# Patient Record
Sex: MALE | Race: WHITE | Employment: OTHER | URBAN - METROPOLITAN AREA
[De-identification: names, ages, dates, MRNs, and addresses within clinical notes are randomized per-mention and may not be internally consistent; named-entity substitution may affect disease eponyms.]

---

## 2017-03-30 ENCOUNTER — OP HISTORICAL/CONVERTED ENCOUNTER (OUTPATIENT)
Dept: OTHER | Age: 82
End: 2017-03-30

## 2018-03-12 ENCOUNTER — IP HISTORICAL/CONVERTED ENCOUNTER (OUTPATIENT)
Dept: OTHER | Age: 83
End: 2018-03-12

## 2018-03-21 ENCOUNTER — DOCUMENTATION ONLY (OUTPATIENT)
Dept: CARDIOLOGY CLINIC | Age: 83
End: 2018-03-21

## 2018-03-21 NOTE — PROGRESS NOTES
Dual chamber pacemaker implantation performed at [facility] 3/16/18     Wound check 1 week   Clinic visit 4 weeks           Cardiac Electrophysiology Report       PATIENT INFORMATION     Patient Name: Sheryl Persaud MRN: [id number]                 Study Date: 3/16/2018     YOB: 1935 Age: 81 years         Gender: Male       Procedure: Lizzeth Townsend     Referring Physician:  Dr. Jan Stephens and Dr. Rodriguez Client     Duty   Name   Electrophysiologist   Dee Dee Zarate MD               PATIENT HISTORY     81year old male with hypertension, dyslipidemia, colon cancer admitted with newly diagnosed atrial fibrillation and noted to have bradycardia with pauses that precluded escalation of rate-controlling drug therapy. As a result, he is referred for implantation of a dual chamber pacemaker. PROCEDURE     The patient was brought to the Cardiac Electrophysiology laboratory in a post-absorptive, fasting state.  Informed consent was obtained. A peripheral IV was in place.  Continuous electrocardiographic, blood pressure, O2 saturation and  CO2 monitoring was initiated.  Intravenous antibiotics were administered pre-operatively. Self-adhesive cardioversion patches were positioned on the chest.  Conscious sedation was effectuated according to protocol. The patient was then prepped and draped in the usual sterile fashion.  A left subclavian venogram was performed and demonstrated patency of the vein. A 50/50 mixture of lidocaine (1%) with epinephrine and bupivicaine (0.5%) was utilized for local anesthesia. An incision was performed medial to the left delto-pectoral groove. Sharp and blunt dissection was carried down to the level of the pre-pectoralis fascia. Hemostasis was maintained with electrocautery. Extra-thoracic, subclavian venous access was obtained twice and sheaths were placed using the modified Seldinger technique.  Permanent pace/sense leads were advanced under fluoroscopic guidance and positioned in the right atrium and ventricle where stable pacing and sensing characteristics were encountered. The sheaths were peeled away and the leads were anchored to the pre-pectoralis fascia using O-ethibond non-absorbable suture material. A device pocket was then fashioned and flushed copiously with antibiotic solution. A pacemaker generator was connected to the leads and the generator was placed into the pocket. The device was secured to the pocket using O-ethibond, non-absorbable suture material. The pocket was then closed in three layers using 2-O vicryl, 3-O monocryl, 4-O monocryl absorbable suture material and Dermabond. The skin was closed using a sub-cuticular technique. A bio-occlusive dressing were applied to the skin. The patient remained hemodynamically stable, tolerated the procedure well and was transferred in stable condition. There were no immediate complications encountered during the procedure. There was minimal blood loss and no specimen were removed.         LEAD & GENERATOR DATA          Model #   Serial #   Generator   Perry e-SENS   [id number]          [id number]     Atrial Lead   [employer]   [id number]   [id number]   Ventricular Lead   Perry e-SENS   [id number]   [id number]       PACE/SENSE DATA       Sensed Wave (mV)   Threshold (V)   Impedance (Ohms)   Atrium                     7.4   1.3   797   Ventricle   14.7   0.4   935       FINAL PROGRAMMING     Mode   Lower Rate (ppm)   Upper Rate (ppm)   DDD   60   130       MEDICATION SUMMARY     Medication   Route   Unit   Total   Gentamycin   IV   mg   80   Ancef   IV   grams   2   Fentanyl   IV   micrograms   125   Versed   IV   grams   3       RADIOLOGY SUMMARY       Total   Fluoro Time (minutes)         10.9     Dose Area Product (mGy)   195       CONCLUSIONS     1. Successful implantation of a dual-chamber pacemaker. 2. IV antibiotics x 24 hours for 3 doses followed by Keflex 500 mg po tid x 5 days.    3. Monitor overnight on telemetry. 4. CXR (PA & lateral) and device interrogation in AM.   5. Possible discharge in AM.   6. Wound check in EP clinic in 7 days. 7. Follow up in EP clinic in 1 month or earlier if necessary. 8. Follow up with Dr. Ricky Luo and Dr. Radha Rasmussen as scheduled.       Sherry anderson, Dr. Ricky Luo and Dr. Radha Rasmussen for involving me in the care of this extraordinarily pleasant male.        Lynnette Goss MD   Cardiac Electrophysiology     Signature Line   Electronically Signed on 03/20/2018 09:55 EDT   ________________________________________________   Tristan Braun MD               Modified by: Tristan Braun MD on 03/16/2018 09:07 EDT     Modified by: Tristan Braun MD on 03/20/2018 09:55 EDT     Result type: Operative Report   Result date: March 16, 2018 09:05 EDT   Result status: Auth (Verified)   Performed by: Tristan Braun MD on March 16, 2018 09:07 EDT   Verified by: Tristan Braun MD on March 20, 2018 09:55 EDT   Encounter info: [id number], [address], Inpatient, 3/12/2018 - 3/19/2018

## 2018-03-28 ENCOUNTER — OFFICE VISIT (OUTPATIENT)
Dept: CARDIOLOGY CLINIC | Age: 83
End: 2018-03-28

## 2018-03-28 VITALS
BODY MASS INDEX: 30.61 KG/M2 | RESPIRATION RATE: 18 BRPM | HEART RATE: 60 BPM | SYSTOLIC BLOOD PRESSURE: 122 MMHG | WEIGHT: 195 LBS | HEIGHT: 67 IN | DIASTOLIC BLOOD PRESSURE: 72 MMHG | OXYGEN SATURATION: 97 %

## 2018-03-28 DIAGNOSIS — Z51.89 VISIT FOR WOUND CHECK: ICD-10-CM

## 2018-03-28 DIAGNOSIS — Z95.0 PACEMAKER: ICD-10-CM

## 2018-03-28 DIAGNOSIS — I49.5 SSS (SICK SINUS SYNDROME) (HCC): Primary | ICD-10-CM

## 2018-03-28 RX ORDER — AMIODARONE HYDROCHLORIDE 200 MG/1
100 TABLET ORAL DAILY
COMMUNITY
Start: 2018-03-19

## 2018-03-28 RX ORDER — CEPHALEXIN 500 MG/1
500 CAPSULE ORAL 3 TIMES DAILY
Qty: 15 CAP | Refills: 0 | Status: SHIPPED | OUTPATIENT
Start: 2018-03-28 | End: 2018-04-02

## 2018-03-28 RX ORDER — METOPROLOL SUCCINATE 25 MG/1
25 TABLET, EXTENDED RELEASE ORAL DAILY
COMMUNITY
Start: 2018-03-19

## 2018-03-28 NOTE — MR AVS SNAPSHOT
[address] [facility] 600 1007 [facility] 
[phone] Patient: William Chau MRN: [id number] :1935 Visit Information Date & Time Provider Department Dept. Phone Encounter #  
 3/28/2018  2:40 PM Sky Shipley NP [facility] Melissa Ornelas [phone] [id number] Your Appointments 2018  1:20 PM  
ESTABLISHED PATIENT with Uyen Heck MD  
[facility] ([address]) Appt Note: 3 week follow up per Ailyn Vegas [address] [facility] 600 1007 [facility]  
[address] [facility] [address] [address] Upcoming Health Maintenance Date Due DTaP/Tdap/Td series (1 - Tdap) 3/9/1957 ZOSTER VACCINE AGE 60> 1996 GLAUCOMA SCREENING Q2Y 3/9/2001 Pneumococcal 65+ Low/Medium Risk (1 of 2 - PCV13) 3/9/2001 Influenza Age 5 to Adult 2017 MEDICARE YEARLY EXAM 3/20/2018 Allergies as of 3/28/2018  Review Complete On: 3/28/2018 By: Sky Shipley NP Severity Noted Reaction Type Reactions Monosodium Glutamate High 2016    Anaphylaxis, Hives Adhesive Tape-silicones      Other (comments) BLISTERS Current Immunizations  Never Reviewed No immunizations on file. Not reviewed this visit You Were Diagnosed With   
  
 Codes Comments SSS (sick sinus syndrome) (HCC)    -  Primary ICD-10-CM: I49.5 ICD-9-CM: 427.81 Pacemaker     ICD-10-CM: Z95.0 ICD-9-CM: V45.01 Visit for wound check     ICD-10-CM: Z51.89 ICD-9-CM: V58.89 Vitals BP Pulse Resp Height(growth percentile) Weight(growth percentile) SpO2  
 122/72 (BP 1 Location: Right arm, BP Patient Position: Sitting) 60 18 5' 7\" (1.702 m) 195 lb (88.5 kg) 97% BMI Smoking Status 30.54 kg/m2 Former Smoker Vitals History BMI and BSA Data  Body Mass Index Body Surface Area 30.54 kg/m 2 2.05 m 2 Preferred Pharmacy Pharmacy Name Phone [address] [facility], [facility] [address] ([address] [phone] Your Updated Medication List  
  
   
This list is accurate as of 3/28/18  2:50 PM.  Always use your most recent med list.  
  
  
  
  
 allopurinol 100 mg tablet Commonly known as:  Jeana Ros Take 200 mg by mouth daily. ALPRAZolam 1 mg tablet Commonly known as:  Marlen Grandchild Take 1 mg by mouth nightly as needed. amiodarone 200 mg tablet Commonly known as:  CORDARONE Take 100 mg by mouth daily. busPIRone 15 mg tablet Commonly known as:  BUSPAR Take 15 mg by mouth two (2) times a day. cephALEXin 500 mg capsule Commonly known as:  Allison Grief Take 1 Cap by mouth three (3) times daily for 5 days. glimepiride 1 mg tablet Commonly known as:  AMARYL Take 1 mg by mouth Daily (before breakfast). lisinopril 2.5 mg tablet Commonly known as:  Beatris Isabela Take 2.5 mg by mouth daily. metFORMIN 1,000 mg tablet Commonly known as:  GLUCOPHAGE Take 1,000 mg by mouth nightly. metoprolol succinate 25 mg XL tablet Commonly known as:  TOPROL-XL Take 25 mg by mouth daily. multivitamin tablet Commonly known as:  ONE A DAY Take 1 Tab by mouth daily. 50+ PARoxetine 10 mg tablet Commonly known as:  PAXIL Take 10 mg by mouth daily. simvastatin 40 mg tablet Commonly known as:  ZOCOR Take 40 mg by mouth nightly. Prescriptions Sent to Pharmacy Refills  
 cephALEXin (KEFLEX) 500 mg capsule 0 Sig: Take 1 Cap by mouth three (3) times daily for 5 days. Class: Normal  
 Pharmacy: [facility] [address] [place] [address] #: [phone] Route: Oral  
  
Introducing \[facility]\"" & HEALTH SERVICES!    
 Rebecca Truong introduces [facility] patient portal. Now you can access parts of your medical record, email your doctor's office, and request medication refills online. 1. In your internet browser, go to [email or web address] 2. Click on the First Time User? Click Here link in the Sign In box. You will see the New Member Sign Up page. 3. Enter your [facility] Access Code exactly as it appears below. You will not need to use this code after youve completed the sign-up process. If you do not sign up before the expiration date, you must request a new code. · [facility] Access Code: [email or web address] Expires: 6/26/2018  2:50 PM 
 
4. Enter the last four digits of your Social Security Number (xxxx) and Date of Birth (mm/dd/yyyy) as indicated and click Submit. You will be taken to the next sign-up page. 5. Create a [facility] ID. This will be your [facility] login ID and cannot be changed, so think of one that is secure and easy to remember. 6. Create a [facility] password. You can change your password at any time. 7. Enter your Password Reset Question and Answer. This can be used at a later time if you forget your password. 8. Enter your e-mail address. You will receive e-mail notification when new information is available in [address] 9. Click Sign Up. You can now view and download portions of your medical record. 10. Click the Download Summary menu link to download a portable copy of your medical information. If you have questions, please visit the Frequently Asked Questions section of the [employer] [facility] website. Remember, [facility] [facility] is NOT to be used for urgent needs. For medical emergencies, dial 911. Now available from your iPhone and Android! Please provide this summary of care documentation to your next provider. Your primary care clinician is listed as Timothy Later. If you have any questions after today's visit, please call [phone].

## 2018-03-28 NOTE — PROGRESS NOTES
Patient presents for wound check post-device implantation. The dressing was removed and the site was inspected. The site appeared to be well-healing without ecchymosis/tenderness/erythema. Denies pain, fevers, discharge. Plan:    Continue follow up in device clinic as planned. He did not receive antibiotic prophylaxis when he was discharged from [facility] post procedure. Will provide Keflex 500mg TID for days.     Evangelista Found, NP

## 2018-03-31 ENCOUNTER — OP HISTORICAL/CONVERTED ENCOUNTER (OUTPATIENT)
Dept: OTHER | Age: 83
End: 2018-03-31

## 2018-04-18 ENCOUNTER — OFFICE VISIT (OUTPATIENT)
Dept: CARDIOLOGY CLINIC | Age: 83
End: 2018-04-18

## 2018-04-18 ENCOUNTER — CLINICAL SUPPORT (OUTPATIENT)
Dept: CARDIOLOGY CLINIC | Age: 83
End: 2018-04-18

## 2018-04-18 VITALS
HEART RATE: 64 BPM | DIASTOLIC BLOOD PRESSURE: 64 MMHG | HEIGHT: 67 IN | BODY MASS INDEX: 30.76 KG/M2 | OXYGEN SATURATION: 96 % | SYSTOLIC BLOOD PRESSURE: 102 MMHG | WEIGHT: 196 LBS | RESPIRATION RATE: 18 BRPM

## 2018-04-18 DIAGNOSIS — Z95.0 CARDIAC PACEMAKER IN SITU: Primary | ICD-10-CM

## 2018-04-18 DIAGNOSIS — Z95.0 PACEMAKER: Primary | ICD-10-CM

## 2018-04-18 NOTE — PROGRESS NOTES
HISTORY OF PRESENTING ILLNESS      Amada Camara is a 81 y.o. male who underwent pacemaker implantation and now presents for follow-up. Incision site has healed well. Device interrogation reveals normal device functioning. 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  EAR           PAST SURGICAL HISTORY     Past Surgical History:   Procedure Laterality Date    HX CATARACT REMOVAL Bilateral     HX COLECTOMY  1908'S    1 FOOT REMOVED    HX GI      COLONOSCOPY    HX HERNIA REPAIR      UMBILICAL    HX RETINAL DETACHMENT REPAIR Right     HX TONSILLECTOMY            ALLERGIES     Allergies   Allergen Reactions    Monosodium Glutamate Anaphylaxis and Hives    Adhesive Tape-Silicones Other (comments)     BLISTERS          FAMILY HISTORY     Family History   Problem Relation Age of Onset    Liver Disease Mother 27     JAUNDICE    Heart Disease Father     Heart Attack Father     No Known Problems Sister     Alcohol abuse Brother     Heart Disease Brother     Muscular dystrophy Brother      FROM THEIR MOTHER, PT STEPMOM    Muscular dystrophy Brother      FROM THEIR MOTHER, PT STEPMOM    Muscular dystrophy Brother      FROM THEIR MOTHER, PT STEPMOM    No Known Problems Sister     Breast Cancer Daughter     Anesth Problems Neg Hx     negative for cardiac disease       SOCIAL HISTORY     Social History     Social History    Marital status:      Spouse name: N/A    Number of children: N/A    Years of education: N/A     Social History Main Topics    Smoking status: Former Smoker     Packs/day: 1.00     Years: 10.00     Quit date: 7/29/1965    Smokeless tobacco: Never Used    Alcohol use No      Comment: NOT SINCE 2004    Drug use: No    Sexual activity: Not on file     Other Topics Concern    Not on file     Social History Narrative         MEDICATIONS     Current Outpatient Prescriptions   Medication Sig    amiodarone (CORDARONE) 200 mg tablet Take 100 mg by mouth daily.  metoprolol succinate (TOPROL-XL) 25 mg XL tablet Take 25 mg by mouth daily.  simvastatin (ZOCOR) 40 mg tablet Take 40 mg by mouth nightly.  lisinopril (PRINIVIL, ZESTRIL) 2.5 mg tablet Take 2.5 mg by mouth daily.  allopurinol (ZYLOPRIM) 100 mg tablet Take 200 mg by mouth daily.  metFORMIN (GLUCOPHAGE) 1,000 mg tablet Take 1,000 mg by mouth nightly.  busPIRone (BUSPAR) 15 mg tablet Take 15 mg by mouth two (2) times a day.  glimepiride (AMARYL) 1 mg tablet Take 1 mg by mouth Daily (before breakfast).  PARoxetine (PAXIL) 10 mg tablet Take 10 mg by mouth daily.  multivitamin (ONE A DAY) tablet Take 1 Tab by mouth daily. 50+    ALPRAZolam (XANAX) 1 mg tablet Take 1 mg by mouth nightly as needed. No current facility-administered medications for this visit. I have reviewed the nurses notes, vitals, problem list, allergy list, medical history, family, social history and medications. REVIEW OF SYMPTOMS      General: Pt denies excessive weight gain or loss. Pt is able to conduct ADL's  HEENT: Denies blurred vision, headaches, hearing loss, epistaxis and difficulty swallowing. Respiratory: Denies cough, congestion, shortness of breath, SALEEM, wheezing or stridor.   Cardiovascular: Denies precordial pain, palpitations, edema or PND  Gastrointestinal: Denies poor appetite, indigestion, abdominal pain or blood in stool  Genitourinary: Denies hematuria, dysuria, increased urinary frequency  Musculoskeletal: Denies joint pain or swelling from muscles or joints  Neurologic: Denies tremor, paresthesias, headache, or sensory motor disturbance  Psychiatric: Denies confusion, insomnia, depression  Integumentray: Denies rash, itching or ulcers. Hematologic: Denies easy bruising, bleeding       PHYSICAL EXAMINATION      There were no vitals filed for this visit. General: Well developed, in no acute distress. HEENT: No jaundice, oral mucosa moist, no oral ulcers  Neck: Supple, no stiffness, no lymphadenopathy, supple  Heart:  Normal S1/S2 negative S3 or S4. Regular, no murmur, gallop or rub, no jugular venous distention  Respiratory: Clear bilaterally x 4, no wheezing or rales  Abdomen:   Soft, non-tender, bowel sounds are active.   Extremities:  No edema, normal cap refill, no cyanosis. Musculoskeletal: No clubbing, no deformities  Neuro: A&Ox3, speech clear, gait stable, cooperative, no focal neurologic deficits  Skin: Skin color is normal. No rashes or lesions. Non diaphoretic, moist.  Vascular: 2+ pulses symmetric in all extremities       DIAGNOSTIC DATA      EKG:        LABORATORY DATA      Lab Results   Component Value Date/Time    WBC 6.8 07/29/2016 03:14 PM    HGB 15.6 07/29/2016 03:14 PM    HCT 45.8 07/29/2016 03:14 PM    PLATELET 590 71/67/6767 03:14 PM    MCV 92.7 07/29/2016 03:14 PM      Lab Results   Component Value Date/Time    Sodium 143 07/29/2016 03:14 PM    Potassium 4.8 07/29/2016 03:14 PM    Chloride 106 07/29/2016 03:14 PM    CO2 29 07/29/2016 03:14 PM    Anion gap 8 07/29/2016 03:14 PM    Glucose 117 (H) 07/29/2016 03:14 PM    BUN 18 07/29/2016 03:14 PM    Creatinine 1.05 07/29/2016 03:14 PM    BUN/Creatinine ratio 17 07/29/2016 03:14 PM    GFR est AA >60 07/29/2016 03:14 PM    GFR est non-AA >60 07/29/2016 03:14 PM    Calcium 8.8 07/29/2016 03:14 PM           ASSESSMENT      1. Pacemaker   [address]  2. Bradycardia         PLAN     Continue following in device clinic. Continue current drug regimen. FOLLOW-UP       Thank you, Ray Jung MD and Dr. Meche Kaufman for allowing me to participate in the care of this extraordinarily pleasant male.  Please do not hesitate to contact me for further questions/concerns.          Sarah Seth MD  Cardiac Electrophysiology / Cardiology    [facility] [address]  [address], [address] [facility], [address] [place] [address], [facility].    [place], [address] [place] [address]  [phone] / [phone] Fax   [phone] / [phone] Fax

## 2018-04-18 NOTE — PROGRESS NOTES
Visit Vitals    /64 (BP 1 Location: Left arm, BP Patient Position: Sitting)    Pulse 64    Resp 18    Ht 5' 7\" (1.702 m)    Wt 196 lb (88.9 kg)    SpO2 96%    BMI 30.7 kg/m2

## 2018-07-15 ENCOUNTER — ED HISTORICAL/CONVERTED ENCOUNTER (OUTPATIENT)
Dept: OTHER | Age: 83
End: 2018-07-15

## 2018-07-18 ENCOUNTER — OFFICE VISIT (OUTPATIENT)
Dept: CARDIOLOGY CLINIC | Age: 83
End: 2018-07-18

## 2018-07-18 DIAGNOSIS — Z95.0 PACEMAKER: Primary | ICD-10-CM

## 2019-12-03 ENCOUNTER — ED HISTORICAL/CONVERTED ENCOUNTER (OUTPATIENT)
Dept: OTHER | Age: 84
End: 2019-12-03

## 2020-06-20 ENCOUNTER — ED HISTORICAL/CONVERTED ENCOUNTER (OUTPATIENT)
Dept: OTHER | Age: 85
End: 2020-06-20

## 2021-07-28 ENCOUNTER — APPOINTMENT (OUTPATIENT)
Dept: CT IMAGING | Age: 86
End: 2021-07-28
Attending: EMERGENCY MEDICINE
Payer: MEDICARE

## 2021-07-28 ENCOUNTER — HOSPITAL ENCOUNTER (OUTPATIENT)
Age: 86
Setting detail: OBSERVATION
Discharge: HOME | End: 2021-07-29
Attending: EMERGENCY MEDICINE | Admitting: INTERNAL MEDICINE
Payer: MEDICARE

## 2021-07-28 ENCOUNTER — APPOINTMENT (OUTPATIENT)
Dept: GENERAL RADIOLOGY | Age: 86
End: 2021-07-28
Attending: EMERGENCY MEDICINE
Payer: MEDICARE

## 2021-07-28 DIAGNOSIS — H55.00 NYSTAGMUS: ICD-10-CM

## 2021-07-28 DIAGNOSIS — H53.2 DIPLOPIA: Primary | ICD-10-CM

## 2021-07-28 PROBLEM — R42 DIZZINESS: Status: ACTIVE | Noted: 2021-07-28

## 2021-07-28 LAB
ALBUMIN SERPL-MCNC: 3.9 G/DL (ref 3.5–5)
ALBUMIN/GLOB SERPL: 1.3 {RATIO} (ref 1.1–2.2)
ALP SERPL-CCNC: 65 U/L (ref 45–117)
ALT SERPL-CCNC: 37 U/L (ref 12–78)
ANION GAP SERPL CALC-SCNC: 2 MMOL/L (ref 5–15)
AST SERPL W P-5'-P-CCNC: 14 U/L (ref 15–37)
BASOPHILS # BLD: 0 K/UL (ref 0–0.1)
BASOPHILS NFR BLD: 0 % (ref 0–1)
BILIRUB SERPL-MCNC: 1.2 MG/DL (ref 0.2–1)
BUN SERPL-MCNC: 24 MG/DL (ref 6–20)
BUN/CREAT SERPL: 19 (ref 12–20)
CA-I BLD-MCNC: 9.5 MG/DL (ref 8.5–10.1)
CHLORIDE SERPL-SCNC: 108 MMOL/L (ref 97–108)
CO2 SERPL-SCNC: 32 MMOL/L (ref 21–32)
CREAT SERPL-MCNC: 1.29 MG/DL (ref 0.7–1.3)
DIFFERENTIAL METHOD BLD: ABNORMAL
EOSINOPHIL # BLD: 0.1 K/UL (ref 0–0.4)
EOSINOPHIL NFR BLD: 1 % (ref 0–7)
ERYTHROCYTE [DISTWIDTH] IN BLOOD BY AUTOMATED COUNT: 14.1 % (ref 11.5–14.5)
GLOBULIN SER CALC-MCNC: 2.9 G/DL (ref 2–4)
GLUCOSE BLD STRIP.AUTO-MCNC: 139 MG/DL (ref 65–117)
GLUCOSE SERPL-MCNC: 180 MG/DL (ref 65–100)
HCT VFR BLD AUTO: 45.2 % (ref 36.6–50.3)
HGB BLD-MCNC: 15.1 G/DL (ref 12.1–17)
IMM GRANULOCYTES # BLD AUTO: 0.1 K/UL (ref 0–0.04)
IMM GRANULOCYTES NFR BLD AUTO: 1 % (ref 0–0.5)
LYMPHOCYTES # BLD: 5.7 K/UL (ref 0.8–3.5)
LYMPHOCYTES NFR BLD: 46 % (ref 12–49)
MCH RBC QN AUTO: 32.2 PG (ref 26–34)
MCHC RBC AUTO-ENTMCNC: 33.4 G/DL (ref 30–36.5)
MCV RBC AUTO: 96.4 FL (ref 80–99)
MONOCYTES # BLD: 1.1 K/UL (ref 0–1)
MONOCYTES NFR BLD: 8 % (ref 5–13)
NEUTS SEG # BLD: 5.5 K/UL (ref 1.8–8)
NEUTS SEG NFR BLD: 44 % (ref 32–75)
NRBC # BLD: 0 K/UL (ref 0–0.01)
NRBC BLD-RTO: 0 PER 100 WBC
PERFORMED BY, TECHID: ABNORMAL
PLATELET # BLD AUTO: 182 K/UL (ref 150–400)
PMV BLD AUTO: 10.6 FL (ref 8.9–12.9)
POTASSIUM SERPL-SCNC: 4.8 MMOL/L (ref 3.5–5.1)
PROT SERPL-MCNC: 6.8 G/DL (ref 6.4–8.2)
RBC # BLD AUTO: 4.69 M/UL (ref 4.1–5.7)
SODIUM SERPL-SCNC: 142 MMOL/L (ref 136–145)
TROPONIN I SERPL-MCNC: <0.05 NG/ML
WBC # BLD AUTO: 12.5 K/UL (ref 4.1–11.1)

## 2021-07-28 PROCEDURE — 82962 GLUCOSE BLOOD TEST: CPT

## 2021-07-28 PROCEDURE — 85025 COMPLETE CBC W/AUTO DIFF WBC: CPT

## 2021-07-28 PROCEDURE — 74011000636 HC RX REV CODE- 636: Performed by: EMERGENCY MEDICINE

## 2021-07-28 PROCEDURE — 80053 COMPREHEN METABOLIC PANEL: CPT

## 2021-07-28 PROCEDURE — 99218 HC RM OBSERVATION: CPT

## 2021-07-28 PROCEDURE — 71045 X-RAY EXAM CHEST 1 VIEW: CPT

## 2021-07-28 PROCEDURE — 36415 COLL VENOUS BLD VENIPUNCTURE: CPT

## 2021-07-28 PROCEDURE — 74011250637 HC RX REV CODE- 250/637: Performed by: EMERGENCY MEDICINE

## 2021-07-28 PROCEDURE — 99285 EMERGENCY DEPT VISIT HI MDM: CPT

## 2021-07-28 PROCEDURE — 84484 ASSAY OF TROPONIN QUANT: CPT

## 2021-07-28 PROCEDURE — 74011250637 HC RX REV CODE- 250/637: Performed by: INTERNAL MEDICINE

## 2021-07-28 PROCEDURE — 70450 CT HEAD/BRAIN W/O DYE: CPT

## 2021-07-28 PROCEDURE — 93005 ELECTROCARDIOGRAM TRACING: CPT

## 2021-07-28 PROCEDURE — 70498 CT ANGIOGRAPHY NECK: CPT

## 2021-07-28 RX ORDER — ATORVASTATIN CALCIUM 20 MG/1
20 TABLET, FILM COATED ORAL
Status: DISCONTINUED | OUTPATIENT
Start: 2021-07-28 | End: 2021-07-29

## 2021-07-28 RX ORDER — ENOXAPARIN SODIUM 100 MG/ML
40 INJECTION SUBCUTANEOUS DAILY
Status: DISCONTINUED | OUTPATIENT
Start: 2021-07-28 | End: 2021-07-29 | Stop reason: HOSPADM

## 2021-07-28 RX ORDER — ACETAMINOPHEN 650 MG/1
650 SUPPOSITORY RECTAL
Status: DISCONTINUED | OUTPATIENT
Start: 2021-07-28 | End: 2021-07-29 | Stop reason: HOSPADM

## 2021-07-28 RX ORDER — ACETAMINOPHEN 325 MG/1
650 TABLET ORAL
Status: DISCONTINUED | OUTPATIENT
Start: 2021-07-28 | End: 2021-07-29 | Stop reason: HOSPADM

## 2021-07-28 RX ORDER — METOPROLOL SUCCINATE 25 MG/1
25 TABLET, EXTENDED RELEASE ORAL DAILY
Status: DISCONTINUED | OUTPATIENT
Start: 2021-07-28 | End: 2021-07-29 | Stop reason: HOSPADM

## 2021-07-28 RX ORDER — GUAIFENESIN 100 MG/5ML
81 LIQUID (ML) ORAL DAILY
Status: DISCONTINUED | OUTPATIENT
Start: 2021-07-29 | End: 2021-07-29 | Stop reason: HOSPADM

## 2021-07-28 RX ORDER — POLYETHYLENE GLYCOL 3350 17 G/17G
17 POWDER, FOR SOLUTION ORAL DAILY PRN
Status: DISCONTINUED | OUTPATIENT
Start: 2021-07-28 | End: 2021-07-29 | Stop reason: HOSPADM

## 2021-07-28 RX ORDER — SODIUM CHLORIDE 0.9 % (FLUSH) 0.9 %
5-40 SYRINGE (ML) INJECTION AS NEEDED
Status: DISCONTINUED | OUTPATIENT
Start: 2021-07-28 | End: 2021-07-29 | Stop reason: HOSPADM

## 2021-07-28 RX ORDER — ONDANSETRON 2 MG/ML
4 INJECTION INTRAMUSCULAR; INTRAVENOUS
Status: DISCONTINUED | OUTPATIENT
Start: 2021-07-28 | End: 2021-07-29 | Stop reason: HOSPADM

## 2021-07-28 RX ORDER — LISINOPRIL 5 MG/1
2.5 TABLET ORAL DAILY
Status: DISCONTINUED | OUTPATIENT
Start: 2021-07-28 | End: 2021-07-29 | Stop reason: HOSPADM

## 2021-07-28 RX ORDER — INSULIN LISPRO 100 [IU]/ML
INJECTION, SOLUTION INTRAVENOUS; SUBCUTANEOUS
Status: DISCONTINUED | OUTPATIENT
Start: 2021-07-28 | End: 2021-07-29 | Stop reason: HOSPADM

## 2021-07-28 RX ORDER — ALPRAZOLAM 0.25 MG/1
1 TABLET ORAL
Status: DISCONTINUED | OUTPATIENT
Start: 2021-07-28 | End: 2021-07-29

## 2021-07-28 RX ORDER — SODIUM CHLORIDE 0.9 % (FLUSH) 0.9 %
5-40 SYRINGE (ML) INJECTION EVERY 8 HOURS
Status: DISCONTINUED | OUTPATIENT
Start: 2021-07-28 | End: 2021-07-29 | Stop reason: HOSPADM

## 2021-07-28 RX ORDER — ALLOPURINOL 100 MG/1
200 TABLET ORAL DAILY
Status: DISCONTINUED | OUTPATIENT
Start: 2021-07-29 | End: 2021-07-29 | Stop reason: HOSPADM

## 2021-07-28 RX ORDER — PAROXETINE 10 MG/1
10 TABLET, FILM COATED ORAL DAILY
Status: DISCONTINUED | OUTPATIENT
Start: 2021-07-28 | End: 2021-07-29 | Stop reason: HOSPADM

## 2021-07-28 RX ORDER — ASPIRIN 325 MG
325 TABLET ORAL
Status: COMPLETED | OUTPATIENT
Start: 2021-07-28 | End: 2021-07-28

## 2021-07-28 RX ORDER — BISMUTH SUBSALICYLATE 262 MG
1 TABLET,CHEWABLE ORAL DAILY
Status: DISCONTINUED | OUTPATIENT
Start: 2021-07-29 | End: 2021-07-29 | Stop reason: HOSPADM

## 2021-07-28 RX ORDER — AMIODARONE HYDROCHLORIDE 200 MG/1
100 TABLET ORAL DAILY
Status: DISCONTINUED | OUTPATIENT
Start: 2021-07-29 | End: 2021-07-29 | Stop reason: HOSPADM

## 2021-07-28 RX ORDER — ONDANSETRON 4 MG/1
4 TABLET, ORALLY DISINTEGRATING ORAL
Status: DISCONTINUED | OUTPATIENT
Start: 2021-07-28 | End: 2021-07-29 | Stop reason: HOSPADM

## 2021-07-28 RX ADMIN — ALPRAZOLAM 1 MG: 0.25 TABLET ORAL at 23:13

## 2021-07-28 RX ADMIN — ATORVASTATIN CALCIUM 20 MG: 20 TABLET, FILM COATED ORAL at 21:05

## 2021-07-28 RX ADMIN — ASPIRIN 325 MG ORAL TABLET 325 MG: 325 PILL ORAL at 15:31

## 2021-07-28 RX ADMIN — IOPAMIDOL 100 ML: 755 INJECTION, SOLUTION INTRAVENOUS at 13:15

## 2021-07-28 RX ADMIN — Medication 10 ML: at 21:04

## 2021-07-28 NOTE — H&P
History & Physical    Primary Care Provider: Horacio Carr DO  Source of Information: Patient     History of Presenting Illness:   Lars Dawn is a 81 y.o. male with history of sick sinus syndrome status post permanent pacemaker placement, type 2 diabetes and essential hypertension who presents to the ED with reports of dizziness and double vision. Symptoms started this morning at about 8 AM.  Presented to PCPs office and was advised to come to the ED. He came to the ED as a stroke alert and was evaluated by neurology on call. CT scan of the brain as well as CTA negative for acute process. Denies chest pain or shortness of breath. No chest palpitations. . Chest x-ray negative for acute process. By the time I examined patient, symptoms had completely resolved. will be monitored overnight       Review of Systems:  A comprehensive review of systems was negative except for that written in the History of Present Illness. Past Medical History:   Diagnosis Date    Anxiety     Bradycardia     Cancer ([facility] [address]     COLON    Diabetes ([facility] [address]     TYPE II    Gout     Hypertension     Pacemaker     Psoriasis     SCALP AND LT. EAR      Past Surgical History:   Procedure Laterality Date    HX CATARACT REMOVAL Bilateral     HX GI      COLONOSCOPY    HX HERNIA REPAIR      UMBILICAL    HX PACEMAKER      HX RETINAL DETACHMENT REPAIR Right     HX TONSILLECTOMY      HX TOTAL COLECTOMY  1908'S    1 FOOT REMOVED     Prior to Admission medications    Medication Sig Start Date End Date Taking? Authorizing Provider   amiodarone (CORDARONE) 200 mg tablet Take 100 mg by mouth daily. 3/19/18   Provider, Historical   metoprolol succinate (TOPROL-XL) 25 mg XL tablet Take 25 mg by mouth daily. 3/19/18   Provider, Historical   simvastatin (ZOCOR) 40 mg tablet Take 40 mg by mouth nightly. Provider, Historical   lisinopril (PRINIVIL, ZESTRIL) 2.5 mg tablet Take 2.5 mg by mouth daily. Provider, Historical   allopurinol (ZYLOPRIM) 100 mg tablet Take 200 mg by mouth daily. Provider, Historical   metFORMIN (GLUCOPHAGE) 1,000 mg tablet Take 1,000 mg by mouth nightly. Provider, Historical   busPIRone (BUSPAR) 15 mg tablet Take 15 mg by mouth two (2) times a day. Provider, Historical   glimepiride (AMARYL) 1 mg tablet Take 1 mg by mouth Daily (before breakfast). Provider, Historical   PARoxetine (PAXIL) 10 mg tablet Take 10 mg by mouth daily. Provider, Historical   multivitamin (ONE A DAY) tablet Take 1 Tab by mouth daily. 50+    Provider, Historical   ALPRAZolam (XANAX) 1 mg tablet Take 1 mg by mouth nightly as needed. Provider, Historical     Allergies   Allergen Reactions    Monosodium Glutamate Anaphylaxis and Hives    Adhesive Tape-Silicones Other (comments)     BLISTERS      Family History   Problem Relation Age of Onset    Liver Disease Mother 27        JAUNDICE    Heart Disease Father     Heart Attack Father     No Known Problems Sister     Alcohol abuse Brother     Heart Disease Brother     Muscular dystrophy Brother         FROM THEIR MOTHER, PT STEPMOM    Muscular dystrophy Brother         FROM THEIR MOTHER, PT STEPMOM    Muscular dystrophy Brother         FROM THEIR MOTHER, PT STEPMOM    No Known Problems Sister     Breast Cancer Daughter     Anesth Problems Neg Hx         SOCIAL HISTORY:  Patient resides:  Independently x   Assisted Living    SNF    With family care       Smoking history:   None x   Former    Chronic      Alcohol history:   None x   Social    Chronic      Ambulates:   Independently x   w/cane    w/walker    w/wc    CODE STATUS:  DNR x   Full    Other      Objective:     Physical Exam:     Visit Vitals  /82   Pulse 60   Temp 98.2 °F (36.8 °C)   Resp 13   Ht 5' 8\" (1.727 m)   Wt 90.7 kg (200 lb)   SpO2 95%   BMI 30.41 kg/m²      O2 Device: None (Room air)    General:  Alert, cooperative, no distress, appears stated age.    Head: Normocephalic, without obvious abnormality, atraumatic. Eyes:  Conjunctivae/corneas clear. PERRL, EOMs intact. Nose: Nares normal. Septum midline. Mucosa normal. No drainage or sinus tenderness. Throat: Lips, mucosa, and tongue normal. Teeth and gums normal.   Neck: Supple, symmetrical, trachea midline, no adenopathy, thyroid: no enlargement/tenderness/nodules, no carotid bruit and no JVD. Back:   Symmetric, no curvature. ROM normal. No CVA tenderness. Lungs:   Clear to auscultation bilaterally. Chest wall:  No tenderness or deformity. Heart:  Regular rate and rhythm, S1, S2 normal, no murmur, click, rub or gallop. Abdomen:   Soft, non-tender. Bowel sounds normal. No masses,  No organomegaly. Extremities: Extremities normal, atraumatic, no cyanosis or edema. Pulses: 2+ and symmetric all extremities. Skin: Skin color, texture, turgor normal. No rashes or lesions   Neurologic: CNII-XII intact. No motor or sensory deficits. EKG:  nonspecific ST and T waves changes. Data Review:     Recent Days:  Recent Labs     07/28/21  1127   WBC 12.5*   HGB 15.1   HCT 45.2        Recent Labs     07/28/21  1127      K 4.8      CO2 32   *   BUN 24*   CREA 1.29   CA 9.5   ALB 3.9   TBILI 1.2*   ALT 37     No results for input(s): PH, PCO2, PO2, HCO3, FIO2 in the last 72 hours.     24 Hour Results:  Recent Results (from the past 24 hour(s))   CBC WITH AUTOMATED DIFF    Collection Time: 07/28/21 11:27 AM   Result Value Ref Range    WBC 12.5 (H) 4.1 - 11.1 K/uL    RBC 4.69 4.10 - 5.70 M/uL    HGB 15.1 12.1 - 17.0 g/dL    HCT 45.2 36.6 - 50.3 %    MCV 96.4 80.0 - 99.0 FL    MCH 32.2 26.0 - 34.0 PG    MCHC 33.4 30.0 - 36.5 g/dL    RDW 14.1 11.5 - 14.5 %    PLATELET 742 996 - 316 K/uL    MPV 10.6 8.9 - 12.9 FL    NRBC 0.0 0.0  WBC    ABSOLUTE NRBC 0.00 0.00 - 0.01 K/uL    NEUTROPHILS 44 32 - 75 %    LYMPHOCYTES 46 12 - 49 %    MONOCYTES 8 5 - 13 %    EOSINOPHILS 1 0 - 7 % BASOPHILS 0 0 - 1 %    IMMATURE GRANULOCYTES 1 (H) 0 - 0.5 %    ABS. NEUTROPHILS 5.5 1.8 - 8.0 K/UL    ABS. LYMPHOCYTES 5.7 (H) 0.8 - 3.5 K/UL    ABS. MONOCYTES 1.1 (H) 0.0 - 1.0 K/UL    ABS. EOSINOPHILS 0.1 0.0 - 0.4 K/UL    ABS. BASOPHILS 0.0 0.0 - 0.1 K/UL    ABS. IMM. GRANS. 0.1 (H) 0.00 - 0.04 K/UL    DF AUTOMATED     METABOLIC PANEL, COMPREHENSIVE    Collection Time: 07/28/21 11:27 AM   Result Value Ref Range    Sodium 142 136 - 145 mmol/L    Potassium 4.8 3.5 - 5.1 mmol/L    Chloride 108 97 - 108 mmol/L    CO2 32 21 - 32 mmol/L    Anion gap 2 (L) 5 - 15 mmol/L    Glucose 180 (H) 65 - 100 mg/dL    BUN 24 (H) 6 - 20 mg/dL    Creatinine 1.29 0.70 - 1.30 mg/dL    BUN/Creatinine ratio 19 12 - 20      GFR est AA >60 >60 ml/min/1.73m2    GFR est non-AA 53 (L) >60 ml/min/1.73m2    Calcium 9.5 8.5 - 10.1 mg/dL    Bilirubin, total 1.2 (H) 0.2 - 1.0 mg/dL    AST (SGOT) 14 (L) 15 - 37 U/L    ALT (SGPT) 37 12 - 78 U/L    Alk. phosphatase 65 45 - 117 U/L    Protein, total 6.8 6.4 - 8.2 g/dL    Albumin 3.9 3.5 - 5.0 g/dL    Globulin 2.9 2.0 - 4.0 g/dL    A-G Ratio 1.3 1.1 - 2.2     TROPONIN I    Collection Time: 07/28/21 11:27 AM   Result Value Ref Range    Troponin-I, Qt. <0.05 <0.05 ng/mL         Imaging:   CTA CODE NEURO HEAD AND NECK W CONT   Final Result   No occlusion or high-grade stenosis of the major cervical or intracranial   arterial vasculature. Please note that degrees of carotid stenosis are measured in accordance with   NASCET criteria. Findings were discussed with Dr. Lois Pan by Dr. Tristan eMtz at 7/28/2021 on 2:01 PM   via phone conversation. XR CHEST PORT   Final Result   No acute cardiopulmonary abnormality. CT HEAD WO CONT   Final Result   1. No acute intracranial abnormality. 2.  Periventricular white matter hypodensities may reflect chronic small vessel   ischemic disease in the appropriate clinical setting. 3.  Prior left mastoidectomy.             Assessment:     Dizziness rule out acute CVA  Benign essential hypertension. Fairly stable  Sick sinus syndrome status post permanent pacemaker placement  Type 2 diabetes  Hard of hearing    Plan:     Admit to medical telemetry floor observation  Neuro check every 4 hours x24 hours  Resume home medications except Metformin and glipizide  Begin low-salt cardiac diet  Obtain neuro evaluation.    Blood sugar AC at bedtime/sliding scale insulin  DVT GI prophylaxis  Anticipate overnight hospital stay for further observation  He is a DNR      Signed By: Laroy Cushing, MD     July 28, 2021

## 2021-07-28 NOTE — ROUTINE PROCESS
TRANSFER - OUT REPORT:    Verbal report given to Esther Aguirre RN(name) on Chacorta Silva  being transferred to [facility](unit) for routine progression of care       Report consisted of patients Situation, Background, Assessment and   Recommendations(SBAR). Information from the following report(s) SBAR, ED Summary, [facility] and Recent Results was reviewed with the receiving nurse. Lines:   Peripheral IV 07/28/21 Left Antecubital (Active)        Opportunity for questions and clarification was provided.       Patient transported with:   "[employer]

## 2021-07-28 NOTE — ED PROVIDER NOTES
EMERGENCY DEPARTMENT HISTORY AND PHYSICAL EXAM      Date: 7/28/2021  Patient Name: Ed Crmaer    History of Presenting Illness     Chief Complaint   Patient presents with    Double Vision       History Provided By: Patient    HPI: Ed Cramer, 81 y.o. male presents to the ED with cc of   Chief Complaint   Patient presents with    Double Vision     To pcp office this am for dizziness and lightheadedness onset this am, sent here for eval of \"double vision\"   Patient denies any headache weakness fever shortness of breath or chest pain, symptoms started today when he woke up around 7 AM    There are no other complaints, changes, or physical findings at this time. PCP: Nav Gutierrez, DO    No current facility-administered medications on file prior to encounter. Current Outpatient Medications on File Prior to Encounter   Medication Sig Dispense Refill    amiodarone (CORDARONE) 200 mg tablet Take 100 mg by mouth daily.  metoprolol succinate (TOPROL-XL) 25 mg XL tablet Take 25 mg by mouth daily.  simvastatin (ZOCOR) 40 mg tablet Take 40 mg by mouth nightly.  lisinopril (PRINIVIL, ZESTRIL) 2.5 mg tablet Take 2.5 mg by mouth daily.  allopurinol (ZYLOPRIM) 100 mg tablet Take 200 mg by mouth daily.  metFORMIN (GLUCOPHAGE) 1,000 mg tablet Take 1,000 mg by mouth nightly.  busPIRone (BUSPAR) 15 mg tablet Take 15 mg by mouth two (2) times a day.  glimepiride (AMARYL) 1 mg tablet Take 1 mg by mouth Daily (before breakfast).  PARoxetine (PAXIL) 10 mg tablet Take 10 mg by mouth daily.  multivitamin (ONE A DAY) tablet Take 1 Tab by mouth daily. 50+      ALPRAZolam (XANAX) 1 mg tablet Take 1 mg by mouth nightly as needed.          Past History     Past Medical History:  Past Medical History:   Diagnosis Date    Anxiety     Bradycardia     Cancer ([address]     COLON    Diabetes ([facility] [address]     TYPE II    Gout     Hypertension     Pacemaker     Psoriasis     SCALP AND LT. EAR       Past Surgical History:  Past Surgical History:   Procedure Laterality Date    HX CATARACT REMOVAL Bilateral     HX GI      COLONOSCOPY    HX HERNIA REPAIR      UMBILICAL    HX PACEMAKER      HX RETINAL DETACHMENT REPAIR Right     HX TONSILLECTOMY      HX TOTAL COLECTOMY  190'S    1 FOOT REMOVED       Family History:  Family History   Problem Relation Age of Onset    Liver Disease Mother 27        JAUNDICE    Heart Disease Father     Heart Attack Father     No Known Problems Sister     Alcohol abuse Brother     Heart Disease Brother     Muscular dystrophy Brother         FROM THEIR MOTHER, PT STEPMOM    Muscular dystrophy Brother         FROM THEIR MOTHER, PT STEPMOM    Muscular dystrophy Brother         FROM THEIR MOTHER, PT STEPMOM    No Known Problems Sister     Breast Cancer Daughter     Anesth Problems Neg Hx        Social History:  Social History     Tobacco Use    Smoking status: Former Smoker     Packs/day: 1.00     Years: 10.00     Pack years: 10.00     Quit date: 1965     Years since quittin.0    Smokeless tobacco: Never Used   Substance Use Topics    Alcohol use: No     Comment: NOT SINCE     Drug use: No       Allergies: Allergies   Allergen Reactions    Monosodium Glutamate Anaphylaxis and Hives    Adhesive Tape-Silicones Other (comments)     BLISTERS         Review of Systems   Review of Systems   Constitutional: Negative. HENT: Negative for congestion, facial swelling, rhinorrhea and sore throat. Eyes: Positive for visual disturbance. Negative for photophobia and pain. Respiratory: Negative for cough, shortness of breath and wheezing. Cardiovascular: Negative. Negative for chest pain. Gastrointestinal: Negative for abdominal distention and abdominal pain. Genitourinary: Negative. Musculoskeletal: Negative. Allergic/Immunologic: Negative for immunocompromised state.    Neurological: Positive for dizziness and light-headedness. Negative for syncope and weakness. Hematological: Negative. Psychiatric/Behavioral: Negative. Physical Exam   Physical Exam  Vitals and nursing note reviewed. Constitutional:       Appearance: Normal appearance. He is normal weight. HENT:      Head: Normocephalic and atraumatic. Nose: No congestion or rhinorrhea. Eyes:      Extraocular Movements: Extraocular movements intact. Pupils: Pupils are equal, round, and reactive to light. Cardiovascular:      Rate and Rhythm: Normal rate and regular rhythm. Pulmonary:      Effort: Pulmonary effort is normal.      Breath sounds: Normal breath sounds. Abdominal:      General: Abdomen is flat. Bowel sounds are normal. There is no distension. Tenderness: There is no abdominal tenderness. There is no guarding. Musculoskeletal:         General: Normal range of motion. Cervical back: Normal range of motion and neck supple. Comments: Lateral upper extremity fine intentional tremor   Skin:     General: Skin is warm and dry. Neurological:      General: No focal deficit present. Mental Status: He is alert and oriented to person, place, and time. Motor: No weakness.       Comments: Positive nystagmus in the right eye is slightly deviated to the right but able to follow directions EOMI intact   Psychiatric:         Mood and Affect: Mood normal.         Diagnostic Study Results     Labs -     Recent Results (from the past 12 hour(s))   CBC WITH AUTOMATED DIFF    Collection Time: 07/28/21 11:27 AM   Result Value Ref Range    WBC 12.5 (H) 4.1 - 11.1 K/uL    RBC 4.69 4.10 - 5.70 M/uL    HGB 15.1 12.1 - 17.0 g/dL    HCT 45.2 36.6 - 50.3 %    MCV 96.4 80.0 - 99.0 FL    MCH 32.2 26.0 - 34.0 PG    MCHC 33.4 30.0 - 36.5 g/dL    RDW 14.1 11.5 - 14.5 %    PLATELET 765 864 - 512 K/uL    MPV 10.6 8.9 - 12.9 FL    NRBC 0.0 0.0  WBC    ABSOLUTE NRBC 0.00 0.00 - 0.01 K/uL    NEUTROPHILS 44 32 - 75 %    LYMPHOCYTES 46 12 - 49 %    MONOCYTES 8 5 - 13 %    EOSINOPHILS 1 0 - 7 %    BASOPHILS 0 0 - 1 %    IMMATURE GRANULOCYTES 1 (H) 0 - 0.5 %    ABS. NEUTROPHILS 5.5 1.8 - 8.0 K/UL    ABS. LYMPHOCYTES 5.7 (H) 0.8 - 3.5 K/UL    ABS. MONOCYTES 1.1 (H) 0.0 - 1.0 K/UL    ABS. EOSINOPHILS 0.1 0.0 - 0.4 K/UL    ABS. BASOPHILS 0.0 0.0 - 0.1 K/UL    ABS. IMM. GRANS. 0.1 (H) 0.00 - 0.04 K/UL    DF AUTOMATED     METABOLIC PANEL, COMPREHENSIVE    Collection Time: 07/28/21 11:27 AM   Result Value Ref Range    Sodium 142 136 - 145 mmol/L    Potassium 4.8 3.5 - 5.1 mmol/L    Chloride 108 97 - 108 mmol/L    CO2 32 21 - 32 mmol/L    Anion gap 2 (L) 5 - 15 mmol/L    Glucose 180 (H) 65 - 100 mg/dL    BUN 24 (H) 6 - 20 mg/dL    Creatinine 1.29 0.70 - 1.30 mg/dL    BUN/Creatinine ratio 19 12 - 20      GFR est AA >60 >60 ml/min/1.73m2    GFR est non-AA 53 (L) >60 ml/min/1.73m2    Calcium 9.5 8.5 - 10.1 mg/dL    Bilirubin, total 1.2 (H) 0.2 - 1.0 mg/dL    AST (SGOT) 14 (L) 15 - 37 U/L    ALT (SGPT) 37 12 - 78 U/L    Alk. phosphatase 65 45 - 117 U/L    Protein, total 6.8 6.4 - 8.2 g/dL    Albumin 3.9 3.5 - 5.0 g/dL    Globulin 2.9 2.0 - 4.0 g/dL    A-G Ratio 1.3 1.1 - 2.2     TROPONIN I    Collection Time: 07/28/21 11:27 AM   Result Value Ref Range    Troponin-I, Qt. <0.05 <0.05 ng/mL       Labs reviewed by me    Radiologic Studies -   CTA CODE NEURO HEAD AND NECK W CONT   Final Result   No occlusion or high-grade stenosis of the major cervical or intracranial   arterial vasculature. Please note that degrees of carotid stenosis are measured in accordance with   NASCET criteria. Findings were discussed with Dr. Deepa Brand by Dr. Junior Awan at 7/28/2021 on 2:01 PM   via phone conversation. XR CHEST PORT   Final Result   No acute cardiopulmonary abnormality. CT HEAD WO CONT   Final Result   1. No acute intracranial abnormality.    2.  Periventricular white matter hypodensities may reflect chronic small vessel   ischemic disease in the appropriate clinical setting. 3.  Prior left mastoidectomy. CT Results  (Last 48 hours)               07/28/21 1314  CTA CODE NEURO HEAD AND NECK W CONT Final result    Impression:  No occlusion or high-grade stenosis of the major cervical or intracranial   arterial vasculature. Please note that degrees of carotid stenosis are measured in accordance with   NASCET criteria. Findings were discussed with Dr. Rosario John by Dr. Morena Lange at 7/28/2021 on 2:01 PM   via phone conversation. Narrative:  Study: Head and Neck CTA       Clinical Indication: Double vision starting this morning. Comparison: Head CT performed earlier the same day. Technique: Contiguous thin section axial acquisition of the head and neck was   performed in the arterial phase from the thoracic inlet to the skull vertex   following the intravenous administration of 100 mL Isovue-370. Coronal,   sagittal, and MIP reconstructions were generated and reviewed. Dose reduction:   All CT scans at this facility are performed using dose reduction optimization   techniques as appropriate to a performed exam including the following-automated   exposure control, adjustments of mA and/or Kv according to patient size, or use   of iterative reconstructive technique. Findings:       Neck CTA:   The aortic arch and great vessel origins are unremarkable. The common carotid arteries are patent without high-grade stenosis. The carotid   bifurcations are unremarkable. The cervical internal carotid arteries are patent   without high-grade stenosis based on NASCET criteria. The external carotid   arteries are unremarkable. The cervical vertebral arteries are patent without high-grade stenosis. The paraspinal soft tissues are unremarkable. The lung apices are clear. Partially visualized left subclavian approach pacemaker. Multilevel cervical   spondylosis.        Head CTA:   The intracranial internal carotid arteries are patent without high-grade   stenosis. The anterior cerebral arteries are patent without high-grade stenosis. The middle cerebral arteries are patent without high-grade stenosis. The intracranial vertebral arteries are patent without high-grade stenosis. The   basilar artery is patent without high-grade stenosis. The posterior cerebral   arteries are patent without high-grade stenosis. No aneurysm or high flow vascular malformation. 07/28/21 1131  CT HEAD WO CONT Final result    Impression:  1. No acute intracranial abnormality. 2.  Periventricular white matter hypodensities may reflect chronic small vessel   ischemic disease in the appropriate clinical setting. 3.  Prior left mastoidectomy. Narrative:      History: Dizziness       Comparison: None. Technique: Continuous helical CT images of the head were obtained without IV   contrast and reconstructed in 3 mm axial intervals. Coronal and sagittal   reconstructions were also performed. Dose reduction: Department policy specifies   that all CT scans at this facility are performed using dose reduction   optimization techniques as appropriate to a performed exam including the   following: automated exposure control, adjustments of the mA and/or kV according   to patient size, or use of iterative reconstruction technique. Findings: There is moderate generalized volume loss. Ventricles remain midline. . The   cortical sulci and subarachnoid cisterns are unremarkable. Mild periventricular   white matter hypodensities may indicate chronic small vessel ischemic disease. Mild intracranial vascular calcifications. The gray-white differentiation is   preserved. There is no evidence of intracranial hemorrhage or territorial   ischemia. The extracalvarial soft tissues are unremarkable. Prior left mastoidectomy. The   paranasal sinuses are clear. Lateral ocular lens replacements.  A hyperdense   scleral band is noted along the right globes. CXR Results  (Last 48 hours)               07/28/21 1140  XR CHEST PORT Final result    Impression:  No acute cardiopulmonary abnormality. Narrative:  Clinical history: Dizziness       Comparison: None. Findings:    Single view chest. Left chest dual-lead cardiac pacer device in place. The lungs   are adequately expanded and clear. No pleural effusion or pneumothorax. The   cardiac silhouette is normal in size. Minimal aortic atherosclerosis. No   pulmonary edema. The osseous structures are intact. Medical Decision Making     I am the first provider for this patient. I reviewed the vital signs, available nursing notes, past medical history, past surgical history, family history and social history. RADIOLOGY report and LABS reviewed by me    Vital Signs-Reviewed the patient's vital signs. Patient Vitals for the past 12 hrs:   Temp Pulse Resp BP SpO2   07/28/21 1200 98.2 °F (36.8 °C) 60 13 135/82 95 %   07/28/21 1105 99.4 °F (37.4 °C) 60 18 109/73 95 %       EKG interpretation: (Preliminary)  EKG done at 1110 shows atrial paced rhythm with prolonged AV conduction call, ventricular rate of 60, left axis deviation, inferior Q waves with no ectopy          Records Reviewed: Nurse's note. Provider Notes (Medical Decision Making):    Patient presents with DIFF DX : diplopia, CVA,        ED Course:   Initial assessment performed. The patients presenting problems have been discussed, and they are in agreement with the care plan formulated and outlined with them. I have encouraged them to ask questions as they arise throughout their visit.      Consulted telemetry neurologist advised to admit the patient for further work-up including MRI patient has diplopia which could be due to a central cause            TREATMENT RESPONSE -Hernando Church MD      Disposition:  Admitted   Diagnostic tests were reviewed and questions answered. Diagnosis, care plan and treatment options were discussed. The patient understand instructions and will follow up as directed. Condition stable    Admitting Provider:  Opal Morales MD     Consulting Provider:  No ref. provider found       DISCHARGE PLAN:  1. Current Discharge Medication List        2. Follow-up Information    None       3. Return to ED if worse     Diagnosis     Clinical Impression:     ICD-10-CM ICD-9-CM    1. Diplopia  H53.2 368.2    2. Nystagmus  H55.00 379.50    3   RO CVA     Attestations:    Marlyn Patterson MD    Please note that this dictation was completed with [employer], the computer voice recognition software. Quite often unanticipated grammatical, syntax, homophones, and other interpretive errors are inadvertently transcribed by the computer software. Please disregard these errors. Please excuse any errors that have escaped final proofreading. Thank you.

## 2021-07-28 NOTE — ED TRIAGE NOTES
To pcp office this am for dizziness and lightheadedness onset this am, sent here for eval of \"double vision\"

## 2021-07-28 NOTE — PROGRESS NOTES
Primary Nurse Kath Dickens LPN and Tommi Lennox, RN performed a dual skin assessment on Mr. Silke Pederson. Patient has spot of Psoriasis on his right knee and spots of psoriasis on both right and left buttocks cheeks. Both feet are dry and flaky but no other wounds or abrasions on the patient skin.

## 2021-07-28 NOTE — PROGRESS NOTES
7/28/21. Pt & son  informed of MOON/OBS notice, verbalized understanding, & pt signed. Copy to pt , copy in chart, & original to HIM for scanning into EMR. Pt HOG & wears hearing aids, pt lives alone, & drives. Family lives close & will transport pt home upon discharge.

## 2021-07-29 ENCOUNTER — APPOINTMENT (OUTPATIENT)
Dept: CT IMAGING | Age: 86
End: 2021-07-29
Attending: HOSPITALIST
Payer: MEDICARE

## 2021-07-29 ENCOUNTER — APPOINTMENT (OUTPATIENT)
Dept: NON INVASIVE DIAGNOSTICS | Age: 86
End: 2021-07-29
Attending: HOSPITALIST
Payer: MEDICARE

## 2021-07-29 VITALS
WEIGHT: 190 LBS | SYSTOLIC BLOOD PRESSURE: 118 MMHG | RESPIRATION RATE: 18 BRPM | TEMPERATURE: 97.7 F | OXYGEN SATURATION: 93 % | HEIGHT: 68 IN | HEART RATE: 60 BPM | BODY MASS INDEX: 28.79 KG/M2 | DIASTOLIC BLOOD PRESSURE: 69 MMHG

## 2021-07-29 LAB
ANION GAP SERPL CALC-SCNC: 1 MMOL/L (ref 5–15)
ATRIAL RATE: 52 BPM
BUN SERPL-MCNC: 22 MG/DL (ref 6–20)
BUN/CREAT SERPL: 19 (ref 12–20)
CA-I BLD-MCNC: 8.8 MG/DL (ref 8.5–10.1)
CALCULATED R AXIS, ECG10: -19 DEGREES
CALCULATED T AXIS, ECG11: -18 DEGREES
CHLORIDE SERPL-SCNC: 108 MMOL/L (ref 97–108)
CHOLEST SERPL-MCNC: 110 MG/DL
CO2 SERPL-SCNC: 33 MMOL/L (ref 21–32)
CREAT SERPL-MCNC: 1.16 MG/DL (ref 0.7–1.3)
DIAGNOSIS, 93000: NORMAL
EST. AVERAGE GLUCOSE BLD GHB EST-MCNC: 146 MG/DL
GLUCOSE BLD STRIP.AUTO-MCNC: 128 MG/DL (ref 65–117)
GLUCOSE BLD STRIP.AUTO-MCNC: 143 MG/DL (ref 65–117)
GLUCOSE BLD STRIP.AUTO-MCNC: 153 MG/DL (ref 65–117)
GLUCOSE SERPL-MCNC: 119 MG/DL (ref 65–100)
HBA1C MFR BLD: 6.7 % (ref 4–5.6)
HDLC SERPL-MCNC: 31 MG/DL
HDLC SERPL: 3.5 {RATIO} (ref 0–5)
LDLC SERPL CALC-MCNC: 25.2 MG/DL (ref 0–100)
LIPID PROFILE,FLP: ABNORMAL
P-R INTERVAL, ECG05: 316 MS
PERFORMED BY, TECHID: ABNORMAL
POTASSIUM SERPL-SCNC: 4 MMOL/L (ref 3.5–5.1)
Q-T INTERVAL, ECG07: 422 MS
QRS DURATION, ECG06: 82 MS
QTC CALCULATION (BEZET), ECG08: 422 MS
SODIUM SERPL-SCNC: 142 MMOL/L (ref 136–145)
TRIGL SERPL-MCNC: 269 MG/DL (ref ?–150)
VENTRICULAR RATE, ECG03: 60 BPM
VLDLC SERPL CALC-MCNC: 53.8 MG/DL

## 2021-07-29 PROCEDURE — 83036 HEMOGLOBIN GLYCOSYLATED A1C: CPT

## 2021-07-29 PROCEDURE — 82962 GLUCOSE BLOOD TEST: CPT

## 2021-07-29 PROCEDURE — 74011250636 HC RX REV CODE- 250/636: Performed by: INTERNAL MEDICINE

## 2021-07-29 PROCEDURE — 93306 TTE W/DOPPLER COMPLETE: CPT

## 2021-07-29 PROCEDURE — 97161 PT EVAL LOW COMPLEX 20 MIN: CPT

## 2021-07-29 PROCEDURE — 70450 CT HEAD/BRAIN W/O DYE: CPT

## 2021-07-29 PROCEDURE — 80048 BASIC METABOLIC PNL TOTAL CA: CPT

## 2021-07-29 PROCEDURE — 74011250637 HC RX REV CODE- 250/637: Performed by: INTERNAL MEDICINE

## 2021-07-29 PROCEDURE — 96372 THER/PROPH/DIAG INJ SC/IM: CPT

## 2021-07-29 PROCEDURE — 74011636637 HC RX REV CODE- 636/637: Performed by: INTERNAL MEDICINE

## 2021-07-29 PROCEDURE — 99218 HC RM OBSERVATION: CPT

## 2021-07-29 PROCEDURE — 80061 LIPID PANEL: CPT

## 2021-07-29 PROCEDURE — 97530 THERAPEUTIC ACTIVITIES: CPT

## 2021-07-29 PROCEDURE — 36415 COLL VENOUS BLD VENIPUNCTURE: CPT

## 2021-07-29 RX ORDER — ATORVASTATIN CALCIUM 40 MG/1
80 TABLET, FILM COATED ORAL
Status: DISCONTINUED | OUTPATIENT
Start: 2021-07-29 | End: 2021-07-29 | Stop reason: HOSPADM

## 2021-07-29 RX ORDER — GUAIFENESIN 100 MG/5ML
81 LIQUID (ML) ORAL DAILY
Qty: 30 TABLET | Refills: 0 | Status: SHIPPED | OUTPATIENT
Start: 2021-07-30 | End: 2022-02-01

## 2021-07-29 RX ORDER — ATORVASTATIN CALCIUM 80 MG/1
80 TABLET, FILM COATED ORAL
Qty: 30 TABLET | Refills: 0 | Status: SHIPPED | OUTPATIENT
Start: 2021-07-29

## 2021-07-29 RX ADMIN — PAROXETINE HYDROCHLORIDE 10 MG: 10 TABLET, FILM COATED ORAL at 08:56

## 2021-07-29 RX ADMIN — INSULIN LISPRO 2 UNITS: 100 INJECTION, SOLUTION INTRAVENOUS; SUBCUTANEOUS at 08:56

## 2021-07-29 RX ADMIN — ASPIRIN 81 MG: 81 TABLET, CHEWABLE ORAL at 08:56

## 2021-07-29 RX ADMIN — Medication 10 ML: at 05:41

## 2021-07-29 RX ADMIN — METOPROLOL SUCCINATE 25 MG: 25 TABLET, EXTENDED RELEASE ORAL at 08:56

## 2021-07-29 RX ADMIN — LISINOPRIL 2.5 MG: 5 TABLET ORAL at 08:56

## 2021-07-29 RX ADMIN — ALLOPURINOL 200 MG: 100 TABLET ORAL at 09:03

## 2021-07-29 RX ADMIN — INSULIN LISPRO 2 UNITS: 100 INJECTION, SOLUTION INTRAVENOUS; SUBCUTANEOUS at 12:42

## 2021-07-29 RX ADMIN — MULTIVITAMIN TABLET 1 TABLET: TABLET at 08:56

## 2021-07-29 RX ADMIN — ENOXAPARIN SODIUM 40 MG: 40 INJECTION SUBCUTANEOUS at 08:56

## 2021-07-29 RX ADMIN — AMIODARONE HYDROCHLORIDE 100 MG: 200 TABLET ORAL at 08:56

## 2021-07-29 NOTE — CONSULTS
Consult Date: 2021    IP CONSULT TO NEUROLOGY  Consult performed by: Tacey Duverney, MD  Consult ordered by: Killian Morris MD       Mr. Felicitas Bowles is a 81year old man with sick sinus syndrome s/p PPM , HTN, DM who developed vertigo and horizontal diplopia for the entire day yesterday. He cannot tell me if the diplopia resolved with one eye closed or if it was worse on far or near vision. He says when he came out of the Ct scan his vision was normal. Ct head showed no acute stroke and CTa head and neck showed no LVO. He is back to baseline now. Subjective     Past Medical History:   Diagnosis Date    Anxiety     Bradycardia     Cancer ([address]     COLON    Diabetes [address] [address]     TYPE II    Gout     Hypertension     Pacemaker     Psoriasis     SCALP AND LT.  EAR      Past Surgical History:   Procedure Laterality Date    HX CATARACT REMOVAL Bilateral     HX GI      COLONOSCOPY    HX HERNIA REPAIR      UMBILICAL    HX PACEMAKER      HX RETINAL DETACHMENT REPAIR Right     HX TONSILLECTOMY      HX TOTAL COLECTOMY  12'S    1 FOOT REMOVED     Family History   Problem Relation Age of Onset    Liver Disease Mother 27        JAUNDICE    Heart Disease Father     Heart Attack Father     No Known Problems Sister     Alcohol abuse Brother     Heart Disease Brother     Muscular dystrophy Brother         FROM THEIR MOTHER, PT STEPMOM    Muscular dystrophy Brother         FROM THEIR MOTHER, PT STEPMOM    Muscular dystrophy Brother         FROM THEIR MOTHER, PT STEPMOM    No Known Problems Sister     Breast Cancer Daughter     Anesth Problems Neg Hx       Social History     Tobacco Use    Smoking status: Former Smoker     Packs/day: 1.00     Years: 10.00     Pack years: 10.00     Quit date: 1965     Years since quittin.0    Smokeless tobacco: Never Used   Substance Use Topics    Alcohol use: No     Comment: NOT SINCE        Current Facility-Administered Medications Medication Dose Route Frequency Provider Last Rate Last Admin    atorvastatin (LIPITOR) tablet 80 mg  80 mg Oral QHS Jaylen Lord MD        sodium chloride (NS) flush 5-40 mL  5-40 mL IntraVENous Q8H Becky Dunn MD   10 mL at 07/29/21 0541    sodium chloride (NS) flush 5-40 mL  5-40 mL IntraVENous PRN Becky Dunn MD        acetaminophen (TYLENOL) tablet 650 mg  650 mg Oral Q6H PRN Becky Dunn MD        Or   Kelechi Arroyo acetaminophen (TYLENOL) suppository 650 mg  650 mg Rectal Q6H PRN Becky Dunn MD        polyethylene glycol (MIRALAX) packet 17 g  17 g Oral DAILY PRN Becky Dunn MD        ondansetron (ZOFRAN ODT) tablet 4 mg  4 mg Oral Q8H PRN Becky Dunn MD        Or    ondansetron [facility]) injection 4 mg  4 mg IntraVENous Q6H PRN Becky Dunn MD        enoxaparin (LOVENOX) injection 40 mg  40 mg SubCUTAneous DAILY Becky Dunn MD   40 mg at 07/29/21 0856    allopurinoL (ZYLOPRIM) tablet 200 mg  200 mg Oral DAILY Becky Dunn MD   200 mg at 07/29/21 7694    amiodarone (CORDARONE) tablet 100 mg  100 mg Oral DAILY Becky Dunn MD   100 mg at 07/29/21 0856    lisinopriL (PRINIVIL, ZESTRIL) tablet 2.5 mg  2.5 mg Oral DAILY Becky Dunn MD   2.5 mg at 07/29/21 6024    metoprolol succinate (TOPROL-XL) XL tablet 25 mg  25 mg Oral DAILY Becky Dunn MD   25 mg at 07/29/21 0856    multivitamin (ONE A DAY) tablet 1 Tablet  1 Tablet Oral DAILY Becky Dunn MD   1 Tablet at 07/29/21 0856    PARoxetine (PAXIL) tablet 10 mg  10 mg Oral DAILY Becky Dunn MD   10 mg at 07/29/21 0856    insulin lispro (HUMALOG) injection   SubCUTAneous AC&HS Becky Dunn MD   2 Units at 07/29/21 0856    aspirin chewable tablet 81 mg  81 mg Oral DAILY Becky Dunn MD   81 mg at 07/29/21 0596        Review of Systems   All other systems reviewed and are negative.       Objective     Vital signs for last 24 hours:  Visit Vitals  /75 (BP 1 Location: Right upper arm, BP Patient Position: At rest;Supine)   Pulse 60   Temp 98 °F (36.7 °C)   Resp 20   Ht 5' 8\" (1.727 m)   Wt 86.2 kg (190 lb)   SpO2 96%   BMI 28.89 kg/m²       Intake/Output this shift:  Current Shift: No intake/output data recorded. Last 3 Shifts: 07/27 1901 - 07/29 0700  In: -   Out: 450 [Urine:450]    Data Review:   Recent Results (from the past 24 hour(s))   CBC WITH AUTOMATED DIFF    Collection Time: 07/28/21 11:27 AM   Result Value Ref Range    WBC 12.5 (H) 4.1 - 11.1 K/uL    RBC 4.69 4.10 - 5.70 M/uL    HGB 15.1 12.1 - 17.0 g/dL    HCT 45.2 36.6 - 50.3 %    MCV 96.4 80.0 - 99.0 FL    MCH 32.2 26.0 - 34.0 PG    MCHC 33.4 30.0 - 36.5 g/dL    RDW 14.1 11.5 - 14.5 %    PLATELET 984 384 - 393 K/uL    MPV 10.6 8.9 - 12.9 FL    NRBC 0.0 0.0  WBC    ABSOLUTE NRBC 0.00 0.00 - 0.01 K/uL    NEUTROPHILS 44 32 - 75 %    LYMPHOCYTES 46 12 - 49 %    MONOCYTES 8 5 - 13 %    EOSINOPHILS 1 0 - 7 %    BASOPHILS 0 0 - 1 %    IMMATURE GRANULOCYTES 1 (H) 0 - 0.5 %    ABS. NEUTROPHILS 5.5 1.8 - 8.0 K/UL    ABS. LYMPHOCYTES 5.7 (H) 0.8 - 3.5 K/UL    ABS. MONOCYTES 1.1 (H) 0.0 - 1.0 K/UL    ABS. EOSINOPHILS 0.1 0.0 - 0.4 K/UL    ABS. BASOPHILS 0.0 0.0 - 0.1 K/UL    ABS. IMM. GRANS. 0.1 (H) 0.00 - 0.04 K/UL    DF AUTOMATED     METABOLIC PANEL, COMPREHENSIVE    Collection Time: 07/28/21 11:27 AM   Result Value Ref Range    Sodium 142 136 - 145 mmol/L    Potassium 4.8 3.5 - 5.1 mmol/L    Chloride 108 97 - 108 mmol/L    CO2 32 21 - 32 mmol/L    Anion gap 2 (L) 5 - 15 mmol/L    Glucose 180 (H) 65 - 100 mg/dL    BUN 24 (H) 6 - 20 mg/dL    Creatinine 1.29 0.70 - 1.30 mg/dL    BUN/Creatinine ratio 19 12 - 20      GFR est AA >60 >60 ml/min/1.73m2    GFR est non-AA 53 (L) >60 ml/min/1.73m2    Calcium 9.5 8.5 - 10.1 mg/dL    Bilirubin, total 1.2 (H) 0.2 - 1.0 mg/dL    AST (SGOT) 14 (L) 15 - 37 U/L    ALT (SGPT) 37 12 - 78 U/L    Alk.  phosphatase 65 45 - 117 U/L    Protein, total 6.8 6.4 - 8.2 g/dL    Albumin 3.9 3.5 - 5.0 g/dL    Globulin 2.9 2.0 - 4.0 g/dL    A-G Ratio 1.3 1.1 - 2.2     TROPONIN I    Collection Time: 07/28/21 11:27 AM   Result Value Ref Range    Troponin-I, Qt. <0.05 <0.05 ng/mL   GLUCOSE, POC    Collection Time: 07/28/21  9:40 PM   Result Value Ref Range    Glucose (POC) 139 (H) 65 - 117 mg/dL    Performed by Laxmi Briones    METABOLIC PANEL, BASIC    Collection Time: 07/29/21  6:03 AM   Result Value Ref Range    Sodium 142 136 - 145 mmol/L    Potassium 4.0 3.5 - 5.1 mmol/L    Chloride 108 97 - 108 mmol/L    CO2 33 (H) 21 - 32 mmol/L    Anion gap 1 (L) 5 - 15 mmol/L    Glucose 119 (H) 65 - 100 mg/dL    BUN 22 (H) 6 - 20 mg/dL    Creatinine 1.16 0.70 - 1.30 mg/dL    BUN/Creatinine ratio 19 12 - 20      GFR est AA >60 >60 ml/min/1.73m2    GFR est non-AA 60 (L) >60 ml/min/1.73m2    Calcium 8.8 8.5 - 10.1 mg/dL   GLUCOSE, POC    Collection Time: 07/29/21  7:39 AM   Result Value Ref Range    Glucose (POC) 143 (H) 65 - 117 mg/dL    Performed by Sonia Mills        Physical Exam    Neuro Physical Exam      General: Well developed, well nourished. Patient in no apparent distress. Neurological Exam:  Mental Status: AAOx4, normal speech   Cranial Nerves:   Intact visual fields. PERRL, EOM's full, no nystagmus, no ptosis. Facial sensation is normal. Facial movement is symmetric. Palate is midline. Normal sternocleidomastoid strength. Tongue is midline. Hearing is intact bilaterally. Motor:  5/5 strength in upper and lower proximal and distal muscles. Normal bulk and tone. Reflexes:   Deep tendon reflexes 2+/4 and symmetrical.   Sensory:   Normal to light touch in all 4 ext    Gait:  Not tested    Tremor:   No tremor noted. Cerebellar:  No cerebellar signs present. Assessment and Plan: Mr. Rehana Gil is a 81year old man with vertigo and diplopia for a day yesterday which has now resolved. Likely this was a small pontine stroke.      - cont asa 81 mg, 80 mg atorvastatin  - cannot have mri brain due to PPM. Repeat Ct head w/o contrast  -TTE  -CTa head and neck showed no LVO  - Hga1c and lipid panel  - BP goal < 150/90  If above test unrevealing he can be discharged and fu with me in clinin in 3-4 weeks.

## 2021-07-29 NOTE — PROGRESS NOTES
PHYSICAL THERAPY EVALUATION/DISCHARGE  Patient: Jacob Estrada (38 y.o. male)  Date: 7/29/2021  Primary Diagnosis: Dizziness [R42]       Precautions: standard       ASSESSMENT  Pt is a 80 yo male admitted for observation on 7/28/2021 with reports of dizziness and double vision. Head CT showed Cerebral atrophy. Mild ischemic microvascular disease white matter. No acute intracranial abnormality. 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 PMH: SSS s/p pacemaker placement, DM, HTN, anxiety, colon cancer, psoriasis. Pt A&O x 4. Per pt report pt resides alone in a 1 story mobile home with 4 KRYSTA, bilateral handrails, pt was I for ADLS/IADLS, no AD with mobility prior to admission. DME: SPC. Pt reports 1 fall in past 3 months. Based on the objective data described below, the patient presents at functional baseline for mobility and amb. Pt semi-supine in bed upon PT arrival, agreeable to evaluation. Pt required mod I to SBA for bed mobility, supine <> sit and sit <> stand transfers. Pt amb 50 feet in room with gt belt, no Ad, and mod I; demonstrates slow, steady, step through gt pattern with no LOB or knee buckling noted. Pt did well with session today, no c/o pain throughout session, normal sensation, strength, and coordination noted on bilateral LE and UE. Pt has no skilled acute PT needs at this time noted by PT or reported by pt, will DC skilled PT following evaluation; pt verbalized understanding and agreement.      Current Level of Function Impacting Discharge (mobility/balance): mod I to SBA    Other factors to consider for discharge: at baseline     PLAN :  Recommendations and Planned Interventions: DC from skilled PT services following evaluation    Frequency/Duration: Patient will be followed by physical therapy: DC from services following evaluation due to pt being at functional baseline; no skilled therapy required during admission, please reorder if needed     Recommendation for discharge: (in order for the patient to meet his/her long term goals)  HHPT    This discharge recommendation:  Has been made in collaboration with the attending provider and/or case management    IF patient discharges home will need the following DME: none       SUBJECTIVE:   Patient stated I feel much better.     OBJECTIVE DATA SUMMARY:   HISTORY:    Past Medical History:   Diagnosis Date    Anxiety     Bradycardia     Cancer ([facility] [address]     COLON    Diabetes ([facility] [address]     TYPE II    Gout     Hypertension     Pacemaker     Psoriasis     SCALP AND LT. EAR     Past Surgical History:   Procedure Laterality Date    HX CATARACT REMOVAL Bilateral     HX GI      COLONOSCOPY    HX HERNIA REPAIR      UMBILICAL    HX PACEMAKER      HX RETINAL DETACHMENT REPAIR Right     HX TONSILLECTOMY      HX TOTAL COLECTOMY  1908'S    1 FOOT REMOVED       Home Situation  Home Environment: Trailer/mobile home  # Steps to Enter: 4  Rails to Enter: Yes  Hand Rails : Bilateral  One/Two Story Residence: One story  Living Alone: Yes  Support Systems: Child(zayra) (son and daughter both live nearby)  Patient Expects to be Discharged to[de-identified] Trailer/mobile home  Current DME Used/Available at Home: Cane, straight    EXAMINATION/PRESENTATION/DECISION MAKING:   Critical Behavior:  Neurologic State: Alert  Orientation Level: Oriented X4  Cognition: Appropriate decision making, Appropriate for age attention/concentration, Appropriate safety awareness     Hearing: Auditory  Auditory Impairment: Hard of hearing, bilateral, Hearing aid(s)  Skin:  Intact where visible  Edema: none noted   Range Of Motion:  AROM: Within functional limits                       Strength:    Strength:  Within functional limits                    Tone & Sensation:                                  Coordination:  Coordination: Within functional limits  Vision:      Functional Mobility:  Bed Mobility:  Rolling: Modified independent  Supine to Sit: Stand-by assistance  Sit to Supine: Stand-by assistance  Scooting: Stand-by assistance  Transfers:  Sit to Stand: Modified independent  Stand to Sit: Modified independent                       Balance:   Sitting: Intact; Without support  Standing: Intact; Without support  Ambulation/Gait Training:  Distance (ft): 50 Feet (ft)  Assistive Device: Gait belt  Ambulation - Level of Assistance: Modified independent     Gait Description (WDL): Exceptions to WDL            Therapeutic Exercises:   Not completed this session    Functional Measure:  [facility] Mobility Inpatient Short Form  How much difficulty does the patient currently have. .. Unable A Lot A Little None   1. Turning over in bed (including adjusting bedclothes, sheets and blankets)? [] 1   [] 2   [] 3   [x] 4   2. Sitting down on and standing up from a chair with arms ( e.g., wheelchair, bedside commode, etc.)   [] 1   [] 2   [] 3   [x] 4   3. Moving from lying on back to sitting on the side of the bed? [] 1   [] 2   [x] 3   [] 4          How much help from another person does the patient currently need. .. Total A Lot A Little None   4. Moving to and from a bed to a chair (including a wheelchair)? [] 1   [] 2   [] 3   [x] 4   5. Need to walk in hospital room? [] 1   [] 2   [] 3   [x] 4   6. Climbing 3-5 steps with a railing? [] 1   [] 2   [x] 3   [] 4   © 2007, Trustees of [address] [place] [address], under license to [employer]. All rights reserved     Score:  Initial: 22/24 Most Recent: X (Date: 7/29/21)   Interpretation of Tool:  Represents activities that are increasingly more difficult (i.e. Bed mobility, Transfers, Gait).   Score 24 23 22-20 19-15 14-10 9-7 6   Modifier CH CI CJ CK CL CM CN          Physical Therapy Evaluation Charge Determination   History Examination Presentation Decision-Making   HIGH Complexity :3+ comorbidities / personal factors will impact the outcome/ POC  HIGH Complexity : 4+ Standardized tests and measures addressing body structure, function, activity limitation and / or participation in recreation  LOW Complexity : Stable, uncomplicated  Other outcome measures [facility] 6  low      Based on the above components, the patient evaluation is determined to be of the following complexity level: LOW     Pain Ratin/10    Activity Tolerance: WNL    After treatment patient left in no apparent distress:   Supine in bed, Call bell within reach and Side rails x 3 and nsg updated      COMMUNICATION/EDUCATION:   The patients plan of care was discussed with: Registered nurse and Case management. Fall prevention education was provided and the patient/caregiver indicated understanding., Patient/family have participated as able in goal setting and plan of care. and Patient/family agree to work toward stated goals and plan of care.     Thank you for this referral.  Ruma Roblero, PT, DPT   Time Calculation: 18 mins

## 2021-07-29 NOTE — DISCHARGE INSTRUCTIONS
Patient Education        Dizziness: Care Instructions  Your Care Instructions  Dizziness is the feeling of unsteadiness or fuzziness in your head. It is different than having vertigo, which is a feeling that the room is spinning or that you are moving or falling. It is also different from lightheadedness, which is the feeling that you are about to faint. It can be hard to know what causes dizziness. Some people feel dizzy when they have migraine headaches. Sometimes bouts of flu can make you feel dizzy. Some medical conditions, such as heart problems or high blood pressure, can make you feel dizzy. Many medicines can cause dizziness, including medicines for high blood pressure, pain, or anxiety. If a medicine causes your symptoms, your doctor may recommend that you stop or change the medicine. If it is a problem with your heart, you may need medicine to help your heart work better. If there is no clear reason for your symptoms, your doctor may suggest watching and waiting for a while to see if the dizziness goes away on its own. Follow-up care is a key part of your treatment and safety. Be sure to make and go to all appointments, and call your doctor if you are having problems. It's also a good idea to know your test results and keep a list of the medicines you take. How can you care for yourself at home? · If your doctor recommends or prescribes medicine, take it exactly as directed. Call your doctor if you think you are having a problem with your medicine. · Do not drive while you feel dizzy. · Try to prevent falls. Steps you can take include:  ? Using nonskid mats, adding grab bars near the tub, and using night-lights. ? Clearing your home so that walkways are free of anything you might trip on.  ? Letting family and friends know that you have been feeling dizzy. This will help them know how to help you. When should you call for help? Call 911 anytime you think you may need emergency care.  For example, call if:    · You passed out (lost consciousness).     · You have dizziness along with symptoms of a heart attack. These may include:  ? Chest pain or pressure, or a strange feeling in the chest.  ? Sweating. ? Shortness of breath. ? Nausea or vomiting. ? Pain, pressure, or a strange feeling in the back, neck, jaw, or upper belly or in one or both shoulders or arms. ? Lightheadedness or sudden weakness. ? A fast or irregular heartbeat.     · You have symptoms of a stroke. These may include:  ? Sudden numbness, tingling, weakness, or loss of movement in your face, arm, or leg, especially on only one side of your body. ? Sudden vision changes. ? Sudden trouble speaking. ? Sudden confusion or trouble understanding simple statements. ? Sudden problems with walking or balance. ? A sudden, severe headache that is different from past headaches. Call your doctor now or seek immediate medical care if:    · You feel dizzy and have a fever, headache, or ringing in your ears.     · You have new or increased nausea and vomiting.     · Your dizziness does not go away or comes back. Watch closely for changes in your health, and be sure to contact your doctor if:    · You do not get better as expected. Where can you learn more? Go to http://www.gray.com/  Enter Q823 in the search box to learn more about \"Dizziness: Care Instructions. \"  Current as of: February 26, 2020               Content Version: 12.8  © 1900-3277 [employer]. Care instructions adapted under license by [employer] (which disclaims liability or warranty for this information). If you have questions about a medical condition or this instruction, always ask your healthcare professional. [place] [address] any warranty or liability for your use of this information.        INSTRUCTIONS ON DISCHARGE     (1) please note that new medications added are ASPIRIN and ATORVASTATIN- please continue to take this medication until you see Dr. Deepa Brand. (since you are on ATORVASTATIN) you dont have to take SIMVASTATIN    (2) please try to wean alprazolam for sleep, it has more risks than benefits    (3) F/u with NEUROLOGY in 1 week

## 2021-07-30 LAB
ECHO AO ASC DIAM: 3.3 CM
ECHO AO ROOT DIAM: 3.3 CM
ECHO AR MAX VEL PISA: 327 CM/S
ECHO AV AREA PEAK VELOCITY: 4.05 CM2
ECHO AV AREA VTI: 3.12 CM2
ECHO AV AREA/BSA PEAK VELOCITY: 2 CM2/M2
ECHO AV AREA/BSA VTI: 1.6 CM2/M2
ECHO AV MEAN GRADIENT: 2 MMHG
ECHO AV MEAN VELOCITY: 65 CM/S
ECHO AV PEAK GRADIENT: 4 MMHG
ECHO AV PEAK VELOCITY: 104 CM/S
ECHO AV REGURGITANT PHT: 555 MS
ECHO AV VTI: 20.3 CM
ECHO EST RA PRESSURE: 5 MMHG
ECHO LA AREA 2C: 10 CM2
ECHO LA AREA 4C: 11 CM2
ECHO LA MAJOR AXIS: 3.35 CM
ECHO LA MINOR AXIS: 1.68 CM
ECHO LV E' SEPTAL VELOCITY: 7.8 CM/S
ECHO LV EDV A2C: 31 CM3
ECHO LV EDV A2C: 72.5 CM3
ECHO LV EDV A4C: 55 CM3
ECHO LV ESV A2C: 16.8 CM3
ECHO LV ESV A4C: 27 CM3
ECHO LV INTERNAL DIMENSION DIASTOLIC: 4.17 CM (ref 4.2–5.9)
ECHO LV INTERNAL DIMENSION SYSTOLIC: 2.56 CM
ECHO LV IVSD: 1 CM (ref 0.6–1)
ECHO LV MASS 2D: 150.3 G (ref 88–224)
ECHO LV MASS INDEX 2D: 75.1 G/M2 (ref 49–115)
ECHO LV POSTERIOR WALL DIASTOLIC: 1.15 CM (ref 0.6–1)
ECHO LVOT DIAM: 2.5 CM
ECHO LVOT PEAK GRADIENT: 3 MMHG
ECHO LVOT PEAK VELOCITY: 85.8 CM/S
ECHO LVOT SV: 63 CM3
ECHO LVOT VTI: 12.9 CM
ECHO LVOT VTI: 12.9 CM
ECHO MV A VELOCITY: 87 CM/S
ECHO MV AREA PHT: 3.73 CM2
ECHO MV E VELOCITY: 41.4 CM/S
ECHO MV E/A RATIO: 0.48
ECHO MV E/E' SEPTAL: 5.31
ECHO MV PRESSURE HALF TIME (PHT): 59 MS
ECHO PV MAX VELOCITY: 71.9 CM/S
ECHO PV MEAN GRADIENT: 1 MMHG
ECHO PV PEAK INSTANTANEOUS GRADIENT SYSTOLIC: 2 MMHG
ECHO PV VTI: 11.4 CM
ECHO RA AREA 4C: 21.24 CM2
ECHO RIGHT VENTRICULAR SYSTOLIC PRESSURE (RVSP): 25 MMHG
ECHO TV MAX VELOCITY: 226 CM/S
ECHO TV REGURGITANT PEAK GRADIENT: 20 MMHG
LA VOL DISK BP: 29 CM3 (ref 18–58)
LVOT MG: 1 MMHG
MV DEC SLOPE: 2070 MM/S2
MV DEC SLOPE: 2070 MM/S2

## 2021-10-28 ENCOUNTER — HOSPITAL ENCOUNTER (EMERGENCY)
Age: 86
Discharge: HOME | End: 2021-10-28
Attending: EMERGENCY MEDICINE
Payer: MEDICARE

## 2021-10-28 VITALS
BODY MASS INDEX: 27.28 KG/M2 | HEIGHT: 68 IN | HEART RATE: 60 BPM | DIASTOLIC BLOOD PRESSURE: 74 MMHG | TEMPERATURE: 98.4 F | OXYGEN SATURATION: 100 % | RESPIRATION RATE: 18 BRPM | WEIGHT: 180 LBS | SYSTOLIC BLOOD PRESSURE: 132 MMHG

## 2021-10-28 DIAGNOSIS — F41.8 ANXIETY ASSOCIATED WITH DEPRESSION: Primary | ICD-10-CM

## 2021-10-28 LAB
ALBUMIN SERPL-MCNC: 3.5 G/DL (ref 3.5–5)
ALBUMIN/GLOB SERPL: 1.3 {RATIO} (ref 1.1–2.2)
ALP SERPL-CCNC: 60 U/L (ref 45–117)
ALT SERPL-CCNC: 29 U/L (ref 12–78)
AMPHET UR QL SCN: NEGATIVE
ANION GAP SERPL CALC-SCNC: 4 MMOL/L (ref 5–15)
APPEARANCE UR: CLEAR
AST SERPL W P-5'-P-CCNC: 18 U/L (ref 15–37)
BACTERIA URNS QL MICRO: NEGATIVE /HPF
BARBITURATES UR QL SCN: NEGATIVE
BASOPHILS # BLD: 0 K/UL (ref 0–0.1)
BASOPHILS NFR BLD: 0 % (ref 0–1)
BENZODIAZ UR QL: NEGATIVE
BILIRUB SERPL-MCNC: 1.1 MG/DL (ref 0.2–1)
BILIRUB UR QL: NEGATIVE
BUN SERPL-MCNC: 21 MG/DL (ref 6–20)
BUN/CREAT SERPL: 18 (ref 12–20)
CA-I BLD-MCNC: 8.8 MG/DL (ref 8.5–10.1)
CANNABINOIDS UR QL SCN: NEGATIVE
CHLORIDE SERPL-SCNC: 105 MMOL/L (ref 97–108)
CO2 SERPL-SCNC: 31 MMOL/L (ref 21–32)
COCAINE UR QL SCN: NEGATIVE
COLOR UR: ABNORMAL
CREAT SERPL-MCNC: 1.17 MG/DL (ref 0.7–1.3)
DIFFERENTIAL METHOD BLD: ABNORMAL
DRUG SCRN COMMENT,DRGCM: NORMAL
EOSINOPHIL # BLD: 0.1 K/UL (ref 0–0.4)
EOSINOPHIL NFR BLD: 1 % (ref 0–7)
ERYTHROCYTE [DISTWIDTH] IN BLOOD BY AUTOMATED COUNT: 13.6 % (ref 11.5–14.5)
ETHANOL SERPL-MCNC: <4 MG/DL
GLOBULIN SER CALC-MCNC: 2.7 G/DL (ref 2–4)
GLUCOSE SERPL-MCNC: 184 MG/DL (ref 65–100)
GLUCOSE UR STRIP.AUTO-MCNC: NEGATIVE MG/DL
HCT VFR BLD AUTO: 44.2 % (ref 36.6–50.3)
HGB BLD-MCNC: 14.7 G/DL (ref 12.1–17)
HGB UR QL STRIP: NEGATIVE
IMM GRANULOCYTES # BLD AUTO: 0.1 K/UL (ref 0–0.04)
IMM GRANULOCYTES NFR BLD AUTO: 0 % (ref 0–0.5)
KETONES UR QL STRIP.AUTO: NEGATIVE MG/DL
LEUKOCYTE ESTERASE UR QL STRIP.AUTO: NEGATIVE
LYMPHOCYTES # BLD: 6 K/UL (ref 0.8–3.5)
LYMPHOCYTES NFR BLD: 51 % (ref 12–49)
MCH RBC QN AUTO: 32.2 PG (ref 26–34)
MCHC RBC AUTO-ENTMCNC: 33.3 G/DL (ref 30–36.5)
MCV RBC AUTO: 96.9 FL (ref 80–99)
METHADONE UR QL: NEGATIVE
MONOCYTES # BLD: 0.7 K/UL (ref 0–1)
MONOCYTES NFR BLD: 6 % (ref 5–13)
MUCOUS THREADS URNS QL MICRO: ABNORMAL /LPF
NEUTS SEG # BLD: 4.9 K/UL (ref 1.8–8)
NEUTS SEG NFR BLD: 42 % (ref 32–75)
NITRITE UR QL STRIP.AUTO: NEGATIVE
NRBC # BLD: 0 K/UL (ref 0–0.01)
NRBC BLD-RTO: 0 PER 100 WBC
OPIATES UR QL: NEGATIVE
PCP UR QL: NEGATIVE
PH UR STRIP: 6 [PH] (ref 5–8)
PLATELET # BLD AUTO: 183 K/UL (ref 150–400)
PMV BLD AUTO: 9.8 FL (ref 8.9–12.9)
POTASSIUM SERPL-SCNC: 4.3 MMOL/L (ref 3.5–5.1)
PROT SERPL-MCNC: 6.2 G/DL (ref 6.4–8.2)
PROT UR STRIP-MCNC: NEGATIVE MG/DL
RBC # BLD AUTO: 4.56 M/UL (ref 4.1–5.7)
RBC #/AREA URNS HPF: ABNORMAL /HPF (ref 0–5)
SODIUM SERPL-SCNC: 140 MMOL/L (ref 136–145)
SP GR UR REFRACTOMETRY: 1.02 (ref 1–1.03)
UROBILINOGEN UR QL STRIP.AUTO: 4 EU/DL (ref 0.1–1)
WBC # BLD AUTO: 11.7 K/UL (ref 4.1–11.1)
WBC URNS QL MICRO: ABNORMAL /HPF (ref 0–4)

## 2021-10-28 PROCEDURE — 82077 ASSAY SPEC XCP UR&BREATH IA: CPT

## 2021-10-28 PROCEDURE — 36415 COLL VENOUS BLD VENIPUNCTURE: CPT

## 2021-10-28 PROCEDURE — 99283 EMERGENCY DEPT VISIT LOW MDM: CPT

## 2021-10-28 PROCEDURE — 81001 URINALYSIS AUTO W/SCOPE: CPT

## 2021-10-28 PROCEDURE — 75810000275 HC EMERGENCY DEPT VISIT NO LEVEL OF CARE

## 2021-10-28 PROCEDURE — 85025 COMPLETE CBC W/AUTO DIFF WBC: CPT

## 2021-10-28 PROCEDURE — 80307 DRUG TEST PRSMV CHEM ANLYZR: CPT

## 2021-10-28 PROCEDURE — 80053 COMPREHEN METABOLIC PANEL: CPT

## 2021-10-28 RX ORDER — SIMVASTATIN 20 MG/1
20 TABLET, FILM COATED ORAL
COMMUNITY
End: 2022-06-14

## 2021-10-28 RX ORDER — ALBUTEROL SULFATE 2.5 MG/.5ML
2.5 SOLUTION RESPIRATORY (INHALATION)
COMMUNITY
End: 2022-06-14

## 2021-10-28 RX ORDER — HYDROXYZINE PAMOATE 25 MG/1
25 CAPSULE ORAL
Qty: 20 CAPSULE | Refills: 0 | Status: SHIPPED | OUTPATIENT
Start: 2021-10-28 | End: 2022-06-14

## 2021-10-28 NOTE — ED TRIAGE NOTES
Arrives to er with daughter, who reports pt is feeling anxious, fearful and \" being on the edge\" for a few weeks

## 2021-10-28 NOTE — ED PROVIDER NOTES
EMERGENCY DEPARTMENT HISTORY AND PHYSICAL EXAM      Date: 10/28/2021  Patient Name: Toya De Leon    History of Presenting Illness     Chief Complaint   Patient presents with    Anxiety       History Provided By: Patient and Caregiver    HPI: Toya De Leon, 81 y.o. male with a past medical history significant For major depressive disorder presents to the ED with cc of increasing anxiety and depression as of recent. Patient has concerns over finances as well as staying at home. He denies any suicidal homicidal ideation. There are no other exacerbating or relieving factors and has not treated with anything. Used to be on medication for this in the past but has not been on it recently. He would like to follow-up with someone in psychiatry. There are no other complaints, changes, or physical findings at this time. PCP: Fernando Gibbons, DO    No current facility-administered medications on file prior to encounter. Current Outpatient Medications on File Prior to Encounter   Medication Sig Dispense Refill    simvastatin (Zocor) 20 mg tablet Take 20 mg by mouth nightly.  albuterol sulfate (PROVENTIL;VENTOLIN) 2.5 mg/0.5 mL nebu nebulizer solution 2.5 mg by Nebulization route every eight (8) hours as needed for Wheezing.  aspirin 81 mg chewable tablet Take 1 Tablet by mouth daily. 30 Tablet 0    amiodarone (CORDARONE) 200 mg tablet Take 100 mg by mouth daily.  metoprolol succinate (TOPROL-XL) 25 mg XL tablet Take 25 mg by mouth daily.  lisinopril (PRINIVIL, ZESTRIL) 2.5 mg tablet Take 2.5 mg by mouth daily.  allopurinol (ZYLOPRIM) 100 mg tablet Take 200 mg by mouth daily.  metFORMIN (GLUCOPHAGE) 1,000 mg tablet Take 1,000 mg by mouth nightly.  busPIRone (BUSPAR) 15 mg tablet Take 15 mg by mouth two (2) times a day.  PARoxetine (PAXIL) 10 mg tablet Take 10 mg by mouth daily.  multivitamin (ONE A DAY) tablet Take 1 Tab by mouth daily.  50+      atorvastatin (LIPITOR) 80 mg tablet Take 1 Tablet by mouth nightly. (Patient not taking: Reported on 10/28/2021) 30 Tablet 0    glimepiride (AMARYL) 1 mg tablet Take 1 mg by mouth Daily (before breakfast). (Patient not taking: Reported on 10/28/2021)         Past History     Past Medical History:  Past Medical History:   Diagnosis Date    Anxiety     Bradycardia     Cancer ([address]     COLON    Diabetes ([address]     TYPE II    Gout     Hypertension     Pacemaker     Psoriasis     SCALP AND LT. EAR       Past Surgical History:  Past Surgical History:   Procedure Laterality Date    HX CATARACT REMOVAL Bilateral     HX GI      COLONOSCOPY    HX HERNIA REPAIR      UMBILICAL    HX PACEMAKER      HX RETINAL DETACHMENT REPAIR Right     HX TONSILLECTOMY      HX TOTAL COLECTOMY  'S    1 FOOT REMOVED       Family History:  Family History   Problem Relation Age of Onset    Liver Disease Mother 27        JAUNDICE    Heart Disease Father     Heart Attack Father     No Known Problems Sister     Alcohol abuse Brother     Heart Disease Brother     Muscular dystrophy Brother         FROM THEIR MOTHER, PT STEPMOM    Muscular dystrophy Brother         FROM THEIR MOTHER, PT STEPMOM    Muscular dystrophy Brother         FROM THEIR MOTHER, PT STEPMOM    No Known Problems Sister     Breast Cancer Daughter     Anesth Problems Neg Hx        Social History:  Social History     Tobacco Use    Smoking status: Former Smoker     Packs/day: 1.00     Years: 10.00     Pack years: 10.00     Quit date: 1965     Years since quittin.2    Smokeless tobacco: Never Used   Vaping Use    Vaping Use: Never used   Substance Use Topics    Alcohol use: No     Comment: NOT SINCE     Drug use: No       Allergies:   Allergies   Allergen Reactions    Monosodium Glutamate Anaphylaxis and Hives    Adhesive Tape-Silicones Other (comments)     BLISTERS         Review of Systems     Review of Systems   Constitutional: Negative. Negative for appetite change, chills, fatigue and fever. HENT: Negative. Negative for congestion and sinus pain. Eyes: Negative. Negative for pain and visual disturbance. Respiratory: Negative. Negative for chest tightness and shortness of breath. Cardiovascular: Negative. Negative for chest pain. Gastrointestinal: Negative. Negative for abdominal pain, diarrhea, nausea and vomiting. Genitourinary: Negative. Negative for difficulty urinating. No discharge   Musculoskeletal: Negative. Negative for arthralgias. Skin: Negative. Negative for rash. Neurological: Negative. Negative for weakness and headaches. Hematological: Negative. Psychiatric/Behavioral: Positive for dysphoric mood. Negative for agitation. The patient is not nervous/anxious. All other systems reviewed and are negative. Physical Exam     Physical Exam  Vitals and nursing note reviewed. Constitutional:       General: He is not in acute distress. Appearance: He is well-developed. HENT:      Head: Normocephalic and atraumatic. Nose: Nose normal.      Mouth/Throat:      Mouth: Mucous membranes are moist.      Pharynx: Oropharynx is clear. No oropharyngeal exudate. Eyes:      General:         Right eye: No discharge. Left eye: No discharge. Conjunctiva/sclera: Conjunctivae normal.      Pupils: Pupils are equal, round, and reactive to light. Cardiovascular:      Rate and Rhythm: Normal rate and regular rhythm. Chest Wall: PMI is not displaced. No thrill. Heart sounds: Normal heart sounds. No murmur heard. No friction rub. No gallop. Pulmonary:      Effort: Pulmonary effort is normal. No respiratory distress. Breath sounds: Normal breath sounds. No wheezing or rales. Chest:      Chest wall: No tenderness. Abdominal:      General: Bowel sounds are normal. There is no distension. Palpations: Abdomen is soft. There is no mass.       Tenderness: There is no abdominal tenderness. There is no guarding or rebound. Musculoskeletal:         General: Normal range of motion. Cervical back: Normal range of motion and neck supple. Lymphadenopathy:      Cervical: No cervical adenopathy. Skin:     General: Skin is warm and dry. Capillary Refill: Capillary refill takes less than 2 seconds. Findings: No erythema or rash. Neurological:      Mental Status: He is alert and oriented to person, place, and time. Cranial Nerves: No cranial nerve deficit. Coordination: Coordination normal.   Psychiatric:         Mood and Affect: Mood normal.         Behavior: Behavior normal.         Lab and Diagnostic Study Results     Labs -   No results found for this or any previous visit (from the past 12 hour(s)). Radiologic Studies -   @lastxrresult@  CT Results  (Last 48 hours)    None        CXR Results  (Last 48 hours)    None            Medical Decision Making   - I am the first provider for this patient. - I reviewed the vital signs, available nursing notes, past medical history, past surgical history, family history and social history. - Initial assessment performed. The patients presenting problems have been discussed, and they are in agreement with the care plan formulated and outlined with them. I have encouraged them to ask questions as they arise throughout their visit. Vital Signs-Reviewed the patient's vital signs. No data found. Records Reviewed: Nursing Notes    The patient presents with depression and anxiety with a differential diagnosis of electrolyte abnormality, dehydration, UTI. Will provide behavioral health evaluation also. ED Course:          Provider Notes (Medical Decision Making): MDM       Procedures   Medical Decision Makingedical Decision Making  Performed by: Jose Sweet MD  PROCEDURES:  Procedures       Disposition   Disposition: Condition stable    Discharged    DISCHARGE PLAN:  1. Current Discharge Medication List      CONTINUE these medications which have NOT CHANGED    Details   simvastatin (Zocor) 20 mg tablet Take 20 mg by mouth nightly. albuterol sulfate (PROVENTIL;VENTOLIN) 2.5 mg/0.5 mL nebu nebulizer solution 2.5 mg by Nebulization route every eight (8) hours as needed for Wheezing. aspirin 81 mg chewable tablet Take 1 Tablet by mouth daily. Qty: 30 Tablet, Refills: 0      amiodarone (CORDARONE) 200 mg tablet Take 100 mg by mouth daily. metoprolol succinate (TOPROL-XL) 25 mg XL tablet Take 25 mg by mouth daily. lisinopril (PRINIVIL, ZESTRIL) 2.5 mg tablet Take 2.5 mg by mouth daily. allopurinol (ZYLOPRIM) 100 mg tablet Take 200 mg by mouth daily. metFORMIN (GLUCOPHAGE) 1,000 mg tablet Take 1,000 mg by mouth nightly. busPIRone (BUSPAR) 15 mg tablet Take 15 mg by mouth two (2) times a day. PARoxetine (PAXIL) 10 mg tablet Take 10 mg by mouth daily. multivitamin (ONE A DAY) tablet Take 1 Tab by mouth daily. 50+      atorvastatin (LIPITOR) 80 mg tablet Take 1 Tablet by mouth nightly. Qty: 30 Tablet, Refills: 0      glimepiride (AMARYL) 1 mg tablet Take 1 mg by mouth Daily (before breakfast). 2.   Follow-up Information     Follow up With Specialties Details Why [id number], [place] [facility] Call in 2 days As needed, If symptoms worsen [address] [place] [id number]  [phone]          3. Return to ED if worse   4. Discharge Medication List as of 10/28/2021 12:56 PM      START taking these medications    Details   hydrOXYzine pamoate (VistariL) 25 mg capsule Take 1 Capsule by mouth two (2) times daily as needed for Itching.  As needed for anxiety, Normal, Disp-20 Capsule, R-0         CONTINUE these medications which have NOT CHANGED    Details   simvastatin (Zocor) 20 mg tablet Take 20 mg by mouth nightly., Historical Med      albuterol sulfate (PROVENTIL;VENTOLIN) 2.5 mg/0.5 mL nebu nebulizer solution 2.5 mg by Nebulization route every eight (8) hours as needed for Wheezing., Historical Med      aspirin 81 mg chewable tablet Take 1 Tablet by mouth daily. , Normal, Disp-30 Tablet, R-0      amiodarone (CORDARONE) 200 mg tablet Take 100 mg by mouth daily. , Historical Med      metoprolol succinate (TOPROL-XL) 25 mg XL tablet Take 25 mg by mouth daily. , Historical Med      lisinopril (PRINIVIL, ZESTRIL) 2.5 mg tablet Take 2.5 mg by mouth daily. , Historical Med      allopurinol (ZYLOPRIM) 100 mg tablet Take 200 mg by mouth daily. , Historical Med      metFORMIN (GLUCOPHAGE) 1,000 mg tablet Take 1,000 mg by mouth nightly., Historical Med      busPIRone (BUSPAR) 15 mg tablet Take 15 mg by mouth two (2) times a day., Historical Med      PARoxetine (PAXIL) 10 mg tablet Take 10 mg by mouth daily. , Historical Med      multivitamin (ONE A DAY) tablet Take 1 Tab by mouth daily. 50+, Historical Med      atorvastatin (LIPITOR) 80 mg tablet Take 1 Tablet by mouth nightly., Normal, Disp-30 Tablet, R-0      glimepiride (AMARYL) 1 mg tablet Take 1 mg by mouth Daily (before breakfast). , Historical Med               Diagnosis     Clinical Impression:   1. Anxiety associated with depression        Attestations:    Tobias Remy MD    Please note that this dictation was completed with [employer], the [employer] voice recognition software. Quite often unanticipated grammatical, syntax, homophones, and other interpretive errors are inadvertently transcribed by the computer software. Please disregard these errors. Please excuse any errors that have escaped final proofreading. Thank you.

## 2021-10-28 NOTE — BSMART NOTE
Pt arrived at ED via private vehicle (family) and assessed in ED 18    Pt presented with anxiety, Denies SI and Denies HI     Pt presented with well-groomed appearance. Pt thought process circumstantial    Pt cognition appropriate safety awareness    Pt reports has never been hospitalized     Most Recent Hospitalizations if any: NA    Pt reports none    Pt does not have a hx of legal issues. Pt does not have hx of violence/aggression     Pt reports no substance use    Pt UDS positive for: No result    Hx. Of Substance Treatment: NO  When: Not Applicable  Where: Not Applicable    Highest Level of Education: HS    Employment: NO    Source of Income: social security    Housing: [place]    Access to Weapons: NO    If weapons, Have they been removed: N/A    Trauma Hx:   Sexual: NO  When:  Not Applicable By Whom:Not Applicable    Physical: NO  When: Not Applicable By Whom:Not Applicable    Verbal: NO  When: Not Applicable By Whom:Not Applicable      Family Support: YES    Who: daughter and son      Dr. Katharine Jimenez contacted and reports pt does not meet inpatient level of care and will follow up with resources outpatient as needed. This writer notified assigned RN Keira and assigned physician Dr. John Powell. Safety Plan Completed: N/A      PATIENT NARRATIVE SUMMARY:  Pt assessed face to face in ED 18. Pt laying on the str calm polite and daughter is in with pt during assessment at pt request.  Pt denies SI HI Hallucinations. States he is edgy and having Anxiety. States just wants something to take the edge off. Pt d/c with Rx for Vistaril 25mg BID PRN and to f/u with family dr. Mari Mcqueen will follow up as needed.

## 2021-10-28 NOTE — ED NOTES
Pt ambulated A&Ox4 ,[facility] 15 to ED lobby, accompanied by daughter. In possession of personal belongings and discharge instructions.

## 2022-01-19 ENCOUNTER — APPOINTMENT (OUTPATIENT)
Dept: CT IMAGING | Age: 87
End: 2022-01-19
Attending: EMERGENCY MEDICINE
Payer: MEDICARE

## 2022-01-19 ENCOUNTER — HOSPITAL ENCOUNTER (EMERGENCY)
Age: 87
Discharge: ANOTHER HOSPITAL | End: 2022-01-20
Attending: EMERGENCY MEDICINE
Payer: MEDICARE

## 2022-01-19 DIAGNOSIS — S06.5XAA SUBDURAL HEMATOMA: Primary | ICD-10-CM

## 2022-01-19 LAB
ALBUMIN SERPL-MCNC: 3.4 G/DL (ref 3.5–5)
ALBUMIN/GLOB SERPL: 1.1 {RATIO} (ref 1.1–2.2)
ALP SERPL-CCNC: 90 U/L (ref 45–117)
ALT SERPL-CCNC: 23 U/L (ref 12–78)
ANION GAP SERPL CALC-SCNC: 6 MMOL/L (ref 5–15)
APTT PPP: 25.7 SEC (ref 22.1–31)
AST SERPL W P-5'-P-CCNC: 18 U/L (ref 15–37)
BASOPHILS # BLD: 0 K/UL (ref 0–0.1)
BASOPHILS NFR BLD: 0 % (ref 0–1)
BILIRUB DIRECT SERPL-MCNC: 0.1 MG/DL (ref 0–0.2)
BILIRUB SERPL-MCNC: 0.7 MG/DL (ref 0.2–1)
BUN SERPL-MCNC: 31 MG/DL (ref 6–20)
BUN/CREAT SERPL: 21 (ref 12–20)
CA-I BLD-MCNC: 8.8 MG/DL (ref 8.5–10.1)
CHLORIDE SERPL-SCNC: 109 MMOL/L (ref 97–108)
CO2 SERPL-SCNC: 31 MMOL/L (ref 21–32)
CREAT SERPL-MCNC: 1.49 MG/DL (ref 0.7–1.3)
DIFFERENTIAL METHOD BLD: ABNORMAL
EOSINOPHIL # BLD: 0.2 K/UL (ref 0–0.4)
EOSINOPHIL NFR BLD: 1 % (ref 0–7)
ERYTHROCYTE [DISTWIDTH] IN BLOOD BY AUTOMATED COUNT: 13.6 % (ref 11.5–14.5)
GLOBULIN SER CALC-MCNC: 3 G/DL (ref 2–4)
GLUCOSE SERPL-MCNC: 228 MG/DL (ref 65–100)
HCT VFR BLD AUTO: 41.3 % (ref 36.6–50.3)
HGB BLD-MCNC: 13.7 G/DL (ref 12.1–17)
INR PPP: 1.1 (ref 0.9–1.1)
LYMPHOCYTES NFR BLD: 51 % (ref 12–49)
MCH RBC QN AUTO: 32.5 PG (ref 26–34)
MCHC RBC AUTO-ENTMCNC: 33.2 G/DL (ref 30–36.5)
MCV RBC AUTO: 98.1 FL (ref 80–99)
MONOCYTES NFR BLD: 7 % (ref 5–13)
NEUTS SEG NFR BLD: 41 % (ref 32–75)
PLATELET # BLD AUTO: 183 K/UL (ref 150–400)
PMV BLD AUTO: 9.7 FL (ref 8.9–12.9)
POTASSIUM SERPL-SCNC: 4.3 MMOL/L (ref 3.5–5.1)
PROT SERPL-MCNC: 6.4 G/DL (ref 6.4–8.2)
PROTHROMBIN TIME: 10.5 SEC (ref 9–11.1)
RBC # BLD AUTO: 4.21 M/UL (ref 4.1–5.7)
SODIUM SERPL-SCNC: 146 MMOL/L (ref 136–145)
THERAPEUTIC RANGE,PTTT: NORMAL SEC (ref 82–109)
WBC # BLD AUTO: 12.3 K/UL (ref 4.1–11.1)

## 2022-01-19 PROCEDURE — 80048 BASIC METABOLIC PNL TOTAL CA: CPT

## 2022-01-19 PROCEDURE — 99283 EMERGENCY DEPT VISIT LOW MDM: CPT

## 2022-01-19 PROCEDURE — 70450 CT HEAD/BRAIN W/O DYE: CPT

## 2022-01-19 PROCEDURE — 85610 PROTHROMBIN TIME: CPT

## 2022-01-19 PROCEDURE — 85025 COMPLETE CBC W/AUTO DIFF WBC: CPT

## 2022-01-19 PROCEDURE — 80076 HEPATIC FUNCTION PANEL: CPT

## 2022-01-19 PROCEDURE — 36415 COLL VENOUS BLD VENIPUNCTURE: CPT

## 2022-01-19 PROCEDURE — 85730 THROMBOPLASTIN TIME PARTIAL: CPT

## 2022-01-20 ENCOUNTER — APPOINTMENT (OUTPATIENT)
Dept: CT IMAGING | Age: 87
DRG: 025 | End: 2022-01-20
Attending: STUDENT IN AN ORGANIZED HEALTH CARE EDUCATION/TRAINING PROGRAM
Payer: MEDICARE

## 2022-01-20 ENCOUNTER — HOSPITAL ENCOUNTER (INPATIENT)
Age: 87
LOS: 12 days | Discharge: NURSING, REHAB OR LONG-TERM CARE | DRG: 025 | End: 2022-02-01
Attending: STUDENT IN AN ORGANIZED HEALTH CARE EDUCATION/TRAINING PROGRAM | Admitting: STUDENT IN AN ORGANIZED HEALTH CARE EDUCATION/TRAINING PROGRAM
Payer: MEDICARE

## 2022-01-20 ENCOUNTER — ANESTHESIA EVENT (OUTPATIENT)
Dept: SURGERY | Age: 87
DRG: 025 | End: 2022-01-20
Payer: MEDICARE

## 2022-01-20 ENCOUNTER — ANESTHESIA (OUTPATIENT)
Dept: SURGERY | Age: 87
DRG: 025 | End: 2022-01-20
Payer: MEDICARE

## 2022-01-20 VITALS
SYSTOLIC BLOOD PRESSURE: 136 MMHG | TEMPERATURE: 98 F | HEART RATE: 60 BPM | DIASTOLIC BLOOD PRESSURE: 97 MMHG | WEIGHT: 180 LBS | BODY MASS INDEX: 28.93 KG/M2 | OXYGEN SATURATION: 96 % | HEIGHT: 66 IN | RESPIRATION RATE: 16 BRPM

## 2022-01-20 PROBLEM — I60.9 SUBARACHNOID BLEED (HCC): Status: ACTIVE | Noted: 2022-01-20

## 2022-01-20 PROBLEM — S06.5XAA SUBDURAL HEMATOMA: Status: ACTIVE | Noted: 2022-01-20

## 2022-01-20 LAB
ANION GAP SERPL CALC-SCNC: 3 MMOL/L (ref 5–15)
BUN SERPL-MCNC: 30 MG/DL (ref 6–20)
BUN/CREAT SERPL: 23 (ref 12–20)
CALCIUM SERPL-MCNC: 8.8 MG/DL (ref 8.5–10.1)
CHLORIDE SERPL-SCNC: 113 MMOL/L (ref 97–108)
CO2 SERPL-SCNC: 28 MMOL/L (ref 21–32)
COVID-19 RAPID TEST, COVR: NOT DETECTED
CREAT SERPL-MCNC: 1.31 MG/DL (ref 0.7–1.3)
ERYTHROCYTE [DISTWIDTH] IN BLOOD BY AUTOMATED COUNT: 13.7 % (ref 11.5–14.5)
GLUCOSE BLD STRIP.AUTO-MCNC: 137 MG/DL (ref 65–117)
GLUCOSE BLD STRIP.AUTO-MCNC: 174 MG/DL (ref 65–117)
GLUCOSE SERPL-MCNC: 187 MG/DL (ref 65–100)
HCT VFR BLD AUTO: 37.7 % (ref 36.6–50.3)
HGB BLD-MCNC: 12.3 G/DL (ref 12.1–17)
MAGNESIUM SERPL-MCNC: 2.1 MG/DL (ref 1.6–2.4)
MCH RBC QN AUTO: 31.7 PG (ref 26–34)
MCHC RBC AUTO-ENTMCNC: 32.6 G/DL (ref 30–36.5)
MCV RBC AUTO: 97.2 FL (ref 80–99)
NRBC # BLD: 0 K/UL (ref 0–0.01)
NRBC BLD-RTO: 0 PER 100 WBC
PHOSPHATE SERPL-MCNC: 2.2 MG/DL (ref 2.6–4.7)
PLATELET # BLD AUTO: 172 K/UL (ref 150–400)
PMV BLD AUTO: 10.1 FL (ref 8.9–12.9)
POTASSIUM SERPL-SCNC: 4.2 MMOL/L (ref 3.5–5.1)
RBC # BLD AUTO: 3.88 M/UL (ref 4.1–5.7)
SERVICE CMNT-IMP: ABNORMAL
SERVICE CMNT-IMP: ABNORMAL
SODIUM SERPL-SCNC: 144 MMOL/L (ref 136–145)
SOURCE, COVRS: NORMAL
WBC # BLD AUTO: 11.7 K/UL (ref 4.1–11.1)

## 2022-01-20 PROCEDURE — 74011250636 HC RX REV CODE- 250/636: Performed by: NEUROLOGICAL SURGERY

## 2022-01-20 PROCEDURE — 77030008684 HC TU ET CUF COVD -B: Performed by: ANESTHESIOLOGY

## 2022-01-20 PROCEDURE — 83735 ASSAY OF MAGNESIUM: CPT

## 2022-01-20 PROCEDURE — 74011250636 HC RX REV CODE- 250/636: Performed by: NURSE PRACTITIONER

## 2022-01-20 PROCEDURE — 76010000171 HC OR TIME 2 TO 2.5 HR INTENSV-TIER 1: Performed by: NEUROLOGICAL SURGERY

## 2022-01-20 PROCEDURE — 74011250636 HC RX REV CODE- 250/636: Performed by: NURSE ANESTHETIST, CERTIFIED REGISTERED

## 2022-01-20 PROCEDURE — 74011000250 HC RX REV CODE- 250: Performed by: NURSE ANESTHETIST, CERTIFIED REGISTERED

## 2022-01-20 PROCEDURE — 80048 BASIC METABOLIC PNL TOTAL CA: CPT

## 2022-01-20 PROCEDURE — 75810000275 HC EMERGENCY DEPT VISIT NO LEVEL OF CARE

## 2022-01-20 PROCEDURE — 74011000250 HC RX REV CODE- 250: Performed by: NURSE PRACTITIONER

## 2022-01-20 PROCEDURE — 77030040361 HC SLV COMPR DVT MDII -B: Performed by: NEUROLOGICAL SURGERY

## 2022-01-20 PROCEDURE — 77030026438 HC STYL ET INTUB CARD -A: Performed by: ANESTHESIOLOGY

## 2022-01-20 PROCEDURE — 85027 COMPLETE CBC AUTOMATED: CPT

## 2022-01-20 PROCEDURE — 65610000006 HC RM INTENSIVE CARE

## 2022-01-20 PROCEDURE — 77030004472 HC BUR TAPR MEDT -B: Performed by: NEUROLOGICAL SURGERY

## 2022-01-20 PROCEDURE — 77030003029 HC SUT VCRL J&J -B: Performed by: NEUROLOGICAL SURGERY

## 2022-01-20 PROCEDURE — 77030003152 HC GRFT COLGN DURAL INLC -H: Performed by: NEUROLOGICAL SURGERY

## 2022-01-20 PROCEDURE — 77030002916 HC SUT ETHLN J&J -A: Performed by: NEUROLOGICAL SURGERY

## 2022-01-20 PROCEDURE — 74011000258 HC RX REV CODE- 258: Performed by: STUDENT IN AN ORGANIZED HEALTH CARE EDUCATION/TRAINING PROGRAM

## 2022-01-20 PROCEDURE — 77030014650 HC SEAL MTRX FLOSEL BAXT -C: Performed by: NEUROLOGICAL SURGERY

## 2022-01-20 PROCEDURE — 77030014355 HC CVR BUR H TI BIOM -C: Performed by: NEUROLOGICAL SURGERY

## 2022-01-20 PROCEDURE — 77030040506 HC DRN WND MDII -A: Performed by: NEUROLOGICAL SURGERY

## 2022-01-20 PROCEDURE — 2709999900 HC NON-CHARGEABLE SUPPLY: Performed by: NEUROLOGICAL SURGERY

## 2022-01-20 PROCEDURE — 4A00X4Z MEASUREMENT OF CENTRAL NERVOUS ELECTRICAL ACTIVITY, EXTERNAL APPROACH: ICD-10-PCS | Performed by: NEUROLOGICAL SURGERY

## 2022-01-20 PROCEDURE — 77030004391 HC BUR FLUT MEDT -C: Performed by: NEUROLOGICAL SURGERY

## 2022-01-20 PROCEDURE — 84100 ASSAY OF PHOSPHORUS: CPT

## 2022-01-20 PROCEDURE — 74011000250 HC RX REV CODE- 250: Performed by: NEUROLOGICAL SURGERY

## 2022-01-20 PROCEDURE — C1713 ANCHOR/SCREW BN/BN,TIS/BN: HCPCS | Performed by: NEUROLOGICAL SURGERY

## 2022-01-20 PROCEDURE — 74011250636 HC RX REV CODE- 250/636: Performed by: ANESTHESIOLOGY

## 2022-01-20 PROCEDURE — 77030002946 HC SUT NRLN J&J -B: Performed by: NEUROLOGICAL SURGERY

## 2022-01-20 PROCEDURE — 82962 GLUCOSE BLOOD TEST: CPT

## 2022-01-20 PROCEDURE — 76060000036 HC ANESTHESIA 2.5 TO 3 HR: Performed by: NEUROLOGICAL SURGERY

## 2022-01-20 PROCEDURE — 77030031139 HC SUT VCRL2 J&J -A: Performed by: NEUROLOGICAL SURGERY

## 2022-01-20 PROCEDURE — 74011000258 HC RX REV CODE- 258: Performed by: NURSE ANESTHETIST, CERTIFIED REGISTERED

## 2022-01-20 PROCEDURE — 74011000250 HC RX REV CODE- 250: Performed by: STUDENT IN AN ORGANIZED HEALTH CARE EDUCATION/TRAINING PROGRAM

## 2022-01-20 PROCEDURE — 77030011233 HC DRN FACE WND AXIO -B: Performed by: NEUROLOGICAL SURGERY

## 2022-01-20 PROCEDURE — 009400Z DRAINAGE OF INTRACRANIAL SUBDURAL SPACE WITH DRAINAGE DEVICE, OPEN APPROACH: ICD-10-PCS | Performed by: NEUROLOGICAL SURGERY

## 2022-01-20 PROCEDURE — 36415 COLL VENOUS BLD VENIPUNCTURE: CPT

## 2022-01-20 PROCEDURE — 87635 SARS-COV-2 COVID-19 AMP PRB: CPT

## 2022-01-20 PROCEDURE — 74011250636 HC RX REV CODE- 250/636: Performed by: STUDENT IN AN ORGANIZED HEALTH CARE EDUCATION/TRAINING PROGRAM

## 2022-01-20 PROCEDURE — 76210000003 HC OR PH I REC 3.5 TO 4 HR: Performed by: NEUROLOGICAL SURGERY

## 2022-01-20 PROCEDURE — 77030014008 HC SPNG HEMSTAT J&J -C: Performed by: NEUROLOGICAL SURGERY

## 2022-01-20 PROCEDURE — 77030014006 HC SPNG HEMSTAT J&J -A: Performed by: NEUROLOGICAL SURGERY

## 2022-01-20 DEVICE — PLATE BONE LNG L16MM THK0.6MM 2 H TI STR FOR 1.5MM SCR: Type: IMPLANTABLE DEVICE | Site: CRANIAL | Status: FUNCTIONAL

## 2022-01-20 DEVICE — DURAGEN® PLUS DURAL REGENERATION MATRIX, 2 IN X 2 IN (5 CM X 5 CM)
Type: IMPLANTABLE DEVICE | Site: CRANIAL | Status: FUNCTIONAL
Brand: DURAGEN® PLUS

## 2022-01-20 DEVICE — COVER BUR H L DIA18.5MM THK0.5MM 5 H NEURO TI W/O TAB: Type: IMPLANTABLE DEVICE | Site: CRANIAL | Status: FUNCTIONAL

## 2022-01-20 DEVICE — SCREW BNE L4MM DIA1.5MM CORT MAXILLOMANDIBULAR GRN TI SELF: Type: IMPLANTABLE DEVICE | Site: CRANIAL | Status: FUNCTIONAL

## 2022-01-20 RX ORDER — MIDAZOLAM HYDROCHLORIDE 1 MG/ML
1 INJECTION, SOLUTION INTRAMUSCULAR; INTRAVENOUS ONCE
Status: COMPLETED | OUTPATIENT
Start: 2022-01-20 | End: 2022-01-20

## 2022-01-20 RX ORDER — DOCUSATE SODIUM 50 MG/5ML
100 LIQUID ORAL
Status: DISCONTINUED | OUTPATIENT
Start: 2022-01-20 | End: 2022-02-01 | Stop reason: HOSPADM

## 2022-01-20 RX ORDER — ONDANSETRON 2 MG/ML
4 INJECTION INTRAMUSCULAR; INTRAVENOUS
Status: CANCELLED | OUTPATIENT
Start: 2022-01-20

## 2022-01-20 RX ORDER — MORPHINE SULFATE 2 MG/ML
2 INJECTION, SOLUTION INTRAMUSCULAR; INTRAVENOUS
Status: CANCELLED | OUTPATIENT
Start: 2022-01-20

## 2022-01-20 RX ORDER — HYDROCODONE BITARTRATE AND ACETAMINOPHEN 5; 325 MG/1; MG/1
1 TABLET ORAL
Status: CANCELLED | OUTPATIENT
Start: 2022-01-20

## 2022-01-20 RX ORDER — AMIODARONE HYDROCHLORIDE 200 MG/1
100 TABLET ORAL DAILY
Status: DISCONTINUED | OUTPATIENT
Start: 2022-01-21 | End: 2022-02-01 | Stop reason: HOSPADM

## 2022-01-20 RX ORDER — PAROXETINE HYDROCHLORIDE 20 MG/1
10 TABLET, FILM COATED ORAL DAILY
Status: DISCONTINUED | OUTPATIENT
Start: 2022-01-21 | End: 2022-02-01 | Stop reason: HOSPADM

## 2022-01-20 RX ORDER — SODIUM CHLORIDE 9 MG/ML
125 INJECTION, SOLUTION INTRAVENOUS CONTINUOUS
Status: DISCONTINUED | OUTPATIENT
Start: 2022-01-20 | End: 2022-01-20

## 2022-01-20 RX ORDER — SODIUM CHLORIDE 0.9 % (FLUSH) 0.9 %
5-40 SYRINGE (ML) INJECTION EVERY 8 HOURS
Status: DISCONTINUED | OUTPATIENT
Start: 2022-01-20 | End: 2022-01-20 | Stop reason: HOSPADM

## 2022-01-20 RX ORDER — METOPROLOL SUCCINATE 25 MG/1
25 TABLET, EXTENDED RELEASE ORAL DAILY
Status: DISCONTINUED | OUTPATIENT
Start: 2022-01-21 | End: 2022-01-24

## 2022-01-20 RX ORDER — ACETAMINOPHEN 650 MG/1
650 SUPPOSITORY RECTAL
Status: DISCONTINUED | OUTPATIENT
Start: 2022-01-20 | End: 2022-02-01 | Stop reason: HOSPADM

## 2022-01-20 RX ORDER — SUCCINYLCHOLINE CHLORIDE 20 MG/ML
INJECTION INTRAMUSCULAR; INTRAVENOUS AS NEEDED
Status: DISCONTINUED | OUTPATIENT
Start: 2022-01-20 | End: 2022-01-20 | Stop reason: HOSPADM

## 2022-01-20 RX ORDER — BACITRACIN 500 UNIT/G
PACKET (EA) TOPICAL AS NEEDED
Status: DISCONTINUED | OUTPATIENT
Start: 2022-01-20 | End: 2022-01-20 | Stop reason: HOSPADM

## 2022-01-20 RX ORDER — LISINOPRIL 5 MG/1
2.5 TABLET ORAL DAILY
Status: DISCONTINUED | OUTPATIENT
Start: 2022-01-21 | End: 2022-02-01 | Stop reason: HOSPADM

## 2022-01-20 RX ORDER — ALLOPURINOL 100 MG/1
200 TABLET ORAL DAILY
Status: DISCONTINUED | OUTPATIENT
Start: 2022-01-21 | End: 2022-01-21

## 2022-01-20 RX ORDER — SODIUM CHLORIDE 0.9 % (FLUSH) 0.9 %
5-40 SYRINGE (ML) INJECTION EVERY 8 HOURS
Status: CANCELLED | OUTPATIENT
Start: 2022-01-20

## 2022-01-20 RX ORDER — DEXTROSE 50 % IN WATER (D50W) INTRAVENOUS SYRINGE
25-50 AS NEEDED
Status: DISCONTINUED | OUTPATIENT
Start: 2022-01-20 | End: 2022-01-27

## 2022-01-20 RX ORDER — ONDANSETRON 2 MG/ML
INJECTION INTRAMUSCULAR; INTRAVENOUS AS NEEDED
Status: DISCONTINUED | OUTPATIENT
Start: 2022-01-20 | End: 2022-01-20 | Stop reason: HOSPADM

## 2022-01-20 RX ORDER — MAGNESIUM SULFATE 100 %
4 CRYSTALS MISCELLANEOUS AS NEEDED
Status: DISCONTINUED | OUTPATIENT
Start: 2022-01-20 | End: 2022-01-27

## 2022-01-20 RX ORDER — ACETAMINOPHEN 325 MG/1
650 TABLET ORAL
Status: DISCONTINUED | OUTPATIENT
Start: 2022-01-20 | End: 2022-02-01 | Stop reason: HOSPADM

## 2022-01-20 RX ORDER — PHENYLEPHRINE HCL IN 0.9% NACL 0.4MG/10ML
SYRINGE (ML) INTRAVENOUS AS NEEDED
Status: DISCONTINUED | OUTPATIENT
Start: 2022-01-20 | End: 2022-01-20 | Stop reason: HOSPADM

## 2022-01-20 RX ORDER — LIDOCAINE HYDROCHLORIDE AND EPINEPHRINE 10; 10 MG/ML; UG/ML
INJECTION, SOLUTION INFILTRATION; PERINEURAL AS NEEDED
Status: DISCONTINUED | OUTPATIENT
Start: 2022-01-20 | End: 2022-01-20 | Stop reason: HOSPADM

## 2022-01-20 RX ORDER — ROCURONIUM BROMIDE 10 MG/ML
INJECTION, SOLUTION INTRAVENOUS AS NEEDED
Status: DISCONTINUED | OUTPATIENT
Start: 2022-01-20 | End: 2022-01-20 | Stop reason: HOSPADM

## 2022-01-20 RX ORDER — SODIUM CHLORIDE, SODIUM LACTATE, POTASSIUM CHLORIDE, CALCIUM CHLORIDE 600; 310; 30; 20 MG/100ML; MG/100ML; MG/100ML; MG/100ML
50 INJECTION, SOLUTION INTRAVENOUS CONTINUOUS
Status: DISCONTINUED | OUTPATIENT
Start: 2022-01-20 | End: 2022-01-20 | Stop reason: HOSPADM

## 2022-01-20 RX ORDER — LIDOCAINE HYDROCHLORIDE 20 MG/ML
INJECTION, SOLUTION EPIDURAL; INFILTRATION; INTRACAUDAL; PERINEURAL AS NEEDED
Status: DISCONTINUED | OUTPATIENT
Start: 2022-01-20 | End: 2022-01-20 | Stop reason: HOSPADM

## 2022-01-20 RX ORDER — HYDROMORPHONE HYDROCHLORIDE 1 MG/ML
0.2 INJECTION, SOLUTION INTRAMUSCULAR; INTRAVENOUS; SUBCUTANEOUS
Status: DISCONTINUED | OUTPATIENT
Start: 2022-01-20 | End: 2022-01-20 | Stop reason: HOSPADM

## 2022-01-20 RX ORDER — SODIUM CHLORIDE 0.9 % (FLUSH) 0.9 %
5-40 SYRINGE (ML) INJECTION AS NEEDED
Status: CANCELLED | OUTPATIENT
Start: 2022-01-20

## 2022-01-20 RX ORDER — FACIAL-BODY WIPES
10 EACH TOPICAL DAILY PRN
Status: DISCONTINUED | OUTPATIENT
Start: 2022-01-20 | End: 2022-02-01 | Stop reason: HOSPADM

## 2022-01-20 RX ORDER — NALOXONE HYDROCHLORIDE 0.4 MG/ML
0.4 INJECTION, SOLUTION INTRAMUSCULAR; INTRAVENOUS; SUBCUTANEOUS AS NEEDED
Status: DISCONTINUED | OUTPATIENT
Start: 2022-01-20 | End: 2022-02-01 | Stop reason: HOSPADM

## 2022-01-20 RX ORDER — HALOPERIDOL 5 MG/ML
2 INJECTION INTRAMUSCULAR ONCE
Status: DISCONTINUED | OUTPATIENT
Start: 2022-01-20 | End: 2022-01-20

## 2022-01-20 RX ORDER — MORPHINE SULFATE 2 MG/ML
2 INJECTION, SOLUTION INTRAMUSCULAR; INTRAVENOUS
Status: DISCONTINUED | OUTPATIENT
Start: 2022-01-20 | End: 2022-01-20 | Stop reason: HOSPADM

## 2022-01-20 RX ORDER — POTASSIUM CHLORIDE AND SODIUM CHLORIDE 450; 150 MG/100ML; MG/100ML
INJECTION, SOLUTION INTRAVENOUS CONTINUOUS
Status: DISCONTINUED | OUTPATIENT
Start: 2022-01-20 | End: 2022-01-23

## 2022-01-20 RX ORDER — INSULIN LISPRO 100 [IU]/ML
INJECTION, SOLUTION INTRAVENOUS; SUBCUTANEOUS EVERY 6 HOURS
Status: DISCONTINUED | OUTPATIENT
Start: 2022-01-21 | End: 2022-01-25

## 2022-01-20 RX ORDER — LIDOCAINE HYDROCHLORIDE 10 MG/ML
0.1 INJECTION, SOLUTION EPIDURAL; INFILTRATION; INTRACAUDAL; PERINEURAL AS NEEDED
Status: DISCONTINUED | OUTPATIENT
Start: 2022-01-20 | End: 2022-01-20 | Stop reason: HOSPADM

## 2022-01-20 RX ORDER — SODIUM CHLORIDE 0.9 % (FLUSH) 0.9 %
5-40 SYRINGE (ML) INJECTION AS NEEDED
Status: DISCONTINUED | OUTPATIENT
Start: 2022-01-20 | End: 2022-01-20 | Stop reason: HOSPADM

## 2022-01-20 RX ORDER — FENTANYL CITRATE 50 UG/ML
INJECTION, SOLUTION INTRAMUSCULAR; INTRAVENOUS AS NEEDED
Status: DISCONTINUED | OUTPATIENT
Start: 2022-01-20 | End: 2022-01-20 | Stop reason: HOSPADM

## 2022-01-20 RX ORDER — ONDANSETRON 2 MG/ML
4 INJECTION INTRAMUSCULAR; INTRAVENOUS
Status: DISCONTINUED | OUTPATIENT
Start: 2022-01-20 | End: 2022-02-01 | Stop reason: HOSPADM

## 2022-01-20 RX ORDER — BUSPIRONE HYDROCHLORIDE 5 MG/1
15 TABLET ORAL 2 TIMES DAILY
Status: DISCONTINUED | OUTPATIENT
Start: 2022-01-20 | End: 2022-02-01 | Stop reason: HOSPADM

## 2022-01-20 RX ORDER — ONDANSETRON 2 MG/ML
4 INJECTION INTRAMUSCULAR; INTRAVENOUS AS NEEDED
Status: DISCONTINUED | OUTPATIENT
Start: 2022-01-20 | End: 2022-01-20 | Stop reason: HOSPADM

## 2022-01-20 RX ORDER — DEXAMETHASONE SODIUM PHOSPHATE 4 MG/ML
INJECTION, SOLUTION INTRA-ARTICULAR; INTRALESIONAL; INTRAMUSCULAR; INTRAVENOUS; SOFT TISSUE AS NEEDED
Status: DISCONTINUED | OUTPATIENT
Start: 2022-01-20 | End: 2022-01-20 | Stop reason: HOSPADM

## 2022-01-20 RX ORDER — PROPOFOL 10 MG/ML
INJECTION, EMULSION INTRAVENOUS AS NEEDED
Status: DISCONTINUED | OUTPATIENT
Start: 2022-01-20 | End: 2022-01-20 | Stop reason: HOSPADM

## 2022-01-20 RX ORDER — SODIUM CHLORIDE, SODIUM LACTATE, POTASSIUM CHLORIDE, CALCIUM CHLORIDE 600; 310; 30; 20 MG/100ML; MG/100ML; MG/100ML; MG/100ML
INJECTION, SOLUTION INTRAVENOUS
Status: DISCONTINUED | OUTPATIENT
Start: 2022-01-20 | End: 2022-01-20 | Stop reason: HOSPADM

## 2022-01-20 RX ORDER — DOCUSATE SODIUM 100 MG/1
100 CAPSULE, LIQUID FILLED ORAL 2 TIMES DAILY
Status: CANCELLED | OUTPATIENT
Start: 2022-01-20

## 2022-01-20 RX ORDER — ATORVASTATIN CALCIUM 40 MG/1
80 TABLET, FILM COATED ORAL
Status: DISCONTINUED | OUTPATIENT
Start: 2022-01-20 | End: 2022-01-21

## 2022-01-20 RX ORDER — HYDRALAZINE HYDROCHLORIDE 20 MG/ML
10 INJECTION INTRAMUSCULAR; INTRAVENOUS
Status: DISCONTINUED | OUTPATIENT
Start: 2022-01-20 | End: 2022-01-23

## 2022-01-20 RX ORDER — METFORMIN HYDROCHLORIDE 500 MG/1
1000 TABLET ORAL
Status: DISCONTINUED | OUTPATIENT
Start: 2022-01-20 | End: 2022-01-20

## 2022-01-20 RX ORDER — FENTANYL CITRATE 50 UG/ML
25 INJECTION, SOLUTION INTRAMUSCULAR; INTRAVENOUS
Status: DISCONTINUED | OUTPATIENT
Start: 2022-01-20 | End: 2022-01-20 | Stop reason: HOSPADM

## 2022-01-20 RX ADMIN — SODIUM CHLORIDE 5 MG/HR: 9 INJECTION, SOLUTION INTRAVENOUS at 22:24

## 2022-01-20 RX ADMIN — WATER 2 G: 1 INJECTION INTRAMUSCULAR; INTRAVENOUS; SUBCUTANEOUS at 14:44

## 2022-01-20 RX ADMIN — LEVETIRACETAM 1 G: 100 INJECTION, SOLUTION INTRAVENOUS at 14:48

## 2022-01-20 RX ADMIN — Medication 80 MCG: at 14:35

## 2022-01-20 RX ADMIN — SUGAMMADEX 300 MG: 100 INJECTION, SOLUTION INTRAVENOUS at 16:31

## 2022-01-20 RX ADMIN — POTASSIUM CHLORIDE AND SODIUM CHLORIDE: 450; 150 INJECTION, SOLUTION INTRAVENOUS at 17:34

## 2022-01-20 RX ADMIN — FENTANYL CITRATE 50 MCG: 50 INJECTION, SOLUTION INTRAMUSCULAR; INTRAVENOUS at 14:34

## 2022-01-20 RX ADMIN — LIDOCAINE HYDROCHLORIDE 80 MG: 20 INJECTION, SOLUTION EPIDURAL; INFILTRATION; INTRACAUDAL; PERINEURAL at 14:34

## 2022-01-20 RX ADMIN — Medication 80 MCG: at 15:44

## 2022-01-20 RX ADMIN — FENTANYL CITRATE 100 MCG: 50 INJECTION, SOLUTION INTRAMUSCULAR; INTRAVENOUS at 14:54

## 2022-01-20 RX ADMIN — PROPOFOL 50 MG: 10 INJECTION, EMULSION INTRAVENOUS at 14:54

## 2022-01-20 RX ADMIN — ROCURONIUM BROMIDE 5 MG: 10 SOLUTION INTRAVENOUS at 14:34

## 2022-01-20 RX ADMIN — PROPOFOL 120 MG: 10 INJECTION, EMULSION INTRAVENOUS at 14:35

## 2022-01-20 RX ADMIN — LEVETIRACETAM 1000 MG: 100 INJECTION INTRAVENOUS at 05:15

## 2022-01-20 RX ADMIN — SUCCINYLCHOLINE CHLORIDE 160 MG: 20 INJECTION, SOLUTION INTRAMUSCULAR; INTRAVENOUS at 14:35

## 2022-01-20 RX ADMIN — ONDANSETRON HYDROCHLORIDE 4 MG: 2 INJECTION, SOLUTION INTRAMUSCULAR; INTRAVENOUS at 16:04

## 2022-01-20 RX ADMIN — SODIUM CHLORIDE 7.5 MG/HR: 9 INJECTION, SOLUTION INTRAVENOUS at 13:14

## 2022-01-20 RX ADMIN — WATER 2 G: 1 INJECTION INTRAMUSCULAR; INTRAVENOUS; SUBCUTANEOUS at 22:13

## 2022-01-20 RX ADMIN — DEXAMETHASONE SODIUM PHOSPHATE 4 MG: 4 INJECTION, SOLUTION INTRAMUSCULAR; INTRAVENOUS at 14:45

## 2022-01-20 RX ADMIN — SODIUM CHLORIDE 125 ML/HR: 900 INJECTION, SOLUTION INTRAVENOUS at 04:13

## 2022-01-20 RX ADMIN — Medication 120 MCG: at 14:41

## 2022-01-20 RX ADMIN — MIDAZOLAM 1 MG: 1 INJECTION INTRAMUSCULAR; INTRAVENOUS at 13:36

## 2022-01-20 RX ADMIN — Medication 120 MCG: at 16:50

## 2022-01-20 RX ADMIN — ROCURONIUM BROMIDE 45 MG: 10 SOLUTION INTRAVENOUS at 14:48

## 2022-01-20 RX ADMIN — SODIUM CHLORIDE, POTASSIUM CHLORIDE, SODIUM LACTATE AND CALCIUM CHLORIDE: 600; 310; 30; 20 INJECTION, SOLUTION INTRAVENOUS at 14:26

## 2022-01-20 RX ADMIN — FENTANYL CITRATE 50 MCG: 50 INJECTION, SOLUTION INTRAMUSCULAR; INTRAVENOUS at 15:12

## 2022-01-20 RX ADMIN — PHENYLEPHRINE HYDROCHLORIDE 20 MCG/MIN: 10 INJECTION INTRAVENOUS at 14:43

## 2022-01-20 RX ADMIN — NICARDIPINE HYDROCHLORIDE 5 MG/HR: 25 INJECTION, SOLUTION INTRAVENOUS at 09:11

## 2022-01-20 NOTE — ED NOTES
TRANSFER - OUT REPORT:    Verbal report given to Sabrina Moctezuma RN (name) on Chato Kaur  being transferred to OR (unit) for routine progression of care       Report consisted of patients Situation, Background, Assessment and   Recommendations(SBAR). Information from the following report(s) SBAR, Kardex and ED Summary was reviewed with the receiving nurse. Lines:   Peripheral IV 01/20/22 Distal;Left Basilic (Active)        Opportunity for questions and clarification was provided.       Patient transported with:   Monitor  Registered Nurse

## 2022-01-20 NOTE — BRIEF OP NOTE
Brief Postoperative Note    Patient: Xiomara Ceron  YOB: 1935  MRN: [id number]    Date of Procedure: 1/20/2022     Pre-Op Diagnosis: LEFT SUBDURAL HEMATOMA    Post-Op Diagnosis: SAME    Procedure(s):  CRANIOTOMY FOR LEFT FRONTAL SUBDURAL HEMATOMA    Surgeon(s):  Maritza Maria MD    Surgical Assistant: Surg Asst-1: Lauren Leger      Surg Asst 2 - Mini    Anesthesia: General     Estimated Blood Loss (mL): 40NQ    Complications: none    Specimens: * No specimens in log *     Implants:   Implant Name Type Inv.  Item Serial No.  Lot No. LRB No. Used Action   SCREW BNE L4MM DIA1.5MM MODESTO MAXILLOMANDIBULAR GRN TI SELF - SNA  SCREW BNE L4MM DIA1.5MM MODESTO MAXILLOMANDIBULAR GRN TI SELF NA ROSALVAPixiflyET MICROFIXATION_WD NA Left 13 Implanted   COVER BUR H L DIA18.5MM THK0.5MM 5 H NEURO TI W/O TAB - SNA  COVER BUR H L DIA18.5MM THK0.5MM 5 H NEURO TI W/O TAB NA ROSALVA BIOMET MICROFIXATION_WD NA Left 1 Implanted   PLATE BONE LNG [id number] THK0.6MM 2 H TI STR FOR 1.5MM SCR - SNA  PLATE BONE LNG [id number] THK0.6MM 2 H TI STR FOR 1.5MM SCR NA ROSALVA BIOMET MICROFIXATION_WD NA Left 3 Implanted   GRAFT DURA [id number] ULTRAPURE DURAGN+ 5PK/EA - SNA  GRAFT DURA [id number] ULTRAPURE DURAGN+ 5PK/EA NA [employer]WD [id number] Left 1 Implanted       Drains:   Umer-Flores Drain 01/20/22 Anterior;Left;Upper Head (Active)       Umer-Flores Drain 01/20/22 Anterior;Left;Upper Head (Active)       Findings: chronic dark brown fluid under pressure; hematoma, subacute; no active bleeding    Electronically Signed by Nicki Mei MD on 1/20/2022 at 4:49 PM

## 2022-01-20 NOTE — PROGRESS NOTES
Physical Therapy - defer  Order received, chart reviewed. Patient admitted with left frontal SDH after a fall. Patient is currently REDDY to the OR. Will follow for evaluation as appropriate.

## 2022-01-20 NOTE — ANESTHESIA PROCEDURE NOTES
Arterial Line Placement    Start time: 1/20/2022 12:20 PM  End time: 1/20/2022 12:30 PM  Performed by: Rodríguez Romano MD  Authorized by: Rodríguez Romano MD     Pre-Procedure  Indications:  Arterial pressure monitoring  Preanesthetic Checklist: patient identified, risks and benefits discussed, anesthesia consent, site marked, patient being monitored, timeout performed and patient being monitored    Timeout Time: 12:20 EST        Procedure:   Prep:  Chlorhexidine  Orientation:  Right  Location:  Radial artery  Catheter size:  20 G  Number of attempts:  1    Assessment:   Post-procedure:  Line secured and sterile dressing applied  Patient Tolerance:  Patient tolerated the procedure well with no immediate complications

## 2022-01-20 NOTE — CONSULTS
[address]    Name:  Brian Clark  MR#:  [id number]  :  1935  ACCOUNT #:  [de-identified]  DATE OF SERVICE:  2022    NEUROSURGERY CONSULTATION    REASON FOR CONSULTATION:  Left frontal subdural hematoma. HISTORY OF PRESENT ILLNESS:  This is an 59-year-old gentleman, who has been having multiple falls since November. His daughter says that he was recently diagnosed with Parkinson's disease, but that his unsteady gait has been present at least since November. Prior to November, he was walking on his own when he started to have decline in his level of function, his daughter moved him in with her. He has been using a walker for ambulation, but more recently she said that he has fallen, he has refused to use a walker, that his personality has changed and his balance is very difficult. He had a fall in the bathroom and he broke his hearing aid and she brought him to the emergency room for further evaluation. Workup at outside emergency room revealed a large left frontal subdural hematoma and he was transferred to our facility for further care. PAST MEDICAL HISTORY:  1. Colon cancer. 2.  Diabetes. 3.  Gout. 4.  Hypertension. 5.  Pacemaker. 6.  Psoriasis. PAST SURGICAL HISTORY:  1. Cataract surgery. 2.  Colonoscopy. 3.  Pacemaker placement. 4.  Tonsillectomy. 5.  Colectomy, . MEDICATIONS PRIOR TO ADMISSION:  1. Zocor 20 mg q. day. 2.  Inhaler q.8 hours p.r.n.  3.  Vistaril 25 mg b.i.d. p.r.n. itching. 4.  Aspirin 81 mg q. day. 5.  Lipitor 80 mg prescribed, but the patient not taking. 6.  Amiodarone 100 mg q. day. 7.  Toprol-XL 25 mg q. day. 8.  Prinivil 2.5 mg q. day. 9.  Allopurinol 200 mg q. day. 10.  Metformin 1000 mg at bedtime. 11. BuSpar 15 mg b.i.d.  12.  Amaryl 1 mg before breakfast, but the patient is not taking this. 13.  Paxil 10 mg q. day. 14.  Multivitamin q. day.     FAMILY HISTORY:  Positive for cardiac disease, muscular dystrophy, breast cancer. SOCIAL HISTORY:  He previously smoked one pack of cigarettes a day, he quit in 1965. No alcohol use. ALLERGIES:  MSG AND ADHESIVE TAPE. REVIEW OF SYSTEMS:  This is obtained from the patient's daughter. She denies any recent fever or chills. No complaints of headaches or vision changes. Positive for significant hearing difficulty. No chest pain, shortness of breath, abdominal pain, urinary dysfunction, numbness, muscle spasm, skin eruption. Positive for difficulty with balance. PHYSICAL EXAMINATION:  GENERAL:  This is a well-developed, well-nourished gentleman, who was examined lying on the hospital bed. VITAL SIGNS:  Height 5 feet 7 inches, weight 179 pounds, BMI 28. Temperature 98.4, blood pressure 125/46, pulse 60. NEUROLOGIC:  He is alert. He is oriented to self, he is disoriented to place, disoriented to date. He will answer questions, but confabulates and he has difficulty paying attention to the questions asked. He is moving all extremities. He has right pronator drift and weakness in his right lower extremities compared to the left. Sensory grossly intact. IMAGING STUDIES:  He had a CT scan performed yesterday evening that shows a large left subdural hematoma acute on chronic. It measures 3.2 cm with 8 mm midline shift. There does appear to be membranes within the subdural collection. ASSESSMENT AND PLAN:  This is an 81-year-old gentleman, who has a large left frontal subdural.  It appears that there is acute on chronic component. He does take aspirin at home. It may be related to his aspirin. I discussed risks and benefits of surgery with his daughter who is his decision maker and she would like to proceed with surgery. He will be scheduled for a  craniotomy resection of subdural collection.       Paula Sanchez MD      KM/ANA [email or web address]  D:  01/20/2022 14:07  T:  01/20/2022 18:09  JOB #:  [id number]

## 2022-01-20 NOTE — ED TRIAGE NOTES
Pt arrived via EMS. Pt deaf r/t hearing aid broken. Per report pt transferred for subderal s/p fall. Pt seen by randy MCMAHON after offloaded by EMS.

## 2022-01-20 NOTE — PROGRESS NOTES
Neurosurgery Progress Note  Joshua Yañez, [facility]          Admit Date: 2022   LOS: 0 days        Daily Progress Note: 2022      Subjective: The patient was recently diagnosed with Parkinson's disease. He is very hard of hearing and wears a hearing aid in his right ear, which he broke when he fell. He is normally oriented at baseline per family. The patient has refused to use a walker for ambulation despite needing one. He is on a baby aspirin at home. Patient is Luba Hurst witness. Unable to obtain ROS due to patient condition. Objective:     Vital signs  Temp (24hrs), Av.2 °F (36.8 °C), Min:97.8 °F (36.6 °C), Max:98.5 °F (36.9 °C)   No intake/output data recorded.  1901 -  0700  In: -   Out: 400 [Urine:400]    Visit Vitals  BP (!) 160/80   Pulse 60   Temp 98.4 °F (36.9 °C)   Resp 25   Ht 5' 7\" (1.702 m)   Wt 81.6 kg (180 lb)   SpO2 94%   BMI 28.19 kg/m²      O2 Device: None (Room air)     Pain control       PT/OT  Gait                 Physical Exam:  Gen:NAD. Neuro: Awake. Likes to keep eyes closed. Difficult to communicate with because of his hearing. When I ask if he can tell me his name, he states no. Does not follow commands. Squeezes eyes shut tight when I try to assess them. Curled on his left side. Moves all extremities spontaneously. Skin: Dried blood in right ear canal.    CT head without contrast on 22 shows large left frontal subdural mixed density collection with significant mass effect, probably acute on chronic subdural hematoma.     24 hour results:    Recent Results (from the past 24 hour(s))   CBC WITH AUTOMATED DIFF    Collection Time: 22 11:30 PM   Result Value Ref Range    WBC 12.3 (H) 4.1 - 11.1 K/uL    RBC 4.21 4.10 - 5.70 M/uL    HGB 13.7 12.1 - 17.0 g/dL    HCT 41.3 36.6 - 50.3 %    MCV 98.1 80.0 - 99.0 FL    MCH 32.5 26.0 - 34.0 PG    MCHC 33.2 30.0 - 36.5 g/dL    RDW 13.6 11.5 - 14.5 %    PLATELET 552 421 - 106 K/uL    MPV 9.7 8.9 - 12.9 FL    NEUTROPHILS 41 32 - 75 %    LYMPHOCYTES 51 (H) 12 - 49 %    MONOCYTES 7 5 - 13 %    EOSINOPHILS 1 0 - 7 %    BASOPHILS 0 0 - 1 %    ABS. EOSINOPHILS 0.2 0.0 - 0.4 K/UL    ABS. BASOPHILS 0.0 0.0 - 0.1 K/UL    DF AUTOMATED     METABOLIC PANEL, BASIC    Collection Time: 01/19/22 11:30 PM   Result Value Ref Range    Sodium 146 (H) 136 - 145 mmol/L    Potassium 4.3 3.5 - 5.1 mmol/L    Chloride 109 (H) 97 - 108 mmol/L    CO2 31 21 - 32 mmol/L    Anion gap 6 5 - 15 mmol/L    Glucose 228 (H) 65 - 100 mg/dL    BUN 31 (H) 6 - 20 mg/dL    Creatinine 1.49 (H) 0.70 - 1.30 mg/dL    BUN/Creatinine ratio 21 (H) 12 - 20      GFR est AA 54 (L) >60 ml/min/1.73m2    GFR est non-AA 45 (L) >60 ml/min/1.73m2    Calcium 8.8 8.5 - 10.1 mg/dL   PROTHROMBIN TIME + INR    Collection Time: 01/19/22 11:30 PM   Result Value Ref Range    Prothrombin time 10.5 9.0 - 11.1 sec    INR 1.1 0.9 - 1.1     PTT    Collection Time: 01/19/22 11:30 PM   Result Value Ref Range    aPTT 25.7 22.1 - 31.0 sec    aPTT, therapeutic range   82 - 109 sec   HEPATIC FUNCTION PANEL    Collection Time: 01/19/22 11:30 PM   Result Value Ref Range    Protein, total 6.4 6.4 - 8.2 g/dL    Albumin 3.4 (L) 3.5 - 5.0 g/dL    Globulin 3.0 2.0 - 4.0 g/dL    A-G Ratio 1.1 1.1 - 2.2      Bilirubin, total 0.7 0.2 - 1.0 mg/dL    Bilirubin, direct 0.1 0.0 - 0.2 mg/dL    Alk.  phosphatase 90 45 - 117 U/L    AST (SGOT) 18 15 - 37 U/L    ALT (SGPT) 23 12 - 78 U/L   CBC W/O DIFF    Collection Time: 01/20/22  5:54 AM   Result Value Ref Range    WBC 11.7 (H) 4.1 - 11.1 K/uL    RBC 3.88 (L) 4.10 - 5.70 M/uL    HGB 12.3 12.1 - 17.0 g/dL    HCT 37.7 36.6 - 50.3 %    MCV 97.2 80.0 - 99.0 FL    MCH 31.7 26.0 - 34.0 PG    MCHC 32.6 30.0 - 36.5 g/dL    RDW 13.7 11.5 - 14.5 %    PLATELET 608 516 - 782 K/uL    MPV 10.1 8.9 - 12.9 FL    NRBC 0.0 0  WBC    ABSOLUTE NRBC 0.00 0.00 - 9.22 K/uL   METABOLIC PANEL, BASIC    Collection Time: 01/20/22  5:54 AM   Result Value Ref Range Sodium 144 136 - 145 mmol/L    Potassium 4.2 3.5 - 5.1 mmol/L    Chloride 113 (H) 97 - 108 mmol/L    CO2 28 21 - 32 mmol/L    Anion gap 3 (L) 5 - 15 mmol/L    Glucose 187 (H) 65 - 100 mg/dL    BUN 30 (H) 6 - 20 MG/DL    Creatinine 1.31 (H) 0.70 - 1.30 MG/DL    BUN/Creatinine ratio 23 (H) 12 - 20      GFR est AA >60 >60 ml/min/1.73m2    GFR est non-AA 52 (L) >60 ml/min/1.73m2    Calcium 8.8 8.5 - 10.1 MG/DL   MAGNESIUM    Collection Time: 01/20/22  5:54 AM   Result Value Ref Range    Magnesium 2.1 1.6 - 2.4 mg/dL   PHOSPHORUS    Collection Time: 01/20/22  5:54 AM   Result Value Ref Range    Phosphorus 2.2 (L) 2.6 - 4.7 MG/DL          Assessment:     Active Problems:    Subdural hematoma ([address] (1/20/2022)        Plan:   1. Traumatic subdural hematoma   - family would like to proceed with surgery   - Pt to go to the OR today for aury holes/possible craniotomy   - will need to be admitted to ICU post-op   - on keppra   - neuro checks q2h currently   - Rapid COVID test for OR  2. Brain compression   - due to #1   - plans as above  3. Parkinson's disease   - recently diagnosed. No meds listed in the PTA med list  4. HTN   - SBP<140   - Cardene PRN   - hydralazine PRN   - hospitalist following  5. Diabetes mellitus, type 2   - hold PO diabetic meds  6. CAD   - hold ASA   - pacemaker      Activity: up with assist  DVT ppx: SCDs  Dispo: tbd    Plan d/w Dr. Cindy Dennison, nurse. Will call daughter to discuss so we can obtain consent. Salima Brown NP     01/20/22 1000    Called daughter and discussed surgery and risks. Will have Dr. Cindy Dennison call daughter and then have nurse obtain consent over telephone from the daughter.

## 2022-01-20 NOTE — ED PROVIDER NOTES
EMERGENCY DEPARTMENT HISTORY AND PHYSICAL EXAM      Date: 1/19/2022  Patient Name: Jessica Nielsen    History of Presenting Illness       History Provided By: Patient and daughter    HPI: Jessica Nielsen, 81 y.o. male presents to the ED with cc of ground-level fall. Patient was brought to the emergency room by his daughter for multiple episodes of ground-level fall in the last several days. Daughter states the patient was recently diagnosed with Parkinson and has had unsteady gait for last several weeks. Daughter states that patient has refused to use a walker for ambulation. No LOC. No altered mental status from his baseline. Patient is not on anticoagulation other than taking baby aspirin a day. Patient has been recently evaluated for Parkinson but no diagnosis of dementia as per daughter. Patient had a hearing aid on his right ear which broke after a fall just prior to arrival.  Patient has difficulty hearing without hearing aid. Past History     Past Medical History:  Past Medical History:   Diagnosis Date    Anxiety     Bradycardia     Cancer ([address]     COLON    Diabetes ([address]     TYPE II    Gout     Hypertension     Pacemaker     Psoriasis     SCALP AND LT.  EAR       Past Surgical History:  Past Surgical History:   Procedure Laterality Date    HX CATARACT REMOVAL Bilateral     HX GI      COLONOSCOPY    HX HERNIA REPAIR      UMBILICAL    HX PACEMAKER      HX RETINAL DETACHMENT REPAIR Right     HX TONSILLECTOMY      HX TOTAL COLECTOMY  12'S    1 FOOT REMOVED       Family History:  Family History   Problem Relation Age of Onset    Liver Disease Mother 27        JAUNDICE    Heart Disease Father     Heart Attack Father     No Known Problems Sister     Alcohol abuse Brother     Heart Disease Brother     Muscular dystrophy Brother         FROM THEIR MOTHER, PT STEPMOM    Muscular dystrophy Brother         FROM THEIR MOTHER, PT STEPMOM    Muscular dystrophy Brother FROM THEIR MOTHER, PT STEPMOM    No Known Problems Sister     Breast Cancer Daughter     Anesth Problems Neg Hx        Social History:  Social History     Tobacco Use    Smoking status: Former Smoker     Packs/day: 1.00     Years: 10.00     Pack years: 10.00     Quit date: 1965     Years since quittin.5    Smokeless tobacco: Never Used   Vaping Use    Vaping Use: Never used   Substance Use Topics    Alcohol use: No     Comment: NOT SINCE     Drug use: No       Allergies: Allergies   Allergen Reactions    Monosodium Glutamate Anaphylaxis and Hives    Adhesive Tape-Silicones Other (comments)     BLISTERS         Review of Systems   Review of Systems   Unable to perform ROS: Other (Review of system as per daughter. Difficulty with obtaining review of systems due to hearing loss)   Constitutional: Negative for fever. HENT: Negative for nosebleeds. Respiratory: Negative for shortness of breath. Cardiovascular: Negative for chest pain. Gastrointestinal: Negative for abdominal pain and vomiting. Genitourinary: Negative for flank pain. Musculoskeletal: Negative for back pain and neck pain. Skin: Negative for wound. Psychiatric/Behavioral: Negative for confusion. Physical Exam   Physical Exam  Vitals and nursing note reviewed. Constitutional:       General: He is not in acute distress. Appearance: Normal appearance. He is not ill-appearing, toxic-appearing or diaphoretic. HENT:      Head: Normocephalic and atraumatic. Left Ear: External ear normal.      Ears:      Comments: Right earlobe with dried blood with superficial abrasion. Canal and tympanic membrane is normal.     Nose: Nose normal.      Mouth/Throat:      Mouth: Mucous membranes are moist.   Eyes:      Extraocular Movements: Extraocular movements intact. Conjunctiva/sclera: Conjunctivae normal.      Pupils: Pupils are equal, round, and reactive to light.    Cardiovascular:      Rate and Rhythm: Normal rate and regular rhythm. Heart sounds: Normal heart sounds. Pulmonary:      Effort: Pulmonary effort is normal.      Breath sounds: Normal breath sounds. Chest:      Chest wall: No deformity or tenderness. Abdominal:      General: Abdomen is flat. Bowel sounds are normal.      Palpations: Abdomen is soft. Tenderness: There is no abdominal tenderness. There is no guarding or rebound. Musculoskeletal:         General: No tenderness, deformity or signs of injury. Cervical back: Normal range of motion and neck supple. No tenderness. No muscular tenderness. Skin:     General: Skin is warm and dry. Findings: No bruising. Neurological:      General: No focal deficit present. Mental Status: He is alert and oriented to person, place, and time. Cranial Nerves: No cranial nerve deficit. Comments: Patient ambulated in the room with ataxic gait   Psychiatric:         Behavior: Behavior normal.         Thought Content: Thought content normal.         Diagnostic Study Results     Labs -     Recent Results (from the past 12 hour(s))   CBC WITH AUTOMATED DIFF    Collection Time: 01/19/22 11:30 PM   Result Value Ref Range    WBC 12.3 (H) 4.1 - 11.1 K/uL    RBC 4.21 4.10 - 5.70 M/uL    HGB 13.7 12.1 - 17.0 g/dL    HCT 41.3 36.6 - 50.3 %    MCV 98.1 80.0 - 99.0 FL    MCH 32.5 26.0 - 34.0 PG    MCHC 33.2 30.0 - 36.5 g/dL    RDW 13.6 11.5 - 14.5 %    PLATELET 102 709 - 438 K/uL    MPV 9.7 8.9 - 12.9 FL    NEUTROPHILS 41 32 - 75 %    LYMPHOCYTES 51 (H) 12 - 49 %    MONOCYTES 7 5 - 13 %    EOSINOPHILS 1 0 - 7 %    BASOPHILS 0 0 - 1 %    ABS. EOSINOPHILS 0.2 0.0 - 0.4 K/UL    ABS.  BASOPHILS 0.0 0.0 - 0.1 K/UL    DF AUTOMATED     METABOLIC PANEL, BASIC    Collection Time: 01/19/22 11:30 PM   Result Value Ref Range    Sodium 146 (H) 136 - 145 mmol/L    Potassium 4.3 3.5 - 5.1 mmol/L    Chloride 109 (H) 97 - 108 mmol/L    CO2 31 21 - 32 mmol/L    Anion gap 6 5 - 15 mmol/L    Glucose 228 (H) 65 - 100 mg/dL    BUN 31 (H) 6 - 20 mg/dL    Creatinine 1.49 (H) 0.70 - 1.30 mg/dL    BUN/Creatinine ratio 21 (H) 12 - 20      GFR est AA 54 (L) >60 ml/min/1.73m2    GFR est non-AA 45 (L) >60 ml/min/1.73m2    Calcium 8.8 8.5 - 10.1 mg/dL   PROTHROMBIN TIME + INR    Collection Time: 01/19/22 11:30 PM   Result Value Ref Range    Prothrombin time 10.5 9.0 - 11.1 sec    INR 1.1 0.9 - 1.1     PTT    Collection Time: 01/19/22 11:30 PM   Result Value Ref Range    aPTT 25.7 22.1 - 31.0 sec    aPTT, therapeutic range   82 - 109 sec   HEPATIC FUNCTION PANEL    Collection Time: 01/19/22 11:30 PM   Result Value Ref Range    Protein, total 6.4 6.4 - 8.2 g/dL    Albumin 3.4 (L) 3.5 - 5.0 g/dL    Globulin 3.0 2.0 - 4.0 g/dL    A-G Ratio 1.1 1.1 - 2.2      Bilirubin, total 0.7 0.2 - 1.0 mg/dL    Bilirubin, direct 0.1 0.0 - 0.2 mg/dL    Alk. phosphatase 90 45 - 117 U/L    AST (SGOT) 18 15 - 37 U/L    ALT (SGPT) 23 12 - 78 U/L       Radiologic Studies -   [unfilled]  CT Results  (Last 48 hours)               01/19/22 2225  CT HEAD WO CONT Final result    Impression:  Large left frontal subdural mixed density collection with   significant mass effect, probably acute on chronic subdural hematoma. I called the report to Dr. Nataliia Alexander 10:45 PM.               Narrative: Indication: Fall. Right ear bleeding. Dose reduction: All CT scans done at this facility are performed using dose   reduction optimization techniques as appropriate to a performed exam including   the following: Automated exposure control, adjustments of the mA and/or kV   according to patient size, or use of iterative reconstruction technique. CT head unenhanced 19 January 2022. Comparison 29 July 2021. Interval large left frontal mixed primarily low density extra-axial likely   subdural fluid collection with significant mass effect, effacing the adjacent   sulci and left lateral ventricle, with midline shift to the right of 8 mm.  There   are several areas of higher density in the periphery of the lesion suggesting   acute on chronic hemorrhage. Collection measures 3.2 cm transverse. No transtentorial herniation. Cerebral atrophy. Left mastoidectomy changes again noted. Normal right mastoid, right middle ear. Right optical globe banding again noted. Bilateral absent native lens. CXR Results  (Last 48 hours)    None          Medical Decision Making and ED Course   I am the first provider for this patient. I reviewed the vital signs, available nursing notes, past medical history, past surgical history, family history and social history. Vital Signs-Reviewed the patient's vital signs. Records Reviewed: Nursing note reviewed      ED Course:   Initial assessment performed. The patients presenting problems have been discussed, and they are in agreement with the care plan formulated and outlined with them. I have encouraged them to ask questions as they arise throughout their visit. Patient remained alert. Hemodynamically stable. Procedures     CRITICAL CARE NOTE :  12:01 AM  Amount of Critical Care Time: __30  __(minutes)__    IMPENDING DETERIORATION -CNS  ASSOCIATED RISK FACTORS - CNS Decompensation  MANAGEMENT- Bedside Assessment, Supervision of Care and Transfer  INTERPRETATION -  CT Scan  INTERVENTIONS - Neurologic interventions  and multiple exam for neuro status  CASE REVIEW - Hospitalist/Intensivist, Medical Sub-Specialist and Family  TREATMENT RESPONSE -Stable  PERFORMED BY - Self    NOTES   :  I have spent critical care time involved in lab review, consultations with specialist, family decision- making, bedside attention and documentation. This time excludes time spent in any separate billed procedures. During this entire length of time I was immediately available to the patient . Bettye Rubio MD                Disposition     Transferred to Another Facility      DISCHARGE PLAN:  1.    Current Discharge Medication List      CONTINUE these medications which have NOT CHANGED    Details   simvastatin (Zocor) 20 mg tablet Take 20 mg by mouth nightly. albuterol sulfate (PROVENTIL;VENTOLIN) 2.5 mg/0.5 mL nebu nebulizer solution 2.5 mg by Nebulization route every eight (8) hours as needed for Wheezing. hydrOXYzine pamoate (VistariL) 25 mg capsule Take 1 Capsule by mouth two (2) times daily as needed for Itching. As needed for anxiety  Qty: 20 Capsule, Refills: 0      aspirin 81 mg chewable tablet Take 1 Tablet by mouth daily. Qty: 30 Tablet, Refills: 0      atorvastatin (LIPITOR) 80 mg tablet Take 1 Tablet by mouth nightly. Qty: 30 Tablet, Refills: 0      amiodarone (CORDARONE) 200 mg tablet Take 100 mg by mouth daily. metoprolol succinate (TOPROL-XL) 25 mg XL tablet Take 25 mg by mouth daily. lisinopril (PRINIVIL, ZESTRIL) 2.5 mg tablet Take 2.5 mg by mouth daily. allopurinol (ZYLOPRIM) 100 mg tablet Take 200 mg by mouth daily. metFORMIN (GLUCOPHAGE) 1,000 mg tablet Take 1,000 mg by mouth nightly. busPIRone (BUSPAR) 15 mg tablet Take 15 mg by mouth two (2) times a day. glimepiride (AMARYL) 1 mg tablet Take 1 mg by mouth Daily (before breakfast). PARoxetine (PAXIL) 10 mg tablet Take 10 mg by mouth daily. multivitamin (ONE A DAY) tablet Take 1 Tab by mouth daily. 50+           2. Follow-up Information    None       3. Return to ED if worse     Diagnosis     Clinical Impression:   1. Subdural hematoma (HCC)        Attestations:    Whit Lira MD    Please note that this dictation was completed with Restlet, the computer voice recognition software. Quite often unanticipated grammatical, syntax, homophones, and other interpretive errors are inadvertently transcribed by the computer software. Please disregard these errors. Please excuse any errors that have escaped final proofreading. Thank you.

## 2022-01-20 NOTE — PROGRESS NOTES
SLP Contact Note    Update: Neurosurgery occurring today. Will hold until tomorrow. Noted Parkinson's disease and large hemorrhage and will certainly be an aspiration risk. Noted pt is to remain NPO for now. Will hold SLP and complete evaluation when indicated.       Thank you,  CAMI LinderEd, [id number] [facility]  Speech-Language Pathologist

## 2022-01-20 NOTE — ED NOTES
Verbal shift change report given to [facility]-MARY VALDOIVNOS (oncoming nurse) by Lance Klein RN  (offgoing nurse). Report included the following information SBAR, Kardex and ED Summary.

## 2022-01-20 NOTE — PROGRESS NOTES
Occupational Therapy  01/20/22    853: Order acknowledged, chart reviewed. Patient admitted with GLF resulting in L frontal SDH, awaiting neurosurgery consult at this time. Per MD, goal for SBP <140 however patient has been agitated, slightly above parameters at rest, therefore will defer and follow up with OT evaluation once plan of care is established and vitals are stable for functional activity. 1327: Patient REDDY in [address] [place] [address] for neurosurgery, will follow up POD #1 as able & medically appropriate.     Thank you,   ODALIS Wilson, OTR/L

## 2022-01-20 NOTE — ED NOTES
Pt remains confused. Per daughter at bedside it is not normal for him to not know basic information. Pt only alert to self. Pt unaware of the year, where he lives or situational questioning. Pt difficult to follow directions r/t not being able to hear at all. PT continues to attempt to climb over the side rail of stretcher. Pt placed in hallway on [place] for safety and continued observation.

## 2022-01-20 NOTE — PERIOP NOTES
Used during the procedure    surgifoam ref: 1971, lot: [id number]; exp 07/02/2025    Surgicel ref: 1950, lot: [id number], exp: 12/31/2025    floseal ref: [id number], lot: [id number], exp: 10/14/2023

## 2022-01-20 NOTE — H&P
History & Physical    Primary Care Provider: Sekou Ortega DO  Source of Information: Patient and chart review    History of Presenting Illness:   Jagruti Kaur is a 81 y.o. male with past medical history of noninsulin-dependent type 2 diabetes, hypertension, gout, CVA hard of hearing, dyslipidemia who presented today outside ER for evaluation after a fall. History is unobtainable from patient due to severe hard of hearing and lack of hearing aids. Family is not at bedside for further history. Per chart review, patient suffered a fall at home and landed on his right temple. He damaged his hearing aid and mild bleeding was noted from his right ear. EMS was activated and patient was taken to hospital for further evaluation and treatment. Remarkable vitals on ER Presentations: /66  Labs Remarkable for: Creatinine 1.49, glucose 228  ER Images: Large left frontal subdural mixed density collection with  significant mass effect, probably acute on chronic subdural hematoma. Review of Systems:  A comprehensive review of systems was negative except for that written in the History of Present Illness. Past Medical History:   Diagnosis Date    Anxiety     Bradycardia     Cancer ([address]     COLON    Diabetes ([address]     TYPE II    Gout     Hypertension     Pacemaker     Psoriasis     SCALP AND LT. EAR      Past Surgical History:   Procedure Laterality Date    HX CATARACT REMOVAL Bilateral     HX GI      COLONOSCOPY    HX HERNIA REPAIR      UMBILICAL    HX PACEMAKER      HX RETINAL DETACHMENT REPAIR Right     HX TONSILLECTOMY      HX TOTAL COLECTOMY  1908'S    1 FOOT REMOVED     Prior to Admission medications    Medication Sig Start Date End Date Taking? Authorizing Provider   simvastatin (Zocor) 20 mg tablet Take 20 mg by mouth nightly.     Other, MD Pravin   albuterol sulfate (PROVENTIL;VENTOLIN) 2.5 mg/0.5 mL nebu nebulizer solution 2.5 mg by Nebulization route every eight (8) hours as needed for Wheezing. Other, MD Pravin   hydrOXYzine pamoate (VistariL) 25 mg capsule Take 1 Capsule by mouth two (2) times daily as needed for Itching. As needed for anxiety 10/28/21   Sunny Allen MD   aspirin 81 mg chewable tablet Take 1 Tablet by mouth daily. 7/30/21   Alexei North MD   atorvastatin (LIPITOR) 80 mg tablet Take 1 Tablet by mouth nightly. Patient not taking: Reported on 10/28/2021 7/29/21   Alexei North MD   amiodarone (CORDARONE) 200 mg tablet Take 100 mg by mouth daily. 3/19/18   Provider, Historical   metoprolol succinate (TOPROL-XL) 25 mg XL tablet Take 25 mg by mouth daily. 3/19/18   Provider, Historical   lisinopril (PRINIVIL, ZESTRIL) 2.5 mg tablet Take 2.5 mg by mouth daily. Provider, Historical   allopurinol (ZYLOPRIM) 100 mg tablet Take 200 mg by mouth daily. Provider, Historical   metFORMIN (GLUCOPHAGE) 1,000 mg tablet Take 1,000 mg by mouth nightly. Provider, Historical   busPIRone (BUSPAR) 15 mg tablet Take 15 mg by mouth two (2) times a day. Provider, Historical   glimepiride (AMARYL) 1 mg tablet Take 1 mg by mouth Daily (before breakfast). Patient not taking: Reported on 10/28/2021    Provider, Historical   PARoxetine (PAXIL) 10 mg tablet Take 10 mg by mouth daily. Provider, Historical   multivitamin (ONE A DAY) tablet Take 1 Tab by mouth daily.  50+    Provider, Historical     Allergies   Allergen Reactions    Monosodium Glutamate Anaphylaxis and Hives    Adhesive Tape-Silicones Other (comments)     BLISTERS      Family History   Problem Relation Age of Onset    Liver Disease Mother 27        JAUNDICE    Heart Disease Father     Heart Attack Father     No Known Problems Sister     Alcohol abuse Brother     Heart Disease Brother     Muscular dystrophy Brother         FROM THEIR MOTHER, PT STEPMOM    Muscular dystrophy Brother         FROM THEIR MOTHER, PT STEPMOM    Muscular dystrophy Brother FROM THEIR MOTHER, PT STEPMOM    No Known Problems Sister     Breast Cancer Daughter     Anesth Problems Neg Hx         SOCIAL HISTORY:  Patient resides:  Independently    Assisted Living    SNF    With family care x      Smoking history:   None x   Former    Chronic      Alcohol history:   None x   Social    Chronic      Ambulates:   Independently x   w/cane    w/walker    w/wc    CODE STATUS:  DNR x   Full    Other      Objective:     Physical Exam:     Visit Vitals  /68   Pulse 60   Temp 98.5 °F (36.9 °C)   Resp 16   Ht 5' 7\" (1.702 m)   Wt 81.6 kg (180 lb)   SpO2 98%   BMI 28.19 kg/m²      O2 Device: None (Room air)    General:  Alert, cooperative, no distress, appears stated age. Severely hard of hearing and unable to participate in any meaningful conversation. Head:  Normocephalic, without obvious abnormality, atraumatic. Eyes:  Conjunctivae/corneas clear. PERRL, EOMs intact. Nose: Nares normal. Septum midline. Mucosa normal.        Neck: Supple, symmetrical, trachea midline. Lungs:   Clear to auscultation bilaterally. Chest wall:  No tenderness or deformity. Heart:  Regular rate and rhythm, S1, S2 normal   Abdomen:   Soft, non-tender. Bowel sounds normal. No masses,  No organomegaly. Extremities: Extremities normal, atraumatic, no cyanosis or edema. Pulses: 2+ and symmetric all extremities. Skin: Skin color, texture, turgor normal. No rashes or lesions   Neurologic: CNII-XII intact. Data Review:     Recent Days:  Recent Labs     01/20/22  0554 01/19/22  2330   WBC 11.7* 12.3*   HGB 12.3 13.7   HCT 37.7 41.3    183     Recent Labs     01/20/22  0554 01/19/22  2330    146*   K 4.2 4.3   * 109*   CO2 28 31   * 228*   BUN 30* 31*   CREA 1.31* 1.49*   CA 8.8 8.8   MG 2.1  --    PHOS 2.2*  --    ALB  --  3.4*   ALT  --  23   INR  --  1.1     No results for input(s): PH, PCO2, PO2, HCO3, FIO2 in the last 72 hours.     24 Hour Results:  Recent Results (from the past 24 hour(s))   CBC WITH AUTOMATED DIFF    Collection Time: 01/19/22 11:30 PM   Result Value Ref Range    WBC 12.3 (H) 4.1 - 11.1 K/uL    RBC 4.21 4.10 - 5.70 M/uL    HGB 13.7 12.1 - 17.0 g/dL    HCT 41.3 36.6 - 50.3 %    MCV 98.1 80.0 - 99.0 FL    MCH 32.5 26.0 - 34.0 PG    MCHC 33.2 30.0 - 36.5 g/dL    RDW 13.6 11.5 - 14.5 %    PLATELET 240 684 - 680 K/uL    MPV 9.7 8.9 - 12.9 FL    NEUTROPHILS 41 32 - 75 %    LYMPHOCYTES 51 (H) 12 - 49 %    MONOCYTES 7 5 - 13 %    EOSINOPHILS 1 0 - 7 %    BASOPHILS 0 0 - 1 %    ABS. EOSINOPHILS 0.2 0.0 - 0.4 K/UL    ABS. BASOPHILS 0.0 0.0 - 0.1 K/UL    DF AUTOMATED     METABOLIC PANEL, BASIC    Collection Time: 01/19/22 11:30 PM   Result Value Ref Range    Sodium 146 (H) 136 - 145 mmol/L    Potassium 4.3 3.5 - 5.1 mmol/L    Chloride 109 (H) 97 - 108 mmol/L    CO2 31 21 - 32 mmol/L    Anion gap 6 5 - 15 mmol/L    Glucose 228 (H) 65 - 100 mg/dL    BUN 31 (H) 6 - 20 mg/dL    Creatinine 1.49 (H) 0.70 - 1.30 mg/dL    BUN/Creatinine ratio 21 (H) 12 - 20      GFR est AA 54 (L) >60 ml/min/1.73m2    GFR est non-AA 45 (L) >60 ml/min/1.73m2    Calcium 8.8 8.5 - 10.1 mg/dL   PROTHROMBIN TIME + INR    Collection Time: 01/19/22 11:30 PM   Result Value Ref Range    Prothrombin time 10.5 9.0 - 11.1 sec    INR 1.1 0.9 - 1.1     PTT    Collection Time: 01/19/22 11:30 PM   Result Value Ref Range    aPTT 25.7 22.1 - 31.0 sec    aPTT, therapeutic range   82 - 109 sec   HEPATIC FUNCTION PANEL    Collection Time: 01/19/22 11:30 PM   Result Value Ref Range    Protein, total 6.4 6.4 - 8.2 g/dL    Albumin 3.4 (L) 3.5 - 5.0 g/dL    Globulin 3.0 2.0 - 4.0 g/dL    A-G Ratio 1.1 1.1 - 2.2      Bilirubin, total 0.7 0.2 - 1.0 mg/dL    Bilirubin, direct 0.1 0.0 - 0.2 mg/dL    Alk.  phosphatase 90 45 - 117 U/L    AST (SGOT) 18 15 - 37 U/L    ALT (SGPT) 23 12 - 78 U/L   CBC W/O DIFF    Collection Time: 01/20/22  5:54 AM   Result Value Ref Range    WBC 11.7 (H) 4.1 - 11.1 K/uL    RBC 3.88 (L) 4.10 - 5.70 M/uL    HGB 12.3 12.1 - 17.0 g/dL    HCT 37.7 36.6 - 50.3 %    MCV 97.2 80.0 - 99.0 FL    MCH 31.7 26.0 - 34.0 PG    MCHC 32.6 30.0 - 36.5 g/dL    RDW 13.7 11.5 - 14.5 %    PLATELET 469 681 - 336 K/uL    MPV 10.1 8.9 - 12.9 FL    NRBC 0.0 0  WBC    ABSOLUTE NRBC 0.00 0.00 - 8.49 K/uL   METABOLIC PANEL, BASIC    Collection Time: 01/20/22  5:54 AM   Result Value Ref Range    Sodium 144 136 - 145 mmol/L    Potassium 4.2 3.5 - 5.1 mmol/L    Chloride 113 (H) 97 - 108 mmol/L    CO2 28 21 - 32 mmol/L    Anion gap 3 (L) 5 - 15 mmol/L    Glucose 187 (H) 65 - 100 mg/dL    BUN 30 (H) 6 - 20 MG/DL    Creatinine 1.31 (H) 0.70 - 1.30 MG/DL    BUN/Creatinine ratio 23 (H) 12 - 20      GFR est AA >60 >60 ml/min/1.73m2    GFR est non-AA 52 (L) >60 ml/min/1.73m2    Calcium 8.8 8.5 - 10.1 MG/DL   MAGNESIUM    Collection Time: 01/20/22  5:54 AM   Result Value Ref Range    Magnesium 2.1 1.6 - 2.4 mg/dL   PHOSPHORUS    Collection Time: 01/20/22  5:54 AM   Result Value Ref Range    Phosphorus 2.2 (L) 2.6 - 4.7 MG/DL         Imaging:     Assessment:     Jose Lane is a 81 y.o. male with past medical history of noninsulin-dependent type 2 diabetes, hypertension, gout, severe hard of hearing, dyslipidemia who is admitted for traumatic subarachnoid hemorrhage. Plan:       Traumatic subarachnoid hemorrhage  -Plan for admission to neuro IMCU per neurosurgery recs (Dr. Rell Mckinnon)  -Neurologically intact currently  -Keep n.p.o.  -Cardene drip with goal systolic BPs 1 43-2 60  -MIVF  -Keppra load for seizure prophylaxis  -Every 2 hours neurochecks  -Repeat head CT in a.m.  -Neurosurgery on consult    Hypertension  -On Cardene gtt. Non-insulin-dependent type 2 diabetes  -SSI plus hypoglycemic protocols  -Every 6 hours Accu-Cheks    Gout  -Resume allopurinol once tolerating p.o.     Generalized anxiety disorder  -Home Renée FELIPE/ROBER -  [email or web address] ml/hr  Activity - as tolerated  DVT prophylaxis - SCDs  GI prophylaxis -  NI  Disposition - TBD    CODE STATUS:  Full code       Signed By: Lucila Anderson MD     January 20, 2022

## 2022-01-20 NOTE — ED TRIAGE NOTES
Pt fell at home and broke the hearing aid in his right ear  Right ear was bleeding and  Is now controlled  Pt is very hard of hearing  He is supposed to use  A walker but will not  Pt has hd several falls over the lat few months

## 2022-01-20 NOTE — ED NOTES
Pt increased aggitation, unable to stay still, pulling at lines and attempts tp still get out of bed. Difficult to reasses. Pt blake report he is \"okay\" and will laugh when you gesture to him.

## 2022-01-20 NOTE — ANESTHESIA PREPROCEDURE EVALUATION
Anesthetic History   No history of anesthetic complications            Review of Systems / Medical History  Patient summary reviewed, nursing notes reviewed and pertinent labs reviewed    Pulmonary                Comments: Former smoker   Neuro/Psych         Psychiatric history    Comments: [employer] [facility]    Hypertension        Dysrhythmias   Pacemaker    Exercise tolerance: >4 METS     GI/Hepatic/Renal  Within defined limits              Endo/Other    Diabetes    Cancer     Other Findings   Comments: [facility]  Jehovah witness  Hb 12         Physical Exam    Airway  Mallampati: II  TM Distance: > 6 cm  Neck ROM: decreased range of motion   Mouth opening: Normal     Cardiovascular    Rhythm: regular  Rate: abnormal        Comments: bradycardia Dental    Dentition: Caps/crowns  Comments: Few missing   Pulmonary  Breath sounds clear to auscultation               Abdominal  GI exam deferred       Other Findings            Anesthetic Plan    ASA: 3  Anesthesia type: general    Monitoring Plan: Arterial line      Induction: Intravenous  Anesthetic plan and risks discussed with: Son / Daughter      No blood products, no chest compressions, no defib.

## 2022-01-20 NOTE — PROGRESS NOTES
Pt seen and examed  Very difficulty hearing and hard to communicate, confused, neuro status similar compared to last night per nurse. Discussed with daughter Parish. She agrees surgery  Plan discussed with NS Dr. Oliver Born, NPO now. Screen for rapid covid 19 test. Plan to OR today per NS.    Plan discussed with Nurse

## 2022-01-20 NOTE — CONSULTS
Full consult dictated   Large left frontal subdural collection  To OR for drainage  Risks and benefits discussed with patients daughter who agreed to proceed.

## 2022-01-20 NOTE — PROGRESS NOTES
[facility]    ICU TEAM Progress Note    Name: Fco Vega   : 1935   MRN: [id number]   Date: 2022      Subjective:   Progress Note: 2022      Mr Fco Vega is an 80 yo male with a PMH of NIDDM2, HTN, gout, prior CVA, HLD, Parkinson disease and who is hard of hearing with a R sided hearing aid. He presented with a traumatic subdural hematoma. It was reported that at baseline, he is oriented. He does have difficulties with ambulation and a walker has been recommended in the past, but he does not utilize this. He experienced a GLF at home, hitting his head. This prompted his presentation to the ED the evening of . He exhibited AMS. CT head showed large left frontal subdural mixed density collection with significant mass effect, probably acute on chronic subdural hematoma. NSGY was consulted. On , he was taken to the OR for craniotomy for evacuation of the SDH. Following the OR, he was taken to the ICU. Of note, he is a Bahai. POD:Day of Surgery    Active Problem List:     Problem List  Date Reviewed: 2019          Codes Class    Subdural hematoma ([facility] [address] ICD-10-CM: B06.4V1N  ICD-9-CM: 432.1         Dizziness ICD-10-CM: R42  ICD-9-CM: 780.4         Pacemaker ICD-10-CM: Z95.0  ICD-9-CM: V45.01         Chronic left mastoiditis ICD-10-CM: H70.12  ICD-9-CM: 383.1               Past Medical History:      has a past medical history of Anxiety, Bradycardia, Cancer ([facility] [address] Diabetes ([facility] [address] Gout, Hypertension, Pacemaker, and Psoriasis. Past Surgical History:      has a past surgical history that includes hx retinal detachment repair (Right); hx cataract removal (Bilateral); hx tonsillectomy; hx gi; hx total colectomy (8'S); hx hernia repair; and hx pacemaker. Home Medications:     Prior to Admission medications    Medication Sig Start Date End Date Taking?  Authorizing Provider   simvastatin (Zocor) 20 mg tablet Take 20 mg by mouth nightly. Pravin Muñoz MD   albuterol sulfate (PROVENTIL;VENTOLIN) 2.5 mg/0.5 mL nebu nebulizer solution 2.5 mg by Nebulization route every eight (8) hours as needed for Wheezing. Pravin Muñoz MD   hydrOXYzine pamoate (VistariL) 25 mg capsule Take 1 Capsule by mouth two (2) times daily as needed for Itching. As needed for anxiety 10/28/21   Ezekiel Soto MD   aspirin 81 mg chewable tablet Take 1 Tablet by mouth daily. 21   Alejandro Eason MD   atorvastatin (LIPITOR) 80 mg tablet Take 1 Tablet by mouth nightly. Patient not taking: Reported on 10/28/2021 7/29/21   Alejandro Eason MD   amiodarone (CORDARONE) 200 mg tablet Take 100 mg by mouth daily. 3/19/18   Provider, Historical   metoprolol succinate (TOPROL-XL) 25 mg XL tablet Take 25 mg by mouth daily. 3/19/18   Provider, Historical   lisinopril (PRINIVIL, ZESTRIL) 2.5 mg tablet Take 2.5 mg by mouth daily. Provider, Historical   allopurinol (ZYLOPRIM) 100 mg tablet Take 200 mg by mouth daily. Provider, Historical   metFORMIN (GLUCOPHAGE) 1,000 mg tablet Take 1,000 mg by mouth nightly. Provider, Historical   busPIRone (BUSPAR) 15 mg tablet Take 15 mg by mouth two (2) times a day. Provider, Historical   glimepiride (AMARYL) 1 mg tablet Take 1 mg by mouth Daily (before breakfast). Patient not taking: Reported on 10/28/2021    Provider, Historical   PARoxetine (PAXIL) 10 mg tablet Take 10 mg by mouth daily. Provider, Historical   multivitamin (ONE A DAY) tablet Take 1 Tab by mouth daily. 50+    Provider, Historical       Allergies/Social/Family History:      Allergies   Allergen Reactions    Monosodium Glutamate Anaphylaxis and Hives    Adhesive Tape-Silicones Other (comments)     BLISTERS      Social History     Tobacco Use    Smoking status: Former Smoker     Packs/day: 1.00     Years: 10.00     Pack years: 10.00     Quit date: 1965     Years since quittin.5    Smokeless tobacco: Never Used   Substance Use Topics  Alcohol use: No     Comment: NOT SINCE       Family History   Problem Relation Age of Onset    Liver Disease Mother 27        JAUNDICE    Heart Disease Father     Heart Attack Father     No Known Problems Sister     Alcohol abuse Brother     Heart Disease Brother     Muscular dystrophy Brother         FROM THEIR MOTHER, PT STEPMOM    Muscular dystrophy Brother         FROM THEIR MOTHER, PT STEPMOM    Muscular dystrophy Brother         FROM THEIR MOTHER, PT STEPMOM    No Known Problems Sister     Breast Cancer Daughter     Anesth Problems Neg Hx        Review of Systems:     Unable to obtain due to AMS    Objective:   Vital Signs:  Visit Vitals  /63 (BP 1 Location: Right upper arm)   Pulse 60   Temp 98.4 °F (36.9 °C)   Resp 28   Ht 5' 7\" (1.702 m)   Wt 81.6 kg (179 lb 14.3 oz)   SpO2 97%   BMI 28.18 kg/m²    O2 Flow Rate (L/min): 2 l/min O2 Device: Nasal cannula Temp (24hrs), Av.3 °F (36.8 °C), Min:97.8 °F (36.6 °C), Max:98.5 °F (36.9 °C)           Intake/Output:     Intake/Output Summary (Last 24 hours) at 2022 1247  Last data filed at 2022 0951  Gross per 24 hour   Intake 33.75 ml   Output 400 ml   Net -366.25 ml       Physical Exam:  General:  no distress, appears stated age, drowsy  Eye:  conjunctivae/corneas clear. Pupils are reg round and reactive bilaterally. Neurologic: Limited, post op, BURTON on painful stimuli, does not follow commands at present. No seizure like activity or twitching noted. Opens eyes to vocal stimuli. Neck:  normal and no erythema or exudates noted. Lungs: Clear bilateral sounds on ausculation, non labored, some grunting noted. Heart: paced rhythm, regular rate, S1, S2   Abdomen:  soft, non-tender.  Bowel sounds normal. No masses,  no organomegaly  Cardiovascular:  Regular rate and rhythm, S1S2 present, pedal pulses normal and no edema  Skin:  Normal. and no rash or abnormalities    LABS AND  DATA: Personally reviewed  Recent Labs 01/20/22  0554 01/19/22  2330   WBC 11.7* 12.3*   HGB 12.3 13.7   HCT 37.7 41.3    183     Recent Labs     01/20/22  0554 01/19/22 2330    146*   K 4.2 4.3   * 109*   CO2 28 31   BUN 30* 31*   CREA 1.31* 1.49*   * 228*   CA 8.8 8.8   MG 2.1  --    PHOS 2.2*  --      Recent Labs     01/19/22 2330   AP 90   TP 6.4   ALB 3.4*   GLOB 3.0     Recent Labs     01/19/22 2330   INR 1.1   PTP 10.5   APTT 25.7      No results for input(s): PHI, PCO2I, PO2I, FIO2I in the last 72 hours. No results for input(s): CPK, CKMB, TROIQ, BNPP in the last 72 hours. MEDS: Reviewed    Chest X-Ray: None to review  CT Results  (Last 48 hours)               01/19/22 2225  CT HEAD WO CONT Final result    Impression:  Large left frontal subdural mixed density collection with   significant mass effect, probably acute on chronic subdural hematoma. I called the report to Dr. Lisa Cohn 10:45 PM.               Narrative: Indication: Fall. Right ear bleeding. Dose reduction: All CT scans done at this facility are performed using dose   reduction optimization techniques as appropriate to a performed exam including   the following: Automated exposure control, adjustments of the mA and/or kV   according to patient size, or use of iterative reconstruction technique. CT head unenhanced 19 January 2022. Comparison 29 July 2021. Interval large left frontal mixed primarily low density extra-axial likely   subdural fluid collection with significant mass effect, effacing the adjacent   sulci and left lateral ventricle, with midline shift to the right of 8 mm. There   are several areas of higher density in the periphery of the lesion suggesting   acute on chronic hemorrhage. Collection measures 3.2 cm transverse. No transtentorial herniation. Cerebral atrophy. Left mastoidectomy changes again noted. Normal right mastoid, right middle ear. Right optical globe banding again noted.  Bilateral absent native lens. Assessment and Plan:     Traumatic subdural hematoma c/b brain compression:  - NSGY consulted and following  - Continue keppra 1g IV q12hrs  - Ancef for proph while head drain is in place  - Q1h neurochecks    HTN:  - Goal SBP <140  - Cardene gtt PRN for above goal  - Hydralazine PRN for above goal    DM2:   - SSI PRN  - Hold metformin PO    Parkinson disease:   - Elected not to take medications as outpatient. CAD: Hold aspirin per NSGY    Deconditioning: Acute on Chronic    1. SBP Goal of: > 90 mmHg and < 140 mmHg  2. MAP Goal of: > 65 mmHg  3. Phenylephrine and Nicardipine (Cardene) - For above SBP/MAP goals  4. IVFs:  0.45% with 20 KCL meq @ 125ml/hr  5. Transfusion Trigger (Hgb): <7 g/dL  6. Respiratory Goals:  a. Aggressive bronchopulmonary hygiene  7. Pulmonary toilet: NA   8. SpO2 Goal: > 92% (on  3 L)  9. Keep K>4; Mg>2   10. PT/OT: PT consulted and on board, OT consulted and on board and Speech therapy consulted and on board   11. Discussed Plan of Care/Code Status: Do Not Resuscitate  12. Appreciate Consultants Input: Neurosurgery  13. Discussed Care Plan with Bedside RN  14. Documentation of Current Medications  15.  Rest of Plan Below:    F - Feeding:  No NPO  A - Analgesia: Acetaminophen  S - Sedation: None  T - DVT Prophylaxis: SCD's or Sequential Compression Device   H - Head of Bed: > 30 Degrees  U - Ulcer Prophylaxis: Not at this time   G - Glycemic Control: Insulin SSI  S - Spontaneous Breathing Trial: N/A  B - Bowel Regimen: Docusate (Colace) and Bisacodyl (Dulcolax)  I - Indwelling Catheter:   Tubes: None  Lines: Peripheral IV and Arterial Line  Drains: Acuna Catheter and Umer-Flores Drain (BAELINO)  D - De-escalation of Antibiotics: Ancef    Multidisciplinary Rounds Completed:  No    ABCDEF Bundle/Checklist Completed:  Yes    SPECIAL EQUIPMENT  None    DISPOSITION  Stay in ICU      CRITICAL CARE CONSULTANT NOTE  I had a face to face encounter with the patient, reviewed and interpreted patient data including clinical events, labs, images, vital signs, I/O's, and examined patient. I have discussed the case and the plan and management of the patient's care with the consulting services, the bedside nurses and the respiratory therapist.      NOTE OF PERSONAL INVOLVEMENT IN CARE   This patient has a high probability of imminent, clinically significant deterioration, which requires the highest level of preparedness to intervene urgently. I participated in the decision-making and personally managed or directed the management of the following life and organ supporting interventions that required my frequent assessment to treat or prevent imminent deterioration. I personally spent 60 minutes of critical care time. This is time spent at this critically ill patient's bedside actively involved in patient care as well as the coordination of care and discussions with the patient's family. This does not include any procedural time which has been billed separately.     ESTEFANÍA Gr[facility]  1/20/2022       Addendum by:  [facility]     Critical Care Medicine  [facility]

## 2022-01-21 ENCOUNTER — APPOINTMENT (OUTPATIENT)
Dept: CT IMAGING | Age: 87
DRG: 025 | End: 2022-01-21
Attending: NEUROLOGICAL SURGERY
Payer: MEDICARE

## 2022-01-21 LAB
ANION GAP SERPL CALC-SCNC: 7 MMOL/L (ref 5–15)
BASOPHILS # BLD: 0 K/UL (ref 0–0.1)
BASOPHILS NFR BLD: 0 % (ref 0–1)
BUN SERPL-MCNC: 29 MG/DL (ref 6–20)
BUN/CREAT SERPL: 20 (ref 12–20)
CALCIUM SERPL-MCNC: 7.9 MG/DL (ref 8.5–10.1)
CHLORIDE SERPL-SCNC: 114 MMOL/L (ref 97–108)
CO2 SERPL-SCNC: 23 MMOL/L (ref 21–32)
CREAT SERPL-MCNC: 1.45 MG/DL (ref 0.7–1.3)
DIFFERENTIAL METHOD BLD: ABNORMAL
EOSINOPHIL # BLD: 0 K/UL (ref 0–0.4)
EOSINOPHIL NFR BLD: 0 % (ref 0–7)
ERYTHROCYTE [DISTWIDTH] IN BLOOD BY AUTOMATED COUNT: 13.7 % (ref 11.5–14.5)
GLUCOSE BLD STRIP.AUTO-MCNC: 116 MG/DL (ref 65–117)
GLUCOSE BLD STRIP.AUTO-MCNC: 166 MG/DL (ref 65–117)
GLUCOSE BLD STRIP.AUTO-MCNC: 174 MG/DL (ref 65–117)
GLUCOSE BLD STRIP.AUTO-MCNC: 186 MG/DL (ref 65–117)
GLUCOSE SERPL-MCNC: 152 MG/DL (ref 65–100)
HCT VFR BLD AUTO: 34.2 % (ref 36.6–50.3)
HGB BLD-MCNC: 11 G/DL (ref 12.1–17)
IMM GRANULOCYTES # BLD AUTO: 0.1 K/UL (ref 0–0.04)
IMM GRANULOCYTES NFR BLD AUTO: 1 % (ref 0–0.5)
LYMPHOCYTES # BLD: 3.7 K/UL (ref 0.8–3.5)
LYMPHOCYTES NFR BLD: 27 % (ref 12–49)
MCH RBC QN AUTO: 31.9 PG (ref 26–34)
MCHC RBC AUTO-ENTMCNC: 32.2 G/DL (ref 30–36.5)
MCV RBC AUTO: 99.1 FL (ref 80–99)
MONOCYTES # BLD: 0.9 K/UL (ref 0–1)
MONOCYTES NFR BLD: 6 % (ref 5–13)
NEUTS SEG # BLD: 9.2 K/UL (ref 1.8–8)
NEUTS SEG NFR BLD: 66 % (ref 32–75)
NRBC # BLD: 0 K/UL (ref 0–0.01)
NRBC BLD-RTO: 0 PER 100 WBC
PLATELET # BLD AUTO: 168 K/UL (ref 150–400)
PMV BLD AUTO: 10.1 FL (ref 8.9–12.9)
POTASSIUM SERPL-SCNC: 4.8 MMOL/L (ref 3.5–5.1)
RBC # BLD AUTO: 3.45 M/UL (ref 4.1–5.7)
SERVICE CMNT-IMP: ABNORMAL
SERVICE CMNT-IMP: NORMAL
SODIUM SERPL-SCNC: 144 MMOL/L (ref 136–145)
WBC # BLD AUTO: 13.8 K/UL (ref 4.1–11.1)

## 2022-01-21 PROCEDURE — 36415 COLL VENOUS BLD VENIPUNCTURE: CPT

## 2022-01-21 PROCEDURE — 85025 COMPLETE CBC W/AUTO DIFF WBC: CPT

## 2022-01-21 PROCEDURE — 82962 GLUCOSE BLOOD TEST: CPT

## 2022-01-21 PROCEDURE — 92610 EVALUATE SWALLOWING FUNCTION: CPT | Performed by: SPEECH-LANGUAGE PATHOLOGIST

## 2022-01-21 PROCEDURE — 74011250636 HC RX REV CODE- 250/636: Performed by: NEUROLOGICAL SURGERY

## 2022-01-21 PROCEDURE — 74011000250 HC RX REV CODE- 250: Performed by: NURSE PRACTITIONER

## 2022-01-21 PROCEDURE — 80048 BASIC METABOLIC PNL TOTAL CA: CPT

## 2022-01-21 PROCEDURE — 97163 PT EVAL HIGH COMPLEX 45 MIN: CPT

## 2022-01-21 PROCEDURE — 94760 N-INVAS EAR/PLS OXIMETRY 1: CPT

## 2022-01-21 PROCEDURE — 97165 OT EVAL LOW COMPLEX 30 MIN: CPT

## 2022-01-21 PROCEDURE — 97530 THERAPEUTIC ACTIVITIES: CPT

## 2022-01-21 PROCEDURE — 74011000250 HC RX REV CODE- 250: Performed by: NEUROLOGICAL SURGERY

## 2022-01-21 PROCEDURE — 77010033678 HC OXYGEN DAILY

## 2022-01-21 PROCEDURE — 74011636637 HC RX REV CODE- 636/637: Performed by: NURSE PRACTITIONER

## 2022-01-21 PROCEDURE — 65610000006 HC RM INTENSIVE CARE

## 2022-01-21 PROCEDURE — 2709999900 HC NON-CHARGEABLE SUPPLY

## 2022-01-21 PROCEDURE — 74011250637 HC RX REV CODE- 250/637: Performed by: NEUROLOGICAL SURGERY

## 2022-01-21 PROCEDURE — 70450 CT HEAD/BRAIN W/O DYE: CPT

## 2022-01-21 PROCEDURE — 74011000258 HC RX REV CODE- 258: Performed by: NEUROLOGICAL SURGERY

## 2022-01-21 PROCEDURE — 77030040831 HC BAG URINE DRNG MDII -A

## 2022-01-21 RX ORDER — DEXMEDETOMIDINE HYDROCHLORIDE 4 UG/ML
.1-.3 INJECTION, SOLUTION INTRAVENOUS
Status: DISCONTINUED | OUTPATIENT
Start: 2022-01-21 | End: 2022-01-24

## 2022-01-21 RX ADMIN — SODIUM CHLORIDE 2.5 MG/HR: 9 INJECTION, SOLUTION INTRAVENOUS at 03:26

## 2022-01-21 RX ADMIN — BUSPIRONE HYDROCHLORIDE 15 MG: 10 TABLET ORAL at 18:59

## 2022-01-21 RX ADMIN — HYDRALAZINE HYDROCHLORIDE 10 MG: 20 INJECTION INTRAMUSCULAR; INTRAVENOUS at 09:54

## 2022-01-21 RX ADMIN — LEVETIRACETAM 1000 MG: 100 INJECTION INTRAVENOUS at 03:24

## 2022-01-21 RX ADMIN — DEXMEDETOMIDINE HYDROCHLORIDE 0.5 MCG/KG/HR: 400 INJECTION INTRAVENOUS at 06:43

## 2022-01-21 RX ADMIN — POTASSIUM CHLORIDE AND SODIUM CHLORIDE: 450; 150 INJECTION, SOLUTION INTRAVENOUS at 01:16

## 2022-01-21 RX ADMIN — POTASSIUM CHLORIDE AND SODIUM CHLORIDE: 450; 150 INJECTION, SOLUTION INTRAVENOUS at 10:36

## 2022-01-21 RX ADMIN — Medication 2 UNITS: at 06:18

## 2022-01-21 RX ADMIN — POTASSIUM CHLORIDE AND SODIUM CHLORIDE: 450; 150 INJECTION, SOLUTION INTRAVENOUS at 18:59

## 2022-01-21 RX ADMIN — Medication 2 UNITS: at 00:51

## 2022-01-21 RX ADMIN — WATER 2 G: 1 INJECTION INTRAMUSCULAR; INTRAVENOUS; SUBCUTANEOUS at 06:44

## 2022-01-21 RX ADMIN — LEVETIRACETAM 1000 MG: 100 INJECTION INTRAVENOUS at 15:16

## 2022-01-21 RX ADMIN — WATER 2 G: 1 INJECTION INTRAMUSCULAR; INTRAVENOUS; SUBCUTANEOUS at 15:17

## 2022-01-21 RX ADMIN — DEXMEDETOMIDINE HYDROCHLORIDE 0.4 MCG/KG/HR: 400 INJECTION INTRAVENOUS at 01:06

## 2022-01-21 RX ADMIN — WATER 2 G: 1 INJECTION INTRAMUSCULAR; INTRAVENOUS; SUBCUTANEOUS at 22:55

## 2022-01-21 RX ADMIN — Medication 2 UNITS: at 13:13

## 2022-01-21 NOTE — PROGRESS NOTES
FRANKIE PLAN:  RUR-15%  Disposition-TBD pending progress  Transportation-BLS vs family  F/U with PCP/Specialist    Awaiting PT/OT evals pending progress    Hx of multiple falls    A new diagnosis of Parkinson's disease    Contact: Keturah Mclaughlin ([phone]). She is a nurse    Medicare pt has received, reviewed, and signed 2nd IM letter informing them of their right to appeal the discharge. Signed copy has been placed on pt bedside chart. Reason for Admission: Left subdural hematoma, s/p Craniotomy, evacuation of left frontal subdural hematoma                     RUR Score:   15%               PCP: First and Last name:   Karina Monzon DO     Name of Practice:    Are you a current patient: Yes/No: Yes   Approximate date of last visit: Nov 2021   Can you participate in a virtual visit if needed: No    Do you (patient/family) have any concerns for transition/discharge? No              Plan for utilizing home health:   TBD    Current Advanced Directive/Advance Care Plan:  DNR      Healthcare Decision Maker: Keturah Chau  Click here to complete [place] including selection of the Healthcare Decision Maker Relationship (ie \"Primary\")              Transition of Care Plan:   CM called daughter Genevieve Seymour, [facility] to discuss discharge planning. Daughter said patient now lives with her, her  and son since late last year when they realized he was no longer safe to live alone. However, they have decided to move and live with the patient at his house as the floor plan is safer for him than their house. She said they will hopefully move out of their home before patient is discharged as it is her father's wish not be placed in a skilled facility. Patient needs assistance with his ADLs and IADLs since diagnosed with Parkinson's disease. Although he has a walker and a cane, he has refused to use them as he likes to be independent. Hence his frequent falls.    The daughter verified the demographic information and did not voice any discharge barriers. Patient has never had rehab or [facility] in the past.   CM will continue to follow for any discharge needs that may arise. Courtney Porras MSA, RN, CRM  Care Management Interventions  PCP Verified by CM: Yes  Palliative Care Criteria Met (RRAT>21 & CHF Dx)?: No  Mode of Transport at Discharge:  Other (see comment) (Private car)  Transition of Care Consult (CM Consult): Discharge Planning  MyChart Signup: No  Discharge Durable Medical Equipment: No  Health Maintenance Reviewed: Yes  Physical Therapy Consult: Yes  Occupational Therapy Consult: Yes  Speech Therapy Consult: Yes  Support Systems: Other Family Member(s)  Confirm Follow Up Transport: Family  Discharge Location  Patient Expects to be Discharged to[de-identified] Unable to determine at this time

## 2022-01-21 NOTE — PROGRESS NOTES
Problem: Mobility Impaired (Adult and Pediatric)  Goal: *Acute Goals and Plan of Care (Insert Text)  Description: FUNCTIONAL STATUS PRIOR TO ADMISSION: Patient is extremely [place], unable to provide PLOF (hearing aid broken with GLF prior to admission). Per chart review, patient with hx of Parkinson's dx, requires assist for ADLs and IADLs, has a RW and [facility] but refuses to use them, significant fall hx. HOME SUPPORT: The patient lived with his daughter and her spouse & child, per chart review, plans for all of them to move back to his one story house. Physical Therapy Goals  Initiated 1/21/2022  1. Patient will move from supine to sit and sit to supine , scoot up and down, and roll side to side in bed with moderate assistance within 7 day(s). 2.  Patient will transfer from bed to chair and chair to bed with minimal assistance of 2 people using the least restrictive device within 7 day(s). 3.  Patient will perform sit to stand with minimal assistance of 2 people within 7 day(s). 4.  Patient will ambulate with moderate assistance of 2 people for 20 feet with the least restrictive device within 7 day(s). 5.  Patient will tolerate sitting EOB for 5min with CGA within 7 day(s). Outcome: Not Met   PHYSICAL THERAPY EVALUATION  Patient: Mandy Rodriguez (79 y.o. male)  Date: 1/21/2022  Primary Diagnosis: Subarachnoid bleed (HCC) [I60.9]  Procedure(s) (LRB):  DARSHANA HOLES FOR LEFT FRONTAL SUBDURAL, with CRANIOTOMY/ REQ TF/ ER 23 (Left) 1 Day Post-Op   Precautions: Fall       ASSESSMENT  Based on the objective data described below, the patient presents with [facility] (hearing aid broken), impaired cognition, decreased balance, generalized weakness (R>L),increased  tone (R side), decreased activity tolerance and distractibility s/p L frontal craniotomy on 1/20/2022. Per chart review at baseline pt lived with family and requires assistance for ADLs and has a  and [facility] but refused use them for mobility.  Pt has a hx of falls and Parkinson's disease. D/t pt's extreme [place] unable to formally assess MMT and sensation. Pt required modA of 2 people for supine<> sit and demonstrated ability to maintain seated balance with occasional Kyle to maintain midline. He was a min-mod of 2 people for sit<>stand and required modA of 2 people to perform side steps to head of bed. Pt manually cued for R knee extension in standing. Recommending IPR upon discharge. Current Level of Function Impacting Discharge (mobility/balance): modA x2 for side stepping    Functional Outcome Measure: The patient scored 1/56 on the Arce outcome measure which is indicative of high fall risk. Other factors to consider for discharge: [place], lives with family, fall risk, Parkinson's disease     Patient will benefit from skilled therapy intervention to address the above noted impairments. PLAN :  Recommendations and Planned Interventions: bed mobility training, transfer training, gait training, therapeutic exercises, neuromuscular re-education, patient and family training/education and therapeutic activities      Frequency/Duration: Patient will be followed by physical therapy:  5 times a week to address goals. Recommendation for discharge: (in order for the patient to meet his/her long term goals)  Therapy 3 hours per day 5-7 days per week    This discharge recommendation:  Has not yet been discussed the attending provider and/or case management    IF patient discharges home will need the following DME: to be determined (TBD)         SUBJECTIVE:   Patient stated I'm coming back.     OBJECTIVE DATA SUMMARY:   HISTORY:    Past Medical History:   Diagnosis Date    Anxiety     Bradycardia     Cancer ([facility]     COLON    Diabetes ([facility]     TYPE II    Gout     Hypertension     Pacemaker     Psoriasis     SCALP AND LT.  EAR     Past Surgical History:   Procedure Laterality Date    HX CATARACT REMOVAL Bilateral     HX GI      COLONOSCOPY    HX HERNIA REPAIR      UMBILICAL    HX PACEMAKER      HX RETINAL DETACHMENT REPAIR Right     HX TONSILLECTOMY      HX TOTAL COLECTOMY  12'S    1 FOOT REMOVED         Home Situation  Support Systems: Other Family Member(s)  Patient Expects to be Discharged to[de-identified] Unable to determine at this time    EXAMINATION/PRESENTATION/DECISION MAKING:   Critical Behavior:  Neurologic State: Alert  Orientation Level:  (unable to assess d/t significant [place])  Cognition: Decreased attention/concentration,Decreased command following,Impaired decision making,Impulsive,Poor safety awareness  Safety/Judgement: Decreased awareness of environment,Decreased awareness of need for assistance,Decreased awareness of need for safety    Range Of Motion:  AROM: Generally decreased, functional (R side moreso decreased)           PROM: Generally decreased, functional (decreased at akiko shoulders)           Strength:    Strength: Generally decreased, functional (R side moreso decreased)                    Tone & Sensation:   Tone: Abnormal (slight tone in RLE and RUE)              Sensation:  (unable to assess d/t significant [place])               Coordination:  Coordination: Generally decreased, functional  Vision:   Tracking: Requires cues, head turns, or add eye shifts to track  Functional Mobility:  Bed Mobility:     Supine to Sit: Moderate assistance;Assist x2  Sit to Supine: Moderate assistance;Assist x2  Scooting: Maximum assistance;Assist x2  Transfers:  Sit to Stand: Minimum assistance; Moderate assistance;Assist x2  Stand to Sit: Minimum assistance; Moderate assistance;Assist x2                       Balance:   Sitting: Impaired  Sitting - Static: Good (unsupported)  Sitting - Dynamic: Fair (occasional)  Standing: Impaired; With support (akiko HHA)  Standing - Static: Fair;Constant support  Standing - Dynamic : Poor;Constant support        Functional Measure:  Arce Balance Test:    Sitting to Standin  Standing Unsupported: 0  Sitting with Back Unsupported: 1  Standing to Sittin  Transfers: 0  Standing Unsupported with Eyes Closed: 0  Standing Unsupported with Feet Together: 0  Reach Forward with Outstretched Arm: 0   Object: 0  Turn to Look Over Shoulders: 0  Turn 360 Degrees: 0  Alternate Foot on Step/Stool: 0  Standing Unsupported One Foot in Front: 0  Stand on One Le  Total:          56=Maximum possible score;   0-20=High fall risk  21-40=Moderate fall risk   41-56=Low fall risk          Physical Therapy Evaluation Charge Determination   History Examination Presentation Decision-Making   HIGH Complexity :3+ comorbidities / personal factors will impact the outcome/ POC  HIGH Complexity : 4+ Standardized tests and measures addressing body structure, function, activity limitation and / or participation in recreation  HIGH Complexity : Unstable and unpredictable characteristics  Other outcome measures Arce  HIGH       Based on the above components, the patient evaluation is determined to be of the following complexity level: HIGH       Activity Tolerance:   Poor    After treatment patient left in no apparent distress:   Supine in bed, Call bell within reach and Restraints    COMMUNICATION/EDUCATION:   The patients plan of care was discussed with: Occupational therapist and Registered nurse. Fall prevention education was provided and the patient/caregiver indicated understanding., Patient/family have participated as able in goal setting and plan of care. and Patient/family agree to work toward stated goals and plan of care. Thank you for this referral.  Michael Velasquez, SPT   Time Calculation: 30 mins      Regarding student involvement in patient care:  A student participated in this treatment session. Per CMS Medicare statements and APTA guidelines I certify that the following was true:  1. I was present and directly observed the entire session. 2. I made all skilled judgments and clinical decisions regarding care.   3. I am the practitioner responsible for assessment, treatment, and documentation.

## 2022-01-21 NOTE — PROGRESS NOTES
Occupational Therapy  01/21/22    Orders received, chart reviewed and patient evaluated by occupational therapy. Pending progression with skilled acute occupational therapy, recommend:  Therapy 3 hours per day 5-7 days per week     Recommend with nursing ADLs with assist, OOB to chair vs bed in chair position as appropriate 3x/day and toileting via bedside commode vs rolling in supine with 2 assist. Thank you for completing as able in order to maintain patient strength, endurance and independence. Full evaluation to follow.      Thank you,   ODALIS Chris, OTR/L

## 2022-01-21 NOTE — PROGRESS NOTES
POD 1  Tmax 100  cardene gtt  ABELINO subdural - 85cc  ABELINO scalp - 55cc  WBC 13.8  Alert  Disoriented  Slight weakness on right  BURTON  Dressing C/D/I  S/P: left crani for SDH  CT - improved  Continue drains - d/c when output decreased

## 2022-01-21 NOTE — PROGRESS NOTES
Speech Pathology:  Chart reviewed and discussed with RN who reports patient agitated overnight and now not alert for PO trials/dyspahgia evaluation. SLP to continue to follow. Thank you.     Iggy Montaño, SLP

## 2022-01-21 NOTE — PROGRESS NOTES
Neurosurgery Progress Note  Maykel Guzmán [facility]          Admit Date: 2022   LOS: 1 day        Daily Progress Note: 2022    HPI: The patient was recently diagnosed with Parkinson's disease. He is very hard of hearing and wears a hearing aid in his right ear, which he broke when he fell. He is normally oriented at baseline per family. The patient has refused to use a walker for ambulation despite needing one. He is on a baby aspirin at home. Patient is Steven Arnel witness. POD1 s/p left craniotomy for evacuation of SDH    Subjective: The patient went to the OR yesterday for craniotomy to evacuate his SDH. He required precedex overnight due to agitation and need to stay flat in bed. Pt is also very hard of hearing making it difficult to communicate. Pt can hear you if you speak slowly into his right ear. He was pretty lethargic on 0.3 mcg/kg/hr of precedex, but was then climbing out of bed off of it. Unable to obtain ROS due to patient condition. Objective:     Vital signs  Temp (24hrs), Av °F (37.2 °C), Min:98 °F (36.7 °C), Max:100 °F (37.8 °C)   No intake/output data recorded.  1901 -  0700  In: 2969.7 [I.V.:2969.7]  Out: 0583 [Urine:1215; Drains:140]    Visit Vitals  BP (!) 168/74   Pulse 60   Temp 99.7 °F (37.6 °C)   Resp 23   Ht 5' 7\" (1.702 m)   Wt 86 kg (189 lb 9.5 oz)   SpO2 95%   BMI 29.69 kg/m²    O2 Flow Rate (L/min): 6 l/min O2 Device: Nasal cannula     Pain control  Pain Assessment  Pain Scale 1: Numeric (0 - 10)  Pain Intensity 1: 0    PT/OT  Gait                 Physical Exam:  Gen:NAD. Neuro: Awake. Likes to keep eyes closed. Difficult to communicate with because of his hearing. Able to tell me his name, that he goes by Angelina. Shook head appropriately yes/no to questions about place. Thinks it is 2020. Follows commands to give me thumbs up on both sides and wiggle toes BL. BURTON spontaneously.   Skin: Dried blood in right ear canal.  Left incision C/D/I    ABELINO drain #1 85 cc since surgery  ABELINO drain #2 55 cc since surgery    CT head without contrast on 01/19/22 shows large left frontal subdural mixed density collection with significant mass effect, probably acute on chronic subdural hematoma. CT head without contrast on 01/21/22 shows improved subdural hematoma status post drain placement. 24 hour results:    Recent Results (from the past 24 hour(s))   GLUCOSE, POC    Collection Time: 01/20/22 12:30 PM   Result Value Ref Range    Glucose (POC) 137 (H) 65 - 117 mg/dL    Performed by Kip Lopes, POC    Collection Time: 01/20/22  6:31 PM   Result Value Ref Range    Glucose (POC) 174 (H) 65 - 117 mg/dL    Performed by Yenni Marin, POC    Collection Time: 01/21/22 12:45 AM   Result Value Ref Range    Glucose (POC) 186 (H) 65 - 117 mg/dL    Performed by [facility]    CBC WITH AUTOMATED DIFF    Collection Time: 01/21/22  4:17 AM   Result Value Ref Range    WBC 13.8 (H) 4.1 - 11.1 K/uL    RBC 3.45 (L) 4.10 - 5.70 M/uL    HGB 11.0 (L) 12.1 - 17.0 g/dL    HCT 34.2 (L) 36.6 - 50.3 %    MCV 99.1 (H) 80.0 - 99.0 FL    MCH 31.9 26.0 - 34.0 PG    MCHC 32.2 30.0 - 36.5 g/dL    RDW 13.7 11.5 - 14.5 %    PLATELET 562 555 - 971 K/uL    MPV 10.1 8.9 - 12.9 FL    NRBC 0.0 0  WBC    ABSOLUTE NRBC 0.00 0.00 - 0.01 K/uL    NEUTROPHILS 66 32 - 75 %    LYMPHOCYTES 27 12 - 49 %    MONOCYTES 6 5 - 13 %    EOSINOPHILS 0 0 - 7 %    BASOPHILS 0 0 - 1 %    IMMATURE GRANULOCYTES 1 (H) 0.0 - 0.5 %    ABS. NEUTROPHILS 9.2 (H) 1.8 - 8.0 K/UL    ABS. LYMPHOCYTES 3.7 (H) 0.8 - 3.5 K/UL    ABS. MONOCYTES 0.9 0.0 - 1.0 K/UL    ABS. EOSINOPHILS 0.0 0.0 - 0.4 K/UL    ABS. BASOPHILS 0.0 0.0 - 0.1 K/UL    ABS. IMM.  GRANS. 0.1 (H) 0.00 - 0.04 K/UL    DF AUTOMATED     METABOLIC PANEL, BASIC    Collection Time: 01/21/22  4:17 AM   Result Value Ref Range    Sodium 144 136 - 145 mmol/L    Potassium 4.8 3.5 - 5.1 mmol/L    Chloride 114 (H) 97 - 108 mmol/L    CO2 23 21 - 32 mmol/L    Anion gap 7 5 - 15 mmol/L    Glucose 152 (H) 65 - 100 mg/dL    BUN 29 (H) 6 - 20 MG/DL    Creatinine 1.45 (H) 0.70 - 1.30 MG/DL    BUN/Creatinine ratio 20 12 - 20      GFR est AA 56 (L) >60 ml/min/1.73m2    GFR est non-AA 46 (L) >60 ml/min/1.73m2    Calcium 7.9 (L) 8.5 - 10.1 MG/DL   GLUCOSE, POC    Collection Time: 01/21/22  6:07 AM   Result Value Ref Range    Glucose (POC) 166 (H) 65 - 117 mg/dL    Performed by [facility]           Assessment:     Active Problems:    Subdural hematoma ([address] (1/20/2022)        Plan:   1. Traumatic subdural hematoma   - s/p crani 01/20   - cont ABELINO drains today   - on keppra   - neuro checks q2h   - ok to get up today   - cont IVF today   - Keep in ICU today   - PT/OT/Speech   - Ancef while ABELINO drains are in place   - Area around drains cleaned with CHG and dressing reapplied this morning as his dressing was off of his head when I walked in this morning  2. Brain compression   - due to #1   - plans as above  3. Parkinson's disease   - recently diagnosed. Started taking meds for a few days but did not think they helped, so he stopped them on his own. 4. HTN   - SBP<140   - Cardene PRN   - hydralazine PRN   - cont amiodarone, lisinopril, metoprolol from home   - intensivist following  5. Diabetes mellitus, type 2   - hold PO diabetic meds   - SSI and accu-checks    - intensivist following  6. CAD   - hold ASA   - pacemaker  7. Acute metabolic encephalopathy   - Multi-factorial   - supportive care   - precedex PRN   - restraints PRN    Activity: up with assist  DVT ppx: SCDs  Dispo: tbd    Plan d/w Dr. Rahul Rock, nurse, intensivist.      Alayna Munoz NP     01/21/22 2858    The patient is more awake. Will place him on a pureed diet, no liquids as he was not as awake when speech came and he seemed to do ok with applesauce. Needs to be fed and will defer feeding of pureed diet at the time to nursing staff based on patient's mentation over the weekend.  The patient's scalp drain was removed and pressure held. Spoke with patient's daughter by telephone to give updates. Daughter stated she was planning to visit on Sunday.  I requested her to bring a back-up hearing aid if she had one that worked at home to help improve communications with nursing and therapy team.    [facility]

## 2022-01-21 NOTE — ANESTHESIA POSTPROCEDURE EVALUATION
Post-Anesthesia Evaluation and Assessment    Patient: Jessica Nielsen MRN: [id number]  SSN: [id number]    YOB: 1935  Age: 81 y.o. Sex: male      I have evaluated the patient and they are stable and ready for discharge from the PACU. Cardiovascular Function/Vital Signs  Vitals Value Taken Time   /62 01/20/22 2015   Temp 37.3 °C (99.1 °F) 01/20/22 1900   Pulse 60 01/20/22 2022   Resp 21 01/20/22 2022   SpO2 89 % 01/20/22 2022       Patient is status post General anesthesia for Procedure(s):  DARSHANA HOLES FOR LEFT FRONTAL SUBDURAL, with CRANIOTOMY/ REQ TF/ ER 23. Nausea/Vomiting: None    Postoperative hydration reviewed and adequate. Pain:  Managed    Neurological Status: At baseline    Mental Status, Level of Consciousness: No acute distress, does not follow commands    Pulmonary Status:   O2 Device: Nasal cannula (01/21/22 0400)   Adequate oxygenation and airway patent    Complications related to anesthesia: None    Post-anesthesia assessment completed.  No concerns    Signed By: Lambert Barrett DO     January 20, 2022

## 2022-01-21 NOTE — PROGRESS NOTES
[facility]    ICU TEAM Progress Note    Name: Zachariah Shirley   : 1935   MRN: [id number]   Date: 2022      I  Subjective:   Progress Note: 2022      Reason for ICU Admission: Post subdural evacuation    Interval history: From critical care consult on   85 yo male with a PMH of NIDDM2, HTN, gout, prior CVA, HLD, Parkinson disease and who is hard of hearing with a R sided hearing aid. He presented with a traumatic subdural hematoma.       It was reported that at baseline, he is oriented. He does have difficulties with ambulation and a walker has been recommended in the past, but he does not utilize this. He experienced a GLF at home, hitting his head. This prompted his presentation to the ED the evening of . He exhibited AMS. CT head showed large left frontal subdural mixed density collection with significant mass effect, probably acute on chronic subdural hematoma. NSGY was consulted. On , he was taken to the OR for craniotomy for evacuation of the SDH. Following the OR, he was taken to the ICU. Overnight Events:   : Postoperatively agitated trying to sit up and get out of bed, low-dose Precedex had to be started to prevent him from falling and pulling IV lines and drains and to keep him as flat as tolerated. Active Problem List:     Problem List  Date Reviewed: 2019          Codes Class    Subdural hematoma ([facility] [address] ICD-10-CM: T26.0T9K  ICD-9-CM: 432.1         Dizziness ICD-10-CM: R42  ICD-9-CM: 780.4         Pacemaker ICD-10-CM: Z95.0  ICD-9-CM: V45.01         Chronic left mastoiditis ICD-10-CM: H70.12  ICD-9-CM: 383.1               Past Medical History:      has a past medical history of Anxiety, Bradycardia, Cancer ([address] Diabetes ([address] Gout, Hypertension, Pacemaker, and Psoriasis.     Past Surgical History:      has a past surgical history that includes hx retinal detachment repair (Right); hx cataract removal (Bilateral); hx tonsillectomy; hx gi; hx total colectomy (1908'S); hx hernia repair; and hx pacemaker. Home Medications:     Prior to Admission medications    Medication Sig Start Date End Date Taking? Authorizing Provider   simvastatin (Zocor) 20 mg tablet Take 20 mg by mouth nightly. Pravin Muñoz MD   albuterol sulfate (PROVENTIL;VENTOLIN) 2.5 mg/0.5 mL nebu nebulizer solution 2.5 mg by Nebulization route every eight (8) hours as needed for Wheezing. Other, MD Pravin   hydrOXYzine pamoate (VistariL) 25 mg capsule Take 1 Capsule by mouth two (2) times daily as needed for Itching. As needed for anxiety 10/28/21   Chela Desir MD   aspirin 81 mg chewable tablet Take 1 Tablet by mouth daily. 7/30/21   Mayra Mixon MD   atorvastatin (LIPITOR) 80 mg tablet Take 1 Tablet by mouth nightly. Patient not taking: Reported on 10/28/2021 7/29/21   Mayra Mixon MD   amiodarone (CORDARONE) 200 mg tablet Take 100 mg by mouth daily. 3/19/18   Provider, Historical   metoprolol succinate (TOPROL-XL) 25 mg XL tablet Take 25 mg by mouth daily. 3/19/18   Provider, Historical   lisinopril (PRINIVIL, ZESTRIL) 2.5 mg tablet Take 2.5 mg by mouth daily. Provider, Historical   allopurinol (ZYLOPRIM) 100 mg tablet Take 200 mg by mouth daily. Provider, Historical   metFORMIN (GLUCOPHAGE) 1,000 mg tablet Take 1,000 mg by mouth nightly. Provider, Historical   busPIRone (BUSPAR) 15 mg tablet Take 15 mg by mouth two (2) times a day. Provider, Historical   glimepiride (AMARYL) 1 mg tablet Take 1 mg by mouth Daily (before breakfast). Patient not taking: Reported on 10/28/2021    Provider, Historical   PARoxetine (PAXIL) 10 mg tablet Take 10 mg by mouth daily. Provider, Historical   multivitamin (ONE A DAY) tablet Take 1 Tab by mouth daily. 50+    Provider, Historical       Allergies/Social/Family History:      Allergies   Allergen Reactions    Monosodium Glutamate Anaphylaxis and Hives    Adhesive Tape-Silicones Other (comments) BLISTERS      Social History     Tobacco Use    Smoking status: Former Smoker     Packs/day: 1.00     Years: 10.00     Pack years: 10.00     Quit date: 1965     Years since quittin.5    Smokeless tobacco: Never Used   Substance Use Topics    Alcohol use: No     Comment: NOT SINCE       Family History   Problem Relation Age of Onset    Liver Disease Mother 27        JAUNDICE    Heart Disease Father     Heart Attack Father     No Known Problems Sister     Alcohol abuse Brother     Heart Disease Brother     Muscular dystrophy Brother         FROM THEIR MOTHER, PT STEPMOM    Muscular dystrophy Brother         FROM THEIR MOTHER, PT STEPMOM    Muscular dystrophy Brother         FROM THEIR MOTHER, PT STEPMOM    No Known Problems Sister     Breast Cancer Daughter     Anesth Problems Neg Hx        Review of Systems:     Not able to obtain due to patient medical condition    Objective:   Vital Signs:  Visit Vitals  /64   Pulse 60   Temp 99.7 °F (37.6 °C)   Resp 23   Ht 5' 7\" (1.702 m)   Wt 86 kg (189 lb 9.5 oz)   SpO2 95%   BMI 29.69 kg/m²    O2 Flow Rate (L/min): 6 l/min O2 Device: Nasal cannula Temp (24hrs), Av.9 °F (37.2 °C), Min:98 °F (36.7 °C), Max:100 °F (37.8 °C)           Intake/Output:     Intake/Output Summary (Last 24 hours) at 2022 3687  Last data filed at 2022 0700  Gross per 24 hour   Intake 2969.67 ml   Output 1005 ml   Net 1964.67 ml       Physical Exam:    General:  no distress, appears stated age, drowsy  Eye:  conjunctivae/corneas clear. Pupils are reg round and reactive bilaterally. Neurologic: Limited as he is on low-dose Precedex, move all limbs spontaneously, does not follow commands [very hard of hearing]. No seizure like activity or twitching noted. Opens eyes to vocal stimuli. Neck:  normal and no erythema or exudates noted. Lungs: Clear bilateral sounds on ausculation, non labored, some grunting noted.    Heart: paced rhythm, regular rate, S1, S2 Abdomen:  soft, non-tender. Bowel sounds normal. No masses,  no organomegaly  Cardiovascular:  Regular rate and rhythm, S1S2 present, pedal pulses normal and no edema  Skin:  Normal. and no rash or abnormalities    LABS AND  DATA: Personally reviewed  Recent Labs     01/21/22 0417 01/20/22  0554   WBC 13.8* 11.7*   HGB 11.0* 12.3   HCT 34.2* 37.7    172     Recent Labs     01/21/22  0417 01/20/22  0554    144   K 4.8 4.2   * 113*   CO2 23 28   BUN 29* 30*   CREA 1.45* 1.31*   * 187*   CA 7.9* 8.8   MG  --  2.1   PHOS  --  2.2*     Recent Labs     01/19/22  2330   AP 90   TP 6.4   ALB 3.4*   GLOB 3.0     Recent Labs     01/19/22  2330   INR 1.1   PTP 10.5   APTT 25.7      No results for input(s): PHI, PCO2I, PO2I, FIO2I in the last 72 hours. No results for input(s): CPK, CKMB, TROIQ, BNPP in the last 72 hours. Hemodynamics:   PAP:   CO:     Wedge:   CI:     CVP:    SVR:       PVR:       Ventilator Settings:  Mode Rate Tidal Volume Pressure FiO2 PEEP                    Peak airway pressure:      Minute ventilation:          MEDS: Reviewed    Chest X-Ray:  CXR Results  (Last 48 hours)    None          Assessment and Plan:   Traumatic subdural hematoma c/b brain compression status post surgical evaluation on January 20:  -Appreciate neurosurgery recommendation management  - Wean Precedex as tolerated, had to be started to prevent interruption of medical care.   Keep as flat as tolerated  - Continue keppra 1g IV q12hrs  - Ancef for proph while head drain is in place  - Q1h neurochecks till adjusted by neurosurgery     HTN:  - Goal SBP <140, currently off drips  - Cardene gtt PRN for above goal  - Hydralazine PRN for above goal     DM2:   - SSI PRN, goal blood sugar below 180  - Hold metformin PO     Parkinson disease:   - Elected not to take medications as outpatient.     CAD: Hold aspirin per NSGY    SCD for DVT prophylaxis    DISPOSITION  Stay in ICU    CRITICAL CARE CONSULTANT NOTE  I had a face to face encounter with the patient, reviewed and interpreted patient data including clinical events, labs, images, vital signs, I/O's, and examined patient. I have discussed the case and the plan and management of the patient's care with the consulting services, the bedside nurses and the respiratory therapist.      NOTE OF PERSONAL INVOLVEMENT IN CARE   This patient has a high probability of imminent, clinically significant deterioration, which requires the highest level of preparedness to intervene urgently. I participated in the decision-making and personally managed or directed the management of the following life and organ supporting interventions that required my frequent assessment to treat or prevent imminent deterioration. I personally spent 40 minutes of critical care time. This is time spent at this critically ill patient's bedside actively involved in patient care as well as the coordination of care and discussions with the patient's family. This does not include any procedural time which has been billed separately. Júnior Blakely M.D.   Staff Intensivist/Pulmonologist  [facility]  1/21/2022

## 2022-01-21 NOTE — OP NOTES
[address]   OPERATIVE REPORT    Name:  Celina Contreras  MR#:  [id number]  :  1935  ACCOUNT #:  [de-identified]  DATE OF SERVICE:  2022    PREOPERATIVE DIAGNOSIS:  Left subdural hematoma. POSTOPERATIVE DIAGNOSIS:  Left subdural hematoma. PROCEDURES PERFORMED:  Craniotomy, evacuation of left frontal subdural hematoma. SURGEON:  Jun Andersen MD    ASSISTANTS:  1. Geovanni Serrano SA  2. Mini. ANESTHESIA:  General.    COMPLICATIONS:  None. SPECIMENS REMOVED:  None. IMPLANTS:  Please see operative record. ESTIMATED BLOOD LOSS:  50 mL. DRAINS:  Subdural drain and scalp drain. FINDINGS:  Chronic dark brown fluid under pressure, hematoma, subacute. No active bleeding. INDICATIONS FOR SURGERY:  This is an 79-year-old gentleman who has had a change in his overall level of function since November. He has been living with his daughter who says his falls are increasing in frequency and he is becoming increasingly confused. Yesterday, he had a fall in the bathroom and that resulted in significant bleeding from his left ear. She brought him to the emergency room. Workup in the emergency room included a CAT scan that showed a large acute-on-chronic subdural hematoma on the left, 3 cm with 8 mm midline shift. Risks and benefits of surgical drainage were discussed with the patient's daughter. She understood these and agreed to proceed. PROCEDURE:  The patient was brought to the operating room. He was placed under general endotracheal anesthesia. His head was placed in [place] head ron. He received 2 g of Ancef within one hour prior to incision. He had SCDs on and functioning during the entire case. I shaved the left side of his head, and I marked a linear incision approximately 5 cm off the midline. He was sterilely prepped and draped in standard fashion. I infiltrated the proposed incision with lidocaine with epinephrine.   I used a 10 blade to incise the skin. Hemostasis was obtained with Bovie and bipolar cautery. Dissection was carried down to the skull. Self-retaining retractors were placed. I used first an acorn and then a matchstick drill to make 2 separate bur holes. He had very thin dura, so I was able to open part of it. I used a craniotome to turn a cranial flap. A part of the dura was ripped during turning the flap, but there was a thick membrane. I opened the membrane, and dark brown fluid came out under pressure. I continued with opening the remaining dura in a cruciate fashion. I irrigated copiously. I removed the membrane. There was some hematoma within the subdural space. I removed this as well. There was no active bleeding. I copiously irrigated. I then placed a subdural drain, tunneled it underneath the skin, reapproximated the dura with 4-0 Nurolon. I placed Duragen over the dural defect. I did use FloSeal along the epidural edges for hemostasis. After placing the Duragen, I placed the bone flap and secured it with cranial plates. I then placed a scalp drain or a subgaleal drain and tunneled this underneath the skin and closed the scalp using 2-0 interrupted Vicryls for the galea and Rapide for the skin. I secured the drain with nylon. He tolerated the surgery well and was taken to the postoperative care unit in stable condition.         MD LAMAR Randall/ANA [email or web address]  D:  01/20/2022 17:27  T:  01/21/2022 0:40  JOB #:  [id number]

## 2022-01-21 NOTE — PROGRESS NOTES
TRANSFER - IN REPORT:    Verbal report received from Amena HERNANDEZ(name) on Richard Ceron  being received from PACU(unit) for routine progression of care      Report consisted of patients Situation, Background, Assessment and   Recommendations(SBAR). Information from the following report(s) SBAR, Kardex, ED Summary, Intake/Output, MAR, Recent Results, Cardiac Rhythm SR and Alarm Parameters  was reviewed with the receiving nurse. Opportunity for questions and clarification was provided. Assessment completed upon patients arrival to unit and care assumed. 2030: Pt on unit. Very drowsy, no commands, BURTON spontaneously. 2330: Pt more awake, agitated, continuing to attempt to sit up in bed, pull at lines, and get out of bed. Finn NP notified, restraints ordered. 0100: Pt increasingly agitated, continuously sitting up in bed and yelling. RN has concern for pt pulling out ABELINO drains and difficulty to keep pt laying flat per neurosurgery orders. Finn JOSÉ notified. Orders for precedex received.

## 2022-01-21 NOTE — PERIOP NOTES
TRANSFER - OUT REPORT:    Verbal report given to Rica Damico (name) on Dawson Sis  being transferred to ICU 13(unit) for routine post - op       Report consisted of patients Situation, Background, Assessment and   Recommendations(SBAR). Time Pre op antibiotic given:1444  Anesthesia Stop time: 9729  Acuna Present on Transfer to floor:Y  Order for Acuna on Chart:Y  Discharge Prescriptions with Chart:N/A    Information from the following report(s) SBAR, OR Summary, Procedure Summary, Intake/Output and MAR was reviewed with the receiving nurse. Opportunity for questions and clarification was provided. Is the patient on 02? YES       L/Min 3       Other N/A    Is the patient on a monitor? YES    Is the nurse transporting with the patient? YES    Surgical Waiting Area notified of patient's transfer from PACU? NO, called daughter @ home      The following personal items collected during your admission accompanied patient upon transfer:   Dental Appliance: Dental Appliances: None  Vision:    Hearing Aid:    Jewelry: Jewelry: None  Clothing: Clothing: With patient to ICU 13  Other Valuables:  Other Valuables: None  Valuables sent to safe:

## 2022-01-21 NOTE — PROGRESS NOTES
Problem: Self Care Deficits Care Plan (Adult)  Goal: *Acute Goals and Plan of Care (Insert Text)  Description: FUNCTIONAL STATUS PRIOR TO ADMISSION: Patient is extremely [place], unable to provide PLOF (hearing aid broken with GLF prior to admission). Per chart review, patient with hx of Parkinson's dx, requires assist for ADLs and IADLs, has a RW and SPC but refuses to use them, significant fall hx. HOME SUPPORT: The patient lived with his daughter and her spouse & child, per chart review, plans for all of them to move back to his one story house. Occupational Therapy Goals  Initiated 1/21/2022   1. Patient will perform self-feeding with minimal assistance/contact guard assist within 7 day(s). 2.  Patient will perform grooming at the sink with moderate assistance within 7 day(s). 3.  Patient will perform bathing with moderate assistance within 7 day(s). 4.  Patient will perform lower body dressing with moderate assistance within 7 day(s). 5.  Patient will perform upper body dressing with moderate assistance within 7 day(s). 6.  Patient will perform toilet transfers with minimal assistance/contact guard assist within 7 day(s). 7.  Patient will perform all aspects of toileting with moderate assistance  within 7 day(s). 8.  Patient will participate in upper extremity therapeutic exercise/activities with minimal assistance/contact guard assist within 7 day(s). 9.  Patient will complete the [address] within 7 days.         Outcome: Not Met     OCCUPATIONAL THERAPY EVALUATION  Patient: Shaka Acosta (22 y.o. male)  Date: 1/21/2022  Primary Diagnosis: Subarachnoid bleed (HCC) [I60.9]  Procedure(s) (LRB):  DARSHANA HOLES FOR LEFT FRONTAL SUBDURAL, with CRANIOTOMY/ REQ TF/ ER 23 (Left) 1 Day Post-Op   Precautions: Fall, Skin, Seizure       ASSESSMENT  Based on the objective data described below, the patient presents with decreased balance, strength, activity tolerance, cognition (initiation, termination, sequencing, attention), coordination (globally, R>L), visual scanning, attention to the R, and lower body access s/p admission for GLF with L frontal SAH, now POD #1 s/p [place] with L crani. Patient unable to provide PLOF, per chart review required assist for functional activity, significant fall hx d/t baseline Parkinson's dx and refusal to use recommended DME, living with his daughter's family. He now presents far below his baseline, requiring Min-Mod A x2 for limited OOB mobility with akiko HHA, visual gestures, and facilitation for weight shifting to promote sidestepping. He requires significant assist with ADLs, Min-Total A with decreased RUE/RLE function, cognition, and inability to hear therapists despite numerous attempts at loud, deep vocalization. Given his deficits, recommend d/c to IPR to maximize safety & functional independence. Current Level of Function Impacting Discharge (ADLs/self-care): Min-Total A for ADLs, Min-Mod A x2 for bed mobility & sidestepping    Functional Outcome Measure: The patient scored 10/100 on the Barthel Index indicating he is totally dependent in basic self care. Other factors to consider for discharge: fall risk, assist of 2, PMH, PLOF, acute neuro deficits, [place] with inability to hear despite numerous attempts     Patient will benefit from skilled therapy intervention to address the above noted impairments. PLAN :  Recommendations and Planned Interventions: self care training, functional mobility training, therapeutic exercise, balance training, visual/perceptual training, therapeutic activities, cognitive retraining, endurance activities, neuromuscular re-education, patient education, home safety training, and family training/education    Frequency/Duration: Patient will be followed by occupational therapy 5 times a week to address goals.     Recommendation for discharge: (in order for the patient to meet his/her long term goals)  Therapy 3 hours per day 5-7 days per week    This discharge recommendation:  A follow-up discussion with the attending provider and/or case management is planned    IF patient discharges home will need the following DME: To be determined (TBD)         SUBJECTIVE:   Patient stated Water water water.  upon entering room    OBJECTIVE DATA SUMMARY:   HISTORY:   Past Medical History:   Diagnosis Date    Anxiety     Bradycardia     Cancer ([facility] [address]     COLON    Diabetes ([facility] [address]     TYPE II    Gout     Hypertension     Pacemaker     Psoriasis     SCALP AND LT. EAR     Past Surgical History:   Procedure Laterality Date    HX CATARACT REMOVAL Bilateral     HX GI      COLONOSCOPY    HX HERNIA REPAIR      UMBILICAL    HX PACEMAKER      HX RETINAL DETACHMENT REPAIR Right     HX TONSILLECTOMY      HX TOTAL COLECTOMY  1908'S    1 FOOT REMOVED     Expanded or extensive additional review of patient history:     Home Situation  Support Systems: Other Family Member(s)  Patient Expects to be Discharged to[de-identified] Unable to determine at this time    EXAMINATION OF PERFORMANCE DEFICITS:  Cognitive/Behavioral Status:  Neurologic State: Alert  Orientation Level:  (unable to assess d/t significant [place])  Cognition: Decreased attention/concentration;Decreased command following; Impaired decision making; Impulsive;Poor safety awareness  Perception: Cues to attend left visual field;Cues to attend right visual field (decreased awareness of R side)     Safety/Judgement: Decreased awareness of environment;Decreased awareness of need for assistance;Decreased awareness of need for safety    Skin: Appears intact despite cranial dressings and AEBLINO drains x2    Edema: None    Hearing:       Vision/Perceptual:    Tracking: Requires cues, head turns, or add eye shifts to track                                Range of Motion:  AROM: Generally decreased, functional (R side moreso decreased)  PROM: Generally decreased, functional (decreased at akiko shoulders)                      Strength:  Strength: Generally decreased, functional (R side moreso decreased)                Coordination:  Coordination: Generally decreased, functional  Fine Motor Skills-Upper: Left Impaired;Right Impaired    Gross Motor Skills-Upper: Left Impaired;Right Impaired    Tone & Sensation:  Tone: Abnormal (slight tone in RLE and RUE)  Sensation:  (unable to assess d/t significant [place])                      Balance:  Sitting: Impaired  Sitting - Static: Good (unsupported)  Sitting - Dynamic: Fair (occasional)  Standing: Impaired; With support (akiko HHA)  Standing - Static: Fair;Constant support  Standing - Dynamic : Poor;Constant support    Functional Mobility and Transfers for ADLs:  Bed Mobility:  Supine to Sit: Moderate assistance;Assist x2  Sit to Supine: Moderate assistance;Assist x2  Scooting: Maximum assistance;Assist x2    Transfers:  Sit to Stand: Minimum assistance; Moderate assistance;Assist x2  Stand to Sit: Minimum assistance; Moderate assistance;Assist x2  Toilet Transfer : Moderate assistance;Assist x2 (infer to [address] per mobility)  Assistive Device : Gait Belt    ADL Assessment:  Feeding: Moderate assistance    Oral Facial Hygiene/Grooming: Maximum assistance    Bathing: Total assistance    Upper Body Dressing: Total assistance    Lower Body Dressing: Total assistance    Toileting: Total assistance                ADL Intervention and task modifications:                           Lower Body Dressing Assistance  Dressing Assistance: Total assistance(dependent)  Socks: Total assistance (dependent)  Leg Crossed Method Used: No  Position Performed: Supine  Cues: Physical assistance; Tactile cues provided;Verbal cues provided;Visual cues provided    Toileting  Toileting Assistance:  Total assistance(dependent) ([place])    Cognitive Retraining  Safety/Judgement: Decreased awareness of environment;Decreased awareness of need for assistance;Decreased awareness of need for safety      Functional Measure:    Barthel Index:  Bathin  Bladder: 0  Bowels: 0  Groomin  Dressin  Feedin  Mobility: 0  Stairs: 0  Toilet Use: 0  Transfer (Bed to Chair and Back): 5  Total: 10/100      The Barthel ADL Index: Guidelines  1. The index should be used as a record of what a patient does, not as a record of what a patient could do. 2. The main aim is to establish degree of independence from any help, physical or verbal, however minor and for whatever reason. 3. The need for supervision renders the patient not independent. 4. A patient's performance should be established using the best available evidence. Asking the patient, friends/relatives and nurses are the usual sources, but direct observation and common sense are also important. However direct testing is not needed. 5. Usually the patient's performance over the preceding 24-48 hours is important, but occasionally longer periods will be relevant. 6. Middle categories imply that the patient supplies over 50 per cent of the effort. 7. Use of aids to be independent is allowed. Score Interpretation (from [address] [facility] [address])    Independent   60-79 Minimally independent   40-59 Partially dependent   20-39 Very dependent   <20 Totally dependent     -Abdirahman Russell., Barthel, DUmbertoW. (1965). Functional evaluation: the Barthel Index. [address] [facility] [employer] ([address] (). The Barthel activities of daily living index: self-reporting versus actual performance in the old (> or = 75 years). Journal of [address] [place] [address] 45(7), [facility], MELIA, Mike Kendall., [facility] . (). Measuring the change in disability after inpatient rehabilitation; comparison of the responsiveness of the Barthel Index and Functional [place] Measure. Journal of Neurology, Neurosurgery, and Psychiatry, 66(4), 483-837. Janneth Jackson, N.J.A, HILARIO Velazquez, & Osei Alberto, M.A. (2004) Assessment of post-stroke quality of life in cost-effectiveness studies:  The usefulness of the Barthel Index and the EuroQoL-5D. Quality of Life Research, 15, 446-13         Occupational Therapy Evaluation Charge Determination   History Examination Decision-Making   LOW Complexity : Brief history review  HIGH Complexity : 5 or more performance deficits relating to physical, cognitive , or psychosocial skils that result in activity limitations and / or participation restrictions HIGH Complexity : Patient presents with comorbidities that affect occupational performance. Signifigant modification of tasks or assistance (eg, physical or verbal) with assessment (s) is necessary to enable patient to complete evaluation       Based on the above components, the patient evaluation is determined to be of the following complexity level: LOW   Pain Rating:  None reported/verbalized, no grimacing    Activity Tolerance:   Fair    After treatment patient left in no apparent distress:    Supine in bed, Call bell within reach, Bed / chair alarm activated, Side rails x 3, and Restraints    COMMUNICATION/EDUCATION:   The patients plan of care was discussed with: Physical therapist and Registered nurse. Patient is unable to participate in goal setting and plan of care. This patients plan of care is appropriate for delegation to [facility].     Thank you for this referral.  ODALIS Russell, OTR/L  Time Calculation: 29 mins

## 2022-01-21 NOTE — PROGRESS NOTES
Problem: Dysphagia (Adult)  Goal: *Acute Goals and Plan of Care (Insert Text)  Description: Speech Therapy Goals  Initiated 1/21/2022  1. Patient will tolerate medication crushed in puree without overt s/s aspiration within 7 days. Outcome: Not Met     SPEECH LANGUAGE PATHOLOGY BEDSIDE SWALLOW EVALUATION  Patient: Janie hSerman (37 y.o. male)  Date: 1/21/2022  Primary Diagnosis: Subarachnoid bleed (HCC) [I60.9]  Procedure(s) (LRB):  DARSHANA HOLES FOR LEFT FRONTAL SUBDURAL, with CRANIOTOMY/ REQ TF/ ER 23 (Left) 1 Day Post-Op   Precautions: Aspiration       ASSESSMENT :  Based on the objective data described below, the patient presents with moderate - severe oral dysphagia characterized by absent mouthing opening to accept bolus for approximately half of PO trials and poor labial seal/retrieval of bolus requiring all PO trials to be dropped into oral cavity. Oral manipulation was slow. Moderate pharyngeal dysphagia is characterized by suspected pharyngeal swallow delay, reduced laryngeal elevation via palpation and multiple swallows per bolus which could indicated pharyngeal residue. Patient at high risk to aspirate given left craniotomy and recent Parkinson's disease diagnosis. Given bedside presentation this date feel patient is safe for medication crushed in puree when awake and alert. Patient will benefit from skilled intervention to address the above impairments. Patients rehabilitation potential is considered to be Good     PLAN :  Recommendations and Planned Interventions:  Oral care  Medication crushed in puree  Frequency/Duration: Patient will be followed by speech-language pathology 3 times a week to address goals. Discharge Recommendations: To Be Determined     SUBJECTIVE:   Patient mouthed the word \"good\" however no voicing achieved. RN at bedside. RN and OT report patient asking for water earlier this date.     OBJECTIVE:     Past Medical History:   Diagnosis Date    Anxiety Bradycardia     Cancer (HCC)     COLON    Diabetes ([facility] [address]     TYPE II    Gout     Hypertension     Pacemaker     Psoriasis     SCALP AND LT. EAR     Past Surgical History:   Procedure Laterality Date    HX CATARACT REMOVAL Bilateral     HX GI      COLONOSCOPY    HX HERNIA REPAIR      UMBILICAL    HX PACEMAKER      HX RETINAL DETACHMENT REPAIR Right     HX TONSILLECTOMY      HX TOTAL COLECTOMY  1908'S    1 FOOT REMOVED     Prior Level of Function/Home Situation:   Home Situation  Support Systems: Other Family Member(s)  Patient Expects to be Discharged to[de-identified] Unable to determine at this time  Diet prior to admission: Unknown  Current Diet:  NPO   Cognitive and Communication Status:  Neurologic State: Alert  Orientation Level:  (unable to assess d/t significant [place])  Cognition: Decreased attention/concentration,Decreased command following,Impaired decision making,Impulsive,Poor safety awareness  Perception: Cues to attend left visual field,Cues to attend right visual field (decreased awareness of R side)     Safety/Judgement: Decreased awareness of environment,Decreased awareness of need for assistance,Decreased awareness of need for safety  Oral Assessment:  Oral Assessment  Labial:  (Unable to assess given poor command following)  P.O. Trials:  Patient Position: Upright in bed  Vocal quality prior to P.O.: Aphonic  Consistency Presented: Ice chips;Puree; Thin liquid  How Presented: SLP-fed/presented;Cup/sip;Spoon     Bolus Acceptance: Impaired  Bolus Formation/Control: Impaired  Type of Impairment: Delayed; Incomplete;Poor  Propulsion: Delayed (# of seconds)     Initiation of Swallow: Delayed (# of seconds)  Laryngeal Elevation: Decreased  Aspiration Signs/Symptoms: None  Pharyngeal Phase Characteristics: Double swallow;Multiple swallows             Oral Phase Severity: Moderate-severe  Pharyngeal Phase Severity : Moderate-severe    NOMS:   The NOMS functional outcome measure was used to quantify this patient's level of swallowing impairment. Based on the NOMS, the patient was determined to be at level 2 for swallow function       NOMS Swallowing Levels:  Level 1 (CN): NPO  Level 2 (CM): NPO but takes consistency in therapy  Level 3 (CL): Takes less than 50% of nutrition p.o. and continues with nonoral feedings; and/or safe with mod cues; and/or max diet restriction  Level 4 (CK): Safe swallow but needs mod cues; and/or mod diet restriction; and/or still requires some nonoral feeding/supplements  Level 5 (CJ): Safe swallow with min diet restriction; and/or needs min cues  Level 6 (CI): Independent with p.o.; rare cues; usually self cues; may need to avoid some foods or needs extra time  Level 7 ([address] [place] [address]): Independent for all p.o.  AMAIRANI. (2003). National Outcomes Measurement System (NOMS): Adult Speech-Language Pathology User's Guide. After treatment:   Patient left in no apparent distress in bed, Call bell within reach, and Nursing notified    COMMUNICATION/EDUCATION:   Patient was educated regarding role of SLP and POC. Patient did not respond. The patient's plan of care including recommendations, planned interventions, and recommended diet changes were discussed with: Occupational therapist and Registered nurse. Patient is unable to participate in goal setting and plan of care.     Thank you for this referral.  CHERIE Sandoval  Time Calculation: 20 mins

## 2022-01-22 LAB
ANION GAP SERPL CALC-SCNC: 2 MMOL/L (ref 5–15)
BUN SERPL-MCNC: 22 MG/DL (ref 6–20)
BUN/CREAT SERPL: 21 (ref 12–20)
CALCIUM SERPL-MCNC: 8.1 MG/DL (ref 8.5–10.1)
CHLORIDE SERPL-SCNC: 113 MMOL/L (ref 97–108)
CO2 SERPL-SCNC: 27 MMOL/L (ref 21–32)
CREAT SERPL-MCNC: 1.06 MG/DL (ref 0.7–1.3)
ERYTHROCYTE [DISTWIDTH] IN BLOOD BY AUTOMATED COUNT: 13.5 % (ref 11.5–14.5)
GLUCOSE BLD STRIP.AUTO-MCNC: 110 MG/DL (ref 65–117)
GLUCOSE BLD STRIP.AUTO-MCNC: 135 MG/DL (ref 65–117)
GLUCOSE BLD STRIP.AUTO-MCNC: 153 MG/DL (ref 65–117)
GLUCOSE BLD STRIP.AUTO-MCNC: 80 MG/DL (ref 65–117)
GLUCOSE BLD STRIP.AUTO-MCNC: 80 MG/DL (ref 65–117)
GLUCOSE SERPL-MCNC: 137 MG/DL (ref 65–100)
HCT VFR BLD AUTO: 36.7 % (ref 36.6–50.3)
HGB BLD-MCNC: 11.8 G/DL (ref 12.1–17)
MAGNESIUM SERPL-MCNC: 2.1 MG/DL (ref 1.6–2.4)
MCH RBC QN AUTO: 31.6 PG (ref 26–34)
MCHC RBC AUTO-ENTMCNC: 32.2 G/DL (ref 30–36.5)
MCV RBC AUTO: 98.4 FL (ref 80–99)
NRBC # BLD: 0 K/UL (ref 0–0.01)
NRBC BLD-RTO: 0 PER 100 WBC
PHOSPHATE SERPL-MCNC: 2.9 MG/DL (ref 2.6–4.7)
PLATELET # BLD AUTO: 135 K/UL (ref 150–400)
PMV BLD AUTO: 10.2 FL (ref 8.9–12.9)
POTASSIUM SERPL-SCNC: 5 MMOL/L (ref 3.5–5.1)
RBC # BLD AUTO: 3.73 M/UL (ref 4.1–5.7)
SERVICE CMNT-IMP: ABNORMAL
SERVICE CMNT-IMP: ABNORMAL
SERVICE CMNT-IMP: NORMAL
SODIUM SERPL-SCNC: 142 MMOL/L (ref 136–145)
WBC # BLD AUTO: 15 K/UL (ref 4.1–11.1)

## 2022-01-22 PROCEDURE — 74011250636 HC RX REV CODE- 250/636: Performed by: NEUROLOGICAL SURGERY

## 2022-01-22 PROCEDURE — 74011000250 HC RX REV CODE- 250: Performed by: NURSE PRACTITIONER

## 2022-01-22 PROCEDURE — 65610000006 HC RM INTENSIVE CARE

## 2022-01-22 PROCEDURE — 84100 ASSAY OF PHOSPHORUS: CPT

## 2022-01-22 PROCEDURE — 85027 COMPLETE CBC AUTOMATED: CPT

## 2022-01-22 PROCEDURE — 74011000258 HC RX REV CODE- 258: Performed by: NEUROLOGICAL SURGERY

## 2022-01-22 PROCEDURE — 83735 ASSAY OF MAGNESIUM: CPT

## 2022-01-22 PROCEDURE — 74011000250 HC RX REV CODE- 250: Performed by: NEUROLOGICAL SURGERY

## 2022-01-22 PROCEDURE — 82962 GLUCOSE BLOOD TEST: CPT

## 2022-01-22 PROCEDURE — 80048 BASIC METABOLIC PNL TOTAL CA: CPT

## 2022-01-22 PROCEDURE — 74011636637 HC RX REV CODE- 636/637: Performed by: NURSE PRACTITIONER

## 2022-01-22 PROCEDURE — 74011250637 HC RX REV CODE- 250/637: Performed by: NEUROLOGICAL SURGERY

## 2022-01-22 PROCEDURE — 36415 COLL VENOUS BLD VENIPUNCTURE: CPT

## 2022-01-22 RX ADMIN — AMIODARONE HYDROCHLORIDE 100 MG: 200 TABLET ORAL at 09:12

## 2022-01-22 RX ADMIN — LEVETIRACETAM 1000 MG: 100 INJECTION INTRAVENOUS at 03:11

## 2022-01-22 RX ADMIN — WATER 2 G: 1 INJECTION INTRAMUSCULAR; INTRAVENOUS; SUBCUTANEOUS at 23:32

## 2022-01-22 RX ADMIN — DEXMEDETOMIDINE HYDROCHLORIDE 0.4 MCG/KG/HR: 400 INJECTION INTRAVENOUS at 03:10

## 2022-01-22 RX ADMIN — LISINOPRIL 2.5 MG: 5 TABLET ORAL at 09:11

## 2022-01-22 RX ADMIN — HYDRALAZINE HYDROCHLORIDE 10 MG: 20 INJECTION INTRAMUSCULAR; INTRAVENOUS at 07:10

## 2022-01-22 RX ADMIN — WATER 2 G: 1 INJECTION INTRAMUSCULAR; INTRAVENOUS; SUBCUTANEOUS at 07:00

## 2022-01-22 RX ADMIN — Medication 2 UNITS: at 12:22

## 2022-01-22 RX ADMIN — BUSPIRONE HYDROCHLORIDE 15 MG: 10 TABLET ORAL at 09:10

## 2022-01-22 RX ADMIN — METOPROLOL SUCCINATE 25 MG: 25 TABLET, EXTENDED RELEASE ORAL at 09:13

## 2022-01-22 RX ADMIN — POTASSIUM CHLORIDE AND SODIUM CHLORIDE: 450; 150 INJECTION, SOLUTION INTRAVENOUS at 18:58

## 2022-01-22 RX ADMIN — BUSPIRONE HYDROCHLORIDE 15 MG: 10 TABLET ORAL at 17:41

## 2022-01-22 RX ADMIN — POTASSIUM CHLORIDE AND SODIUM CHLORIDE: 450; 150 INJECTION, SOLUTION INTRAVENOUS at 12:22

## 2022-01-22 RX ADMIN — PAROXETINE HYDROCHLORIDE 10 MG: 20 TABLET, FILM COATED ORAL at 09:13

## 2022-01-22 RX ADMIN — LEVETIRACETAM 1000 MG: 100 INJECTION INTRAVENOUS at 15:20

## 2022-01-22 RX ADMIN — POTASSIUM CHLORIDE AND SODIUM CHLORIDE: 450; 150 INJECTION, SOLUTION INTRAVENOUS at 03:11

## 2022-01-22 RX ADMIN — WATER 2 G: 1 INJECTION INTRAMUSCULAR; INTRAVENOUS; SUBCUTANEOUS at 15:18

## 2022-01-22 RX ADMIN — DEXMEDETOMIDINE HYDROCHLORIDE 0.4 MCG/KG/HR: 400 INJECTION INTRAVENOUS at 15:17

## 2022-01-22 NOTE — PROGRESS NOTES
Awake, eyes open. Restrained overnight. Grimaces and localizes. Not fc. No speech. May need ngt for enteral feeding.

## 2022-01-22 NOTE — PROGRESS NOTES
[facility]    ICU TEAM Progress Note    Name: Jose Lane   : 1935   MRN: [id number]   Date: 2022      Subjective:   Progress Note: 2022      Per prior clinician: Mr Jose Lane is an 82 yo male with a PMH of NIDDM2, HTN, gout, prior CVA, HLD, Parkinson disease and who is hard of hearing with a R sided hearing aid. He presented with a traumatic subdural hematoma. It was reported that at baseline, he is oriented. He does have difficulties with ambulation and a walker has been recommended in the past, but he does not utilize this. He experienced a GLF at home, hitting his head. This prompted his presentation to the ED the evening of . He exhibited AMS. CT head showed large left frontal subdural mixed density collection with significant mass effect, probably acute on chronic subdural hematoma. NSGY was consulted. On , he was taken to the OR for craniotomy for evacuation of the SDH. Following the OR, he was taken to the ICU. Of note, he is a [employer]. POD:2    Active Problem List:     Problem List  Date Reviewed: 2019          Codes Class    Subdural hematoma ([facility] [address] ICD-10-CM: W50.7C0I  ICD-9-CM: 432.1         Dizziness ICD-10-CM: R42  ICD-9-CM: 780.4         Pacemaker ICD-10-CM: Z95.0  ICD-9-CM: V45.01         Chronic left mastoiditis ICD-10-CM: H70.12  ICD-9-CM: 383.1               Past Medical History:      has a past medical history of Anxiety, Bradycardia, Cancer ([address] Diabetes ([facility] [address] Gout, Hypertension, Pacemaker, and Psoriasis. Past Surgical History:      has a past surgical history that includes hx retinal detachment repair (Right); hx cataract removal (Bilateral); hx tonsillectomy; hx gi; hx total colectomy (8'S); hx hernia repair; and hx pacemaker. Home Medications:     Prior to Admission medications    Medication Sig Start Date End Date Taking?  Authorizing Provider   simvastatin (Zocor) 20 mg tablet Take 20 mg by mouth nightly. Pravin Muñoz MD   albuterol sulfate (PROVENTIL;VENTOLIN) 2.5 mg/0.5 mL nebu nebulizer solution 2.5 mg by Nebulization route every eight (8) hours as needed for Wheezing. Pravin Muñoz MD   hydrOXYzine pamoate (VistariL) 25 mg capsule Take 1 Capsule by mouth two (2) times daily as needed for Itching. As needed for anxiety 10/28/21   Tiffany Ram MD   aspirin 81 mg chewable tablet Take 1 Tablet by mouth daily. 21   Edu Ariza MD   atorvastatin (LIPITOR) 80 mg tablet Take 1 Tablet by mouth nightly. Patient not taking: Reported on 10/28/2021 7/29/21   Edu Ariza MD   amiodarone (CORDARONE) 200 mg tablet Take 100 mg by mouth daily. 3/19/18   Provider, Historical   metoprolol succinate (TOPROL-XL) 25 mg XL tablet Take 25 mg by mouth daily. 3/19/18   Provider, Historical   lisinopril (PRINIVIL, ZESTRIL) 2.5 mg tablet Take 2.5 mg by mouth daily. Provider, Historical   allopurinol (ZYLOPRIM) 100 mg tablet Take 200 mg by mouth daily. Provider, Historical   metFORMIN (GLUCOPHAGE) 1,000 mg tablet Take 1,000 mg by mouth nightly. Provider, Historical   busPIRone (BUSPAR) 15 mg tablet Take 15 mg by mouth two (2) times a day. Provider, Historical   glimepiride (AMARYL) 1 mg tablet Take 1 mg by mouth Daily (before breakfast). Patient not taking: Reported on 10/28/2021    Provider, Historical   PARoxetine (PAXIL) 10 mg tablet Take 10 mg by mouth daily. Provider, Historical   multivitamin (ONE A DAY) tablet Take 1 Tab by mouth daily. 50+    Provider, Historical       Allergies/Social/Family History:      Allergies   Allergen Reactions    Monosodium Glutamate Anaphylaxis and Hives    Adhesive Tape-Silicones Other (comments)     BLISTERS      Social History     Tobacco Use    Smoking status: Former Smoker     Packs/day: 1.00     Years: 10.00     Pack years: 10.00     Quit date: 1965     Years since quittin.8    Smokeless tobacco: Never Used   Substance Use Topics    Alcohol use: No     Comment: NOT SINCE       Family History   Problem Relation Age of Onset    Liver Disease Mother 27        JAUNDICE    Heart Disease Father     Heart Attack Father     No Known Problems Sister     Alcohol abuse Brother     Heart Disease Brother     Muscular dystrophy Brother         FROM THEIR MOTHER, PT STEPMOM    Muscular dystrophy Brother         FROM THEIR MOTHER, PT STEPMOM    Muscular dystrophy Brother         FROM THEIR MOTHER, PT STEPMOM    No Known Problems Sister     Breast Cancer Daughter     Anesth Problems Neg Hx        Objective:   Vital Signs:  Visit Vitals  BP (!) 150/73   Pulse 60   Temp 97.3 °F (36.3 °C)   Resp 21   Ht 5' 7\" (1.702 m)   Wt 87.9 kg (193 lb 12.6 oz)   SpO2 98%   BMI 30.35 kg/m²    O2 Flow Rate (L/min): 6 l/min O2 Device: Nasal cannula Temp (24hrs), Av.3 °F (36.8 °C), Min:97.3 °F (36.3 °C), Max:99.4 °F (37.4 °C)           Intake/Output:     Intake/Output Summary (Last 24 hours) at 2022 0814  Last data filed at 2022 0700  Gross per 24 hour   Intake 3383.35 ml   Output 2053 ml   Net 1330.35 ml       Physical Exam:   General:  no distress, appears stated age, drowsy on precedex  Eye:  conjunctivae/corneas clear. PERRL  Neurologic: very [place], best in R ear (though missing hearing aid), does follow commands if able to hear  Lungs: resps even and unlabored, symmetric chest rise  Heart: paced rhythm, regular  Abdomen:  soft, non-tender.    Skin:  Dry, no visible rashes    LABS AND  DATA: Personally reviewed  Recent Labs     22  0435 22  0417   WBC 15.0* 13.8*   HGB 11.8* 11.0*   HCT 36.7 34.2*   * 168     Recent Labs     22  0435 22  0417 22  0554 22  0554    144   < > 144   K 5.0 4.8   < > 4.2   * 114*   < > 113*   CO2 27 23   < > 28   BUN 22* 29*   < > 30*   CREA 1.06 1.45*   < > 1.31*   * 152*   < > 187*   CA 8.1* 7.9*   < > 8.8   MG 2.1  --   --  2.1   PHOS 2.9  --   -- 2.2*    < > = values in this interval not displayed. Recent Labs     01/19/22  2330   AP 90   TP 6.4   ALB 3.4*   GLOB 3.0     Recent Labs     01/19/22  2330   INR 1.1   PTP 10.5   APTT 25.7      No results for input(s): PHI, PCO2I, PO2I, FIO2I in the last 72 hours. No results for input(s): CPK, CKMB, TROIQ, BNPP in the last 72 hours. MEDS: Reviewed    Chest X-Ray: None to review  CT Results  (Last 48 hours)               01/21/22 0307  CT HEAD WO CONT Final result    Impression:  Improved subdural hematoma status post drain placement. Narrative:  EXAM: CT HEAD WO CONT       INDICATION: monitor progression of SAH       COMPARISON: 1/20/2022. CONTRAST: None. TECHNIQUE: Unenhanced CT of the head was performed using 5 mm images. Brain and   bone windows were generated. Coronal and sagittal reformats. CT dose reduction   was achieved through use of a standardized protocol tailored for this   examination and automatic exposure control for dose modulation. FINDINGS:   There is improved shift to the right, which measures 3.9 mm, previously 7.7 mm. Periventricular hypoattenuation compatible with chronic small vessel ischemic   changes. The subdural hematoma is smaller status post drain placement and   measures 2.0 cm, previously 3.1 cm there is a small amount of acute hemorrhage   in the subdural space. The basilar cisterns are open. No CT evidence of acute   infarct. There is scalp soft tissue swelling. The bone windows demonstrate left frontal craniectomy. The visualized portions   of the paranasal sinuses and mastoid air cells are clear. Assessment and Plan:     Traumatic subdural hematoma   S/p crani 1/20  - NSGY consulted and following  - Continue keppra   - Ancef while ABELINO drains in place    HTN:  - Goal SBP <140  - Cardene gtt, hydral IV prn    DM2:   - SSI prn    Parkinson disease:   - Elected not to take medications as outpatient.     CAD:   - Hold aspirin per [address]  Stay in ICU while drains in place      CRITICAL CARE CONSULTANT NOTE  I had a face to face encounter with the patient, reviewed and interpreted patient data including clinical events, labs, images, vital signs, I/O's, and examined patient. I have discussed the case and the plan and management of the patient's care with the consulting services, the bedside nurses and the respiratory therapist.      NOTE OF PERSONAL INVOLVEMENT IN CARE   This patient has a high probability of imminent, clinically significant deterioration, which requires the highest level of preparedness to intervene urgently. I participated in the decision-making and personally managed or directed the management of the following life and organ supporting interventions that required my frequent assessment to treat or prevent imminent deterioration. I personally spent 30 minutes of critical care time. This is time spent at this critically ill patient's bedside actively involved in patient care as well as the coordination of care and discussions with the patient's family. This does not include any procedural time which has been billed separately.     Marianne Perez MD  Intensivist  1/22/2022

## 2022-01-22 NOTE — PROGRESS NOTES
Bedside and Verbal shift change report given to Kamala (oncoming nurse) by Angel Naylor (offgoing nurse). Report included the following information SBAR, Kardex, Intake/Output, MAR, Accordion and Recent Results. Dual neuro performed with Angel Naylor RN at shift change. At best, pt can say his name, he engages in conversation. No facial droop. He follows commands intermittently. Pupils equal and reactive. He is extremely hard of hearing. [place] and a-line removed. On and off precedex today. One ABELINO drain removed by NS. Bedside and Verbal shift change report given to Tami (oncoming nurse) by Marilynn Chand (offgoing nurse). Report included the following information SBAR, Kardex, Intake/Output, MAR, Accordion, Recent Results and Dual Neuro Assessment.

## 2022-01-23 LAB
ANION GAP SERPL CALC-SCNC: 4 MMOL/L (ref 5–15)
BASOPHILS # BLD: 0 K/UL (ref 0–0.1)
BASOPHILS NFR BLD: 0 % (ref 0–1)
BUN SERPL-MCNC: 17 MG/DL (ref 6–20)
BUN/CREAT SERPL: 17 (ref 12–20)
CALCIUM SERPL-MCNC: 7.9 MG/DL (ref 8.5–10.1)
CHLORIDE SERPL-SCNC: 111 MMOL/L (ref 97–108)
CO2 SERPL-SCNC: 25 MMOL/L (ref 21–32)
CREAT SERPL-MCNC: 0.98 MG/DL (ref 0.7–1.3)
DIFFERENTIAL METHOD BLD: ABNORMAL
EOSINOPHIL # BLD: 0.2 K/UL (ref 0–0.4)
EOSINOPHIL NFR BLD: 2 % (ref 0–7)
ERYTHROCYTE [DISTWIDTH] IN BLOOD BY AUTOMATED COUNT: 13.1 % (ref 11.5–14.5)
GLUCOSE BLD STRIP.AUTO-MCNC: 121 MG/DL (ref 65–117)
GLUCOSE BLD STRIP.AUTO-MCNC: 135 MG/DL (ref 65–117)
GLUCOSE BLD STRIP.AUTO-MCNC: 97 MG/DL (ref 65–117)
GLUCOSE SERPL-MCNC: 107 MG/DL (ref 65–100)
HCT VFR BLD AUTO: 35.2 % (ref 36.6–50.3)
HGB BLD-MCNC: 11.4 G/DL (ref 12.1–17)
IMM GRANULOCYTES # BLD AUTO: 0 K/UL (ref 0–0.04)
IMM GRANULOCYTES NFR BLD AUTO: 0 % (ref 0–0.5)
LYMPHOCYTES # BLD: 5.8 K/UL (ref 0.8–3.5)
LYMPHOCYTES NFR BLD: 50 % (ref 12–49)
MCH RBC QN AUTO: 31.8 PG (ref 26–34)
MCHC RBC AUTO-ENTMCNC: 32.4 G/DL (ref 30–36.5)
MCV RBC AUTO: 98.1 FL (ref 80–99)
MONOCYTES # BLD: 0.7 K/UL (ref 0–1)
MONOCYTES NFR BLD: 6 % (ref 5–13)
NEUTS SEG # BLD: 4.9 K/UL (ref 1.8–8)
NEUTS SEG NFR BLD: 42 % (ref 32–75)
NRBC # BLD: 0 K/UL (ref 0–0.01)
NRBC BLD-RTO: 0 PER 100 WBC
PLATELET # BLD AUTO: 156 K/UL (ref 150–400)
PMV BLD AUTO: 10.1 FL (ref 8.9–12.9)
POTASSIUM SERPL-SCNC: 5.3 MMOL/L (ref 3.5–5.1)
RBC # BLD AUTO: 3.59 M/UL (ref 4.1–5.7)
SERVICE CMNT-IMP: ABNORMAL
SERVICE CMNT-IMP: ABNORMAL
SERVICE CMNT-IMP: NORMAL
SODIUM SERPL-SCNC: 140 MMOL/L (ref 136–145)
WBC # BLD AUTO: 11.7 K/UL (ref 4.1–11.1)

## 2022-01-23 PROCEDURE — 85025 COMPLETE CBC W/AUTO DIFF WBC: CPT

## 2022-01-23 PROCEDURE — 36415 COLL VENOUS BLD VENIPUNCTURE: CPT

## 2022-01-23 PROCEDURE — 74011250636 HC RX REV CODE- 250/636: Performed by: NEUROLOGICAL SURGERY

## 2022-01-23 PROCEDURE — 82962 GLUCOSE BLOOD TEST: CPT

## 2022-01-23 PROCEDURE — 74011000250 HC RX REV CODE- 250: Performed by: INTERNAL MEDICINE

## 2022-01-23 PROCEDURE — 74011000258 HC RX REV CODE- 258: Performed by: NEUROLOGICAL SURGERY

## 2022-01-23 PROCEDURE — 74011000250 HC RX REV CODE- 250: Performed by: NEUROLOGICAL SURGERY

## 2022-01-23 PROCEDURE — 65610000006 HC RM INTENSIVE CARE

## 2022-01-23 PROCEDURE — 80048 BASIC METABOLIC PNL TOTAL CA: CPT

## 2022-01-23 PROCEDURE — 74011250636 HC RX REV CODE- 250/636: Performed by: INTERNAL MEDICINE

## 2022-01-23 PROCEDURE — 74011250637 HC RX REV CODE- 250/637: Performed by: NEUROLOGICAL SURGERY

## 2022-01-23 PROCEDURE — 92526 ORAL FUNCTION THERAPY: CPT

## 2022-01-23 PROCEDURE — 74011250637 HC RX REV CODE- 250/637: Performed by: INTERNAL MEDICINE

## 2022-01-23 RX ORDER — FUROSEMIDE 10 MG/ML
10 INJECTION INTRAMUSCULAR; INTRAVENOUS DAILY
Status: DISCONTINUED | OUTPATIENT
Start: 2022-01-23 | End: 2022-01-23

## 2022-01-23 RX ORDER — MICONAZOLE NITRATE 2 %
POWDER (GRAM) TOPICAL 2 TIMES DAILY
Status: DISCONTINUED | OUTPATIENT
Start: 2022-01-23 | End: 2022-02-01 | Stop reason: HOSPADM

## 2022-01-23 RX ORDER — HYDRALAZINE HYDROCHLORIDE 20 MG/ML
5 INJECTION INTRAMUSCULAR; INTRAVENOUS
Status: DISCONTINUED | OUTPATIENT
Start: 2022-01-23 | End: 2022-01-27

## 2022-01-23 RX ADMIN — LEVETIRACETAM 1000 MG: 100 INJECTION INTRAVENOUS at 02:46

## 2022-01-23 RX ADMIN — DEXMEDETOMIDINE HYDROCHLORIDE 0.3 MCG/KG/HR: 400 INJECTION INTRAVENOUS at 06:40

## 2022-01-23 RX ADMIN — BUSPIRONE HYDROCHLORIDE 15 MG: 10 TABLET ORAL at 08:39

## 2022-01-23 RX ADMIN — METOPROLOL SUCCINATE 25 MG: 25 TABLET, EXTENDED RELEASE ORAL at 08:40

## 2022-01-23 RX ADMIN — LISINOPRIL 2.5 MG: 5 TABLET ORAL at 08:40

## 2022-01-23 RX ADMIN — PAROXETINE HYDROCHLORIDE 10 MG: 20 TABLET, FILM COATED ORAL at 08:41

## 2022-01-23 RX ADMIN — AMIODARONE HYDROCHLORIDE 100 MG: 200 TABLET ORAL at 08:41

## 2022-01-23 RX ADMIN — WATER 2 G: 1 INJECTION INTRAMUSCULAR; INTRAVENOUS; SUBCUTANEOUS at 16:32

## 2022-01-23 RX ADMIN — LEVETIRACETAM 1000 MG: 100 INJECTION INTRAVENOUS at 16:32

## 2022-01-23 RX ADMIN — BUSPIRONE HYDROCHLORIDE 15 MG: 10 TABLET ORAL at 18:30

## 2022-01-23 RX ADMIN — WATER 20 MG: 1 INJECTION INTRAMUSCULAR; INTRAVENOUS; SUBCUTANEOUS at 16:14

## 2022-01-23 RX ADMIN — MICONAZOLE NITRATE 2 % TOPICAL POWDER: at 21:33

## 2022-01-23 RX ADMIN — WATER 2 G: 1 INJECTION INTRAMUSCULAR; INTRAVENOUS; SUBCUTANEOUS at 06:41

## 2022-01-23 RX ADMIN — POTASSIUM CHLORIDE AND SODIUM CHLORIDE: 450; 150 INJECTION, SOLUTION INTRAVENOUS at 02:46

## 2022-01-23 NOTE — PROGRESS NOTES
1930: Bedside and Verbal shift change report given to Mauri Jung (oncoming nurse) by Theodore Meckel (offgoing nurse). Report included the following information SBAR, Kardex, ED Summary, Procedure Summary, MAR, Recent Results and Cardiac Rhythm AV Paced. 2000: Shift assessment completed. 0000: Reassessment completed. 0400: Reassessment completed. 0730: Bedside and Verbal shift change report given to Theodore Meckel (oncoming nurse) by Mauri Jung (offgoing nurse). Report included the following information SBAR, Kardex, ED Summary, Procedure Summary, MAR, Recent Results and Cardiac Rhythm AV paced.

## 2022-01-23 NOTE — PROGRESS NOTES
Problem: Dysphagia (Adult)  Goal: *Acute Goals and Plan of Care (Insert Text)  Description: Speech Therapy Goals  Initiated 1/21/2022  1. Patient will tolerate medication crushed in puree without overt s/s aspiration within 7 days. Outcome: Progressing Towards Goal     SPEECH LANGUAGE PATHOLOGY DYSPHAGIA TREATMENT  Patient: Karina Jeffries (28 y.o. male)  Date: 1/23/2022  Diagnosis: Subarachnoid bleed ([address] [I60.9] <principal problem not specified>  Procedure(s) (LRB):  DARSHANA HOLES FOR LEFT FRONTAL SUBDURAL, with CRANIOTOMY/ REQ TF/ ER 23 (Left) 3 Days Post-Op  Precautions:       ASSESSMENT:  Pt continues to be at high risk for prandial aspiration given Parkinson's and overt difficulty noted with thin liquids. Recommend pureed snacks and ice chips/teaspoons of water. Would recommend a goals of care conversation given his diagnosis of Parkinson's and given that dysphagia is typically an inevitable occurrence with Parkinson's. However, will plan to complete a Modified Barium Swallow Study to objectively assess safety of swallow for further information if in line with his goals of care. Will follow-up tomorrow to see if he is appropriate to participate. PLAN:  Recommendations and Planned Interventions:  --Pureed snacks  --Ice chips/teaspoons of water  --Meds crushed in applesauce  --Good oral care  --MBS early this week if in line with goals of care and pt able to participate    Patient continues to benefit from skilled intervention to address the above impairments. Continue treatment per established plan of care. Discharge Recommendations: To Be Determined     SUBJECTIVE:   Patient stated, Matt Males me that! .      OBJECTIVE:   Cognitive and Communication Status:  Neurologic State: Alert,Irritable  Orientation Level: Oriented to person,Disoriented to situation,Disoriented to time  Cognition: Decreased command following  Perception: Cues to attend left visual field,Cues to attend right visual field (decreased awareness of R side)     Safety/Judgement: Decreased awareness of environment,Decreased awareness of need for assistance,Decreased awareness of need for safety  Dysphagia Treatment:  Oral Assessment:  Oral Assessment  Labial: No impairment  Dentition: Natural  P.O. Trials:  Patient Position: upright in bed  Vocal quality prior to P.O.: Strain  Consistency Presented: Thin liquid;Puree  How Presented: Self-fed/presented;Cup/sip;Spoon;Straw     Bolus Acceptance: No impairment  Bolus Formation/Control: No impairment           Initiation of Swallow:  (subjectively discoordinated)     Aspiration Signs/Symptoms: Strong cough (with thin liquids)                Oral Phase Severity: No impairment  Pharyngeal Phase Severity :  (almost certainly at least moderate but needs imaging)  Pain:  Pain Scale 1: Numeric (0 - 10)  Pain Intensity 1: 0       After treatment:   Call bell within reach and Nursing notified    COMMUNICATION/EDUCATION:     The patient's plan of care including recommendations, planned interventions, and recommended diet changes were discussed with: Registered nurse.      Yvan Mcgill SLP  Time Calculation: 10 mins

## 2022-01-23 NOTE — PROGRESS NOTES
[facility]    ICU TEAM Progress Note    Name: Chato Kaur   : 1935   MRN: [id number]   Date: 2022      Subjective:   Progress Note: 2022      Per prior clinician: Mr Chato Kaur is an 82 yo male with a PMH of NIDDM2, HTN, gout, prior CVA, HLD, Parkinson disease and who is hard of hearing with a R sided hearing aid. He presented with a traumatic subdural hematoma. It was reported that at baseline, he is oriented. He does have difficulties with ambulation and a walker has been recommended in the past, but he does not utilize this. He experienced a GLF at home, hitting his head. This prompted his presentation to the ED the evening of . He exhibited AMS. CT head showed large left frontal subdural mixed density collection with significant mass effect, probably acute on chronic subdural hematoma. NSGY was consulted. On , he was taken to the OR for craniotomy for evacuation of the SDH. Following the OR, he was taken to the ICU. Of note, he is a [employer]. POD: 3    Overnight events  : occ agitation mostly related to inability to understand caregivers/staff (very [place]); tolerating low-dose prec well. Doing very well this morning -- oriented, without pain. Drains out. Active Problem List:     Problem List  Date Reviewed: 2019          Codes Class    Subdural hematoma ([facility] [address] ICD-10-CM: D21.8B2A  ICD-9-CM: 432.1         Dizziness ICD-10-CM: R42  ICD-9-CM: 780.4         Pacemaker ICD-10-CM: Z95.0  ICD-9-CM: V45.01         Chronic left mastoiditis ICD-10-CM: H70.12  ICD-9-CM: 383.1               Past Medical History:      has a past medical history of Anxiety, Bradycardia, Cancer ([address] Diabetes ([address] Gout, Hypertension, Pacemaker, and Psoriasis.     Past Surgical History:      has a past surgical history that includes hx retinal detachment repair (Right); hx cataract removal (Bilateral); hx tonsillectomy; hx gi; hx total colectomy (1908'S); hx hernia repair; and hx pacemaker. Home Medications:     Prior to Admission medications    Medication Sig Start Date End Date Taking? Authorizing Provider   simvastatin (Zocor) 20 mg tablet Take 20 mg by mouth nightly. Toni, MD Pravin   albuterol sulfate (PROVENTIL;VENTOLIN) 2.5 mg/0.5 mL nebu nebulizer solution 2.5 mg by Nebulization route every eight (8) hours as needed for Wheezing. Other, MD Pravin   hydrOXYzine pamoate (VistariL) 25 mg capsule Take 1 Capsule by mouth two (2) times daily as needed for Itching. As needed for anxiety 10/28/21   Caleb Casiano MD   aspirin 81 mg chewable tablet Take 1 Tablet by mouth daily. 7/30/21   Lazarus Chase MD   atorvastatin (LIPITOR) 80 mg tablet Take 1 Tablet by mouth nightly. Patient not taking: Reported on 10/28/2021 7/29/21   Lazarus Chase MD   amiodarone (CORDARONE) 200 mg tablet Take 100 mg by mouth daily. 3/19/18   Provider, Historical   metoprolol succinate (TOPROL-XL) 25 mg XL tablet Take 25 mg by mouth daily. 3/19/18   Provider, Historical   lisinopril (PRINIVIL, ZESTRIL) 2.5 mg tablet Take 2.5 mg by mouth daily. Provider, Historical   allopurinol (ZYLOPRIM) 100 mg tablet Take 200 mg by mouth daily. Provider, Historical   metFORMIN (GLUCOPHAGE) 1,000 mg tablet Take 1,000 mg by mouth nightly. Provider, Historical   busPIRone (BUSPAR) 15 mg tablet Take 15 mg by mouth two (2) times a day. Provider, Historical   glimepiride (AMARYL) 1 mg tablet Take 1 mg by mouth Daily (before breakfast). Patient not taking: Reported on 10/28/2021    Provider, Historical   PARoxetine (PAXIL) 10 mg tablet Take 10 mg by mouth daily. Provider, Historical   multivitamin (ONE A DAY) tablet Take 1 Tab by mouth daily. 50+    Provider, Historical       Allergies/Social/Family History:      Allergies   Allergen Reactions    Monosodium Glutamate Anaphylaxis and Hives    Adhesive Tape-Silicones Other (comments)     BLISTERS      Social History Tobacco Use    Smoking status: Former Smoker     Packs/day: 1.00     Years: 10.00     Pack years: 10.00     Quit date: 1965     Years since quittin.5    Smokeless tobacco: Never Used   Substance Use Topics    Alcohol use: No     Comment: NOT SINCE       Family History   Problem Relation Age of Onset    Liver Disease Mother 27        JAUNDICE    Heart Disease Father     Heart Attack Father     No Known Problems Sister     Alcohol abuse Brother     Heart Disease Brother     Muscular dystrophy Brother         FROM THEIR MOTHER, PT STEPMOM    Muscular dystrophy Brother         FROM THEIR MOTHER, PT STEPMOM    Muscular dystrophy Brother         FROM THEIR MOTHER, PT STEPMOM    No Known Problems Sister     Breast Cancer Daughter     Anesth Problems Neg Hx        Objective:   Vital Signs:  Visit Vitals  /61   Pulse 60   Temp 98.3 °F (36.8 °C)   Resp 25   Ht 5' 7\" (1.702 m)   Wt 84.8 kg (186 lb 15.2 oz)   SpO2 99%   BMI 29.28 kg/m²    O2 Flow Rate (L/min): 4 l/min O2 Device: Nasal cannula Temp (24hrs), Av.6 °F (36.4 °C), Min:96.8 °F (36 °C), Max:98.4 °F (36.9 °C)           Intake/Output:     Intake/Output Summary (Last 24 hours) at 2022 8520  Last data filed at 2022 0700  Gross per 24 hour   Intake 3441.87 ml   Output 1557 ml   Net 1884.87 ml       Physical Exam: grossly unchanged  General: no distress, awake, alert, oriented, ABELINO drains newly out  Neurologic: very [place], best in R ear (though missing hearing aid), does follow commands if able to hear  Lungs: resps even and unlabored, symmetric chest rise on low NC  Heart: paced rhythm, regular  Abdomen:  soft, non-tender.    Skin:  Dry, no visible rashes    LABS AND  DATA: Personally reviewed  Recent Labs     22  0431 22  043   WBC 11.7* 15.0*   HGB 11.4* 11.8*   HCT 35.2* 36.7    135*     Recent Labs     22  0431 22  0435    142   K 5.3* 5.0   * 113*   CO2 25 27   BUN 17 22* CREA 0.98 1.06   * 137*   CA 7.9* 8.1*   MG  --  2.1   PHOS  --  2.9     No results for input(s): AP, TBIL, TP, ALB, GLOB, AML, LPSE in the last 72 hours. No lab exists for component: SGOT, GPT, AMYP  No results for input(s): INR, PTP, APTT, INREXT, INREXT in the last 72 hours. No results for input(s): PHI, PCO2I, PO2I, FIO2I in the last 72 hours. No results for input(s): CPK, CKMB, TROIQ, BNPP in the last 72 hours. MEDS: Reviewed    Chest X-Ray: None to review  CT Results  (Last 48 hours)    None        Assessment and Plan:     Traumatic subdural hematoma   S/p crani 1/20  - NSGY consulted and following  - Continue keppra   - weaning precedex    HTN:  - Goal SBP <140    DM2:   - SSI prn    Parkinson disease:   - Elected not to take medications as outpatient. CAD:   - Hold aspirin per [address]  Stay in ICU 24h post-drain removal      CRITICAL CARE CONSULTANT NOTE  I had a face to face encounter with the patient, reviewed and interpreted patient data including clinical events, labs, images, vital signs, I/O's, and examined patient. I have discussed the case and the plan and management of the patient's care with the consulting services, the bedside nurses and the respiratory therapist.      NOTE OF PERSONAL INVOLVEMENT IN CARE   This patient has a high probability of imminent, clinically significant deterioration, which requires the highest level of preparedness to intervene urgently. I participated in the decision-making and personally managed or directed the management of the following life and organ supporting interventions that required my frequent assessment to treat or prevent imminent deterioration. I personally spent 30 minutes of critical care time. This is time spent at this critically ill patient's bedside actively involved in patient care as well as the coordination of care and discussions with the patient's family.   This does not include any procedural time which has been billed separately.     Breanna Dill MD  Intensivist  1/23/2022

## 2022-01-23 NOTE — PROGRESS NOTES
SLP Contact Note    SLP treatment complete. Recommend pureed snacks and ice chips/teaspoons of water. Would recommend a goals of care conversation given his diagnosis of Parkinson's. However, will plan to complete a Modified Barium Swallow Study to objectively assess safety of swallow for further information. Will follow-up tomorrow to see if he is appropriate to participate.        Thank you,  CAMI MartinezEd, [id number] [facility]  Speech-Language Pathologist

## 2022-01-24 LAB
ANION GAP SERPL CALC-SCNC: 5 MMOL/L (ref 5–15)
BASOPHILS # BLD: 0 K/UL (ref 0–0.1)
BASOPHILS NFR BLD: 0 % (ref 0–1)
BUN SERPL-MCNC: 18 MG/DL (ref 6–20)
BUN/CREAT SERPL: 19 (ref 12–20)
CALCIUM SERPL-MCNC: 8.4 MG/DL (ref 8.5–10.1)
CHLORIDE SERPL-SCNC: 110 MMOL/L (ref 97–108)
CO2 SERPL-SCNC: 28 MMOL/L (ref 21–32)
CREAT SERPL-MCNC: 0.93 MG/DL (ref 0.7–1.3)
DIFFERENTIAL METHOD BLD: ABNORMAL
EOSINOPHIL # BLD: 0.2 K/UL (ref 0–0.4)
EOSINOPHIL NFR BLD: 2 % (ref 0–7)
ERYTHROCYTE [DISTWIDTH] IN BLOOD BY AUTOMATED COUNT: 13.2 % (ref 11.5–14.5)
GLUCOSE BLD STRIP.AUTO-MCNC: 102 MG/DL (ref 65–117)
GLUCOSE BLD STRIP.AUTO-MCNC: 110 MG/DL (ref 65–117)
GLUCOSE BLD STRIP.AUTO-MCNC: 177 MG/DL (ref 65–117)
GLUCOSE BLD STRIP.AUTO-MCNC: 180 MG/DL (ref 65–117)
GLUCOSE SERPL-MCNC: 118 MG/DL (ref 65–100)
HCT VFR BLD AUTO: 37.1 % (ref 36.6–50.3)
HGB BLD-MCNC: 11.8 G/DL (ref 12.1–17)
IMM GRANULOCYTES # BLD AUTO: 0 K/UL (ref 0–0.04)
IMM GRANULOCYTES NFR BLD AUTO: 0 % (ref 0–0.5)
LYMPHOCYTES # BLD: 6.4 K/UL (ref 0.8–3.5)
LYMPHOCYTES NFR BLD: 51 % (ref 12–49)
MCH RBC QN AUTO: 31.4 PG (ref 26–34)
MCHC RBC AUTO-ENTMCNC: 31.8 G/DL (ref 30–36.5)
MCV RBC AUTO: 98.7 FL (ref 80–99)
MONOCYTES # BLD: 0.7 K/UL (ref 0–1)
MONOCYTES NFR BLD: 6 % (ref 5–13)
NEUTS SEG # BLD: 5 K/UL (ref 1.8–8)
NEUTS SEG NFR BLD: 41 % (ref 32–75)
NRBC # BLD: 0 K/UL (ref 0–0.01)
NRBC BLD-RTO: 0 PER 100 WBC
PLATELET # BLD AUTO: 174 K/UL (ref 150–400)
PMV BLD AUTO: 10.2 FL (ref 8.9–12.9)
POTASSIUM SERPL-SCNC: 4.4 MMOL/L (ref 3.5–5.1)
RBC # BLD AUTO: 3.76 M/UL (ref 4.1–5.7)
RBC MORPH BLD: ABNORMAL
SERVICE CMNT-IMP: ABNORMAL
SERVICE CMNT-IMP: ABNORMAL
SERVICE CMNT-IMP: NORMAL
SERVICE CMNT-IMP: NORMAL
SODIUM SERPL-SCNC: 143 MMOL/L (ref 136–145)
WBC # BLD AUTO: 12.3 K/UL (ref 4.1–11.1)
WBC MORPH BLD: ABNORMAL

## 2022-01-24 PROCEDURE — 74011250636 HC RX REV CODE- 250/636: Performed by: NEUROLOGICAL SURGERY

## 2022-01-24 PROCEDURE — 97112 NEUROMUSCULAR REEDUCATION: CPT

## 2022-01-24 PROCEDURE — 82962 GLUCOSE BLOOD TEST: CPT

## 2022-01-24 PROCEDURE — 97530 THERAPEUTIC ACTIVITIES: CPT

## 2022-01-24 PROCEDURE — 74011636637 HC RX REV CODE- 636/637: Performed by: NURSE PRACTITIONER

## 2022-01-24 PROCEDURE — 80048 BASIC METABOLIC PNL TOTAL CA: CPT

## 2022-01-24 PROCEDURE — 74011250637 HC RX REV CODE- 250/637: Performed by: NEUROLOGICAL SURGERY

## 2022-01-24 PROCEDURE — 92526 ORAL FUNCTION THERAPY: CPT

## 2022-01-24 PROCEDURE — 74011000250 HC RX REV CODE- 250: Performed by: INTERNAL MEDICINE

## 2022-01-24 PROCEDURE — 74011000258 HC RX REV CODE- 258: Performed by: NEUROLOGICAL SURGERY

## 2022-01-24 PROCEDURE — 65660000000 HC RM CCU STEPDOWN

## 2022-01-24 PROCEDURE — 74011000250 HC RX REV CODE- 250: Performed by: NEUROLOGICAL SURGERY

## 2022-01-24 PROCEDURE — 74011250637 HC RX REV CODE- 250/637: Performed by: INTERNAL MEDICINE

## 2022-01-24 PROCEDURE — 36415 COLL VENOUS BLD VENIPUNCTURE: CPT

## 2022-01-24 PROCEDURE — 92612 ENDOSCOPY SWALLOW (FEES) VID: CPT

## 2022-01-24 PROCEDURE — 97535 SELF CARE MNGMENT TRAINING: CPT

## 2022-01-24 PROCEDURE — 85025 COMPLETE CBC W/AUTO DIFF WBC: CPT

## 2022-01-24 RX ORDER — QUETIAPINE FUMARATE 25 MG/1
50 TABLET, FILM COATED ORAL EVERY EVENING
Status: DISCONTINUED | OUTPATIENT
Start: 2022-01-24 | End: 2022-01-26

## 2022-01-24 RX ORDER — QUETIAPINE FUMARATE 25 MG/1
25 TABLET, FILM COATED ORAL DAILY
Status: DISCONTINUED | OUTPATIENT
Start: 2022-01-25 | End: 2022-01-26

## 2022-01-24 RX ORDER — QUETIAPINE FUMARATE 25 MG/1
50 TABLET, FILM COATED ORAL 2 TIMES DAILY
Status: DISCONTINUED | OUTPATIENT
Start: 2022-01-24 | End: 2022-01-24

## 2022-01-24 RX ORDER — METOPROLOL TARTRATE 25 MG/1
12.5 TABLET, FILM COATED ORAL EVERY 12 HOURS
Status: DISCONTINUED | OUTPATIENT
Start: 2022-01-25 | End: 2022-02-01 | Stop reason: HOSPADM

## 2022-01-24 RX ADMIN — BUSPIRONE HYDROCHLORIDE 15 MG: 10 TABLET ORAL at 17:14

## 2022-01-24 RX ADMIN — QUETIAPINE FUMARATE 50 MG: 25 TABLET ORAL at 17:14

## 2022-01-24 RX ADMIN — LEVETIRACETAM 1000 MG: 100 INJECTION INTRAVENOUS at 03:26

## 2022-01-24 RX ADMIN — AMIODARONE HYDROCHLORIDE 100 MG: 200 TABLET ORAL at 09:19

## 2022-01-24 RX ADMIN — Medication 2 UNITS: at 00:04

## 2022-01-24 RX ADMIN — MICONAZOLE NITRATE 2 % TOPICAL POWDER: at 17:15

## 2022-01-24 RX ADMIN — Medication 2 UNITS: at 12:43

## 2022-01-24 RX ADMIN — LEVETIRACETAM 1000 MG: 100 INJECTION INTRAVENOUS at 14:40

## 2022-01-24 RX ADMIN — WATER 2 G: 1 INJECTION INTRAMUSCULAR; INTRAVENOUS; SUBCUTANEOUS at 00:04

## 2022-01-24 RX ADMIN — QUETIAPINE FUMARATE 50 MG: 25 TABLET ORAL at 09:16

## 2022-01-24 RX ADMIN — DEXMEDETOMIDINE HYDROCHLORIDE 0.2 MCG/KG/HR: 400 INJECTION INTRAVENOUS at 06:33

## 2022-01-24 RX ADMIN — PAROXETINE HYDROCHLORIDE 10 MG: 20 TABLET, FILM COATED ORAL at 09:17

## 2022-01-24 RX ADMIN — BUSPIRONE HYDROCHLORIDE 15 MG: 10 TABLET ORAL at 09:17

## 2022-01-24 RX ADMIN — MICONAZOLE NITRATE 2 % TOPICAL POWDER: at 09:19

## 2022-01-24 RX ADMIN — METOPROLOL SUCCINATE 25 MG: 25 TABLET, EXTENDED RELEASE ORAL at 09:17

## 2022-01-24 RX ADMIN — WATER 2 G: 1 INJECTION INTRAMUSCULAR; INTRAVENOUS; SUBCUTANEOUS at 06:34

## 2022-01-24 RX ADMIN — LISINOPRIL 2.5 MG: 5 TABLET ORAL at 09:18

## 2022-01-24 NOTE — PROGRESS NOTES
1930: Bedside and Verbal shift change report given to Corinne Stanton (oncoming nurse) by Vinay Correia (offgoing nurse). Report included the following information SBAR, Kardex, ED Summary, Procedure Summary, MAR, Recent Results and Cardiac Rhythm AV paced. 2000: Shift assessment completed. 0000: Reassessment completed. 0400: Reassessment completed. 0730: Bedside and Verbal shift change report given to [address] [place] [address] (oncoming nurse) by Corinne Stanton (offgoing nurse). Report included the following information SBAR, Kardex, ED Summary, Procedure Summary, MAR, Recent Results and Cardiac Rhythm AV Paced.

## 2022-01-24 NOTE — PROGRESS NOTES
Problem: Dysphagia (Adult)  Goal: *Acute Goals and Plan of Care (Insert Text)  Description: Speech Therapy Goals  Initiated 1/21/2022  1. Patient will tolerate medication crushed in puree without overt s/s aspiration within 7 days. 1/24/2022 1136 by Zafar Seals, SLP  Outcome: Progressing Towards Goal  1/24/2022 1135 by Zafar Seals, SLP  Outcome: Progressing Towards Goal     Speech Language Pathology  Flexible Endoscopic Evaluation of Swallowing-FEES  Patient: Jagruti Kaur (76 y.o. male)  Date: 1/24/2022  Primary Diagnosis: Subarachnoid bleed ([facility] [address] [I60.9]  Procedure(s) (LRB):  DARSHANA HOLES FOR LEFT FRONTAL SUBDURAL, with CRANIOTOMY/ REQ TF/ ER 23 (Left) 4 Days Post-Op   Precautions:        ASSESSMENT :  Based on the objective data described below, the patient presents with severe pharyngeal dysphagia characterized by reduced epiglottic inversion with suspect reduced hyolaryngeal excursion resulting in silent aspiration during and after the swallow with all liquid consistencies, and severe residue with liquid consistencies and the entirety of the solid consistency that was attempted to be swallowed. Pt also noted to be aspirating posteriorly from the piriform sinuses with all consistencies except for puree. Suspect current swallow dysfunction is likely not new and is the result of Parkinson's disease and exacerbated by likely presence of a cervical osteophyte around c3 impacting epiglottic retroflexion. Therefore, would recommend a goals of care conversation as this is likely not going to improve. 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 PEG tube likely not indicated given chronic nature of dysphagia with Parkinson's, however, will benefit from goals of care conversation.  Will continue pureed snacks until a decision is made    Patient will benefit from skilled intervention to address the above impairments. Patient's rehabilitation potential is considered to be Guarded     PLAN :  Recommendations and Planned Interventions:  --See above for options. --Goals of care conversation  --SLP available to help as needed. Frequency/Duration: Patient will be followed by speech-language pathology 3 times a week to address goals. Discharge Recommendations: To Be Determined     SUBJECTIVE:   Patient stated, Janette Sinclair. OBJECTIVE:     Past Medical History:   Diagnosis Date    Anxiety     Bradycardia     Cancer ([facility] [address]     COLON    Diabetes ([facility] [address]     TYPE II    Gout     Hypertension     Pacemaker     Psoriasis     SCALP AND LT.  EAR     Past Surgical History:   Procedure Laterality Date    HX CATARACT REMOVAL Bilateral     HX GI      COLONOSCOPY    HX HERNIA REPAIR      UMBILICAL    HX PACEMAKER      HX RETINAL DETACHMENT REPAIR Right     HX TONSILLECTOMY      HX TOTAL COLECTOMY  1908'S    1 FOOT REMOVED     Prior Level of Function/Home Situation:   Home Situation  Support Systems: Other Family Member(s)  Patient Expects to be Discharged to[de-identified] Unable to determine at this time  Diet prior to admission: regular diet/thin liquids  Current Diet:  Pureed diet no liquids     Cognitive and Communication Status:  Neurologic State: Confused,Drowsy,Eyes open to voice,Pharmacologically induced (comment)  Orientation Level: Oriented to person,Disoriented to time,Disoriented to situation,Disoriented to place  Cognition: Follows commands  Perception: Cues to attend left visual field,Cues to attend right visual field (decreased awareness of R side)     Safety/Judgement: Decreased awareness of environment,Decreased awareness of need for assistance,Decreased awareness of need for safety    History/indication for procedure: Parkinson's Disease; dubious bedside presentation  Lidocaine used: No  Nostril used: left; right was clogged with dried blood  Scope Used: Ambu disposable scope  Feeding Tube Present in Nare: No  Adverse Reaction: No  Respiratory status: Nasal cannula        Part I:  Anatomy:  Oral Assessment  Labial: No impairment  Dentition: Natural    General Comments:      Palate:   WFL   Base of tongue:   WFL   Valleculae:   WFL   Epiglottis:   WFL   Arytenoids:   WFL   False vocal folds:   WFL   Vocal folds:   Impaired: ?vocal nodule although unable to diagnose without stroboscopy Pyriform sinus:   WFL         Part II:  Laryngeal Function:    Adduction:   Full   Abduction:   Full   Arytenoid movement:   Symmetric Vocal quality:   Hoarse         Part III:  Secretions:    [place] Secretion Rating (0-7): 3     Location:  1 - Secretions in pyriform fossae (above 20%) Amount:   1 - Secretions in pyriform fossae, not yet full (20-80%) Response*:  1 - Ineffective attempts to clear or no response to secretions in pyriform fossae   Comments (e.g., quality/texture/color):      *Normal airway responses in the pharynx or laryngeal vestibule may include spontaneous coughing, throat clearing, and/or swallowing        Part IV:  Swallow Trials:    Subjective comments regarding oral phase of swallow: Prolonged mastication; tongue pumping    Comments regarding esophageal phase of swallow: Unable to assess given presence of residue    Consistency:  Thin liquid, Mildly thick liquid, and Moderately thick liquid  Position of Bolus Pre-Swallow: Presumed base of tongue  [place] Pharyngeal Residue Severity Rating Scale: Valleculae residue: 2 - trace; 1-5%, trace coating of the mucosa and Pyriform sinus residue: 3 - mild; 5-25%, up wall to quarter full  Spontaneously Cleared: No  Penetration: Yes  Aspiration: Yes  Response: No response  [place] Penetration-Aspiration Scale: 8 - Material enters the airway, passes below the vocal folds, and no effort is made to eject    Consistency: Puree  Position of Bolus Pre-Swallow: Valleculae  [place] Pharyngeal Residue Severity Rating Scale: Valleculae residue: 1 - none; 0%, no residue and Pyriform sinus residue: 2 - trace; 1-5%, trace coating of the mucosa  Spontaneously Cleared: Yes  Penetration: Some on the tip of the epiglottis  Aspiration: No  Response: n/a  [place] Penetration-Aspiration Scale: 1 - Material does not enter the airway    Consistency: Solid  Position of Bolus Pre-Swallow: Valleculae  [place] Pharyngeal Residue Severity Rating Scale: Valleculae residue: 5 - severe; >50%, filled to epiglottic rim and Pyriform sinus residue: 4 - moderate; 25-50%, up wall to half full  Spontaneously Cleared: No  Penetration: Yes  Aspiration: No  Response: No response  [place] Penetration-Aspiration Scale: 3 - Material enters the airway, remains above the vocal folds, and is not ejected from the airway    Dysphagia Severity Rating:   Oral phase: Mild  Pharyngeal phase: Severe      NOMS:   The NOMS functional outcome measure was used to quantify this patient's level of swallowing impairment. Based on the NOMS, the patient was determined to be at level 3 for swallow function         NOMS Swallowing Levels:  Level 1 (CN): NPO  Level 2 (CM): NPO but takes consistency in therapy  Level 3 (CL): Takes less than 50% of nutrition p.o. and continues with nonoral feedings; and/or safe with mod cues; and/or max diet restriction  Level 4 (CK): Safe swallow but needs mod cues; and/or mod diet restriction; and/or still requires some nonoral feeding/supplements  Level 5 (CJ): Safe swallow with min diet restriction; and/or needs min cues  Level 6 (CI): Independent with p.o.; rare cues; usually self cues; may need to avoid some foods or needs extra time  Level 7 ([address] [place] [address]): Independent for all p.o.  AMAIRANI. (2003). National Outcomes Measurement System (NOMS): Adult Speech-Language Pathology User's Guide.      Pain:  Pain Scale 1: Numeric (0 - 10)  Pain Intensity 1: 0       COMMUNICATION/EDUCATION:     The patient's plan of care including findings from FEES, recommendations, planned interventions, and recommended diet changes were discussed with: Registered nurse. Patient is unable to participate in goal setting and plan of care.     Thank you for this referral.  CHERIE Calderon  Time Calculation: 30 mins

## 2022-01-24 NOTE — PROGRESS NOTES
Patient very agitated at present Precedex  Off at present. Pulled out IV x2  Pulling off ECG leads. Geodon 20 mg given IM x 1. Resting. Daughter Christos Monreal visited updates given. 20 g IV started in R and L AC. Precedex infusing at  0.2 mcg/kg/hr.

## 2022-01-24 NOTE — PROGRESS NOTES
SLP Contact Note    FEES complete. Pt with silent aspiration with all liquid consistencies (thin, mildly-thick/nectar, and moderately-thick/honey)  and unable to clear solid consistencies. Therefore, would recommend a goals of care conversation. 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 PEG tube likely not indicated given chronic nature of dysphagia with Parkinson's, however, will benefit from goals of care. Will continue pureed snacks until a decision is made. Full note to follow.       Thank you,  CAMI BoyerEd, [id number] [facility]  Speech-Language Pathologist

## 2022-01-24 NOTE — PROGRESS NOTES
Problem: Mobility Impaired (Adult and Pediatric)  Goal: *Acute Goals and Plan of Care (Insert Text)  Description: FUNCTIONAL STATUS PRIOR TO ADMISSION: Patient is extremely [place], unable to provide PLOF (hearing aid broken with GLF prior to admission). Per chart review, patient with hx of Parkinson's dx, requires assist for ADLs and IADLs, has a RW and SPC but refuses to use them, significant fall hx. HOME SUPPORT: The patient lived with his daughter and her spouse & child, per chart review, plans for all of them to move back to his one story house. Physical Therapy Goals  Initiated 1/21/2022  1. Patient will move from supine to sit and sit to supine , scoot up and down, and roll side to side in bed with moderate assistance within 7 day(s). 2.  Patient will transfer from bed to chair and chair to bed with minimal assistance of 2 people using the least restrictive device within 7 day(s). 3.  Patient will perform sit to stand with minimal assistance of 2 people within 7 day(s). 4.  Patient will ambulate with moderate assistance of 2 people for 20 feet with the least restrictive device within 7 day(s). 5.  Patient will tolerate sitting EOB for 5min with CGA within 7 day(s). Outcome: Progressing Towards Goal   PHYSICAL THERAPY TREATMENT  Patient: Lachelle Yip (28 y.o. male)  Date: 1/24/2022  Diagnosis: Subarachnoid bleed ([facility] [address] [I60.9] <principal problem not specified>  Procedure(s) (LRB):  DARSHANA HOLES FOR LEFT FRONTAL SUBDURAL, with CRANIOTOMY/ REQ TF/ ER 23 (Left) 4 Days Post-Op  Precautions:  Fall  Chart, physical therapy assessment, plan of care and goals were reviewed. ASSESSMENT  Patient continues with skilled PT services and is slowly progressing towards goals. Pt presents with generalized weakness (R>L), [place] (better on R side), lethargy, decreased activity tolerance. Pt was given Precedex last night d/t aggitated behavior and was lethargic during todays treatment session.  Pt required max multimodal cueing to open eyes and participate in session. Once awake, demonstrated simple command following with repeated VC and additional time. Pt turned head in response to voice but did not visually attend to therapist. Demonstrated ability to volitionally move all extremities, but is still very weak. Repositioned in bed and left with restraints donned. Recommending IPR upon discharge. Current Level of Function Impacting Discharge (mobility/balance): totalA for mobility d/t lethargy    Other factors to consider for discharge: [place], Parkinson's disease, unclear PLOF, drowsy d/t Precedex         PLAN :  Patient continues to benefit from skilled intervention to address the above impairments. Continue treatment per established plan of care. to address goals. Recommendation for discharge: (in order for the patient to meet his/her long term goals)  Therapy 3 hours per day 5-7 days per week    This discharge recommendation:  Has not yet been discussed the attending provider and/or case management    IF patient discharges home will need the following DME: to be determined (TBD)       SUBJECTIVE:   Patient stated my back feels fine.     OBJECTIVE DATA SUMMARY:   Critical Behavior:  Neurologic State: Confused,Drowsy,Eyes open to voice,Pharmacologically induced (comment)  Orientation Level: Oriented to person,Disoriented to time,Disoriented to situation,Disoriented to place  Cognition: Follows commands  Safety/Judgement: Decreased awareness of environment,Decreased awareness of need for assistance,Decreased awareness of need for safety      Activity Tolerance:   Poor    After treatment patient left in no apparent distress:   Supine in bed, Call bell within reach, Side rails x 3, and Restraints    COMMUNICATION/COLLABORATION:   The patients plan of care was discussed with: Registered nurse.      Cheryle Constable, SPT   Time Calculation: 10 mins        Regarding student involvement in patient care:  A student participated in this treatment session. Per CMS Medicare statements and APTA guidelines I certify that the following was true:  1. I was present and directly observed the entire session. 2. I made all skilled judgments and clinical decisions regarding care. 3. I am the practitioner responsible for assessment, treatment, and documentation.

## 2022-01-24 NOTE — PROGRESS NOTES
[facility]    ICU TEAM Progress Note    Name: Maximiliano Rossi   : 1935   MRN: [id number]   Date: 2022      Subjective:   Progress Note: 2022      Per prior clinician: Mr Maximiliano Rossi is an 82 yo male with a PMH of NIDDM2, HTN, gout, prior CVA, HLD, Parkinson disease and who is hard of hearing with a R sided hearing aid. He presented with a traumatic subdural hematoma. It was reported that at baseline, he is oriented. He does have difficulties with ambulation and a walker has been recommended in the past, but he does not utilize this. He experienced a GLF at home, hitting his head. This prompted his presentation to the ED the evening of . He exhibited AMS. CT head showed large left frontal subdural mixed density collection with significant mass effect, probably acute on chronic subdural hematoma. NSGY was consulted. On , he was taken to the OR for craniotomy for evacuation of the SDH. Following the OR, he was taken to the ICU. Of note, he is a [employer]. POD: 4    Overnight events  : occ agitation mostly related to inability to understand caregivers/staff (very [place]); tolerating low-dose prec well. Doing very well this morning -- oriented, without pain. Drains out.  : intermittent agitation yesterday after precedex stopped; responsive to geodon. Active Problem List:     Problem List  Date Reviewed: 2019          Codes Class    Subdural hematoma ([facility] [address] ICD-10-CM: I07.0J4B  ICD-9-CM: 432.1         Dizziness ICD-10-CM: R42  ICD-9-CM: 780.4         Pacemaker ICD-10-CM: Z95.0  ICD-9-CM: V45.01         Chronic left mastoiditis ICD-10-CM: H70.12  ICD-9-CM: 383.1               Past Medical History:      has a past medical history of Anxiety, Bradycardia, Cancer ([facility] [address] Diabetes ([facility] [address] Gout, Hypertension, Pacemaker, and Psoriasis.     Past Surgical History:      has a past surgical history that includes hx retinal detachment repair (Right); hx cataract removal (Bilateral); hx tonsillectomy; hx gi; hx total colectomy (1908'S); hx hernia repair; and hx pacemaker. Home Medications:     Prior to Admission medications    Medication Sig Start Date End Date Taking? Authorizing Provider   simvastatin (Zocor) 20 mg tablet Take 20 mg by mouth nightly. Other, MD Pravin   albuterol sulfate (PROVENTIL;VENTOLIN) 2.5 mg/0.5 mL nebu nebulizer solution 2.5 mg by Nebulization route every eight (8) hours as needed for Wheezing. Other, MD Pravin   hydrOXYzine pamoate (VistariL) 25 mg capsule Take 1 Capsule by mouth two (2) times daily as needed for Itching. As needed for anxiety 10/28/21   Caleb Casiano MD   aspirin 81 mg chewable tablet Take 1 Tablet by mouth daily. 7/30/21   Lazarus Chase MD   atorvastatin (LIPITOR) 80 mg tablet Take 1 Tablet by mouth nightly. Patient not taking: Reported on 10/28/2021 7/29/21   Lazarus Chase MD   amiodarone (CORDARONE) 200 mg tablet Take 100 mg by mouth daily. 3/19/18   Provider, Historical   metoprolol succinate (TOPROL-XL) 25 mg XL tablet Take 25 mg by mouth daily. 3/19/18   Provider, Historical   lisinopril (PRINIVIL, ZESTRIL) 2.5 mg tablet Take 2.5 mg by mouth daily. Provider, Historical   allopurinol (ZYLOPRIM) 100 mg tablet Take 200 mg by mouth daily. Provider, Historical   metFORMIN (GLUCOPHAGE) 1,000 mg tablet Take 1,000 mg by mouth nightly. Provider, Historical   busPIRone (BUSPAR) 15 mg tablet Take 15 mg by mouth two (2) times a day. Provider, Historical   glimepiride (AMARYL) 1 mg tablet Take 1 mg by mouth Daily (before breakfast). Patient not taking: Reported on 10/28/2021    Provider, Historical   PARoxetine (PAXIL) 10 mg tablet Take 10 mg by mouth daily. Provider, Historical   multivitamin (ONE A DAY) tablet Take 1 Tab by mouth daily. 50+    Provider, Historical       Allergies/Social/Family History:      Allergies   Allergen Reactions    Monosodium Glutamate Anaphylaxis and Hives    Adhesive Tape-Silicones Other (comments)     BLISTERS      Social History     Tobacco Use    Smoking status: Former Smoker     Packs/day: 1.00     Years: 10.00     Pack years: 10.00     Quit date: 1965     Years since quittin.5    Smokeless tobacco: Never Used   Substance Use Topics    Alcohol use: No     Comment: NOT SINCE       Family History   Problem Relation Age of Onset    Liver Disease Mother 27        JAUNDICE    Heart Disease Father     Heart Attack Father     No Known Problems Sister     Alcohol abuse Brother     Heart Disease Brother     Muscular dystrophy Brother         FROM THEIR MOTHER, PT STEPMOM    Muscular dystrophy Brother         FROM THEIR MOTHER, PT STEPMOM    Muscular dystrophy Brother         FROM THEIR MOTHER, PT STEPMOM    No Known Problems Sister     Breast Cancer Daughter     Anesth Problems Neg Hx        Objective:   Vital Signs:  Visit Vitals  BP (!) 104/54   Pulse 60   Temp 98.2 °F (36.8 °C)   Resp 21   Ht 5' 7\" (1.702 m)   Wt 86.5 kg (190 lb 11.2 oz)   SpO2 95%   BMI 29.87 kg/m²    O2 Flow Rate (L/min): 2 l/min O2 Device: Nasal cannula Temp (24hrs), Av.1 °F (36.7 °C), Min:97.1 °F (36.2 °C), Max:99 °F (37.2 °C)           Intake/Output:     Intake/Output Summary (Last 24 hours) at 2022 0729  Last data filed at 2022 0700  Gross per 24 hour   Intake 750.97 ml   Output 425 ml   Net 325.97 ml       Physical Exam:   General: no distress, awake, alert, oriented, ABELINO drains out; dressing in place c/d/i  Neurologic: very [place], best in R ear (though missing hearing aid), does follow commands if able to hear  Lungs: resps even and unlabored, symmetric chest rise   Heart: paced rhythm, regular  Abdomen:  soft, non-tender  Skin:  Dry, no visible rashes      LABS AND  DATA: Personally reviewed  Recent Labs     22  0431   WBC 12.3* 11.7*   HGB 11.8* 11.4*   HCT 37.1 35.2*    156     Recent Labs     2221 01/23/22  0431 01/22/22  0435 01/22/22  0435    140   < > 142   K 4.4 5.3*   < > 5.0   * 111*   < > 113*   CO2 28 25   < > 27   BUN 18 17   < > 22*   CREA 0.93 0.98   < > 1.06   * 107*   < > 137*   CA 8.4* 7.9*   < > 8.1*   MG  --   --   --  2.1   PHOS  --   --   --  2.9    < > = values in this interval not displayed. No results for input(s): AP, TBIL, TP, ALB, GLOB, AML, LPSE in the last 72 hours. No lab exists for component: SGOT, GPT, AMYP  No results for input(s): INR, PTP, APTT, INREXT, INREXT in the last 72 hours. No results for input(s): PHI, PCO2I, PO2I, FIO2I in the last 72 hours. No results for input(s): CPK, CKMB, TROIQ, BNPP in the last 72 hours. MEDS: Reviewed    Chest X-Ray: None to review  CT Results  (Last 48 hours)    None        Assessment and Plan:     Traumatic subdural hematoma   S/p crani 1/20  - NSGY consulted and following  - Continue keppra   - off precedex    HTN:  - Goal SBP <140    DM2:   - SSI prn    Parkinson disease:   - Elected not to take medications as outpatient. CAD:   - Hold aspirin per NSGY    Agitation/psychosis/delirium  - seroquel 25 qAM / 50 qPM      DISPOSITION  Transfer out of ICU today      E/M time: 30 minutes.  [address]  MD Dl  Intensivist  1/24/2022

## 2022-01-24 NOTE — PROGRESS NOTES
Neurosurgery Progress Note  Ibis Nab, [facility]          Admit Date: 2022   LOS: 4 days        Daily Progress Note: 2022    HPI: The patient was recently diagnosed with Parkinson's disease. He is very hard of hearing and wears a hearing aid in his right ear, which he broke when he fell. He is normally oriented at baseline per family. The patient has refused to use a walker for ambulation despite needing one. He is on a baby aspirin at home. Patient is [place] witness. The patient went to the OR yesterday for craniotomy to evacuate his SDH. He required precedex overnight due to agitation and need to stay flat in bed. Pt is also very hard of hearing making it difficult to communicate. Pt can hear you if you speak slowly into his right ear. POD4 s/p left craniotomy for evacuation of SDH    Subjective: The patient's subdural drain was removed yesterday. Pt was agitated yesterday evening, requiring Geodon and Precedex was restarted. Daughter visited yesterday and brought new hearing aid for right ear. Pt more calm and coopertaive this morning, but is still on Precedex. FEES test done today, which showed aspiration of all liquids, including all thickeners and unable to clear solid consistencies. 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 Pt denies headache today.  He asks me why he is in the hospital after I have just told him why he is in the hospital.      Objective:     Vital signs  Temp (24hrs), Av.1 °F (36.7 °C), Min:97.1 °F (36.2 °C), Max:99 °F (37.2 °C)   701 -  1900  In: 11.5 [I.V.:11.5]  Out: -   1901 -  0700  In: 2621.8 [I.V.:2621.8]  Out: 9426 [Urine:1700; Drains:2]    Visit Vitals  /64   Pulse 60   Temp 98 °F (36.7 °C)   Resp 21   Ht 5' 7\" (1.702 m)   Wt 86.5 kg (190 lb 11.2 oz)   SpO2 92%   BMI 29.87 kg/m²    O2 Flow Rate (L/min): 2 l/min O2 Device: None (Room air)     Pain control  Pain Assessment  Pain Scale 1: Numeric (0 - 10)  Pain Intensity 1: 0    PT/OT  Gait                 Physical Exam:  Gen:NAD. Neuro: Awake. Likes to keep eyes closed. Difficult to communicate with because of his hearing. Knows his name and answers yes/no appropriately to place. Tells me it is 2022. Follows commands to give me thumbs up on both sides and wiggle toes BL. BURTON spontaneously. Right hearing aid in place. Skin: Left incision C/D/I    CT head without contrast on 01/19/22 shows large left frontal subdural mixed density collection with significant mass effect, probably acute on chronic subdural hematoma. CT head without contrast on 01/21/22 shows improved subdural hematoma status post drain placement. 24 hour results:    Recent Results (from the past 24 hour(s))   GLUCOSE, POC    Collection Time: 01/23/22 12:36 PM   Result Value Ref Range    Glucose (POC) 135 (H) 65 - 117 mg/dL    Performed by Cathie     GLUCOSE, POC    Collection Time: 01/23/22  6:03 PM   Result Value Ref Range    Glucose (POC) 121 (H) 65 - 117 mg/dL    Performed by Cathie     GLUCOSE, POC    Collection Time: 01/23/22 11:59 PM   Result Value Ref Range    Glucose (POC) 180 (H) 65 - 117 mg/dL    Performed by Jaspal Mayberry    CBC WITH AUTOMATED DIFF    Collection Time: 01/24/22  5:21 AM   Result Value Ref Range    WBC 12.3 (H) 4.1 - 11.1 K/uL    RBC 3.76 (L) 4.10 - 5.70 M/uL    HGB 11.8 (L) 12.1 - 17.0 g/dL    HCT 37.1 36.6 - 50.3 %    MCV 98.7 80.0 - 99.0 FL    MCH 31.4 26.0 - 34.0 PG    MCHC 31.8 30.0 - 36.5 g/dL    RDW 13.2 11.5 - 14.5 %    PLATELET 946 305 - 248 K/uL    MPV 10.2 8.9 - 12.9 FL    NRBC 0.0 0  WBC    ABSOLUTE NRBC 0.00 0.00 - 0.01 K/uL    NEUTROPHILS 41 32 - 75 %    LYMPHOCYTES 51 (H) 12 - 49 %    MONOCYTES 6 5 - 13 %    EOSINOPHILS 2 0 - 7 %    BASOPHILS 0 0 - 1 %    IMMATURE GRANULOCYTES 0 0.0 - 0.5 %    ABS. NEUTROPHILS 5.0 1.8 - 8.0 K/UL    ABS. LYMPHOCYTES 6.4 (H) 0.8 - 3.5 K/UL    ABS.  MONOCYTES 0.7 0.0 - 1.0 K/UL ABS. EOSINOPHILS 0.2 0.0 - 0.4 K/UL    ABS. BASOPHILS 0.0 0.0 - 0.1 K/UL    ABS. IMM. GRANS. 0.0 0.00 - 0.04 K/UL    DF SMEAR SCANNED      RBC COMMENTS NORMOCYTIC, NORMOCHROMIC      WBC COMMENTS REACTIVE LYMPHS     METABOLIC PANEL, BASIC    Collection Time: 01/24/22  5:21 AM   Result Value Ref Range    Sodium 143 136 - 145 mmol/L    Potassium 4.4 3.5 - 5.1 mmol/L    Chloride 110 (H) 97 - 108 mmol/L    CO2 28 21 - 32 mmol/L    Anion gap 5 5 - 15 mmol/L    Glucose 118 (H) 65 - 100 mg/dL    BUN 18 6 - 20 MG/DL    Creatinine 0.93 0.70 - 1.30 MG/DL    BUN/Creatinine ratio 19 12 - 20      GFR est AA >60 >60 ml/min/1.73m2    GFR est non-AA >60 >60 ml/min/1.73m2    Calcium 8.4 (L) 8.5 - 10.1 MG/DL   GLUCOSE, POC    Collection Time: 01/24/22  6:37 AM   Result Value Ref Range    Glucose (POC) 110 65 - 117 mg/dL    Performed by [facility]    GLUCOSE, POC    Collection Time: 01/24/22 11:23 AM   Result Value Ref Range    Glucose (POC) 177 (H) 65 - 117 mg/dL    Performed by [facility]           Assessment:     Active Problems:    Subdural hematoma ([address] (1/20/2022)        Plan:   1. Traumatic subdural hematoma   - s/p crani 01/20   - Stan drains removed over the weekend. - on keppra   - neuro checks q2h   - ok to get up   - PT/OT/Speech   - D/C Ancef  2. Brain compression   - due to #1   - plans as above  3. Parkinson's disease   - recently diagnosed. Started taking meds for a few days but did not think they helped, so he stopped them on his own. 4. HTN   - SBP<140   - Cardene PRN   - hydralazine PRN   - cont amiodarone, lisinopril, metoprolol from home   - intensivist following  5. Diabetes mellitus, type 2   - hold PO diabetic meds   - SSI and accu-checks    - intensivist following  6. CAD   - hold ASA   - pacemaker  7. Acute metabolic encephalopathy   - Multi-factorial   - supportive care   - precedex PRN   - restraints PRN  8. Dysphagia   - failed FEES. On pureed snacks.  Discussion needs to occur with family about aspiration and nutritional route. May benefit from palliative care consult    Activity: up with assist  DVT ppx: SCDs  Dispo: tbd    Plan d/w Dr. Cindy Dennison, nurse. Pt still on precedex, so would have to stay in ICU due to that. Started on Seroquel 50 mg bid today.       Salima Brown, NP

## 2022-01-24 NOTE — PROGRESS NOTES
[facility] Adult  Hospitalist Group     ICU Transfer/Accept Summary     This [place] being [place] [address] [place]  DATE OF TRANSFER: 1/24/2022       PATIENT ID: Zachariah Shirley  MRN: [id number]   YOB: 1935    PRIMARY CARE PROVIDER: Louis Stark DO   DATE OF ADMISSION: 1/20/2022  1:39 AM    ATTENDING PHYSICIAN: Lyndee Severs, FNP  CONSULTATIONS:   IP CONSULT TO NEUROSURGERY    PROCEDURES/SURGERIES:   Procedure(s):  DARSHANA HOLES FOR LEFT FRONTAL SUBDURAL, with CRANIOTOMY/ REQ TF/ ER 23    REASON FOR ADMISSION: <principal problem not specified>     HOSPITAL PROBLEM LIST:  Patient Active Problem List   Diagnosis Code    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 He presented with a traumatic subdural hematoma.       It was reported that at baseline, he is oriented. He does have difficulties with ambulation and a walker has been recommended in the past, but he does not utilize this. He experienced a GLF at home, hitting his head. This prompted his presentation to the ED the evening of 1/19. He exhibited AMS. CT head showed large left frontal subdural mixed density collection with significant mass effect, probably acute on chronic subdural hematoma. NSGY was consulted. On 1/20, he was taken to the OR for craniotomy for evacuation of the SDH. Following the OR, he was taken to the ICU. \"    Patient deemed appropriate for downgrade to NSTU. Patient examined at bedside, not acute distress noted, patient resting with eyes closed, easily arousable. Able to follow commands.    Assessment and Plan:    #  Traumatic subdural hematoma s/p craniotomy on 1/20       - continue keppra       - continue q 2 hours neuro checks       - PT/OT/Speech       - Neuro surgery following  # HTN       - Continue lisinopril       - Continue Amiodarone       - Continue Metoprolol       - Goal: SBP < 140, prn hydralazine in place  # Parkinson Disease      - PTA patient elected to not take medication      - Continue supportive care  # CAD      - Hold ASA per neurosurgery  # Agitation      - Possibly related to patient being hard of hearing, hearing aids were damaged during fall      - Continue Seroquel         PHYSICAL EXAMINATION:  Visit Vitals  BP (!) 115/58 (BP 1 Location: Right upper arm, BP Patient Position: At rest)   Pulse 60   Temp 98.1 °F (36.7 °C)   Resp 21   Ht 5' 7\" (1.702 m)   Wt 86.5 kg (190 lb 11.2 oz)   SpO2 94%   BMI 29.87 kg/m²       General:          Alert, cooperative, no distress  HEENT:           surgical incision, MMM            Neck:               Supple, symmetrical,  thyroid: non tender  Lungs:             Clear to auscultation bilaterally. No Wheezing or Rhonchi. No rales. Heart:              Regular  rhythm,  No  murmur   No edema  Abdomen:       Soft, non-tender. Not distended. Bowel sounds normal  Extremities:     No cyanosis. No clubbing,  +2 distal pulses  Skin:                Not pale. Not Jaundiced  No rashes   Psych:             Not anxious or agitated. Neurologic:      Alert, moves all extremities, oriented to self    Labs:     Recent Labs     01/24/22  0521 01/23/22  0431   WBC 12.3* 11.7*   HGB 11.8* 11.4*   HCT 37.1 35.2*    156     Recent Labs     01/24/22  0521 01/23/22  0431 01/22/22  0435    140 142   K 4.4 5.3* 5.0   * 111* 113*   CO2 28 25 27   BUN 18 17 22*   CREA 0.93 0.98 1.06   * 107* 137*   CA 8.4* 7.9* 8.1*   MG  --   --  2.1   PHOS  --   --  2.9     No results for input(s): ALT, AP, TBIL, TBILI, TP, ALB, GLOB, GGT, AML, LPSE in the last 72 hours. No lab exists for component: SGOT, GPT, AMYP, HLPSE  No results for input(s): INR, PTP, APTT, INREXT in the last 72 hours.    No results for input(s): FE, TIBC, PSAT, FERR in the last 72 hours. No results found for: FOL, RBCF   No results for input(s): PH, PCO2, PO2 in the last 72 hours. No results for input(s): CPK, CKNDX, TROIQ in the last 72 hours.     No lab exists for component: CPKMB  Lab Results   Component Value Date/Time    Cholesterol, total 110 07/29/2021 11:30 AM    HDL Cholesterol 31 07/29/2021 11:30 AM    LDL, calculated 25.2 07/29/2021 11:30 AM    Triglyceride 269 (H) 07/29/2021 11:30 AM    CHOL/HDL Ratio 3.5 07/29/2021 11:30 AM     Lab Results   Component Value Date/Time    Glucose (POC) 177 (H) 01/24/2022 11:23 AM    Glucose (POC) 110 01/24/2022 06:37 AM    Glucose (POC) 180 (H) 01/23/2022 11:59 PM    Glucose (POC) 121 (H) 01/23/2022 06:03 PM    Glucose (POC) 135 (H) 01/23/2022 12:36 PM     Lab Results   Component Value Date/Time    Color Yellow/Straw 10/28/2021 11:48 AM    Appearance Clear 10/28/2021 11:48 AM    Specific gravity 1.016 10/28/2021 11:48 AM    pH (UA) 6.0 10/28/2021 11:48 AM    Protein Negative 10/28/2021 11:48 AM    Glucose Negative 10/28/2021 11:48 AM    Ketone Negative 10/28/2021 11:48 AM    Bilirubin Negative 10/28/2021 11:48 AM    Urobilinogen 4.0 (H) 10/28/2021 11:48 AM    Nitrites Negative 10/28/2021 11:48 AM    Leukocyte Esterase Negative 10/28/2021 11:48 AM    Bacteria Negative 10/28/2021 11:48 AM    WBC 0-5 10/28/2021 11:48 AM    RBC 0-5 10/28/2021 11:48 AM         CODE STATUS:   Full Code   X DNR    Partial    Comfort Care       Signed:   NICOLAS Young  Date of Service:  1/24/2022  1:34 PM

## 2022-01-24 NOTE — PROGRESS NOTES
POD 4  Tmax 100  cardene gtt  WBC 12.3  Alert  Disoriented  Slight weakness on right  BURTON  Dressing C/D/I  S/P: left crani for SDH  CT (1/21) - improved

## 2022-01-24 NOTE — PROGRESS NOTES
Problem: Self Care Deficits Care Plan (Adult)  Goal: *Acute Goals and Plan of Care (Insert Text)  Description: FUNCTIONAL STATUS PRIOR TO ADMISSION: Patient is extremely [place], unable to provide PLOF (hearing aid broken with GLF prior to admission). Per chart review, patient with hx of Parkinson's dx, requires assist for ADLs and IADLs, has a RW and SPC but refuses to use them, significant fall hx. HOME SUPPORT: The patient lived with his daughter and her spouse & child, per chart review, plans for all of them to move back to his one story house. Occupational Therapy Goals  Initiated 1/21/2022   1. Patient will perform self-feeding with minimal assistance/contact guard assist within 7 day(s). 2.  Patient will perform grooming at the sink with moderate assistance within 7 day(s). 3.  Patient will perform bathing with moderate assistance within 7 day(s). 4.  Patient will perform lower body dressing with moderate assistance within 7 day(s). 5.  Patient will perform upper body dressing with moderate assistance within 7 day(s). 6.  Patient will perform toilet transfers with minimal assistance/contact guard assist within 7 day(s). 7.  Patient will perform all aspects of toileting with moderate assistance  within 7 day(s). 8.  Patient will participate in upper extremity therapeutic exercise/activities with minimal assistance/contact guard assist within 7 day(s). 9.  Patient will complete the [id number] within 7 days. Outcome: Progressing Towards Goal     OCCUPATIONAL THERAPY TREATMENT  Patient: Xiomara Ceron (54 y.o. male)  Date: 1/24/2022  Diagnosis: Subarachnoid bleed ([facility] [address] [I60.9] <principal problem not specified>  Procedure(s) (LRB):  DARSHANA HOLES FOR LEFT FRONTAL SUBDURAL, with CRANIOTOMY/ REQ TF/ ER 23 (Left) 4 Days Post-Op  Precautions:    Chart, occupational therapy assessment, plan of care, and goals were reviewed.     ASSESSMENT  Patient continues with skilled OT services and is progressing towards goals however remains limited by decreased balance, strength, RUE/RLE function, vision (jeb in R eye), cognition (attention, memory, sequencing, safety awareness, orientation, judgment, problem solving), and ROM with bed mobility, lower body ADLs, vision, and OOB sidestepping tasks completed this session. Much improved ability to hear with R hearing aid donned this session, following simple commands with min-mod cues for safety. Improved mobility with Min-Mod A and mod cues for sequencing, however increased difficulty with ADLs requiring Max A to adjust socks with max cues for tailor sit technique & RUE initiation (limited) with poor carryover, RLE proximal AROM moreso limited. Further visual assessment completed this session with R inattention, increased time for scanning to the R, and blurred vision noted with L eye occluded, irritation & redness noted in R eye with some secretions, difficulty maintaining R eye open with all activity, vision intact in L eye. Continue to recommend d/c to IPR as he remains far from his baseline. Current Level of Function Impacting Discharge (ADLs): Min-Total A for ADLs, Min-Mod A for limited mobility    Other factors to consider for discharge: fall risk, PMH, PLOF, dysphagia--see SLP notes for details         PLAN :  Patient continues to benefit from skilled intervention to address the above impairments. Continue treatment per established plan of care to address goals. Recommend with staff: Recommend with nursing, ADLs with assist, modified bed in chair position 3x/day and toileting via bedside commode with 1-2 assist if continent. Thank you for completing as able in order to maintain patient strength, endurance and independence.      Recommendation for discharge: (in order for the patient to meet his/her long term goals)  Therapy 3 hours per day 5-7 days per week    This discharge recommendation:  Has been made in collaboration with the attending provider and/or case management    IF patient discharges home will need the following DME: To be determined (TBD)         SUBJECTIVE:   Patient stated Hang on, I think I gotta pebaron.     OBJECTIVE DATA SUMMARY:   Cognitive/Behavioral Status:  Neurologic State: Alert;Confused  Orientation Level: Oriented to person;Disoriented to situation;Disoriented to place; Disoriented to time  Cognition: Decreased attention/concentration; Follows commands; Impaired decision making; Impulsive;Memory loss;Poor safety awareness  Perception: Cues to attend right visual field;Cues to attend to right side of body;Cues to maintain midline in standing  Perseveration: No perseveration noted  Safety/Judgement: Decreased awareness of environment;Decreased awareness of need for assistance;Decreased awareness of need for safety;Decreased insight into deficits    Functional Mobility and Transfers for ADLs:  Bed Mobility:  Rolling: Stand-by assistance  Supine to Sit: Moderate assistance  Sit to Supine: Moderate assistance    Transfers:  Sit to Stand: Moderate assistance          Balance:  Sitting: Impaired  Sitting - Static: Good (unsupported)  Sitting - Dynamic: Fair (occasional)  Standing: Impaired  Standing - Static: Fair;Constant support  Standing - Dynamic : Poor;Constant support    ADL Intervention:   Lower Body Dressing Assistance  Dressing Assistance: Maximum assistance  Socks: Maximum assistance; Compensatory technique training  Leg Crossed Method Used: Yes (decreased RLE)  Position Performed: Seated edge of bed    Toileting  Toileting Assistance: Total assistance(dependent)  Bladder Hygiene:  Total assistance (dependent) (condom catheter, some continence (urge to void))    Cognitive Retraining  Orientation Retraining: Awareness of environment  Problem Solving: Awareness of environment  Executive Functions: Executing cognitive plans;Regulating behavior  Organizing/Sequencing: Breaking task down  Attention to Task: Single task  Following Commands: Follows one step commands/directions; Follows two step commands/directions  Safety/Judgement: Decreased awareness of environment;Decreased awareness of need for assistance;Decreased awareness of need for safety;Decreased insight into deficits  Cues: Tactile cues provided;Verbal cues provided;Visual cues provided    Neuro Re-Education:   - Visual scanning to the R, increased time and multimodal cues for cervical rotation to the R, fair carryover, able to scan no more than 75 degrees R of midline, difficulty sustaining R eye open d/t irritation & blurred vision monocularly, L ocular vision intact with R eye occlusion   - RUE/RLE initiation with functional activity, fair carryover and limited tolerance d/t quick NM fatigue and weakness          Therapeutic Exercises:   Sit<>stand x2 with Mod A, sidestepping up the bed with Mod A and mod cues for sequencing and coordination    Pain:  None reported    Activity Tolerance:   Fair    After treatment patient left in no apparent distress:   Supine in bed, Call bell within reach, Bed / chair alarm activated, Side rails x 3, and Restraints    COMMUNICATION/COLLABORATION:   The patients plan of care was discussed with: Physical therapist and Registered nurse.      ODALIS Salinas, OTR/L  Time Calculation: 26 mins

## 2022-01-24 NOTE — PROGRESS NOTES
Shift report received from [place] pass, RN using [employer]

## 2022-01-24 NOTE — PROGRESS NOTES
Patient transferred to room , connected to the cardiac monitor, Lidia Starkey RN called to bedside, VSS. Daughter Yash Reyes notified of transfer via telephone.

## 2022-01-24 NOTE — PROGRESS NOTES
Problem: Dysphagia (Adult)  Goal: *Acute Goals and Plan of Care (Insert Text)  Description: Speech Therapy Goals  Initiated 1/21/2022  1. Patient will tolerate medication crushed in puree without overt s/s aspiration within 7 days. Outcome: Progressing Towards Goal     SPEECH LANGUAGE PATHOLOGY DYSPHAGIA TREATMENT  Patient: Xiomara Ceron (89 y.o. male)  Date: 1/24/2022  Diagnosis: Subarachnoid bleed ([facility] [address] [I60.9] <principal problem not specified>  Procedure(s) (LRB):  DARSHANA HOLES FOR LEFT FRONTAL SUBDURAL, with CRANIOTOMY/ REQ TF/ ER 23 (Left) 4 Days Post-Op  Precautions:       ASSESSMENT:  Pt continues with good tolerance of purees but with dubious bedside presentation with thin liquids. Given aspiration risk with Parkinson's disease, and given that pt is less agitated today, will plan to complete a Flexible Endoscopic Evaluation of Swallow to objectively assess safety of swallow. Recommendations to follow. PLAN:  Recommendations and Planned Interventions:  --FEES today. --Recommendations to follow. Patient continues to benefit from skilled intervention to address the above impairments. Continue treatment per established plan of care. Discharge Recommendations: To Be Determined     SUBJECTIVE:   Patient stated, \"I can't hear you. OBJECTIVE:   Cognitive and Communication Status:  Neurologic State: Confused,Drowsy,Eyes open to voice,Pharmacologically induced (comment)  Orientation Level: Oriented to person,Disoriented to time,Disoriented to situation,Disoriented to place  Cognition: Follows commands  Perception: Cues to attend left visual field,Cues to attend right visual field (decreased awareness of R side)     Safety/Judgement: Decreased awareness of environment,Decreased awareness of need for assistance,Decreased awareness of need for safety  Dysphagia Treatment:    P.O. Trials:     Vocal quality prior to P.O.: Hoarse  Consistency Presented: Puree; Thin liquid  How Presented: Cup/sip;Spoon                                          Pain:  Pain Scale 1: Numeric (0 - 10)  Pain Intensity 1: 0       After treatment:   Call bell within reach and Nursing notified    COMMUNICATION/EDUCATION:       The patient's plan of care including recommendations, planned interventions, and recommended diet changes were discussed with: Registered nurse.      Anthony Thakur, SLP  Time Calculation: 30 mins

## 2022-01-25 LAB
ANION GAP SERPL CALC-SCNC: 6 MMOL/L (ref 5–15)
BASOPHILS # BLD: 0 K/UL (ref 0–0.1)
BASOPHILS NFR BLD: 0 % (ref 0–1)
BUN SERPL-MCNC: 20 MG/DL (ref 6–20)
BUN/CREAT SERPL: 18 (ref 12–20)
CALCIUM SERPL-MCNC: 8.6 MG/DL (ref 8.5–10.1)
CHLORIDE SERPL-SCNC: 109 MMOL/L (ref 97–108)
CO2 SERPL-SCNC: 27 MMOL/L (ref 21–32)
CREAT SERPL-MCNC: 1.14 MG/DL (ref 0.7–1.3)
DIFFERENTIAL METHOD BLD: ABNORMAL
EOSINOPHIL # BLD: 0.2 K/UL (ref 0–0.4)
EOSINOPHIL NFR BLD: 2 % (ref 0–7)
ERYTHROCYTE [DISTWIDTH] IN BLOOD BY AUTOMATED COUNT: 13.1 % (ref 11.5–14.5)
GLUCOSE BLD STRIP.AUTO-MCNC: 107 MG/DL (ref 65–117)
GLUCOSE BLD STRIP.AUTO-MCNC: 115 MG/DL (ref 65–117)
GLUCOSE BLD STRIP.AUTO-MCNC: 143 MG/DL (ref 65–117)
GLUCOSE BLD STRIP.AUTO-MCNC: 146 MG/DL (ref 65–117)
GLUCOSE BLD STRIP.AUTO-MCNC: 183 MG/DL (ref 65–117)
GLUCOSE SERPL-MCNC: 103 MG/DL (ref 65–100)
HCT VFR BLD AUTO: 36.7 % (ref 36.6–50.3)
HGB BLD-MCNC: 11.7 G/DL (ref 12.1–17)
IMM GRANULOCYTES # BLD AUTO: 0.1 K/UL (ref 0–0.04)
IMM GRANULOCYTES NFR BLD AUTO: 0 % (ref 0–0.5)
LYMPHOCYTES # BLD: 5.5 K/UL (ref 0.8–3.5)
LYMPHOCYTES NFR BLD: 41 % (ref 12–49)
MCH RBC QN AUTO: 31.4 PG (ref 26–34)
MCHC RBC AUTO-ENTMCNC: 31.9 G/DL (ref 30–36.5)
MCV RBC AUTO: 98.4 FL (ref 80–99)
MONOCYTES # BLD: 1.2 K/UL (ref 0–1)
MONOCYTES NFR BLD: 9 % (ref 5–13)
NEUTS SEG # BLD: 6.6 K/UL (ref 1.8–8)
NEUTS SEG NFR BLD: 48 % (ref 32–75)
NRBC # BLD: 0 K/UL (ref 0–0.01)
NRBC BLD-RTO: 0 PER 100 WBC
PLATELET # BLD AUTO: 198 K/UL (ref 150–400)
PMV BLD AUTO: 10.9 FL (ref 8.9–12.9)
POTASSIUM SERPL-SCNC: 4.2 MMOL/L (ref 3.5–5.1)
RBC # BLD AUTO: 3.73 M/UL (ref 4.1–5.7)
SERVICE CMNT-IMP: ABNORMAL
SERVICE CMNT-IMP: NORMAL
SERVICE CMNT-IMP: NORMAL
SODIUM SERPL-SCNC: 142 MMOL/L (ref 136–145)
WBC # BLD AUTO: 13.7 K/UL (ref 4.1–11.1)

## 2022-01-25 PROCEDURE — 74011250636 HC RX REV CODE- 250/636: Performed by: INTERNAL MEDICINE

## 2022-01-25 PROCEDURE — 97530 THERAPEUTIC ACTIVITIES: CPT

## 2022-01-25 PROCEDURE — 65660000000 HC RM CCU STEPDOWN

## 2022-01-25 PROCEDURE — 74011636637 HC RX REV CODE- 636/637: Performed by: FAMILY MEDICINE

## 2022-01-25 PROCEDURE — 74011000258 HC RX REV CODE- 258: Performed by: NEUROLOGICAL SURGERY

## 2022-01-25 PROCEDURE — 74011250636 HC RX REV CODE- 250/636: Performed by: NEUROLOGICAL SURGERY

## 2022-01-25 PROCEDURE — 74011636637 HC RX REV CODE- 636/637: Performed by: NURSE PRACTITIONER

## 2022-01-25 PROCEDURE — 97112 NEUROMUSCULAR REEDUCATION: CPT

## 2022-01-25 PROCEDURE — 82962 GLUCOSE BLOOD TEST: CPT

## 2022-01-25 PROCEDURE — 85025 COMPLETE CBC W/AUTO DIFF WBC: CPT

## 2022-01-25 PROCEDURE — 74011250637 HC RX REV CODE- 250/637: Performed by: NEUROLOGICAL SURGERY

## 2022-01-25 PROCEDURE — 80048 BASIC METABOLIC PNL TOTAL CA: CPT

## 2022-01-25 PROCEDURE — 36415 COLL VENOUS BLD VENIPUNCTURE: CPT

## 2022-01-25 PROCEDURE — 74011250637 HC RX REV CODE- 250/637: Performed by: INTERNAL MEDICINE

## 2022-01-25 PROCEDURE — 74011250637 HC RX REV CODE- 250/637: Performed by: NURSE PRACTITIONER

## 2022-01-25 RX ORDER — SODIUM CHLORIDE, SODIUM LACTATE, POTASSIUM CHLORIDE, CALCIUM CHLORIDE 600; 310; 30; 20 MG/100ML; MG/100ML; MG/100ML; MG/100ML
50 INJECTION, SOLUTION INTRAVENOUS CONTINUOUS
Status: DISCONTINUED | OUTPATIENT
Start: 2022-01-25 | End: 2022-01-25

## 2022-01-25 RX ORDER — INSULIN LISPRO 100 [IU]/ML
INJECTION, SOLUTION INTRAVENOUS; SUBCUTANEOUS
Status: DISCONTINUED | OUTPATIENT
Start: 2022-01-25 | End: 2022-01-27

## 2022-01-25 RX ORDER — LEVETIRACETAM 500 MG/1
1000 TABLET ORAL EVERY 12 HOURS
Status: DISCONTINUED | OUTPATIENT
Start: 2022-01-25 | End: 2022-02-01 | Stop reason: HOSPADM

## 2022-01-25 RX ADMIN — MICONAZOLE NITRATE 2 % TOPICAL POWDER: at 18:00

## 2022-01-25 RX ADMIN — HYDRALAZINE HYDROCHLORIDE 5 MG: 20 INJECTION, SOLUTION INTRAMUSCULAR; INTRAVENOUS at 03:24

## 2022-01-25 RX ADMIN — MICONAZOLE NITRATE 2 % TOPICAL POWDER: at 08:56

## 2022-01-25 RX ADMIN — BUSPIRONE HYDROCHLORIDE 15 MG: 10 TABLET ORAL at 18:13

## 2022-01-25 RX ADMIN — BUSPIRONE HYDROCHLORIDE 15 MG: 10 TABLET ORAL at 08:53

## 2022-01-25 RX ADMIN — LEVETIRACETAM 1000 MG: 500 TABLET, FILM COATED ORAL at 18:13

## 2022-01-25 RX ADMIN — Medication 2 UNITS: at 00:40

## 2022-01-25 RX ADMIN — METOPROLOL TARTRATE 12.5 MG: 25 TABLET, FILM COATED ORAL at 08:53

## 2022-01-25 RX ADMIN — QUETIAPINE FUMARATE 50 MG: 25 TABLET ORAL at 18:13

## 2022-01-25 RX ADMIN — PAROXETINE HYDROCHLORIDE 10 MG: 20 TABLET, FILM COATED ORAL at 08:54

## 2022-01-25 RX ADMIN — LISINOPRIL 2.5 MG: 5 TABLET ORAL at 08:54

## 2022-01-25 RX ADMIN — AMIODARONE HYDROCHLORIDE 100 MG: 200 TABLET ORAL at 08:53

## 2022-01-25 RX ADMIN — QUETIAPINE FUMARATE 25 MG: 25 TABLET ORAL at 08:54

## 2022-01-25 RX ADMIN — HYDRALAZINE HYDROCHLORIDE 5 MG: 20 INJECTION, SOLUTION INTRAMUSCULAR; INTRAVENOUS at 18:15

## 2022-01-25 RX ADMIN — METOPROLOL TARTRATE 12.5 MG: 25 TABLET, FILM COATED ORAL at 22:41

## 2022-01-25 RX ADMIN — LEVETIRACETAM 1000 MG: 100 INJECTION INTRAVENOUS at 03:28

## 2022-01-25 RX ADMIN — Medication 2 UNITS: at 12:03

## 2022-01-25 NOTE — PROGRESS NOTES
Bedside and Verbal shift change report given to Kaela Dotson (oncoming nurse) by Alexus Daniel (offgoing nurse). Report included the following information SBAR, Kardex and Recent Results.

## 2022-01-25 NOTE — PROGRESS NOTES
Hospitalist Progress Note      Hospital summary: 81 y.o man w/ DM, HTN, CVA, hyperlipidemia, Parkinson's disease, who was admitted for a traumatic subdural hematoma. He is s/p crani on 1/20. Assessment/Plan:  Traumatic SDH: s/p crani 1/20  -NSG following  -continue Keppra  -was on Precedex in the ICU    Dysphagia: likely due to Parkinsons. Aspiration with all liquid consistencies on FEES. I spoke to his daughter regarding options - feeding tube vs allow diet with transition to hospice. She is leaning towards a feeding tube, but will confer with the rest of her family first.     HTN:   -keep SBP<140    Type 2 DM:  -SSI/POC checks  -hold PO meds    Parkinson's disease: reportedly declined meds as an outpatient. Per his daughter he was taking Sinement but felt like it wasn't helping except at night time for sleep, so he decided not to see his neurologist again, and she weaned him off the medication. CAD:   -hold aspirin    AMS: delirium due to acute illness/hospitalization  -continue quetiapine    Has a pacemaker for bradycardia. Unclear why he is on amiodarone. Code status: DNR  DVT prophylaxis: SCDs  Disposition: TBD  ----------------------------------------------    CC: f/u SDH    S: poor historian, not offering any complaints     Review of Systems:  Pertinent items are noted in HPI.     O:  Visit Vitals  BP (!) 116/59 (BP 1 Location: Right upper arm, BP Patient Position: At rest)   Pulse 66   Temp 99.9 °F (37.7 °C)   Resp 26   Ht 5' 7\" (1.702 m)   Wt 84.1 kg (185 lb 6.5 oz)   SpO2 97%   BMI 29.04 kg/m²       PHYSICAL EXAM:  Gen: NAD, non-toxic  HEENT: anicteric sclerae, normal conjunctiva, s/p crani  Neck: supple, trachea midline, no adenopathy  Heart: RRR, no MRG, no JVD, no peripheral edema  Lungs: CTA b/l, non-labored respirations  Abd: soft, NT, ND, BS+  Extr: warm  Skin: dry, no rash  Neuro: CN II-XII grossly intact, mild R-sided weakness  Psych: confused      Intake/Output Summary (Last 24 hours) at 1/25/2022 1140  Last data filed at 1/25/2022 0000  Gross per 24 hour   Intake 100 ml   Output 800 ml   Net -700 ml        Recent labs & imaging reviewed:  Recent Results (from the past 24 hour(s))   GLUCOSE, POC    Collection Time: 01/24/22  5:11 PM   Result Value Ref Range    Glucose (POC) 102 65 - 117 mg/dL    Performed by [facility] Peng    GLUCOSE, POC    Collection Time: 01/25/22 12:24 AM   Result Value Ref Range    Glucose (POC) 143 (H) 65 - 117 mg/dL    Performed by Jean Claude Galan    CBC WITH AUTOMATED DIFF    Collection Time: 01/25/22  3:41 AM   Result Value Ref Range    WBC 13.7 (H) 4.1 - 11.1 K/uL    RBC 3.73 (L) 4.10 - 5.70 M/uL    HGB 11.7 (L) 12.1 - 17.0 g/dL    HCT 36.7 36.6 - 50.3 %    MCV 98.4 80.0 - 99.0 FL    MCH 31.4 26.0 - 34.0 PG    MCHC 31.9 30.0 - 36.5 g/dL    RDW 13.1 11.5 - 14.5 %    PLATELET 181 863 - 397 K/uL    MPV 10.9 8.9 - 12.9 FL    NRBC 0.0 0  WBC    ABSOLUTE NRBC 0.00 0.00 - 0.01 K/uL    NEUTROPHILS 48 32 - 75 %    LYMPHOCYTES 41 12 - 49 %    MONOCYTES 9 5 - 13 %    EOSINOPHILS 2 0 - 7 %    BASOPHILS 0 0 - 1 %    IMMATURE GRANULOCYTES 0 0.0 - 0.5 %    ABS. NEUTROPHILS 6.6 1.8 - 8.0 K/UL    ABS. LYMPHOCYTES 5.5 (H) 0.8 - 3.5 K/UL    ABS. MONOCYTES 1.2 (H) 0.0 - 1.0 K/UL    ABS. EOSINOPHILS 0.2 0.0 - 0.4 K/UL    ABS. BASOPHILS 0.0 0.0 - 0.1 K/UL    ABS. IMM.  GRANS. 0.1 (H) 0.00 - 0.04 K/UL    DF AUTOMATED     METABOLIC PANEL, BASIC    Collection Time: 01/25/22  3:41 AM   Result Value Ref Range    Sodium 142 136 - 145 mmol/L    Potassium 4.2 3.5 - 5.1 mmol/L    Chloride 109 (H) 97 - 108 mmol/L    CO2 27 21 - 32 mmol/L    Anion gap 6 5 - 15 mmol/L    Glucose 103 (H) 65 - 100 mg/dL    BUN 20 6 - 20 MG/DL    Creatinine 1.14 0.70 - 1.30 MG/DL    BUN/Creatinine ratio 18 12 - 20      GFR est AA >60 >60 ml/min/1.73m2    GFR est non-AA >60 >60 ml/min/1.73m2    Calcium 8.6 8.5 - 10.1 MG/DL   GLUCOSE, POC    Collection Time: 01/25/22 7:20 AM   Result Value Ref Range    Glucose (POC) 115 65 - 117 mg/dL    Performed by Bahman Mra    GLUCOSE, POC    Collection Time: 01/25/22 11:29 AM   Result Value Ref Range    Glucose (POC) 183 (H) 65 - 117 mg/dL    Performed by Kristen Monroe (CON)      Recent Labs     01/25/22  0341 01/24/22  0521   WBC 13.7* 12.3*   HGB 11.7* 11.8*   HCT 36.7 37.1    174     Recent Labs     01/25/22  0341 01/24/22  0521 01/23/22  0431    143 140   K 4.2 4.4 5.3*   * 110* 111*   CO2 27 28 25   BUN 20 18 17   CREA 1.14 0.93 0.98   * 118* 107*   CA 8.6 8.4* 7.9*     No results for input(s): ALT, AP, TBIL, TBILI, TP, ALB, GLOB, GGT, AML, LPSE in the last 72 hours. No lab exists for component: SGOT, GPT, AMYP, HLPSE  No results for input(s): INR, PTP, APTT, INREXT in the last 72 hours. No results for input(s): FE, TIBC, PSAT, FERR in the last 72 hours. No results found for: FOL, RBCF   No results for input(s): PH, PCO2, PO2 in the last 72 hours. No results for input(s): CPK, CKNDX, TROIQ in the last 72 hours.     No lab exists for component: CPKMB  Lab Results   Component Value Date/Time    Cholesterol, total 110 07/29/2021 11:30 AM    HDL Cholesterol 31 07/29/2021 11:30 AM    LDL, calculated 25.2 07/29/2021 11:30 AM    Triglyceride 269 (H) 07/29/2021 11:30 AM    CHOL/HDL Ratio 3.5 07/29/2021 11:30 AM     Lab Results   Component Value Date/Time    Glucose (POC) 183 (H) 01/25/2022 11:29 AM    Glucose (POC) 115 01/25/2022 07:20 AM    Glucose (POC) 143 (H) 01/25/2022 12:24 AM    Glucose (POC) 102 01/24/2022 05:11 PM    Glucose (POC) 177 (H) 01/24/2022 11:23 AM     Lab Results   Component Value Date/Time    Color Yellow/Straw 10/28/2021 11:48 AM    Appearance Clear 10/28/2021 11:48 AM    Specific gravity 1.016 10/28/2021 11:48 AM    pH (UA) 6.0 10/28/2021 11:48 AM    Protein Negative 10/28/2021 11:48 AM    Glucose Negative 10/28/2021 11:48 AM    Ketone Negative 10/28/2021 11:48 AM    Bilirubin Negative 10/28/2021 11:48 AM    Urobilinogen 4.0 (H) 10/28/2021 11:48 AM    Nitrites Negative 10/28/2021 11:48 AM    Leukocyte Esterase Negative 10/28/2021 11:48 AM    Bacteria Negative 10/28/2021 11:48 AM    WBC 0-5 10/28/2021 11:48 AM    RBC 0-5 10/28/2021 11:48 AM       Med list reviewed  Current Facility-Administered Medications   Medication Dose Route Frequency    insulin lispro (HUMALOG) injection   SubCUTAneous AC&HS    levETIRAcetam (KEPPRA) tablet 1,000 mg  1,000 mg Oral Q12H    QUEtiapine (SEROquel) tablet 25 mg  25 mg Oral DAILY    QUEtiapine (SEROquel) tablet 50 mg  50 mg Oral QPM    metoprolol tartrate (LOPRESSOR) tablet 12.5 mg  12.5 mg Oral Q12H    hydrALAZINE (APRESOLINE) 20 mg/mL injection 5 mg  5 mg IntraVENous Q4H PRN    miconazole (MICOTIN) 2 % powder   Topical BID    ondansetron (ZOFRAN) injection 4 mg  4 mg IntraVENous Q6H PRN    naloxone (NARCAN) injection 0.4 mg  0.4 mg IntraVENous PRN    acetaminophen (TYLENOL) tablet 650 mg  650 mg Oral Q4H PRN    Or    acetaminophen (TYLENOL) solution 650 mg  650 mg Per NG tube Q4H PRN    Or    acetaminophen (TYLENOL) suppository 650 mg  650 mg Rectal Q4H PRN    lisinopriL (PRINIVIL, ZESTRIL) tablet 2.5 mg  2.5 mg Oral DAILY    busPIRone (BUSPAR) tablet 15 mg  15 mg Oral BID    PARoxetine (PAXIL) tablet 10 mg  10 mg Oral DAILY    amiodarone (CORDARONE) tablet 100 mg  100 mg Oral DAILY    glucose chewable tablet 16 g  4 Tablet Oral PRN    dextrose (D50W) injection syrg 12.5-25 g  25-50 mL IntraVENous PRN    glucagon (GLUCAGEN) injection 1 mg  1 mg IntraMUSCular PRN    docusate (COLACE) 50 mg/5 mL oral liquid 100 mg  100 mg Oral DAILY PRN    bisacodyL (DULCOLAX) suppository 10 mg  10 mg Rectal DAILY PRN       Care Plan discussed with:  Patient/Family, Nurse and     Holly Ramos MD  Internal Medicine  Date of Service: 1/25/2022

## 2022-01-25 NOTE — PROGRESS NOTES
SLP Contact Note    Noted pt has yet to have goals of care conversation regarding PO. Of note, FEES completed yesterday showed aspiration with all liquid consistencies and inability to clear pharynx of solid. Likely will be chronic given his Parkinson's, particularly as he is declining any Parkinson's medications. Per note yesterday: \" Therefore, would recommend a goals of care conversation. 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 PEG tube likely not indicated given chronic nature of dysphagia with Parkinson's, however, will benefit from goals of care. Will continue pureed snacks until a decision is made\". Noted pt can continue a diet for comfort without pursuing comfort measures. Will continue to follow.       Thank you,  CAMI WheelerEd, [id number] [facility]  Speech-Language Pathologist

## 2022-01-25 NOTE — PROGRESS NOTES
Transition of Care Plan   RUR- Low  13%   DISPOSITION: IPR - [facility] - needs auth, however not medically stable, will wait to start auth   F/U with PCP/Specialist     Transport: [facility]      Transition of Care Plan: PT/OT and Dr. Berlin Black recommending IPR for patient at discharge. CM spoke with patient's daughter, Hawa Bangura, who is open to referrals sent to [facility], [facility] and [facility] to determine earliest bed availability. The Plan for Transition of Care is related to the following treatment goals: IPR    The Patient and/or patient representative, Hawa Bangura, was provided with a choice of provider and agrees  with the discharge plan. Yes [x] No []    A Freedom of choice list was provided with basic dialogue that supports the patient's individualized plan of care/goals and shares the quality data associated with the providers. Yes [x] No []    1:41pm: [facility] and [facility] all following progress. Patient's family considering feeding tube, likely >48 hours from discharge. 2:52pm: [facility] has accepted patient and will likely have bed after auth is received 1-2 days. Will hold on starting auth until nutrition plan is determined. Will continue to follow. Bo Barreto) MELINDA Alejandro.

## 2022-01-25 NOTE — PROGRESS NOTES
POD 5  Tmax 100  cardene gtt  WBC 13.7  Alert  Disoriented  Slight weakness on right  BURTON  incision C/D/I  S/P: left crani for SDH  CT (1/21) - improved

## 2022-01-25 NOTE — PROGRESS NOTES
0000: Assumed care of patient. Patient confused, in bilat wrist restraints for safety. 0300: IV keppra infusion. AM labs drawn. 0500: Condom catheter replaced. 0730: Bedside shift change report given to Deyanira Keating (oncoming nurse) by Iam Deras (offgoing nurse). Report included the following information SBAR, Kardex, Accordion and Cardiac Rhythm A-paced.

## 2022-01-25 NOTE — PROGRESS NOTES
Problem: Self Care Deficits Care Plan (Adult)  Goal: *Acute Goals and Plan of Care (Insert Text)  Description: FUNCTIONAL STATUS PRIOR TO ADMISSION: Patient is extremely [place], unable to provide PLOF (hearing aid broken with GLF prior to admission). Per chart review, patient with hx of Parkinson's dx, requires assist for ADLs and IADLs, has a RW and SPC but refuses to use them, significant fall hx. HOME SUPPORT: The patient lived with his daughter and her spouse & child, per chart review, plans for all of them to move back to his one story house. Occupational Therapy Goals  Initiated 1/21/2022   1. Patient will perform self-feeding with minimal assistance/contact guard assist within 7 day(s). 2.  Patient will perform grooming at the sink with moderate assistance within 7 day(s). 3.  Patient will perform bathing with moderate assistance within 7 day(s). 4.  Patient will perform lower body dressing with moderate assistance within 7 day(s). 5.  Patient will perform upper body dressing with moderate assistance within 7 day(s). 6.  Patient will perform toilet transfers with minimal assistance/contact guard assist within 7 day(s). 7.  Patient will perform all aspects of toileting with moderate assistance  within 7 day(s). 8.  Patient will participate in upper extremity therapeutic exercise/activities with minimal assistance/contact guard assist within 7 day(s). 9.  Patient will complete the [id number] within 7 days. Outcome: Progressing Towards Goal     OCCUPATIONAL THERAPY TREATMENT  Patient: Stephanie Sung (56 y.o. male)  Date: 1/25/2022  Diagnosis: Subarachnoid bleed ([facility] [address] [I60.9] <principal problem not specified>  Procedure(s) (LRB):  DARSHANA HOLES FOR LEFT FRONTAL SUBDURAL, with CRANIOTOMY/ REQ TF/ ER 23 (Left) 5 Days Post-Op  Precautions:    Chart, occupational therapy assessment, plan of care, and goals were reviewed.     ASSESSMENT  Patient continues with skilled OT services and is slowly progressing towards goals, however remains limited by generalized weakness, inattention to R, impaired RUE and LE function, impaired coordination, balance, and cognition. Pt cleared for therapy by nursing and received supine in bed with restraints. Pt oriented to self. Completed bed mobility to sit EOB requiring max verbal cues with one-step commands, requiring Mod A for supine>sit. Pt completed sit>stand with Mod Ax1 and gait belt to take 5 steps to head of bed with pt demo'ing L positional preference with sidestep. Therapist provided activity for R visual attention and RUE functional use. Pt able to attend to R to gather items with max verbal and visual cues and required increased time and frequent redirection. At end of session pt returned to bed with Min A for sit>supine and Mod A for repositioning. Currently recommend IPR at discharge to maximize pt outcomes. Current Level of Function Impacting Discharge (ADLs): Mod A x1 for bed mobility and sit>stand, Max-Total for LB dressing/toileting     Other factors to consider for discharge: Lives with wife and daughter          PLAN :  Patient continues to benefit from skilled intervention to address the above impairments. Continue treatment per established plan of care to address goals. Recommend with staff: Bed in chair position for meals, Pt involvement with self-care as able     Recommend next OT session: grooming tasks at EOB, transfer to chair or bsc     Recommendation for discharge: (in order for the patient to meet his/her long term goals)  Therapy 3 hours per day 5-7 days per week    This discharge recommendation:  Has been made in collaboration with the attending provider and/or case management    IF patient discharges home will need the following DME: TBD at discharge       SUBJECTIVE:   Patient stated David Rinaldi had a tumor.     OBJECTIVE DATA SUMMARY:   Cognitive/Behavioral Status:  Neurologic State: Alert  Orientation Level: Oriented to person;Oriented to place;  Cognition: Decreased attention/concentration;Decreased command following  Perception: Cues to attend right visual field;Cues to attend to right side of body  Perseveration: No perseveration noted  Safety/Judgement: Decreased awareness of environment;Decreased awareness of need for assistance;Decreased awareness of need for safety;Decreased insight into deficits    Functional Mobility and Transfers for ADLs:  Bed Mobility:  Rolling: Minimum assistance  Supine to Sit: Moderate assistance;Assist x1  Sit to Supine: Moderate assistance;Assist x1  Scooting: Minimum assistance    Transfers:  Sit to Stand: Moderate assistance;Assist x1        Balance:  Sitting: Impaired  Sitting - Static: Good (unsupported)  Sitting - Dynamic: Fair (occasional)  Standing: Impaired  Standing - Static: Fair  Standing - Dynamic : Poor    ADL Intervention:   Cognitive Retraining  Orientation Retraining: Awareness of environment  Problem Solving: Awareness of environment  Executive Functions: Executing cognitive plans  Organizing/Sequencing: Breaking task down  Attention to Task: Single task  Following Commands: Follows one step commands/directions; Follows two step commands/directions  Safety/Judgement: Decreased awareness of environment;Decreased awareness of need for assistance;Decreased awareness of need for safety;Decreased insight into deficits  Cues: Tactile cues provided;Verbal cues provided;Visual cues provided    Neuro Retraining:   Attention to R with mulitmodal cues to attend to items on R side, grasp items with R hand, cross midline to pass to L     Pain:  None reported     Activity Tolerance:   Fair    After treatment patient left in no apparent distress:   Supine in bed, Call bell within reach and Restraints    COMMUNICATION/COLLABORATION:   The patients plan of care was discussed with: Physical therapist and Registered nurse.      Armaan Shelton OT  Time Calculation: 40 mins

## 2022-01-26 LAB
ANION GAP SERPL CALC-SCNC: 4 MMOL/L (ref 5–15)
APPEARANCE UR: CLEAR
ARTERIAL PATENCY WRIST A: YES
BACTERIA URNS QL MICRO: NEGATIVE /HPF
BASE EXCESS BLDA CALC-SCNC: 4.9 MMOL/L
BDY SITE: ABNORMAL
BILIRUB UR QL: NEGATIVE
BUN SERPL-MCNC: 25 MG/DL (ref 6–20)
BUN/CREAT SERPL: 21 (ref 12–20)
CALCIUM SERPL-MCNC: 8.7 MG/DL (ref 8.5–10.1)
CHLORIDE SERPL-SCNC: 111 MMOL/L (ref 97–108)
CO2 SERPL-SCNC: 28 MMOL/L (ref 21–32)
COLOR UR: ABNORMAL
CREAT SERPL-MCNC: 1.21 MG/DL (ref 0.7–1.3)
EPITH CASTS URNS QL MICRO: ABNORMAL /LPF
ERYTHROCYTE [DISTWIDTH] IN BLOOD BY AUTOMATED COUNT: 13.2 % (ref 11.5–14.5)
GLUCOSE BLD STRIP.AUTO-MCNC: 122 MG/DL (ref 65–117)
GLUCOSE BLD STRIP.AUTO-MCNC: 152 MG/DL (ref 65–117)
GLUCOSE BLD STRIP.AUTO-MCNC: 156 MG/DL (ref 65–117)
GLUCOSE BLD STRIP.AUTO-MCNC: 93 MG/DL (ref 65–117)
GLUCOSE SERPL-MCNC: 148 MG/DL (ref 65–100)
GLUCOSE UR STRIP.AUTO-MCNC: NEGATIVE MG/DL
HCO3 BLDA-SCNC: 30 MMOL/L (ref 22–26)
HCT VFR BLD AUTO: 36.5 % (ref 36.6–50.3)
HGB BLD-MCNC: 11.7 G/DL (ref 12.1–17)
HGB UR QL STRIP: ABNORMAL
HYALINE CASTS URNS QL MICRO: ABNORMAL /LPF (ref 0–5)
KETONES UR QL STRIP.AUTO: 15 MG/DL
LEUKOCYTE ESTERASE UR QL STRIP.AUTO: NEGATIVE
MAGNESIUM SERPL-MCNC: 2.2 MG/DL (ref 1.6–2.4)
MCH RBC QN AUTO: 31.6 PG (ref 26–34)
MCHC RBC AUTO-ENTMCNC: 32.1 G/DL (ref 30–36.5)
MCV RBC AUTO: 98.6 FL (ref 80–99)
NITRITE UR QL STRIP.AUTO: NEGATIVE
NRBC # BLD: 0 K/UL (ref 0–0.01)
NRBC BLD-RTO: 0 PER 100 WBC
PCO2 BLDA: 43 MMHG (ref 35–45)
PH BLDA: 7.45 [PH] (ref 7.35–7.45)
PH UR STRIP: 7 [PH] (ref 5–8)
PHOSPHATE SERPL-MCNC: 2.4 MG/DL (ref 2.6–4.7)
PLATELET # BLD AUTO: 195 K/UL (ref 150–400)
PMV BLD AUTO: 10.2 FL (ref 8.9–12.9)
PO2 BLDA: 65 MMHG (ref 80–100)
POTASSIUM SERPL-SCNC: 4.1 MMOL/L (ref 3.5–5.1)
PROT UR STRIP-MCNC: ABNORMAL MG/DL
RBC # BLD AUTO: 3.7 M/UL (ref 4.1–5.7)
RBC #/AREA URNS HPF: ABNORMAL /HPF (ref 0–5)
SAO2 % BLD: 93 % (ref 92–97)
SAO2% DEVICE SAO2% SENSOR NAME: ABNORMAL
SERVICE CMNT-IMP: ABNORMAL
SERVICE CMNT-IMP: NORMAL
SODIUM SERPL-SCNC: 143 MMOL/L (ref 136–145)
SP GR UR REFRACTOMETRY: 1.02 (ref 1–1.03)
SPECIMEN SITE: ABNORMAL
UA: UC IF INDICATED,UAUC: ABNORMAL
UROBILINOGEN UR QL STRIP.AUTO: 4 EU/DL (ref 0.2–1)
WBC # BLD AUTO: 13.1 K/UL (ref 4.1–11.1)
WBC URNS QL MICRO: ABNORMAL /HPF (ref 0–4)

## 2022-01-26 PROCEDURE — 84100 ASSAY OF PHOSPHORUS: CPT

## 2022-01-26 PROCEDURE — 80048 BASIC METABOLIC PNL TOTAL CA: CPT

## 2022-01-26 PROCEDURE — 74011250637 HC RX REV CODE- 250/637: Performed by: HOSPITALIST

## 2022-01-26 PROCEDURE — 74011636637 HC RX REV CODE- 636/637: Performed by: FAMILY MEDICINE

## 2022-01-26 PROCEDURE — 83735 ASSAY OF MAGNESIUM: CPT

## 2022-01-26 PROCEDURE — 36415 COLL VENOUS BLD VENIPUNCTURE: CPT

## 2022-01-26 PROCEDURE — 82962 GLUCOSE BLOOD TEST: CPT

## 2022-01-26 PROCEDURE — 74011250637 HC RX REV CODE- 250/637: Performed by: NURSE PRACTITIONER

## 2022-01-26 PROCEDURE — 82803 BLOOD GASES ANY COMBINATION: CPT

## 2022-01-26 PROCEDURE — 97530 THERAPEUTIC ACTIVITIES: CPT

## 2022-01-26 PROCEDURE — 65660000000 HC RM CCU STEPDOWN

## 2022-01-26 PROCEDURE — 81001 URINALYSIS AUTO W/SCOPE: CPT

## 2022-01-26 PROCEDURE — 85027 COMPLETE CBC AUTOMATED: CPT

## 2022-01-26 PROCEDURE — 74011250637 HC RX REV CODE- 250/637: Performed by: INTERNAL MEDICINE

## 2022-01-26 PROCEDURE — 36600 WITHDRAWAL OF ARTERIAL BLOOD: CPT

## 2022-01-26 PROCEDURE — 74011250637 HC RX REV CODE- 250/637: Performed by: NEUROLOGICAL SURGERY

## 2022-01-26 RX ORDER — QUETIAPINE FUMARATE 25 MG/1
25 TABLET, FILM COATED ORAL EVERY EVENING
Status: DISCONTINUED | OUTPATIENT
Start: 2022-01-26 | End: 2022-01-29

## 2022-01-26 RX ADMIN — LEVETIRACETAM 1000 MG: 500 TABLET, FILM COATED ORAL at 08:33

## 2022-01-26 RX ADMIN — MICONAZOLE NITRATE 2 % TOPICAL POWDER: at 17:43

## 2022-01-26 RX ADMIN — QUETIAPINE FUMARATE 25 MG: 25 TABLET ORAL at 17:43

## 2022-01-26 RX ADMIN — LISINOPRIL 2.5 MG: 5 TABLET ORAL at 08:33

## 2022-01-26 RX ADMIN — METOPROLOL TARTRATE 12.5 MG: 25 TABLET, FILM COATED ORAL at 21:24

## 2022-01-26 RX ADMIN — BUSPIRONE HYDROCHLORIDE 15 MG: 10 TABLET ORAL at 17:43

## 2022-01-26 RX ADMIN — AMIODARONE HYDROCHLORIDE 100 MG: 200 TABLET ORAL at 08:34

## 2022-01-26 RX ADMIN — QUETIAPINE FUMARATE 25 MG: 25 TABLET ORAL at 08:34

## 2022-01-26 RX ADMIN — BUSPIRONE HYDROCHLORIDE 15 MG: 10 TABLET ORAL at 08:33

## 2022-01-26 RX ADMIN — METOPROLOL TARTRATE 12.5 MG: 25 TABLET, FILM COATED ORAL at 08:33

## 2022-01-26 RX ADMIN — Medication 2 UNITS: at 11:35

## 2022-01-26 RX ADMIN — PAROXETINE HYDROCHLORIDE 10 MG: 20 TABLET, FILM COATED ORAL at 08:34

## 2022-01-26 RX ADMIN — LEVETIRACETAM 1000 MG: 500 TABLET, FILM COATED ORAL at 21:24

## 2022-01-26 RX ADMIN — MICONAZOLE NITRATE 2 % TOPICAL POWDER: at 08:34

## 2022-01-26 NOTE — PROGRESS NOTES
Bedside and Verbal shift change report given to [facility] (oncoming nurse) by Deyanira Keating RN (offgoing nurse). Report included the following information SBAR, Kardex, Intake/Output, MAR and Recent Results.

## 2022-01-26 NOTE — PROGRESS NOTES
Hospitalist Progress Note      Hospital summary: 81 y.o man w/ DM, HTN, CVA, hyperlipidemia, Parkinson's disease, who was admitted for a traumatic subdural hematoma. He is s/p crani on 1/20. Assessment/Plan:  Traumatic SDH: s/p crani 1/20  -NSG following  -continue Keppra  -was on Precedex in the ICU    Dysphagia: likely due to Parkinsons. Aspiration with all liquid consistencies on FEES. I spoke to his daughter regarding options - feeding tube vs allow diet with transition to hospice (can eat while acknowledging risk however from a long term perspective hospice would be appropriate since he is declining Parkinson's treatment and chronic aspiration is not sustainable). HTN:   -keep SBP<140    Type 2 DM:  -SSI/POC checks  -hold PO meds    Parkinson's disease: reportedly declined meds as an outpatient. Per his daughter he was taking Sinement but felt like it wasn't helping except at night time for sleep, so he decided not to see his neurologist again, and she weaned him off the medication. CAD:   -hold aspirin    AMS: delirium due to acute illness/hospitalization  -continue quetiapine - reduce dose as he is drowsy today. ABG/UA ok. Has a pacemaker for bradycardia. Unclear why he is on amiodarone. Code status: DNR  DVT prophylaxis: SCDs  Disposition: TBD  ----------------------------------------------    CC: f/u SDH    S: poor historian, not offering any complaints     Review of Systems:  Pertinent items are noted in HPI.     O:  Visit Vitals  /66   Pulse 60   Temp 98.6 °F (37 °C)   Resp 15   Ht 5' 7\" (1.702 m)   Wt 84.1 kg (185 lb 6.5 oz)   SpO2 96%   BMI 29.04 kg/m²       PHYSICAL EXAM:  Gen: NAD, non-toxic  HEENT: anicteric sclerae, normal conjunctiva, s/p crani  Neck: supple, trachea midline, no adenopathy  Heart: RRR, no MRG, no JVD, no peripheral edema  Lungs: CTA b/l, non-labored respirations  Abd: soft, NT, ND, BS+  Extr: warm  Skin: dry, no rash  Neuro: CN II-XII grossly intact  Psych: confused, more drowsy than yesterday      Intake/Output Summary (Last 24 hours) at 1/26/2022 1749  Last data filed at 1/26/2022 1600  Gross per 24 hour   Intake --   Output 300 ml   Net -300 ml        Recent labs & imaging reviewed:  Recent Results (from the past 24 hour(s))   GLUCOSE, POC    Collection Time: 01/25/22 10:39 PM   Result Value Ref Range    Glucose (POC) 146 (H) 65 - 117 mg/dL    Performed by Veronica Benson ([facility])    METABOLIC PANEL, BASIC    Collection Time: 01/26/22  2:12 AM   Result Value Ref Range    Sodium 143 136 - 145 mmol/L    Potassium 4.1 3.5 - 5.1 mmol/L    Chloride 111 (H) 97 - 108 mmol/L    CO2 28 21 - 32 mmol/L    Anion gap 4 (L) 5 - 15 mmol/L    Glucose 148 (H) 65 - 100 mg/dL    BUN 25 (H) 6 - 20 MG/DL    Creatinine 1.21 0.70 - 1.30 MG/DL    BUN/Creatinine ratio 21 (H) 12 - 20      GFR est AA >60 >60 ml/min/1.73m2    GFR est non-AA 57 (L) >60 ml/min/1.73m2    Calcium 8.7 8.5 - 10.1 MG/DL   CBC W/O DIFF    Collection Time: 01/26/22  2:12 AM   Result Value Ref Range    WBC 13.1 (H) 4.1 - 11.1 K/uL    RBC 3.70 (L) 4.10 - 5.70 M/uL    HGB 11.7 (L) 12.1 - 17.0 g/dL    HCT 36.5 (L) 36.6 - 50.3 %    MCV 98.6 80.0 - 99.0 FL    MCH 31.6 26.0 - 34.0 PG    MCHC 32.1 30.0 - 36.5 g/dL    RDW 13.2 11.5 - 14.5 %    PLATELET 923 854 - 266 K/uL    MPV 10.2 8.9 - 12.9 FL    NRBC 0.0 0  WBC    ABSOLUTE NRBC 0.00 0.00 - 0.01 K/uL   MAGNESIUM    Collection Time: 01/26/22  2:12 AM   Result Value Ref Range    Magnesium 2.2 1.6 - 2.4 mg/dL   PHOSPHORUS    Collection Time: 01/26/22  2:12 AM   Result Value Ref Range    Phosphorus 2.4 (L) 2.6 - 4.7 MG/DL   GLUCOSE, POC    Collection Time: 01/26/22  7:12 AM   Result Value Ref Range    Glucose (POC) 122 (H) 65 - 117 mg/dL    Performed by Veronica Benson ([facility])    GLUCOSE, POC    Collection Time: 01/26/22 11:15 AM   Result Value Ref Range    Glucose (POC) 152 (H) 65 - 117 mg/dL    Performed by Lottie Diamond    URINALYSIS W/ REFLEX CULTURE    Collection Time: 01/26/22  3:36 PM    Specimen: Urine   Result Value Ref Range    Color YELLOW/STRAW      Appearance CLEAR CLEAR      Specific gravity 1.023 1.003 - 1.030      pH (UA) 7.0 5.0 - 8.0      Protein TRACE (A) NEG mg/dL    Glucose Negative NEG mg/dL    Ketone 15 (A) NEG mg/dL    Bilirubin Negative NEG      Blood SMALL (A) NEG      Urobilinogen 4.0 (H) 0.2 - 1.0 EU/dL    Nitrites Negative NEG      Leukocyte Esterase Negative NEG      UA:UC IF INDICATED CULTURE NOT INDICATED BY UA RESULT CNI      WBC 0-4 0 - 4 /hpf    RBC 10-20 0 - 5 /hpf    Epithelial cells FEW FEW /lpf    Bacteria Negative NEG /hpf    Hyaline cast 0-2 0 - 5 /lpf   GLUCOSE, POC    Collection Time: 01/26/22  4:41 PM   Result Value Ref Range    Glucose (POC) 93 65 - 117 mg/dL    Performed by Denny Zurita    BLOOD GAS, ARTERIAL    Collection Time: 01/26/22  5:01 PM   Result Value Ref Range    pH 7.45 7.35 - 7.45      PCO2 43 35 - 45 mmHg    PO2 65 (L) 80 - 100 mmHg    O2 SAT 93 92 - 97 %    BICARBONATE 30 (H) 22 - 26 mmol/L    BASE EXCESS 4.9 mmol/L    O2 METHOD ROOM AIR      Sample source ARTERIAL      SITE RIGHT RADIAL      JAMESON'S TEST YES       Recent Labs     01/26/22 0212 01/25/22  0341   WBC 13.1* 13.7*   HGB 11.7* 11.7*   HCT 36.5* 36.7    198     Recent Labs     01/26/22  0212 01/25/22  0341 01/24/22  0521    142 143   K 4.1 4.2 4.4   * 109* 110*   CO2 28 27 28   BUN 25* 20 18   CREA 1.21 1.14 0.93   * 103* 118*   CA 8.7 8.6 8.4*   MG 2.2  --   --    PHOS 2.4*  --   --      No results for input(s): ALT, AP, TBIL, TBILI, TP, ALB, GLOB, GGT, AML, LPSE in the last 72 hours. No lab exists for component: SGOT, GPT, AMYP, HLPSE  No results for input(s): INR, PTP, APTT, INREXT, INREXT in the last 72 hours. No results for input(s): FE, TIBC, PSAT, FERR in the last 72 hours.    No results found for: FOL, RBCF   Recent Labs     01/26/22  1701   PH 7.45   PCO2 43   PO2 65*     No results for input(s): CPK, CKNDX, TROIQ in the last 72 hours.     No lab exists for component: CPKMB  Lab Results   Component Value Date/Time    Cholesterol, total 110 07/29/2021 11:30 AM    HDL Cholesterol 31 07/29/2021 11:30 AM    LDL, calculated 25.2 07/29/2021 11:30 AM    Triglyceride 269 (H) 07/29/2021 11:30 AM    CHOL/HDL Ratio 3.5 07/29/2021 11:30 AM     Lab Results   Component Value Date/Time    Glucose (POC) 93 01/26/2022 04:41 PM    Glucose (POC) 152 (H) 01/26/2022 11:15 AM    Glucose (POC) 122 (H) 01/26/2022 07:12 AM    Glucose (POC) 146 (H) 01/25/2022 10:39 PM    Glucose (POC) 107 01/25/2022 04:23 PM     Lab Results   Component Value Date/Time    Color YELLOW/STRAW 01/26/2022 03:36 PM    Appearance CLEAR 01/26/2022 03:36 PM    Specific gravity 1.023 01/26/2022 03:36 PM    Specific gravity 1.016 10/28/2021 11:48 AM    pH (UA) 7.0 01/26/2022 03:36 PM    Protein TRACE (A) 01/26/2022 03:36 PM    Glucose Negative 01/26/2022 03:36 PM    Ketone 15 (A) 01/26/2022 03:36 PM    Bilirubin Negative 01/26/2022 03:36 PM    Urobilinogen 4.0 (H) 01/26/2022 03:36 PM    Nitrites Negative 01/26/2022 03:36 PM    Leukocyte Esterase Negative 01/26/2022 03:36 PM    Epithelial cells FEW 01/26/2022 03:36 PM    Bacteria Negative 01/26/2022 03:36 PM    WBC 0-4 01/26/2022 03:36 PM    RBC 10-20 01/26/2022 03:36 PM       Med list reviewed  Current Facility-Administered Medications   Medication Dose Route Frequency    QUEtiapine (SEROquel) tablet 25 mg  25 mg Oral QPM    insulin lispro (HUMALOG) injection   SubCUTAneous AC&HS    levETIRAcetam (KEPPRA) tablet 1,000 mg  1,000 mg Oral Q12H    metoprolol tartrate (LOPRESSOR) tablet 12.5 mg  12.5 mg Oral Q12H    hydrALAZINE (APRESOLINE) 20 mg/mL injection 5 mg  5 mg IntraVENous Q4H PRN    miconazole (MICOTIN) 2 % powder   Topical BID    ondansetron (ZOFRAN) injection 4 mg  4 mg IntraVENous Q6H PRN    naloxone (NARCAN) injection 0.4 mg  0.4 mg IntraVENous PRN    acetaminophen (TYLENOL) tablet 650 mg  650 mg Oral Q4H PRN    Or    acetaminophen (TYLENOL) solution 650 mg  650 mg Per NG tube Q4H PRN    Or    acetaminophen (TYLENOL) suppository 650 mg  650 mg Rectal Q4H PRN    lisinopriL (PRINIVIL, ZESTRIL) tablet 2.5 mg  2.5 mg Oral DAILY    busPIRone (BUSPAR) tablet 15 mg  15 mg Oral BID    PARoxetine (PAXIL) tablet 10 mg  10 mg Oral DAILY    amiodarone (CORDARONE) tablet 100 mg  100 mg Oral DAILY    glucose chewable tablet 16 g  4 Tablet Oral PRN    dextrose (D50W) injection syrg 12.5-25 g  25-50 mL IntraVENous PRN    glucagon (GLUCAGEN) injection 1 mg  1 mg IntraMUSCular PRN    docusate (COLACE) 50 mg/5 mL oral liquid 100 mg  100 mg Oral DAILY PRN    bisacodyL (DULCOLAX) suppository 10 mg  10 mg Rectal DAILY PRN       Care Plan discussed with:  Patient/Family, Nurse and     Alejandra Haddad MD  Internal Medicine  Date of Service: 1/26/2022

## 2022-01-26 NOTE — PROGRESS NOTES
Transition of Care Plan   RUR- Low  14%   DISPOSITION: IPR - [facility] - pending [address] (will be started when patient is able to work with both PT/OT disciplines)   F/U with PCP/Specialist     Transport: NIKO DUMONT spoke with Symone Diallo at [facility] regarding patient's insurance auth, CM Noted patient not able to work with OT today. Will request that [address] is started tomorrow with new PT/OT notes. CAMI BradfordS.W.

## 2022-01-26 NOTE — PROGRESS NOTES
Bedside and Verbal shift change report given to Mera Valentine (oncoming nurse) by Carolin Reed (offgoing nurse). Report included the following information SBAR, Kardex, Procedure Summary, Intake/Output, MAR, Recent Results, Cardiac Rhythm AV Paced and Dual Neuro Assessment.

## 2022-01-26 NOTE — PROGRESS NOTES
Problem: Mobility Impaired (Adult and Pediatric)  Goal: *Acute Goals and Plan of Care (Insert Text)  Description: FUNCTIONAL STATUS PRIOR TO ADMISSION: Patient is extremely [place], unable to provide PLOF (hearing aid broken with GLF prior to admission). Per chart review, patient with hx of Parkinson's dx, requires assist for ADLs and IADLs, has a RW and SPC but refuses to use them, significant fall hx. HOME SUPPORT: The patient lived with his daughter and her spouse & child, per chart review, plans for all of them to move back to his one story house. Physical Therapy Goals  Initiated 1/21/2022  1. Patient will move from supine to sit and sit to supine , scoot up and down, and roll side to side in bed with moderate assistance within 7 day(s). 2.  Patient will transfer from bed to chair and chair to bed with minimal assistance of 2 people using the least restrictive device within 7 day(s). 3.  Patient will perform sit to stand with minimal assistance of 2 people within 7 day(s). 4.  Patient will ambulate with moderate assistance of 2 people for 20 feet with the least restrictive device within 7 day(s). 5.  Patient will tolerate sitting EOB for 5min with CGA within 7 day(s). Outcome: Not Progressing Towards Goal   PHYSICAL THERAPY TREATMENT  Patient: Galo Croft (89 y.o. male)  Date: 1/26/2022  Diagnosis: Subarachnoid bleed ([facility] [address] [I60.9] <principal problem not specified>  Procedure(s) (LRB):  DARSHANA HOLES FOR LEFT FRONTAL SUBDURAL, with CRANIOTOMY/ REQ TF/ ER 23 (Left) 6 Days Post-Op  Precautions:  Fall, SBP <140  Chart, physical therapy assessment, plan of care and goals were reviewed. ASSESSMENT  Patient continues with skilled PT services and is not progressing towards goals. Pt received in bed and opened to eyes to max verbal and tactile cueing. Pt followed no commands and made no verbalizations which was a significant change from previous sessions. He actively resisted all movement.  When asked if he was ok, pt shook his head no- no other attempts to communicate. Resting SBP in 140s (SBP goal <140). Pt rolled and scooted with totalA in order to reposition pt in bed. Further mobility deferred and RN notified. Current Level of Function Impacting Discharge (mobility/balance): totalA x2 bed mobility     Other factors to consider for discharge: Parkinson's disease at baseline, [place], modified independent baseline, hx falls          PLAN :  Patient continues to benefit from skilled intervention to address the above impairments. Continue treatment per established plan of care. to address goals. Recommendation for discharge: (in order for the patient to meet his/her long term goals)  Therapy 3 hours per day 5-7 days per week pending progress    This discharge recommendation:  Has not yet been discussed the attending provider and/or case management    IF patient discharges home will need the following DME: to be determined (TBD)       SUBJECTIVE:   Patient did not verbalize. Shook his head no when asked if he was ok. OBJECTIVE DATA SUMMARY:   Critical Behavior:  Neurologic State: Alert  Orientation Level: Oriented to person,Oriented to place,Oriented to time,Disoriented to time (knew month and year)  Cognition: Follows commands,Impulsive  Safety/Judgement: Decreased awareness of environment,Decreased awareness of need for assistance,Decreased awareness of need for safety,Decreased insight into deficits  Functional Mobility Training:  Bed Mobility:  Rolling: Total assistance;Assist x1        Scooting: Total assistance;Assist x2          Activity Tolerance:   Poor    After treatment patient left in no apparent distress:   Supine in bed, Call bell within reach, Bed / chair alarm activated, and Restraints    COMMUNICATION/COLLABORATION:   The patients plan of care was discussed with: Occupational therapist and Registered nurse.      KARELY Fernández   Time Calculation: 20 mins        Regarding student involvement in patient care:  A student participated in this treatment session. Per CMS Medicare statements and APTA guidelines I certify that the following was true:  1. I was present and directly observed the entire session. 2. I made all skilled judgments and clinical decisions regarding care. 3. I am the practitioner responsible for assessment, treatment, and documentation.

## 2022-01-26 NOTE — PROGRESS NOTES
SLP Contact Note    Chart reviewed. Noted family is still deciding on goals of care. As stated in previous SLP notes, given that this is likely not a new issue and likely a combination of impact from Parkinson's and presence of likely cervical osteophyte, could consider initiating puree/thins with parties acknowledging the risks of aspiration. PEG tube likely not indicated given chronic nature of dysphagia with Parkinson's, however, will benefit from goals of care. Will continue pureed snacks until a decision is made. Note pt can continue a diet for comfort without pursuing comfort measures. Will continue to follow.     Thank you,   Andrew Shelton M.S. CF-SLP   Speech Language Pathologist

## 2022-01-26 NOTE — PROGRESS NOTES
Problem: Non-Violent Restraints  Goal: Removal from restraints as soon as assessed to be safe  Outcome: Progressing Towards Goal  Goal: No harm/injury to patient while restraints in use  Outcome: Progressing Towards Goal  Goal: Patient's dignity will be maintained  Outcome: Progressing Towards Goal  Goal: Patient Interventions  Outcome: Progressing Towards Goal     Problem: Falls - Risk of  Goal: *Absence of Falls  Description: Document Neo Blight Fall Risk and appropriate interventions in the flowsheet. Outcome: Progressing Towards Goal  Note: Fall Risk Interventions:  Mobility Interventions: Communicate number of staff needed for ambulation/transfer,Patient to call before getting OOB,PT Consult for mobility concerns    Mentation Interventions: More frequent rounding    Medication Interventions: Patient to call before getting OOB,Teach patient to arise slowly    Elimination Interventions: Call light in reach,Toileting schedule/hourly rounds    History of Falls Interventions: Consult care management for discharge planning,Door open when patient unattended,Room close to nurse's station         Problem: Patient Education: Go to Patient Education Activity  Goal: Patient/Family Education  Outcome: Progressing Towards Goal     Problem: Patient Education: Go to Patient Education Activity  Goal: Patient/Family Education  Outcome: Progressing Towards Goal     Problem: Patient Education: Go to Patient Education Activity  Goal: Patient/Family Education  Outcome: Progressing Towards Goal     Problem: Patient Education: Go to Patient Education Activity  Goal: Patient/Family Education  Outcome: Progressing Towards Goal     Problem: Patient Education: Go to Patient Education Activity  Goal: Patient/Family Education  Outcome: Progressing Towards Goal     Problem: Pressure Injury - Risk of  Goal: *Prevention of pressure injury  Description: Document Jose Alberto Scale and appropriate interventions in the flowsheet.   Outcome: Progressing Towards Goal  Note: Pressure Injury Interventions:  Sensory Interventions: Assess changes in LOC,Check visual cues for pain    Moisture Interventions: Absorbent underpads,Maintain skin hydration (lotion/cream),Internal/External urinary devices    Activity Interventions: Pressure redistribution bed/mattress(bed type)    Mobility Interventions: Assess need for specialty bed,Pressure redistribution bed/mattress (bed type),PT/OT evaluation    Nutrition Interventions: Document food/fluid/supplement intake    Friction and Shear Interventions: Minimize layers,Lift team/patient mobility team                Problem: Patient Education: Go to Patient Education Activity  Goal: Patient/Family Education  Outcome: Progressing Towards Goal

## 2022-01-26 NOTE — PROGRESS NOTES
Occupational Therapy: defer    Chart reviewed, consulted with PT, who recently worked with patient and advised that he required max stimuli to open eyes and followed no commands. Note this is a significant change from previous sessions. Will defer OT at this time and will f/u as able and appropriate.     Esteban Alexis, OTR/L

## 2022-01-27 LAB
ANION GAP SERPL CALC-SCNC: 4 MMOL/L (ref 5–15)
BUN SERPL-MCNC: 22 MG/DL (ref 6–20)
BUN/CREAT SERPL: 21 (ref 12–20)
CALCIUM SERPL-MCNC: 8.8 MG/DL (ref 8.5–10.1)
CHLORIDE SERPL-SCNC: 111 MMOL/L (ref 97–108)
CO2 SERPL-SCNC: 28 MMOL/L (ref 21–32)
CREAT SERPL-MCNC: 1.05 MG/DL (ref 0.7–1.3)
ERYTHROCYTE [DISTWIDTH] IN BLOOD BY AUTOMATED COUNT: 13.1 % (ref 11.5–14.5)
GLUCOSE BLD STRIP.AUTO-MCNC: 112 MG/DL (ref 65–117)
GLUCOSE BLD STRIP.AUTO-MCNC: 138 MG/DL (ref 65–117)
GLUCOSE BLD STRIP.AUTO-MCNC: 147 MG/DL (ref 65–117)
GLUCOSE BLD STRIP.AUTO-MCNC: 147 MG/DL (ref 65–117)
GLUCOSE SERPL-MCNC: 132 MG/DL (ref 65–100)
HCT VFR BLD AUTO: 40.1 % (ref 36.6–50.3)
HGB BLD-MCNC: 12.6 G/DL (ref 12.1–17)
MCH RBC QN AUTO: 31 PG (ref 26–34)
MCHC RBC AUTO-ENTMCNC: 31.4 G/DL (ref 30–36.5)
MCV RBC AUTO: 98.8 FL (ref 80–99)
NRBC # BLD: 0 K/UL (ref 0–0.01)
NRBC BLD-RTO: 0 PER 100 WBC
PLATELET # BLD AUTO: 219 K/UL (ref 150–400)
PMV BLD AUTO: 10.8 FL (ref 8.9–12.9)
POTASSIUM SERPL-SCNC: 4.2 MMOL/L (ref 3.5–5.1)
RBC # BLD AUTO: 4.06 M/UL (ref 4.1–5.7)
SERVICE CMNT-IMP: ABNORMAL
SERVICE CMNT-IMP: NORMAL
SODIUM SERPL-SCNC: 143 MMOL/L (ref 136–145)
WBC # BLD AUTO: 11.3 K/UL (ref 4.1–11.1)

## 2022-01-27 PROCEDURE — 97535 SELF CARE MNGMENT TRAINING: CPT

## 2022-01-27 PROCEDURE — 97112 NEUROMUSCULAR REEDUCATION: CPT

## 2022-01-27 PROCEDURE — 74011636637 HC RX REV CODE- 636/637: Performed by: FAMILY MEDICINE

## 2022-01-27 PROCEDURE — 65660000000 HC RM CCU STEPDOWN

## 2022-01-27 PROCEDURE — 36415 COLL VENOUS BLD VENIPUNCTURE: CPT

## 2022-01-27 PROCEDURE — 74011250637 HC RX REV CODE- 250/637: Performed by: NEUROLOGICAL SURGERY

## 2022-01-27 PROCEDURE — 74011250637 HC RX REV CODE- 250/637: Performed by: NURSE PRACTITIONER

## 2022-01-27 PROCEDURE — 74011250637 HC RX REV CODE- 250/637: Performed by: FAMILY MEDICINE

## 2022-01-27 PROCEDURE — 97530 THERAPEUTIC ACTIVITIES: CPT

## 2022-01-27 PROCEDURE — 82962 GLUCOSE BLOOD TEST: CPT

## 2022-01-27 PROCEDURE — 74011250637 HC RX REV CODE- 250/637: Performed by: INTERNAL MEDICINE

## 2022-01-27 PROCEDURE — 85027 COMPLETE CBC AUTOMATED: CPT

## 2022-01-27 PROCEDURE — 74011250637 HC RX REV CODE- 250/637: Performed by: HOSPITALIST

## 2022-01-27 PROCEDURE — 80048 BASIC METABOLIC PNL TOTAL CA: CPT

## 2022-01-27 RX ORDER — HYDROXYZINE HYDROCHLORIDE 10 MG/1
10 TABLET, FILM COATED ORAL 3 TIMES DAILY
Status: DISCONTINUED | OUTPATIENT
Start: 2022-01-27 | End: 2022-02-01 | Stop reason: HOSPADM

## 2022-01-27 RX ADMIN — METOPROLOL TARTRATE 12.5 MG: 25 TABLET, FILM COATED ORAL at 08:33

## 2022-01-27 RX ADMIN — BUSPIRONE HYDROCHLORIDE 15 MG: 10 TABLET ORAL at 17:00

## 2022-01-27 RX ADMIN — HYDROXYZINE HYDROCHLORIDE 10 MG: 10 TABLET ORAL at 18:08

## 2022-01-27 RX ADMIN — LEVETIRACETAM 1000 MG: 500 TABLET, FILM COATED ORAL at 21:11

## 2022-01-27 RX ADMIN — Medication 2 UNITS: at 11:58

## 2022-01-27 RX ADMIN — MICONAZOLE NITRATE 2 % TOPICAL POWDER: at 08:36

## 2022-01-27 RX ADMIN — AMIODARONE HYDROCHLORIDE 100 MG: 200 TABLET ORAL at 08:33

## 2022-01-27 RX ADMIN — HYDROXYZINE HYDROCHLORIDE 10 MG: 10 TABLET ORAL at 21:11

## 2022-01-27 RX ADMIN — MICONAZOLE NITRATE 2 % TOPICAL POWDER: at 17:00

## 2022-01-27 RX ADMIN — QUETIAPINE FUMARATE 25 MG: 25 TABLET ORAL at 17:00

## 2022-01-27 RX ADMIN — LISINOPRIL 2.5 MG: 5 TABLET ORAL at 08:34

## 2022-01-27 RX ADMIN — BUSPIRONE HYDROCHLORIDE 15 MG: 10 TABLET ORAL at 08:33

## 2022-01-27 RX ADMIN — PAROXETINE HYDROCHLORIDE 10 MG: 20 TABLET, FILM COATED ORAL at 08:32

## 2022-01-27 RX ADMIN — LEVETIRACETAM 1000 MG: 500 TABLET, FILM COATED ORAL at 08:32

## 2022-01-27 RX ADMIN — METOPROLOL TARTRATE 12.5 MG: 25 TABLET, FILM COATED ORAL at 21:11

## 2022-01-27 NOTE — PROGRESS NOTES
Bedside and Verbal shift change report given to [facility] [address] (oncoming nurse) by Elda Alcantara RN (offgoing nurse). Report included the following information SBAR, Kardex, Intake/Output, MAR and Recent Results.

## 2022-01-27 NOTE — PROGRESS NOTES
Problem: Falls - Risk of  Goal: *Absence of Falls  Description: Document Lam Blades Fall Risk and appropriate interventions in the flowsheet. Outcome: Progressing Towards Goal  Note: Fall Risk Interventions:  Mobility Interventions: Communicate number of staff needed for ambulation/transfer    Mentation Interventions: More frequent rounding    Medication Interventions: Patient to call before getting OOB    Elimination Interventions: Call light in reach    History of Falls Interventions: Consult care management for discharge planning         Problem: Patient Education: Go to Patient Education Activity  Goal: Patient/Family Education  Outcome: Progressing Towards Goal     Problem: Patient Education: Go to Patient Education Activity  Goal: Patient/Family Education  Outcome: Progressing Towards Goal     Problem: Patient Education: Go to Patient Education Activity  Goal: Patient/Family Education  Outcome: Progressing Towards Goal     Problem: Patient Education: Go to Patient Education Activity  Goal: Patient/Family Education  Outcome: Progressing Towards Goal     Problem: Pressure Injury - Risk of  Goal: *Prevention of pressure injury  Description: Document Jose Alberto Scale and appropriate interventions in the flowsheet.   Outcome: Progressing Towards Goal  Note: Pressure Injury Interventions:  Sensory Interventions: Assess changes in LOC    Moisture Interventions: Absorbent underpads    Activity Interventions: Pressure redistribution bed/mattress(bed type)    Mobility Interventions: Assess need for specialty bed    Nutrition Interventions: Document food/fluid/supplement intake    Friction and Shear Interventions: Minimize layers                Problem: Patient Education: Go to Patient Education Activity  Goal: Patient/Family Education  Outcome: Progressing Towards Goal

## 2022-01-27 NOTE — PROGRESS NOTES
Problem: Self Care Deficits Care Plan (Adult)  Goal: *Acute Goals and Plan of Care (Insert Text)  Description: FUNCTIONAL STATUS PRIOR TO ADMISSION: Patient is extremely [place], unable to provide PLOF (hearing aid broken with GLF prior to admission). Per chart review, patient with hx of Parkinson's dx, requires assist for ADLs and IADLs, has a RW and SPC but refuses to use them, significant fall hx. HOME SUPPORT: The patient lived with his daughter and her spouse & child, per chart review, plans for all of them to move back to his one story house. Occupational Therapy Goals  Initiated 1/21/2022   1. Patient will perform self-feeding with minimal assistance/contact guard assist within 7 day(s). 2.  Patient will perform grooming at the sink with moderate assistance within 7 day(s). 3.  Patient will perform bathing with moderate assistance within 7 day(s). 4.  Patient will perform lower body dressing with moderate assistance within 7 day(s). 5.  Patient will perform upper body dressing with moderate assistance within 7 day(s). 6.  Patient will perform toilet transfers with minimal assistance/contact guard assist within 7 day(s). 7.  Patient will perform all aspects of toileting with moderate assistance  within 7 day(s). 8.  Patient will participate in upper extremity therapeutic exercise/activities with minimal assistance/contact guard assist within 7 day(s). 9.  Patient will complete the [id number] within 7 days. Outcome: Progressing Towards Goal     OCCUPATIONAL THERAPY TREATMENT  Patient: Xiomara Ceron (8 y.o. male)  Date: 1/27/2022  Diagnosis: Subarachnoid bleed ([facility] [address] [I60.9] <principal problem not specified>  Procedure(s) (LRB):  DARSHANA HOLES FOR LEFT FRONTAL SUBDURAL, with CRANIOTOMY/ REQ TF/ ER 23 (Left) 7 Days Post-Op  Precautions:    Chart, occupational therapy assessment, plan of care, and goals were reviewed.     ASSESSMENT  Patient continues with skilled OT services and is progressing towards goals however remains limited by decreased balance, strength, RUE/RLE function & sensation, coordination, activity tolerance, cognition (memory, attention, initiation), and attention to the R with bed mobility, toileting, lower body dressing, and brief OOB mobility this session. Patient remains confused but more alert today, requiring mod-max verbal cues for safety with all functional tasks d/t poor safety awareness. Patient found soiled in supine, requiring Total A for hygiene in supine for safety. Able to transition to EOB and standing for brief OOB mobility however requiring Min-Mod A for balance with max multimodal cues d/t cognitive deficits & weight shifting for coordination to the L. Improved distal lower body access seated EOB with carryover of tailor sit technique, Min A for balance with RUE initiation and R lateral reaching with mod multimodal cues. He remains a high fall & safety risk, would benefit from IPR to maximize safety & functional independence. Current Level of Function Impacting Discharge (ADLs): Min-Total A for ADLs, Min-Mod A x2 for limited OOB mobility    Other factors to consider for discharge: fall risk, assist of 2, significant fall hx         PLAN :  Patient continues to benefit from skilled intervention to address the above impairments. Continue treatment per established plan of care to address goals. Recommend with staff: Recommend with nursing, ADLs with, bed in chair position 3x/day, and toileting via rolling in supine vs BSC . Thank you for completing as able in order to maintain patient strength, endurance and independence. Recommendation for discharge: (in order for the patient to meet his/her long term goals)  Therapy 3 hours per day 5-7 days per week    This discharge recommendation:  Has been made in collaboration with the attending provider and/or case management    IF patient discharges home will need the following DME:  To be determined (TBD) SUBJECTIVE:   Patient stated Do you see that boat over there?     OBJECTIVE DATA SUMMARY:   Cognitive/Behavioral Status:  Neurologic State: Alert;Confused  Orientation Level: Oriented to person;Disoriented to place; Disoriented to situation;Disoriented to time  Cognition: Decreased command following;Decreased attention/concentration; Impaired decision making; Impulsive;Memory loss;Poor safety awareness  Perception: Cues to attend right visual field;Cues to attend to right side of body     Safety/Judgement: Decreased awareness of need for safety;Decreased awareness of environment;Decreased awareness of need for assistance;Decreased insight into deficits    Functional Mobility and Transfers for ADLs:  Bed Mobility:  Rolling: Minimum assistance  Supine to Sit: Minimum assistance  Sit to Supine: Minimum assistance;Assist x2  Scooting: Contact guard assistance (EOB)    Transfers:  Sit to Stand: Contact guard assistance;Minimum assistance          Balance:  Sitting: Impaired  Sitting - Static: Good (unsupported)  Sitting - Dynamic: Fair (occasional)  Standing: Impaired  Standing - Static: Fair;Constant support  Standing - Dynamic : Poor;Constant support    ADL Intervention:   Lower Body Dressing Assistance  Dressing Assistance: Maximum assistance  Socks: Minimum assistance (A for sitting balance, R lateral reach outside SABINE)  Leg Crossed Method Used: Yes  Position Performed: Seated edge of bed;Standing  Cues: Physical assistance; Tactile cues provided;Verbal cues provided;Visual cues provided;Visual/perceptual training/retraining    Toileting  Toileting Assistance: Total assistance(dependent)  Bladder Hygiene:  Total assistance (dependent)  Clothing Management: Maximum assistance (supine for safety)  Cues: Tactile cues provided;Verbal cues provided;Visual cues provided    Cognitive Retraining  Orientation Retraining: Awareness of environment  Problem Solving: Awareness of environment  Executive Functions: Executing cognitive plans;Managing time;Regulating behavior  Organizing/Sequencing: Breaking task down  Attention to Task: Single task;Distractibility  Following Commands: Follows one step commands/directions  Safety/Judgement: Decreased awareness of need for safety;Decreased awareness of environment;Decreased awareness of need for assistance;Decreased insight into deficits  Cues: Tactile cues provided;Verbal cues provided;Visual cues provided    Neuro Re-Education:   - Visual scanning & attention to the R with lateral weight shifting & reach with RUE outside SABINE with Min A for balance, increased time for RUE initiation    - Standing balance with intermittent R lateral lean, weight shifting with mod facilitation, fair carryover          Pain:  None reported    Activity Tolerance:   Good    After treatment patient left in no apparent distress:   Supine in bed, Call bell within reach, Bed / chair alarm activated, Side rails x 3, and Restraints    COMMUNICATION/COLLABORATION:   The patients plan of care was discussed with: Physical therapist and Registered nurse.      ODALIS Pickett, OTR/L  Time Calculation: 23 mins

## 2022-01-27 NOTE — PROGRESS NOTES
Problem: Mobility Impaired (Adult and Pediatric)  Goal: *Acute Goals and Plan of Care (Insert Text)  Description: FUNCTIONAL STATUS PRIOR TO ADMISSION: Patient is extremely [place], unable to provide PLOF (hearing aid broken with GLF prior to admission). Per chart review, patient with hx of Parkinson's dx, requires assist for ADLs and IADLs, has a RW and SPC but refuses to use them, significant fall hx. HOME SUPPORT: The patient lived with his daughter and her spouse & child, per chart review, plans for all of them to move back to his one story house. Physical Therapy Goals  Initiated 1/21/2022  1. Patient will move from supine to sit and sit to supine , scoot up and down, and roll side to side in bed with moderate assistance within 7 day(s). 2.  Patient will transfer from bed to chair and chair to bed with minimal assistance of 2 people using the least restrictive device within 7 day(s). 3.  Patient will perform sit to stand with minimal assistance of 2 people within 7 day(s). 4.  Patient will ambulate with moderate assistance of 2 people for 20 feet with the least restrictive device within 7 day(s). 5.  Patient will tolerate sitting EOB for 5min with CGA within 7 day(s). Outcome: Progressing Towards Goal   PHYSICAL THERAPY TREATMENT  Patient: Erick Childress (37 y.o. male)  Date: 1/27/2022  Diagnosis: Subarachnoid bleed ([facility] [address] [I60.9] <principal problem not specified>  Procedure(s) (LRB):  DARSHANA HOLES FOR LEFT FRONTAL SUBDURAL, with CRANIOTOMY/ REQ TF/ ER 23 (Left) 7 Days Post-Op  Precautions: Fall risk, SBP <140   Chart, physical therapy assessment, plan of care and goals were reviewed. ASSESSMENT  Patient continues with skilled PT services and is progressing towards goals. Pt presents with impaired cognition, confusion, generalized weakness and impaired balance. Pt was more alert this date compared to prior sessions.  Pt followed commands and actively participated in session, but made comments indicating he thought he was on a boat. He required Kyle for supine<> sit. He performed a sit <>stand with CGA-Kyle and demonstrated impaired standing balance. When asked to side step to his L pt attempted to step forward repeatedly, perseverating on pattern on the floor. While side stepping along EOB pt required visual cueing, verbal cueing and physical assistance for weight shifting to side step to the L. Recommending IPR upon discharge. Current Level of Function Impacting Discharge (mobility/balance): modA x2 side stepping     Other factors to consider for discharge: impaired cognition, hx of falls, modified independent baseline, good family support, Parkinson's disease, [place] (hearing aid in R ear)         PLAN :  Patient continues to benefit from skilled intervention to address the above impairments. Continue treatment per established plan of care. to address goals. Recommendation for discharge: (in order for the patient to meet his/her long term goals)  Therapy 3 hours per day 5-7 days per week    This discharge recommendation:  Has not yet been discussed the attending provider and/or case management    IF patient discharges home will need the following DME: to be determined (TBD)       SUBJECTIVE:   Patient stated i'm ready to set sail.     OBJECTIVE DATA SUMMARY:   Critical Behavior:  Neurologic State: Confused,Agitated,Alert,Eyes open spontaneously  Orientation Level: Oriented to person,Disoriented to place,Disoriented to situation,Disoriented to time  Cognition: Decreased attention/concentration,Decreased command following,Impulsive,Poor safety awareness,Impaired decision making  Safety/Judgement: Decreased awareness of environment,Decreased awareness of need for assistance,Decreased awareness of need for safety,Decreased insight into deficits  Functional Mobility Training:  Bed Mobility:  Rolling: Minimum assistance  Supine to Sit: Minimum assistance  Sit to Supine: Minimum assistance;Assist x2  Scooting: Contact guard assistance (EOB)        Transfers:  Sit to Stand: Contact guard assistance;Minimum assistance  Stand to Sit: Minimum assistance             Balance:  Sitting: Impaired  Sitting - Static: Good (unsupported)  Sitting - Dynamic: Fair (occasional)  Standing: Impaired  Standing - Static: Fair;Constant support  Standing - Dynamic : Poor;Constant support    Gait:  3ft side stepping with modAx2       Activity Tolerance:   Fair    After treatment patient left in no apparent distress:   Supine in bed, Heels elevated for pressure relief, Call bell within reach, Bed / chair alarm activated, and Restraints    COMMUNICATION/COLLABORATION:   The patients plan of care was discussed with: Occupational therapist and Registered nurse. Chavez Saunders, [facility]   Time Calculation: 23 mins        Regarding student involvement in patient care:  A student participated in this treatment session. Per CMS Medicare statements and APTA guidelines I certify that the following was true:  1. I was present and directly observed the entire session. 2. I made all skilled judgments and clinical decisions regarding care. 3. I am the practitioner responsible for assessment, treatment, and documentation.

## 2022-01-27 NOTE — PROGRESS NOTES
Bedside and Verbal shift change report given to [facility] RN(oncoming nurse) by Terence Diaz RN (offgoing nurse). Report included the following information SBAR, Kardex, ED Summary, Intake/Output, MAR, Recent Results and Cardiac Rhythm AV Paced.

## 2022-01-27 NOTE — PROGRESS NOTES
Bilateral wrist restraints order placed. Patient climbing out of bed, confused, combative. New restraint order for 24 hours.

## 2022-01-28 PROCEDURE — 74011250637 HC RX REV CODE- 250/637: Performed by: NEUROLOGICAL SURGERY

## 2022-01-28 PROCEDURE — 74011250637 HC RX REV CODE- 250/637: Performed by: INTERNAL MEDICINE

## 2022-01-28 PROCEDURE — 65660000000 HC RM CCU STEPDOWN

## 2022-01-28 PROCEDURE — 74011250637 HC RX REV CODE- 250/637: Performed by: FAMILY MEDICINE

## 2022-01-28 PROCEDURE — 95816 EEG AWAKE AND DROWSY: CPT | Performed by: NURSE PRACTITIONER

## 2022-01-28 PROCEDURE — 74011250637 HC RX REV CODE- 250/637: Performed by: NURSE PRACTITIONER

## 2022-01-28 PROCEDURE — 97530 THERAPEUTIC ACTIVITIES: CPT

## 2022-01-28 RX ADMIN — MICONAZOLE NITRATE 2 % TOPICAL POWDER: at 17:05

## 2022-01-28 RX ADMIN — LEVETIRACETAM 1000 MG: 500 TABLET, FILM COATED ORAL at 08:24

## 2022-01-28 RX ADMIN — LEVETIRACETAM 1000 MG: 500 TABLET, FILM COATED ORAL at 21:37

## 2022-01-28 RX ADMIN — METOPROLOL TARTRATE 12.5 MG: 25 TABLET, FILM COATED ORAL at 21:36

## 2022-01-28 RX ADMIN — HYDROXYZINE HYDROCHLORIDE 10 MG: 10 TABLET ORAL at 15:46

## 2022-01-28 RX ADMIN — HYDROXYZINE HYDROCHLORIDE 10 MG: 10 TABLET ORAL at 08:24

## 2022-01-28 RX ADMIN — METOPROLOL TARTRATE 12.5 MG: 25 TABLET, FILM COATED ORAL at 08:22

## 2022-01-28 RX ADMIN — BUSPIRONE HYDROCHLORIDE 15 MG: 10 TABLET ORAL at 17:05

## 2022-01-28 RX ADMIN — BUSPIRONE HYDROCHLORIDE 15 MG: 10 TABLET ORAL at 08:24

## 2022-01-28 RX ADMIN — PAROXETINE HYDROCHLORIDE 10 MG: 20 TABLET, FILM COATED ORAL at 08:24

## 2022-01-28 RX ADMIN — AMIODARONE HYDROCHLORIDE 100 MG: 200 TABLET ORAL at 08:24

## 2022-01-28 RX ADMIN — LISINOPRIL 2.5 MG: 5 TABLET ORAL at 08:24

## 2022-01-28 RX ADMIN — MICONAZOLE NITRATE 2 % TOPICAL POWDER: at 08:22

## 2022-01-28 RX ADMIN — HYDROXYZINE HYDROCHLORIDE 10 MG: 10 TABLET ORAL at 21:37

## 2022-01-28 NOTE — PROGRESS NOTES
Physical Therapy    PT treatment deferred following discussion with OT who notes change in pt status with decreased MICKIE, not following commands. RN informed by OT. Will con't to follow.     Mickey Keane, PT, MPT

## 2022-01-28 NOTE — PROGRESS NOTES
Transition of Care Plan   RUR- Low  14%   DISPOSITION: IPR - [facility] - pending auth - started 1/27 and medical stability    F/U with PCP/Specialist     Transport: NIKO    CM spoke with Prince Frederick at [facility], they have started insurance auth with [facility] based on PT/OT notes from 1/27. They have requested clarification on nutrition plan prior to DC. [place] Last) MELINDA Collier.

## 2022-01-28 NOTE — PROGRESS NOTES
Bedside and Verbal shift change report given to [address] [place] [address] (oncoming nurse) by Ariana Spear (offgoing nurse). Report included the following information SBAR, Kardex, Procedure Summary, Intake/Output, Accordion, Recent Results, Cardiac Rhythm A  Paced, Alarm Parameters  and Dual Neuro Assessment.

## 2022-01-28 NOTE — PROGRESS NOTES
Hospitalist Progress Note      Hospital summary: 81 y.o man w/ DM, HTN, CVA, hyperlipidemia, Parkinson's disease, who was admitted for a traumatic subdural hematoma. He is s/p crani on 1/20. Assessment/Plan:  Traumatic SDH: s/p crani 1/20  -NSG following  -continue Keppra  -was on Precedex in the ICU    Dysphagia: likely due to Parkinsons. Aspiration with all liquid consistencies on FEES. I spoke to his daughter regarding options - feeding tube vs allow diet with transition to hospice (can eat while acknowledging risk however from a long term perspective hospice would be appropriate since he is declining Parkinson's treatment and chronic aspiration is not sustainable). HTN:   -keep SBP<140    Type 2 DM:  -SSI/POC checks  -hold PO meds    Parkinson's disease: reportedly declined meds as an outpatient. Per his daughter he was taking Sinement but felt like it wasn't helping except at night time for sleep, so he decided not to see his neurologist again, and she weaned him off the medication. CAD:   -hold aspirin    AMS: delirium due to acute illness/hospitalization  -continue quetiapine - reduce dose as he is drowsy today. ABG/UA ok. Has a pacemaker for bradycardia. Unclear why he is on amiodarone. Code status: DNR  DVT prophylaxis: SCDs  Disposition: TBD  ----------------------------------------------    CC: f/u SDH    S: poor historian, not offering any complaints     Review of Systems:  Pertinent items are noted in HPI.     O:  Visit Vitals  BP (!) 148/85 (BP 1 Location: Right upper arm, BP Patient Position: At rest)   Pulse 62   Temp 97.5 °F (36.4 °C)   Resp 16   Ht 5' 7\" (1.702 m)   Wt 84 kg (185 lb 3 oz)   SpO2 97%   BMI 29.00 kg/m²     Patient Vitals for the past 24 hrs:   Temp Pulse Resp BP SpO2   01/27/22 2202 97.5 °F (36.4 °C) 62 16 (!) 148/85 97 %   01/27/22 2111 -- 60 -- (!) 148/85 --   01/27/22 2003 -- 60 -- -- --   01/27/22 1806 -- 60 -- -- --   01/27/22 1800 -- -- -- (!) 131/59 --   01/27/22 1756 98.1 °F (36.7 °C) 60 20 (!) 159/73 95 %   01/27/22 1400 -- 60 -- -- --   01/27/22 1356 98.1 °F (36.7 °C) 60 15 (!) 157/88 95 %   01/27/22 1100 -- 60 -- -- --   01/27/22 1000 -- 60 -- -- --   01/27/22 0958 98 °F (36.7 °C) 60 11 (!) 159/76 --   01/27/22 0833 -- 60 -- (!) 178/69 --   01/27/22 0600 98.3 °F (36.8 °C) 60 26 (!) 158/78 92 %   01/27/22 0200 98.3 °F (36.8 °C) 60 27 (!) 169/72 91 %       PHYSICAL EXAM:  Gen: NAD, non-toxic  HEENT: anicteric sclerae, normal conjunctiva, s/p crani  Neck: supple, trachea midline, no adenopathy  Heart: RRR, no MRG, no JVD, no peripheral edema  Lungs: CTA b/l, non-labored respirations  Abd: soft, NT, ND, BS+  Extr: warm  Skin: dry, no rash  Neuro: CN II-XII grossly intact  Psych: not agitation, flat affect    No intake or output data in the 24 hours ending 01/27/22 2332     Recent labs & imaging reviewed:  Recent Results (from the past 24 hour(s))   CBC W/O DIFF    Collection Time: 01/27/22  1:07 AM   Result Value Ref Range    WBC 11.3 (H) 4.1 - 11.1 K/uL    RBC 4.06 (L) 4.10 - 5.70 M/uL    HGB 12.6 12.1 - 17.0 g/dL    HCT 40.1 36.6 - 50.3 %    MCV 98.8 80.0 - 99.0 FL    MCH 31.0 26.0 - 34.0 PG    MCHC 31.4 30.0 - 36.5 g/dL    RDW 13.1 11.5 - 14.5 %    PLATELET 373 383 - 888 K/uL    MPV 10.8 8.9 - 12.9 FL    NRBC 0.0 0  WBC    ABSOLUTE NRBC 0.00 0.00 - 1.64 K/uL   METABOLIC PANEL, BASIC    Collection Time: 01/27/22  1:07 AM   Result Value Ref Range    Sodium 143 136 - 145 mmol/L    Potassium 4.2 3.5 - 5.1 mmol/L    Chloride 111 (H) 97 - 108 mmol/L    CO2 28 21 - 32 mmol/L    Anion gap 4 (L) 5 - 15 mmol/L    Glucose 132 (H) 65 - 100 mg/dL    BUN 22 (H) 6 - 20 MG/DL    Creatinine 1.05 0.70 - 1.30 MG/DL    BUN/Creatinine ratio 21 (H) 12 - 20      GFR est AA >60 >60 ml/min/1.73m2    GFR est non-AA >60 >60 ml/min/1.73m2    Calcium 8.8 8.5 - 10.1 MG/DL   GLUCOSE, POC    Collection Time: 01/27/22  7:11 AM   Result Value Ref Range    Glucose (POC) 138 (H) 65 - 117 mg/dL    Performed by Marnell Hamman (Mickie)    GLUCOSE, POC    Collection Time: 01/27/22 11:17 AM   Result Value Ref Range    Glucose (POC) 147 (H) 65 - 117 mg/dL    Performed by Pearlean Card    GLUCOSE, POC    Collection Time: 01/27/22  4:27 PM   Result Value Ref Range    Glucose (POC) 112 65 - 117 mg/dL    Performed by Pearlean Card    GLUCOSE, POC    Collection Time: 01/27/22  9:49 PM   Result Value Ref Range    Glucose (POC) 147 (H) 65 - 117 mg/dL    Performed by Marnell Hamman (Mickie)      Recent Labs     01/27/22 0107 01/26/22  0212   WBC 11.3* 13.1*   HGB 12.6 11.7*   HCT 40.1 36.5*    195     Recent Labs     01/27/22 0107 01/26/22  0212 01/25/22  0341    143 142   K 4.2 4.1 4.2   * 111* 109*   CO2 28 28 27   BUN 22* 25* 20   CREA 1.05 1.21 1.14   * 148* 103*   CA 8.8 8.7 8.6   MG  --  2.2  --    PHOS  --  2.4*  --      No results for input(s): ALT, AP, TBIL, TBILI, TP, ALB, GLOB, GGT, AML, LPSE in the last 72 hours. No lab exists for component: SGOT, GPT, AMYP, HLPSE  No results for input(s): INR, PTP, APTT, INREXT, INREXT in the last 72 hours. No results for input(s): FE, TIBC, PSAT, FERR in the last 72 hours. No results found for: FOL, RBCF   Recent Labs     01/26/22  1701   PH 7.45   PCO2 43   PO2 65*     No results for input(s): CPK, CKNDX, TROIQ in the last 72 hours.     No lab exists for component: CPKMB  Lab Results   Component Value Date/Time    Cholesterol, total 110 07/29/2021 11:30 AM    HDL Cholesterol 31 07/29/2021 11:30 AM    LDL, calculated 25.2 07/29/2021 11:30 AM    Triglyceride 269 (H) 07/29/2021 11:30 AM    CHOL/HDL Ratio 3.5 07/29/2021 11:30 AM     Lab Results   Component Value Date/Time    Glucose (POC) 147 (H) 01/27/2022 09:49 PM    Glucose (POC) 112 01/27/2022 04:27 PM    Glucose (POC) 147 (H) 01/27/2022 11:17 AM    Glucose (POC) 138 (H) 01/27/2022 07:11 AM    Glucose (POC) 156 (H) 01/26/2022 09:53 PM     Lab Results   Component Value Date/Time    Color YELLOW/STRAW 01/26/2022 03:36 PM    Appearance CLEAR 01/26/2022 03:36 PM    Specific gravity 1.023 01/26/2022 03:36 PM    Specific gravity 1.016 10/28/2021 11:48 AM    pH (UA) 7.0 01/26/2022 03:36 PM    Protein TRACE (A) 01/26/2022 03:36 PM    Glucose Negative 01/26/2022 03:36 PM    Ketone 15 (A) 01/26/2022 03:36 PM    Bilirubin Negative 01/26/2022 03:36 PM    Urobilinogen 4.0 (H) 01/26/2022 03:36 PM    Nitrites Negative 01/26/2022 03:36 PM    Leukocyte Esterase Negative 01/26/2022 03:36 PM    Epithelial cells FEW 01/26/2022 03:36 PM    Bacteria Negative 01/26/2022 03:36 PM    WBC 0-4 01/26/2022 03:36 PM    RBC 10-20 01/26/2022 03:36 PM       Med list reviewed  Current Facility-Administered Medications   Medication Dose Route Frequency    hydrOXYzine HCL (ATARAX) tablet 10 mg  10 mg Oral TID    [Held by provider] QUEtiapine (SEROquel) tablet 25 mg  25 mg Oral QPM    levETIRAcetam (KEPPRA) tablet 1,000 mg  1,000 mg Oral Q12H    metoprolol tartrate (LOPRESSOR) tablet 12.5 mg  12.5 mg Oral Q12H    miconazole (MICOTIN) 2 % powder   Topical BID    ondansetron (ZOFRAN) injection 4 mg  4 mg IntraVENous Q6H PRN    naloxone (NARCAN) injection 0.4 mg  0.4 mg IntraVENous PRN    acetaminophen (TYLENOL) tablet 650 mg  650 mg Oral Q4H PRN    Or    acetaminophen (TYLENOL) solution 650 mg  650 mg Per NG tube Q4H PRN    Or    acetaminophen (TYLENOL) suppository 650 mg  650 mg Rectal Q4H PRN    lisinopriL (PRINIVIL, ZESTRIL) tablet 2.5 mg  2.5 mg Oral DAILY    busPIRone (BUSPAR) tablet 15 mg  15 mg Oral BID    PARoxetine (PAXIL) tablet 10 mg  10 mg Oral DAILY    amiodarone (CORDARONE) tablet 100 mg  100 mg Oral DAILY    docusate (COLACE) 50 mg/5 mL oral liquid 100 mg  100 mg Oral DAILY PRN    bisacodyL (DULCOLAX) suppository 10 mg  10 mg Rectal DAILY PRN       Care Plan discussed with:  Patient/Family, Nurse and     Carlo Laureano MD  Internal Medicine  Date of Service: 1/27/2022

## 2022-01-28 NOTE — PROGRESS NOTES
Neurosurgery Progress Note  Lina Castleman, [facility]          Admit Date: 2022   LOS: 8 days        Daily Progress Note: 2022    HPI: The patient was recently diagnosed with Parkinson's disease. He is very hard of hearing and wears a hearing aid in his right ear, which he broke when he fell. He is normally oriented at baseline per family. The patient has refused to use a walker for ambulation despite needing one. He is on a baby aspirin at home. Patient is Charlene Rafter witness. The patient went to the OR yesterday for craniotomy to evacuate his SDH. He required precedex overnight due to agitation and need to stay flat in bed. Pt is also very hard of hearing making it difficult to communicate. Pt can hear you if you speak slowly into his right ear. POD8 s/p left craniotomy for evacuation of SDH    Subjective: Therapy went to work with the patient today and had trouble waking him up. He was not following commands. They tried to sit him on the side of the bed and he was not able to hold himself up. Nurse evaluated patient after putting him back in the bed and he started talking more. I spoke with patient and he was able to answer my questions and follow commands. Objective:     Vital signs  Temp (24hrs), Av.1 °F (36.7 °C), Min:97.5 °F (36.4 °C), Max:98.5 °F (36.9 °C)   No intake/output data recorded. No intake/output data recorded.     Visit Vitals  /63 (BP 1 Location: Right arm, BP Patient Position: At rest)   Pulse 60   Temp 98.1 °F (36.7 °C)   Resp 21   Ht 5' 7\" (1.702 m)   Wt 84 kg (185 lb 3 oz)   SpO2 94%   BMI 29.00 kg/m²    O2 Flow Rate (L/min): 2 l/min O2 Device: None (Room air)     Pain control  Pain Assessment  Pain Scale 1: Numeric (0 - 10)  Pain Intensity 1: 0    PT/OT  Gait     Gait  Base of Support: Center of gravity altered,Narrowed  Step Length: Left shortened,Right shortened  Swing Pattern: Left asymmetrical,Right asymmetrical  Ambulation - Level of Assistance: Moderate assistance,Assist x2  Distance (ft): 3 Feet (ft) (side stepping )  Assistive Device: Gait belt           Physical Exam:  Gen:NAD. Neuro: Awake. Eyes open spontaneously. Difficult to communicate with because of his hearing. Knows his name, place, year. Quiet speech. Follows commands to give me thumbs up on both sides and wiggle toes BL. [place] spontaneously. Right hearing aid in place. Resting tremors in hands at baseline. Skin: Left incision C/D/I    CT head without contrast on 01/19/22 shows large left frontal subdural mixed density collection with significant mass effect, probably acute on chronic subdural hematoma. CT head without contrast on 01/21/22 shows improved subdural hematoma status post drain placement. 24 hour results:    Recent Results (from the past 24 hour(s))   GLUCOSE, POC    Collection Time: 01/27/22  4:27 PM   Result Value Ref Range    Glucose (POC) 112 65 - 117 mg/dL    Performed by Aquiles Chand    GLUCOSE, POC    Collection Time: 01/27/22  9:49 PM   Result Value Ref Range    Glucose (POC) 147 (H) 65 - 117 mg/dL    Performed by Luis Armando Rain)           Assessment:     Active Problems:    Subdural hematoma ([address] (1/20/2022)        Plan:   1. Traumatic subdural hematoma   - s/p crani 01/20   - on keppra   - neuro checks q4h   - ok to get up   - PT/OT/Speech   - D/C Ancef   - will check EEG to rule out seizure. Possible the episode was due to hypotension as well  2. Brain compression   - due to #1   - plans as above  3. Parkinson's disease   - recently diagnosed. Started taking meds for a few days but did not think they helped, so he stopped them on his own. 4. HTN   - SBP<140   - Cardene PRN   - hydralazine PRN   - cont amiodarone, lisinopril, metoprolol from home   - hospitalist following  5. Diabetes mellitus, type 2   - hold PO diabetic meds   - SSI and accu-checks    - hospitalist following  6. CAD   - hold ASA   - pacemaker  7.  Acute metabolic encephalopathy   - Multi-factorial   - supportive care   - precedex PRN   - restraints PRN  8. Dysphagia   - failed FEES. On pureed snacks. Family deciding about feeding tube vs comfort feeds. Activity: up with assist  DVT ppx: SCDs  Dispo: inpt rehab    Plan d/w Dr. Regan Elias, nurse, hospitalist, therapy team. Joyce Felix to go to rehab when accepted. May benefit from being closer to the nurse's station.  Will see over weekend upon request.      Kirsty Troncoso NP

## 2022-01-28 NOTE — PROGRESS NOTES
Problem: Self Care Deficits Care Plan (Adult)  Goal: *Acute Goals and Plan of Care (Insert Text)  Description: FUNCTIONAL STATUS PRIOR TO ADMISSION: Patient is extremely [place], unable to provide PLOF (hearing aid broken with GLF prior to admission). Per chart review, patient with hx of Parkinson's dx, requires assist for ADLs and IADLs, has a RW and SPC but refuses to use them, significant fall hx. HOME SUPPORT: The patient lived with his daughter and her spouse & child, per chart review, plans for all of them to move back to his one story house. Occupational Therapy Goals  Initiated 1/21/2022   1. Patient will perform self-feeding with minimal assistance/contact guard assist within 7 day(s). 2.  Patient will perform grooming at the sink with moderate assistance within 7 day(s). 3.  Patient will perform bathing with moderate assistance within 7 day(s). 4.  Patient will perform lower body dressing with moderate assistance within 7 day(s). 5.  Patient will perform upper body dressing with moderate assistance within 7 day(s). 6.  Patient will perform toilet transfers with minimal assistance/contact guard assist within 7 day(s). 7.  Patient will perform all aspects of toileting with moderate assistance  within 7 day(s). 8.  Patient will participate in upper extremity therapeutic exercise/activities with minimal assistance/contact guard assist within 7 day(s). 9.  Patient will complete the [id number] within 7 days. Outcome: Progressing Towards Goal    OCCUPATIONAL THERAPY TREATMENT  Patient: Anirudh Hoang (33 y.o. male)  Date: 1/28/2022  Diagnosis: Subarachnoid bleed ([facility] [address] [I60.9] <principal problem not specified>  Procedure(s) (LRB):  DARSHANA HOLES FOR LEFT FRONTAL SUBDURAL, with CRANIOTOMY/ REQ TF/ ER 23 (Left) 8 Days Post-Op  Precautions:    Chart, occupational therapy assessment, plan of care, and goals were reviewed.     ASSESSMENT  Patient continues with skilled OT services and is progressing towards goals. Pt cleared for therapy and received supine in bed sleeping. Attempted to wake pt verbally and with sternal rub. Pt only opened eyes to nail bed pressure this date and did not respond to therapist questions or commands. Attempted supine>sit with pt requiring Total Ax2. Pt with eyes open, however, did not demo righting reactions, sitting balance or command following. Pt returned to supine and RN and Neurosurgeon NP notified immediately. Pt A&Ox2 at end of session with RN and NP. Unable to complete weekly reassessment this session due to pt responsiveness. Pending medical stability, continue to recommend IPR to maximize pt outcomes. Current Level of Function Impacting Discharge (ADLs): Total Ax2 this session for bed mobility and ADLs     Other factors to consider for discharge: Pt lives with family          PLAN :  Patient continues to benefit from skilled intervention to address the above impairments. Continue treatment per established plan of care to address goals. Recommend with staff: pt participation in self-care as tolerated     Recommend next OT session: Complete [place]     Recommendation for discharge: (in order for the patient to meet his/her long term goals)  Therapy 3 hours per day 5-7 days per week    This discharge recommendation:  Has been made in collaboration with the attending provider and/or case management    IF patient discharges home will need the following DME: tbd       SUBJECTIVE:   Patient stated hospital to the question where are you? OBJECTIVE DATA SUMMARY:   Cognitive/Behavioral Status:  Neurologic State: Eyes open to pain  Orientation Level: Oriented to person;Oriented to place  Cognition: Decreased command following     Functional Mobility and Transfers for ADLs:  Bed Mobility:  Rolling: Total assistance  Supine to Sit: Total assistance;Assist x2  Sit to Supine:  Total assistance;Assist x2      Balance:  Sitting: Impaired  Sitting - Static: Poor (constant support)    ADL Intervention:    Lower Body Dressing Assistance  Dressing Assistance: Total assistance(dependent)  Socks: Total assistance (dependent)         Pain:  None reported    Activity Tolerance:   Poor    After treatment patient left in no apparent distress:   Supine in bed, Call bell within reach, wrist restraints on, and nursing present    COMMUNICATION/COLLABORATION:   The patients plan of care was discussed with: Physical therapist, Registered nurse, and Physician.      Bhargav Perez OT  Time Calculation: 23 mins

## 2022-01-29 PROCEDURE — 74011250637 HC RX REV CODE- 250/637: Performed by: NEUROLOGICAL SURGERY

## 2022-01-29 PROCEDURE — 74011250637 HC RX REV CODE- 250/637: Performed by: NURSE PRACTITIONER

## 2022-01-29 PROCEDURE — 94760 N-INVAS EAR/PLS OXIMETRY 1: CPT

## 2022-01-29 PROCEDURE — 65660000000 HC RM CCU STEPDOWN

## 2022-01-29 PROCEDURE — 74011250636 HC RX REV CODE- 250/636: Performed by: FAMILY MEDICINE

## 2022-01-29 PROCEDURE — 74011250637 HC RX REV CODE- 250/637: Performed by: INTERNAL MEDICINE

## 2022-01-29 PROCEDURE — 74011250637 HC RX REV CODE- 250/637: Performed by: FAMILY MEDICINE

## 2022-01-29 RX ORDER — HYDROXYZINE 50 MG/ML
25 INJECTION, SOLUTION INTRAMUSCULAR ONCE
Status: COMPLETED | OUTPATIENT
Start: 2022-01-29 | End: 2022-01-29

## 2022-01-29 RX ORDER — QUETIAPINE FUMARATE 25 MG/1
12.5 TABLET, FILM COATED ORAL
Status: DISCONTINUED | OUTPATIENT
Start: 2022-01-29 | End: 2022-02-01 | Stop reason: HOSPADM

## 2022-01-29 RX ADMIN — METOPROLOL TARTRATE 12.5 MG: 25 TABLET, FILM COATED ORAL at 20:39

## 2022-01-29 RX ADMIN — PAROXETINE HYDROCHLORIDE 10 MG: 20 TABLET, FILM COATED ORAL at 08:39

## 2022-01-29 RX ADMIN — LISINOPRIL 2.5 MG: 5 TABLET ORAL at 08:39

## 2022-01-29 RX ADMIN — LEVETIRACETAM 1000 MG: 500 TABLET, FILM COATED ORAL at 20:39

## 2022-01-29 RX ADMIN — HYDROXYZINE HYDROCHLORIDE 10 MG: 10 TABLET ORAL at 08:39

## 2022-01-29 RX ADMIN — METOPROLOL TARTRATE 12.5 MG: 25 TABLET, FILM COATED ORAL at 08:39

## 2022-01-29 RX ADMIN — AMIODARONE HYDROCHLORIDE 100 MG: 200 TABLET ORAL at 08:38

## 2022-01-29 RX ADMIN — MICONAZOLE NITRATE 2 % TOPICAL POWDER: at 10:29

## 2022-01-29 RX ADMIN — HYDROXYZINE HYDROCHLORIDE 10 MG: 10 TABLET ORAL at 16:57

## 2022-01-29 RX ADMIN — LEVETIRACETAM 1000 MG: 500 TABLET, FILM COATED ORAL at 08:39

## 2022-01-29 RX ADMIN — BUSPIRONE HYDROCHLORIDE 15 MG: 10 TABLET ORAL at 08:38

## 2022-01-29 RX ADMIN — MICONAZOLE NITRATE 2 % TOPICAL POWDER: at 20:46

## 2022-01-29 RX ADMIN — QUETIAPINE FUMARATE 12.5 MG: 25 TABLET ORAL at 20:45

## 2022-01-29 RX ADMIN — HYDROXYZINE HYDROCHLORIDE 25 MG: 50 INJECTION, SOLUTION INTRAMUSCULAR at 20:45

## 2022-01-29 RX ADMIN — BUSPIRONE HYDROCHLORIDE 15 MG: 10 TABLET ORAL at 19:13

## 2022-01-29 NOTE — PROGRESS NOTES
Hospitalist Progress Note      Hospital summary: 81 y.o man w/ DM, HTN, CVA, hyperlipidemia, Parkinson's disease, who was admitted for a traumatic subdural hematoma. He is s/p crani on 1/20. Assessment/Plan:  Traumatic SDH: s/p crani 1/20  -NSG following  -continue Keppra  -was on Precedexm then seroquel in the ICU- since discontinued    Dysphagia: likely due to Parkinsons. Aspiration with all liquid consistencies on FEES. I spoke to his daughter regarding options - feeding tube vs allow diet with transition to hospice (can eat while acknowledging risk however from a long term perspective hospice would be appropriate since he is declining Parkinson's treatment and chronic aspiration is not sustainable). HTN:   -keep SBP<140    Type 2 DM:  -SSI/POC checks  -hold PO meds    Parkinson's disease: reportedly declined meds as an outpatient. Per his daughter he was taking Sinement but felt like it wasn't helping except at night time for sleep, so he decided not to see his neurologist again, and she weaned him off the medication. CAD:   -hold aspirin    AMS: delirium due to acute illness/hospitalization- resolving  -stopped quetiapine -much improved today    Has a pacemaker for bradycardia. Unclear why he is on amiodarone. Code status: DNR  DVT prophylaxis: SCDs  Disposition: TBD  ----------------------------------------------    CC: f/u SDH    S: poor historian, not offering any complaints     Review of Systems:  Pertinent items are noted in HPI.     O:  Visit Vitals  BP (!) 158/88 (BP 1 Location: Right arm, BP Patient Position: At rest)   Pulse 60   Temp 99 °F (37.2 °C)   Resp 18   Ht 5' 7\" (1.702 m)   Wt 84 kg (185 lb 3 oz)   SpO2 95%   BMI 29.00 kg/m²     Patient Vitals for the past 24 hrs:   Temp Pulse Resp BP SpO2   01/29/22 1414 -- 60 -- -- --   01/29/22 1401 99 °F (37.2 °C) 60 18 (!) 158/88 95 %   01/29/22 1208 -- 60 -- -- --   01/29/22 1029 98.4 °F (36.9 °C) 60 18 113/64 96 %   01/29/22 1009 -- 60 -- -- --   01/29/22 0838 -- 60 -- (!) 132/94 --   01/29/22 0740 98.8 °F (37.1 °C) 60 18 138/75 95 %   01/29/22 0307 98.5 °F (36.9 °C) 60 18 (!) 153/75 93 %   01/28/22 2321 97.7 °F (36.5 °C) 60 12 (!) 142/62 98 %   01/28/22 2136 -- 60 -- (!) 147/76 --   01/28/22 1757 97.1 °F (36.2 °C) 60 23 (!) 144/72 94 %       PHYSICAL EXAM:  Gen: NAD, non-toxic  HEENT: anicteric sclerae, normal conjunctiva, s/p crani  Neck: supple, trachea midline, no adenopathy  Heart: RRR, no MRG, no JVD, no peripheral edema  Lungs: CTA b/l, non-labored respirations  Abd: soft, NT, ND, BS+  Extr: warm  Skin: dry, no rash  Neuro: CN II-XII grossly intact  Psych: not agitation, flat affect    No intake or output data in the 24 hours ending 01/29/22 1748     Recent labs & imaging reviewed:  No results found for this or any previous visit (from the past 24 hour(s)). Recent Labs     01/27/22  0107   WBC 11.3*   HGB 12.6   HCT 40.1        Recent Labs     01/27/22  0107      K 4.2   *   CO2 28   BUN 22*   CREA 1.05   *   CA 8.8     No results for input(s): ALT, AP, TBIL, TBILI, TP, ALB, GLOB, GGT, AML, LPSE in the last 72 hours. No lab exists for component: SGOT, GPT, AMYP, HLPSE  No results for input(s): INR, PTP, APTT, INREXT, INREXT in the last 72 hours. No results for input(s): FE, TIBC, PSAT, FERR in the last 72 hours. No results found for: FOL, RBCF   No results for input(s): PH, PCO2, PO2 in the last 72 hours. No results for input(s): CPK, CKNDX, TROIQ in the last 72 hours.     No lab exists for component: CPKMB  Lab Results   Component Value Date/Time    Cholesterol, total 110 07/29/2021 11:30 AM    HDL Cholesterol 31 07/29/2021 11:30 AM    LDL, calculated 25.2 07/29/2021 11:30 AM    Triglyceride 269 (H) 07/29/2021 11:30 AM    CHOL/HDL Ratio 3.5 07/29/2021 11:30 AM     Lab Results   Component Value Date/Time    Glucose (POC) 147 (H) 01/27/2022 09:49 PM    Glucose (POC) 112 01/27/2022 04:27 PM    Glucose (POC) 147 (H) 01/27/2022 11:17 AM    Glucose (POC) 138 (H) 01/27/2022 07:11 AM    Glucose (POC) 156 (H) 01/26/2022 09:53 PM     Lab Results   Component Value Date/Time    Color YELLOW/STRAW 01/26/2022 03:36 PM    Appearance CLEAR 01/26/2022 03:36 PM    Specific gravity 1.023 01/26/2022 03:36 PM    Specific gravity 1.016 10/28/2021 11:48 AM    pH (UA) 7.0 01/26/2022 03:36 PM    Protein TRACE (A) 01/26/2022 03:36 PM    Glucose Negative 01/26/2022 03:36 PM    Ketone 15 (A) 01/26/2022 03:36 PM    Bilirubin Negative 01/26/2022 03:36 PM    Urobilinogen 4.0 (H) 01/26/2022 03:36 PM    Nitrites Negative 01/26/2022 03:36 PM    Leukocyte Esterase Negative 01/26/2022 03:36 PM    Epithelial cells FEW 01/26/2022 03:36 PM    Bacteria Negative 01/26/2022 03:36 PM    WBC 0-4 01/26/2022 03:36 PM    RBC 10-20 01/26/2022 03:36 PM       Med list reviewed  Current Facility-Administered Medications   Medication Dose Route Frequency    hydrOXYzine HCL (ATARAX) tablet 10 mg  10 mg Oral TID    levETIRAcetam (KEPPRA) tablet 1,000 mg  1,000 mg Oral Q12H    metoprolol tartrate (LOPRESSOR) tablet 12.5 mg  12.5 mg Oral Q12H    miconazole (MICOTIN) 2 % powder   Topical BID    ondansetron (ZOFRAN) injection 4 mg  4 mg IntraVENous Q6H PRN    naloxone (NARCAN) injection 0.4 mg  0.4 mg IntraVENous PRN    acetaminophen (TYLENOL) tablet 650 mg  650 mg Oral Q4H PRN    Or    acetaminophen (TYLENOL) solution 650 mg  650 mg Per NG tube Q4H PRN    Or    acetaminophen (TYLENOL) suppository 650 mg  650 mg Rectal Q4H PRN    lisinopriL (PRINIVIL, ZESTRIL) tablet 2.5 mg  2.5 mg Oral DAILY    busPIRone (BUSPAR) tablet 15 mg  15 mg Oral BID    PARoxetine (PAXIL) tablet 10 mg  10 mg Oral DAILY    amiodarone (CORDARONE) tablet 100 mg  100 mg Oral DAILY    docusate (COLACE) 50 mg/5 mL oral liquid 100 mg  100 mg Oral DAILY PRN    bisacodyL (DULCOLAX) suppository 10 mg  10 mg Rectal DAILY PRN       Care Plan discussed with:  Patient/ Tiffany Flynn MD  Internal Medicine  Date of Service: 1/29/2022

## 2022-01-29 NOTE — ROUTINE PROCESS
Dakotatavo Claudinemarce become verbally and physically combative with me. He ripped all his leads off. Messaged Dr. Gurdeep Zamora to place an order for something to relax  Him.

## 2022-01-30 PROCEDURE — 95822 EEG COMA OR SLEEP ONLY: CPT | Performed by: PSYCHIATRY & NEUROLOGY

## 2022-01-30 PROCEDURE — 74011250637 HC RX REV CODE- 250/637: Performed by: INTERNAL MEDICINE

## 2022-01-30 PROCEDURE — 65660000000 HC RM CCU STEPDOWN

## 2022-01-30 PROCEDURE — 74011250637 HC RX REV CODE- 250/637: Performed by: NURSE PRACTITIONER

## 2022-01-30 PROCEDURE — 74011250637 HC RX REV CODE- 250/637: Performed by: FAMILY MEDICINE

## 2022-01-30 PROCEDURE — 74011250637 HC RX REV CODE- 250/637: Performed by: NEUROLOGICAL SURGERY

## 2022-01-30 RX ADMIN — BUSPIRONE HYDROCHLORIDE 15 MG: 10 TABLET ORAL at 18:52

## 2022-01-30 RX ADMIN — PAROXETINE HYDROCHLORIDE 10 MG: 20 TABLET, FILM COATED ORAL at 08:44

## 2022-01-30 RX ADMIN — QUETIAPINE FUMARATE 12.5 MG: 25 TABLET ORAL at 20:55

## 2022-01-30 RX ADMIN — LEVETIRACETAM 1000 MG: 500 TABLET, FILM COATED ORAL at 20:52

## 2022-01-30 RX ADMIN — METOPROLOL TARTRATE 12.5 MG: 25 TABLET, FILM COATED ORAL at 08:43

## 2022-01-30 RX ADMIN — LISINOPRIL 2.5 MG: 5 TABLET ORAL at 08:45

## 2022-01-30 RX ADMIN — AMIODARONE HYDROCHLORIDE 100 MG: 200 TABLET ORAL at 08:45

## 2022-01-30 RX ADMIN — METOPROLOL TARTRATE 12.5 MG: 25 TABLET, FILM COATED ORAL at 20:52

## 2022-01-30 RX ADMIN — MICONAZOLE NITRATE 2 % TOPICAL POWDER: at 09:00

## 2022-01-30 RX ADMIN — BUSPIRONE HYDROCHLORIDE 15 MG: 10 TABLET ORAL at 08:45

## 2022-01-30 RX ADMIN — MICONAZOLE NITRATE 2 % TOPICAL POWDER: at 20:55

## 2022-01-30 RX ADMIN — LEVETIRACETAM 1000 MG: 500 TABLET, FILM COATED ORAL at 08:44

## 2022-01-30 NOTE — PROCEDURES
PROCEDURE: ROUTINE INPATIENT EEG  NAME:   Yariel Armijo NUMBER : [de-identified]  MRN:   [id number]  DATE OF SERVICE: 1/28/2022    HISTORY/INDICATION: Patient is an 56-year-old male with left subdural hematoma status post craniotomy with a spell of unresponsiveness. EEG is performed to evaluate for evidence of underlying seizure activity.     MEDICATIONS:   Current Facility-Administered Medications   Medication Dose Route Frequency Provider Last Rate Last Admin    QUEtiapine (SEROquel) tablet 12.5 mg  12.5 mg Oral DAILY WITH Jay Sr MD   12.5 mg at 01/29/22 2045    [Held by provider] hydrOXYzine HCL (ATARAX) tablet 10 mg  10 mg Oral TID Meño Alexander MD   10 mg at 01/29/22 1657    levETIRAcetam (KEPPRA) tablet 1,000 mg  1,000 mg Oral Q12H Mackenzie Shaikh NP   1,000 mg at 01/30/22 0844    metoprolol tartrate (LOPRESSOR) tablet 12.5 mg  12.5 mg Oral Q12H Quan Jacobson MD   12.5 mg at 01/30/22 0843    miconazole (MICOTIN) 2 % powder   Topical BID Derrek Perez MD   Given at 01/29/22 2046    ondansetron (ZOFRAN) injection 4 mg  4 mg IntraVENous Q6H PRN Jessica Parish MD        naloxone [facility]) injection 0.4 mg  0.4 mg IntraVENous PRN Jessica Parish MD        acetaminophen (TYLENOL) tablet 650 mg  650 mg Oral Q4H PRN Jessica Parish MD        Or   [facility] acetaminophen (TYLENOL) solution 650 mg  650 mg Per NG tube Q4H PRN Jessica Parish MD        Or   [facility] acetaminophen (TYLENOL) suppository 650 mg  650 mg Rectal Q4H PRN Jessica Parish MD        lisinopriL (PRINIVIL, ZESTRIL) tablet 2.5 mg  2.5 mg Oral DAILY Jessica Parish MD   2.5 mg at 01/30/22 0845    busPIRone (BUSPAR) tablet 15 mg  15 mg Oral BID Jessica Parish MD   15 mg at 01/30/22 0845    PARoxetine (PAXIL) tablet 10 mg  10 mg Oral DAILY Jessica Parish MD   10 mg at 01/30/22 3004    amiodarone (CORDARONE) tablet 100 mg  100 mg Oral DAILY Jessica Parish MD   100 mg at 01/30/22 1808    docusate (COLACE) 50 mg/5 mL oral liquid 100 mg  100 mg Oral DAILY PRN [place] Brayan, NP-C        bisacodyL (DULCOLAX) suppository 10 mg  10 mg Rectal DAILY PRN Loretta Brayan, NP-C           CONDITIONS OF RECORDING: This is a routine 21-channel EEG recording performed in accordance with the international 10-20 system with one channel devoted to limited EKG. This study was done during a poorly responsive state. No activating procedures were performed. DESCRIPTION:   As the record opens, there is diffuse generalized very low amplitude 2-4 Hz activity seen across all head regions. With stimulation the patient briefly generates a posterior dominant rhythm of 5 Hz with an amplitude of 20 µV that is symmetric in the bilateral posterior derivations, but is poorly sustained. No normal sleep architecture is seen. There are no focal abnormalities, epileptiform discharges, or electrographic seizures seen. INTERPRETATION: Abnormal EEG due to diffuse generalized delta slowing of the background    CLINICAL CORRELATION: Finding is consistent with a severe encephalopathy which can have many etiologies including toxic, metabolic, and medication effect. Clinical correlation is advised.     Lee Marsh MD

## 2022-01-30 NOTE — ROUTINE PROCESS
Bedside and Verbal shift change report given to Hero Llamas RN (oncoming nurse) by Mikie Carmona (offgoing nurse). Report included the following information SBAR, Kardex, Intake/Output, MAR, Recent Results, Cardiac Rhythm Atrial paced, Alarm Parameters , Quality Measures and Dual Neuro Assessment.

## 2022-01-31 LAB
ALBUMIN SERPL-MCNC: 2.8 G/DL (ref 3.5–5)
ALBUMIN/GLOB SERPL: 0.9 {RATIO} (ref 1.1–2.2)
ALP SERPL-CCNC: 94 U/L (ref 45–117)
ALT SERPL-CCNC: 18 U/L (ref 12–78)
ANION GAP SERPL CALC-SCNC: 2 MMOL/L (ref 5–15)
AST SERPL-CCNC: 26 U/L (ref 15–37)
BILIRUB SERPL-MCNC: 0.8 MG/DL (ref 0.2–1)
BUN SERPL-MCNC: 22 MG/DL (ref 6–20)
BUN/CREAT SERPL: 19 (ref 12–20)
CALCIUM SERPL-MCNC: 8.3 MG/DL (ref 8.5–10.1)
CHLORIDE SERPL-SCNC: 114 MMOL/L (ref 97–108)
CO2 SERPL-SCNC: 27 MMOL/L (ref 21–32)
CREAT SERPL-MCNC: 1.15 MG/DL (ref 0.7–1.3)
ERYTHROCYTE [DISTWIDTH] IN BLOOD BY AUTOMATED COUNT: 13.1 % (ref 11.5–14.5)
GLOBULIN SER CALC-MCNC: 3 G/DL (ref 2–4)
GLUCOSE SERPL-MCNC: 130 MG/DL (ref 65–100)
HCT VFR BLD AUTO: 36.5 % (ref 36.6–50.3)
HGB BLD-MCNC: 11.4 G/DL (ref 12.1–17)
MAGNESIUM SERPL-MCNC: 2.2 MG/DL (ref 1.6–2.4)
MCH RBC QN AUTO: 31.1 PG (ref 26–34)
MCHC RBC AUTO-ENTMCNC: 31.2 G/DL (ref 30–36.5)
MCV RBC AUTO: 99.7 FL (ref 80–99)
NRBC # BLD: 0 K/UL (ref 0–0.01)
NRBC BLD-RTO: 0 PER 100 WBC
PLATELET # BLD AUTO: 207 K/UL (ref 150–400)
PMV BLD AUTO: 9.8 FL (ref 8.9–12.9)
POTASSIUM SERPL-SCNC: 4.3 MMOL/L (ref 3.5–5.1)
PROT SERPL-MCNC: 5.8 G/DL (ref 6.4–8.2)
RBC # BLD AUTO: 3.66 M/UL (ref 4.1–5.7)
SODIUM SERPL-SCNC: 143 MMOL/L (ref 136–145)
WBC # BLD AUTO: 12.8 K/UL (ref 4.1–11.1)

## 2022-01-31 PROCEDURE — 74011250637 HC RX REV CODE- 250/637: Performed by: NURSE PRACTITIONER

## 2022-01-31 PROCEDURE — 83735 ASSAY OF MAGNESIUM: CPT

## 2022-01-31 PROCEDURE — 97530 THERAPEUTIC ACTIVITIES: CPT

## 2022-01-31 PROCEDURE — 74011250637 HC RX REV CODE- 250/637: Performed by: NEUROLOGICAL SURGERY

## 2022-01-31 PROCEDURE — 85027 COMPLETE CBC AUTOMATED: CPT

## 2022-01-31 PROCEDURE — 74011250637 HC RX REV CODE- 250/637: Performed by: INTERNAL MEDICINE

## 2022-01-31 PROCEDURE — 36415 COLL VENOUS BLD VENIPUNCTURE: CPT

## 2022-01-31 PROCEDURE — 97112 NEUROMUSCULAR REEDUCATION: CPT

## 2022-01-31 PROCEDURE — 65660000000 HC RM CCU STEPDOWN

## 2022-01-31 PROCEDURE — 80053 COMPREHEN METABOLIC PANEL: CPT

## 2022-01-31 RX ADMIN — BUSPIRONE HYDROCHLORIDE 15 MG: 10 TABLET ORAL at 18:07

## 2022-01-31 RX ADMIN — LISINOPRIL 2.5 MG: 5 TABLET ORAL at 08:13

## 2022-01-31 RX ADMIN — MICONAZOLE NITRATE 2 % TOPICAL POWDER: at 08:18

## 2022-01-31 RX ADMIN — MICONAZOLE NITRATE 2 % TOPICAL POWDER: at 19:52

## 2022-01-31 RX ADMIN — BUSPIRONE HYDROCHLORIDE 15 MG: 10 TABLET ORAL at 08:11

## 2022-01-31 RX ADMIN — AMIODARONE HYDROCHLORIDE 100 MG: 200 TABLET ORAL at 08:15

## 2022-01-31 RX ADMIN — LEVETIRACETAM 1000 MG: 500 TABLET, FILM COATED ORAL at 20:48

## 2022-01-31 RX ADMIN — METOPROLOL TARTRATE 12.5 MG: 25 TABLET, FILM COATED ORAL at 08:15

## 2022-01-31 RX ADMIN — LEVETIRACETAM 1000 MG: 500 TABLET, FILM COATED ORAL at 08:12

## 2022-01-31 RX ADMIN — PAROXETINE HYDROCHLORIDE 10 MG: 20 TABLET, FILM COATED ORAL at 08:15

## 2022-01-31 NOTE — PROGRESS NOTES
Hospitalist Progress Note      Hospital summary: 81 y.o man w/ DM, HTN, CVA, hyperlipidemia, Parkinson's disease, who was admitted for a traumatic subdural hematoma. He is s/p crani on 1/20. Assessment/Plan:  Traumatic SDH: s/p crani 1/20  -NSG following  -continue Keppra  -was on Precedexm then seroquel in the ICU- since discontinued    Dysphagia: likely due to Parkinsons. Aspiration with all liquid consistencies on FEES. I spoke to his daughter regarding options - feeding tube vs allow diet with transition to hospice (can eat while acknowledging risk however from a long term perspective hospice would be appropriate since he is declining Parkinson's treatment and chronic aspiration is not sustainable). HTN:   -keep SBP<140    Type 2 DM:  -SSI/POC checks  -hold PO meds    Parkinson's disease: reportedly declined meds as an outpatient. Per his daughter he was taking Sinement but felt like it wasn't helping except at night time for sleep, so he decided not to see his neurologist again, and she weaned him off the medication. CAD:   -hold aspirin    AMS: delirium due to acute illness/hospitalization- resolving  -stopped quetiapine -much improved today    Has a pacemaker for bradycardia. Unclear why he is on amiodarone. Code status: DNR  DVT prophylaxis: SCDs  Disposition: TBD  ----------------------------------------------    CC: f/u SDH    S: poor historian, not offering any complaints     Review of Systems:  Pertinent items are noted in HPI.     O:  Visit Vitals  /75 (BP 1 Location: Right upper arm, BP Patient Position: At rest)   Pulse 60   Temp 98 °F (36.7 °C)   Resp 18   Ht 5' 7\" (1.702 m)   Wt 84 kg (185 lb 3 oz)   SpO2 96%   BMI 29.00 kg/m²     Patient Vitals for the past 24 hrs:   Temp Pulse Resp BP SpO2   01/30/22 1800 98 °F (36.7 °C) 60 18 127/75 96 %   01/30/22 1400 -- 60 -- -- --   01/30/22 1345 99.1 °F (37.3 °C) 60 14 (!) 151/75 95 %   01/30/22 1200 -- 60 -- -- --   01/30/22 1000 99.3 °F (37.4 °C) 60 20 (!) 144/78 96 %   01/30/22 0843 -- 60 -- 135/65 --   01/30/22 0200 99.1 °F (37.3 °C) 62 20 121/68 95 %   01/29/22 2200 98.9 °F (37.2 °C) 65 20 121/61 96 %   01/29/22 2039 -- 63 -- 135/83 --       PHYSICAL EXAM:  Gen: NAD, non-toxic  HEENT: anicteric sclerae, normal conjunctiva, s/p crani  Neck: supple, trachea midline, no adenopathy  Heart: RRR, no MRG, no JVD, no peripheral edema  Lungs: CTA b/l, non-labored respirations  Abd: soft, NT, ND, BS+  Extr: warm  Skin: dry, no rash  Neuro: CN II-XII grossly intact  Psych: not agitation, flat affect    No intake or output data in the 24 hours ending 01/30/22 2024     Recent labs & imaging reviewed:  No results found for this or any previous visit (from the past 24 hour(s)). No results for input(s): WBC, HGB, HCT, PLT, HGBEXT, HCTEXT, PLTEXT, HGBEXT, HCTEXT, PLTEXT in the last 72 hours. No results for input(s): NA, K, CL, CO2, BUN, CREA, GLU, CA, MG, PHOS, URICA in the last 72 hours. No results for input(s): ALT, AP, TBIL, TBILI, TP, ALB, GLOB, GGT, AML, LPSE in the last 72 hours. No lab exists for component: SGOT, GPT, AMYP, HLPSE  No results for input(s): INR, PTP, APTT, INREXT, INREXT in the last 72 hours. No results for input(s): FE, TIBC, PSAT, FERR in the last 72 hours. No results found for: FOL, RBCF   No results for input(s): PH, PCO2, PO2 in the last 72 hours. No results for input(s): CPK, CKNDX, TROIQ in the last 72 hours.     No lab exists for component: CPKMB  Lab Results   Component Value Date/Time    Cholesterol, total 110 07/29/2021 11:30 AM    HDL Cholesterol 31 07/29/2021 11:30 AM    LDL, calculated 25.2 07/29/2021 11:30 AM    Triglyceride 269 (H) 07/29/2021 11:30 AM    CHOL/HDL Ratio 3.5 07/29/2021 11:30 AM     Lab Results   Component Value Date/Time    Glucose (POC) 147 (H) 01/27/2022 09:49 PM    Glucose (POC) 112 01/27/2022 04:27 PM    Glucose (POC) 147 (H) 01/27/2022 11:17 AM    Glucose (POC) 138 (H) 01/27/2022 07:11 AM    Glucose (POC) 156 (H) 01/26/2022 09:53 PM     Lab Results   Component Value Date/Time    Color YELLOW/STRAW 01/26/2022 03:36 PM    Appearance CLEAR 01/26/2022 03:36 PM    Specific gravity 1.023 01/26/2022 03:36 PM    Specific gravity 1.016 10/28/2021 11:48 AM    pH (UA) 7.0 01/26/2022 03:36 PM    Protein TRACE (A) 01/26/2022 03:36 PM    Glucose Negative 01/26/2022 03:36 PM    Ketone 15 (A) 01/26/2022 03:36 PM    Bilirubin Negative 01/26/2022 03:36 PM    Urobilinogen 4.0 (H) 01/26/2022 03:36 PM    Nitrites Negative 01/26/2022 03:36 PM    Leukocyte Esterase Negative 01/26/2022 03:36 PM    Epithelial cells FEW 01/26/2022 03:36 PM    Bacteria Negative 01/26/2022 03:36 PM    WBC 0-4 01/26/2022 03:36 PM    RBC 10-20 01/26/2022 03:36 PM       Med list reviewed  Current Facility-Administered Medications   Medication Dose Route Frequency    QUEtiapine (SEROquel) tablet 12.5 mg  12.5 mg Oral DAILY WITH DINNER    [Held by provider] hydrOXYzine HCL (ATARAX) tablet 10 mg  10 mg Oral TID    levETIRAcetam (KEPPRA) tablet 1,000 mg  1,000 mg Oral Q12H    metoprolol tartrate (LOPRESSOR) tablet 12.5 mg  12.5 mg Oral Q12H    miconazole (MICOTIN) 2 % powder   Topical BID    ondansetron (ZOFRAN) injection 4 mg  4 mg IntraVENous Q6H PRN    naloxone (NARCAN) injection 0.4 mg  0.4 mg IntraVENous PRN    acetaminophen (TYLENOL) tablet 650 mg  650 mg Oral Q4H PRN    Or    acetaminophen (TYLENOL) solution 650 mg  650 mg Per NG tube Q4H PRN    Or    acetaminophen (TYLENOL) suppository 650 mg  650 mg Rectal Q4H PRN    lisinopriL (PRINIVIL, ZESTRIL) tablet 2.5 mg  2.5 mg Oral DAILY    busPIRone (BUSPAR) tablet 15 mg  15 mg Oral BID    PARoxetine (PAXIL) tablet 10 mg  10 mg Oral DAILY    amiodarone (CORDARONE) tablet 100 mg  100 mg Oral DAILY    docusate (COLACE) 50 mg/5 mL oral liquid 100 mg  100 mg Oral DAILY PRN    bisacodyL (DULCOLAX) suppository 10 mg  10 mg Rectal DAILY PRN       Care Plan discussed with:  Rajat Flores MD  Internal Medicine  Date of Service: 1/30/2022

## 2022-01-31 NOTE — PROGRESS NOTES
Neurosurgery    Staples removed. Drain suture removed. Main incision sutures are dissolvable and will break down on their own in 2-3 weeks. Ok to go to rehab when accepted. F/U with Dr. Camden Resendiz after discharge from rehab. Cont Keppra at discharge.     Oumou Valero, NP

## 2022-01-31 NOTE — PROGRESS NOTES
Physician Progress Note      PATIENT:               Tadeo Freitas  CSN #:                  [id number]  :                       1935  ADMIT DATE:       2022 1:39 AM  DISCH DATE:  RESPONDING  PROVIDER #:        Leoncio Welch MD          QUERY TEXT:    Patient admitted with traumatic subdural hemorrhage following a fall. Length of LOC, if there was any, did not appear to be documented. If possible, please document whether the patient lost consciousness and, if so, for how long: The medical record reflects the following:    Risk Factors: Fall, SDH    Clinical Indicators:    -- Hospitalist, ICU and Neurosurgery notes documented that the pt had a GLF, hit his head, has exhibited AMS/metabolic encephalopathy/delirium and had a subdural hemorrhage and is now s/p craniotomy    -- CT head on  = Large left frontal subdural mixed density collection with significant mass effect, probably acute on chronic subdural hematoma. Treatment: Neurosurgery consult, craniotomy, ICU admission & monitoring, supportive care    Thank-you,  Kamari Flores RN, [facility]  Clinical   [phone]  [email or web address]  Options provided:  -- Traumatic subdural hemorrhage without LOC  -- Traumatic subdural hemorrhage, unknown whether or not patient lost consciousness  -- Traumatic subdural hemorrhage with LOC of unknown duration  -- Traumatic subdural hemorrhage with LOC lasting, please specify duration, Please specify duration. -- Other - I will add my own diagnosis  -- Disagree - Not applicable / Not valid  -- Disagree - Clinically unable to determine / Unknown  -- Refer to Clinical Documentation Reviewer    PROVIDER RESPONSE TEXT:    This patient had a traumatic subdural hemorrhage, unknown whether or not patient lost consciousness.     Query created by: Gladys Bowens on 2022 12:44 PM      QUERY TEXT:    Patient with Parkinson's had subdural hematoma following a fall and was noted to have altered mental status and confusion. Pt has delirium due to acute illness/hospitalization documented by the Hospitalist and acute metabolic encephalopathy documented by Neurosurgery.  If possible, please document:    The medical record reflects the following:    Risk Factors: SDH, fall, Parkinson's, hospitalization    Clinical Indicators:    -- Hospitalist noted AMS: delirium due to acute illness/hospitalization - ABG/UA ok & Parkinson's disease: pt reportedly declined meds as an outpatient and decided not to see his neurologist again    -- Neurosurgery noted multifactorial acute encephalopathy as well as agitation requiring Geodon and Precedex and PRN restraints    Treatment: Neurosurgery consult, Geodon & Precedex, quetiapine, supportive care, monitoring    Thank-you,  Estrella Cox RN, [facility]  Clinical   [phone]  [email or web address]  Options provided:  -- Acute delirium confirmed and metabolic encephalopathy ruled out  -- Metabolic encephalopathy confirmed and acute delirium ruled out  -- Other - I will add my own diagnosis  -- Disagree - Not applicable / Not valid  -- Disagree - Clinically unable to determine / Unknown  -- Refer to Clinical Documentation Reviewer    PROVIDER RESPONSE TEXT:    Patient with acute delirium and medication induced encephalopathy    Query created by: Yaquelin Silva on 1/28/2022 12:13 PM      Electronically signed by:  Tuan Arango MD 1/31/2022 7:27 AM

## 2022-01-31 NOTE — PROGRESS NOTES
Transition of Care Plan   RUR- Low  14%   DISPOSITION: IPR - [facility] - today   F/U with PCP/Specialist     Transport: NIKO DUMONT spoke with Rj Gamble at [facility] regarding patient's insurance auth, he has been approved for [employer]. Awaiting bed confirmation. NIKO requested for 2pm.    9:04am: JULIA notified that patient was in restraints until 12am 1/31 - will need to postpone discharge until tomorrow. [facility] pushed to 2/1. Yaneli Vazquez M.S.W.

## 2022-01-31 NOTE — ROUTINE PROCESS
Patient has been out of restraints since midnight 01/31. He has been feeding himself. Right hearing aid needs new battery. Patient pulled it out of his ear. It is in a pink box with a patient label on it on his tray table.

## 2022-01-31 NOTE — ROUTINE PROCESS
Bedside and Verbal shift change report given to Maddison Diana RN (oncoming nurse) by MARY Heller (offgoing nurse). Report included the following information SBAR, Kardex, Intake/Output, MAR, Recent Results, Cardiac Rhythm Sinus Rhythm, Alarm Parameters , Quality Measures and Dual Neuro Assessment.

## 2022-01-31 NOTE — PROGRESS NOTES
Problem: Self Care Deficits Care Plan (Adult)  Goal: *Acute Goals and Plan of Care (Insert Text)  Description: FUNCTIONAL STATUS PRIOR TO ADMISSION: Patient is extremely [place], unable to provide PLOF (hearing aid broken with GLF prior to admission). Per chart review, patient with hx of Parkinson's dx, requires assist for ADLs and IADLs, has a RW and SPC but refuses to use them, significant fall hx. HOME SUPPORT: The patient lived with his daughter and her spouse & child, per chart review, plans for all of them to move back to his one story house. Occupational Therapy Goals  Initiated 1/21/2022. Weekly Reassessment 1/31/2022  1. Patient will perform self-feeding with minimal assistance/contact guard assist within 7 day(s). GOAL MET   2. Patient will perform grooming at the sink with moderate assistance within 7 day(s). CONTINUE  3. Patient will perform bathing with moderate assistance within 7 day(s). CONTINUE  4.  Patient will perform lower body dressing with moderate assistance within 7 day(s). CONTINUE  5. Patient will perform upper body dressing with moderate assistance within 7 day(s). CONTINUE  6. Patient will perform toilet transfers with minimal assistance/contact guard assist within 7 day(s). CONTINUE  7. Patient will perform all aspects of toileting with moderate assistance  within 7 day(s). CONTINUE  8. Patient will participate in upper extremity therapeutic exercise/activities with minimal assistance/contact guard assist within 7 day(s). CONTINUE  9. Patient will complete the [facility] within 7 days.  GOAL MET         Outcome: Progressing Towards Goal     OCCUPATIONAL THERAPY TREATMENT/WEEKLY RE-ASSESSMENT  Patient: Stephanie Sung (89 y.o. male)  Date: 1/31/2022  Diagnosis: Subarachnoid bleed ([facility] [address] [I60.9] <principal problem not specified>  Procedure(s) (LRB):  DARSHANA HOLES FOR LEFT FRONTAL SUBDURAL, with CRANIOTOMY/ REQ TF/ ER 23 (Left) 11 Days Post-Op  Precautions:    Chart, occupational therapy assessment, plan of care, and goals were reviewed. ASSESSMENT  Patient continues with skilled OT services and is slowly progressing towards goals, however remains limited by cognitive deficits, impaired balance, generalized weakness and inattention to the R. Pt cleared for therapy and received supine in bed asleep. Pt opened eyes to therapist's voice and oriented to self, disoriented to time, place and situation. Pt completed bed mobility to sit EOB with Min A. At EOB pt with L lateral flexion requiring manual cues to correct to midline seated position. Pt participated in Fugl-Mane and demo'd mild impairments in UE ROM and coordination with hx of Parkinson's Disease. Pt unaware lunch was in room and required cues to attend to R to locate lunch tray. At end of session pt in bed (modified chair position) to eat lunch. Demo'd ability to feed self with SBA. Continue to recommend IPR at discharge to increase safety and independence in ADLs. Current Level of Function Impacting Discharge (ADLs): CGA-Min A for functional mobility, Mod A for bathing/toileting/dressing     Other factors to consider for discharge: Lives with family          PLAN :  Goals have been updated based on progression since last assessment. Patient continues to benefit from skilled intervention to address the above impairments. Continue to follow patient 5 times a week to address goals. Recommend with staff: Pt participation in self-care, OOB for [facility] with Mod Ax1-2    Recommend next OT session: Transfer to chair (need recliner in room)     Recommendation for discharge: (in order for the patient to meet his/her long term goals)  Therapy 3 hours per day 5-7 days per week    This discharge recommendation:  Has been made in collaboration with the attending provider and/or case management    IF patient discharges home will need the following DME: TBD at d/c       SUBJECTIVE:   Patient stated i'm outside.     OBJECTIVE DATA SUMMARY:   Cognitive/Behavioral Status:  Neurologic State: Alert;Confused; Eyes open to voice  Orientation Level: Oriented to person  Cognition: Follows commands; Impaired decision making  Perception: Cues to attend to right side of body  Perseveration: No perseveration noted  Safety/Judgement: Decreased awareness of environment;Decreased awareness of need for safety;Decreased awareness of need for assistance;Decreased insight into deficits    Functional Mobility and Transfers for ADLs:  Bed Mobility:  Rolling: Contact guard assistance  Supine to Sit: Minimum assistance  Sit to Supine: Contact guard assistance    Transfers:  Sit to Stand: Minimum assistance          Balance:  Sitting: Impaired  Sitting - Static: Good (unsupported)  Sitting - Dynamic: Fair (occasional)  Standing: Impaired; With support  Standing - Static: Fair;Constant support  Standing - Dynamic : Fair;Constant support    ADL Intervention:  Feeding  Feeding Assistance: Set-up  Utensil Management: Set-up  Food to Mouth:  Independent       Cognitive Retraining  Orientation Retraining: Awareness of environment;Place;Situation;Time  Safety/Judgement: Decreased awareness of environment;Decreased awareness of need for safety;Decreased awareness of need for assistance;Decreased insight into deficits    Fugl-Mane Assessment of Motor Recovery after Stroke:     Reflex Activity  Flexors/Biceps/Fingers: Can be elicited  Extensors/Triceps: Can be elicited  Reflex Subtotal: 4    Volitional Movement Within Synergies  Shoulder Retraction: Partial  Shoulder Elevation: Partial  Shoulder Abduction (90 degrees): Full  Shoulder External Rotation: Full  Elbow Flexion: Full  Forearm Supination: Partial  Shoulder Adduction/Internal Rotation: Full  Elbow Extension: Full  Forearm Pronation: Partial  Subtotal: 14    Volitional Movement Mixing Synergies  Hand to Lumbar Spine: Full  Shoulder Flexion (0-90 degrees): Full  Pronation-Supination: Partial  Subtotal: 5    Volitional Movement With Little or No Synergy  Shoulder Abduction (0-90 degrees): Full  Shoulder Flexion ( degrees): Partial  Pronation/Supination: Partial  Subtotal : 4    Normal Reflex Activity  Biceps, Triceps, Finger Flexors: Full  Subtotal : 2    Upper Extremity Total   Upper Extremity Total: 29    Wrist  Stability at 15 Degree Dorsiflexion: Full  Repeated Dorsiflexion/ Volar Flexion: Full  Stability at 15 Degree Dorsiflexion: Full  Repeated Dorsiflexion/ Volar Flexion: Full  Circumduction: Full  Wrist Total: 10    Hand  Mass Flexion: Full  Mass Extension: Full  Grasp A: Full  Grasp B: Full  Grasp C: Full  Grasp D: Full  Grasp E: Full  Hand Total: 14    Coordination/Speed  Tremor: Slight  Dysmetria: Slight  Time: <1s  Coordination/Speed Total : 4    Total A-D  Total A-D (Motor Function): 57/66         This is a reliable/valid measure of arm function after a neurological event. It has established value to characterize functional status and for measuring spontaneous and therapy-induced recovery; tests proximal and distal motor functions. Fugl-Mane Assessment - UE scores recorded between five and 30 days post neurologic event can be used to predict UE recovery at six months post neurologic event. Severe = 0-21 points   Moderately Severe = 22-33 points   Moderate = 34-47 points   Mild = 48-66 points  SUSAN Pack, ZAKIYA Ace, & NILESH Latif (1992). Measurement of motor recovery after stroke: Outcome assessment and sample size requirements.  Stroke, 23, pp. 7589-3272.   --------------------------------------------------------------------------------------------------------------------------------------------------------------------  MCID:  Stroke:   Parish Fret et al, 2001; n = 171; mean age 79 (6) years; assessed within 16 (12) days of stroke, Acute Stroke)  FMA Motor Scores from Admission to Discharge    10 point increase in FMA Upper Extremity = 1.5 change in discharge FIM    10 point increase in FMA Lower Extremity = 1.9 change in discharge FIM  MDC:   Stroke:   Jason Spencer et al, 2008, n = 14, mean age = 59.9 (14.6) years, assessed on average 14 (6.5) months post stroke, Chronic Stroke)    FMA = 5.2 points for the Upper Extremity portion of the assessment         Pain:  None reported    Activity Tolerance:   Fair    After treatment patient left in no apparent distress:   Supine in bed, Call bell within reach, and Bed / chair alarm activated    COMMUNICATION/COLLABORATION:   The patients plan of care was discussed with: Physical therapist and Registered nurse.      Azael Kendall OT  Time Calculation: 25 mins

## 2022-02-01 VITALS
HEIGHT: 67 IN | BODY MASS INDEX: 28.37 KG/M2 | OXYGEN SATURATION: 96 % | SYSTOLIC BLOOD PRESSURE: 127 MMHG | HEART RATE: 77 BPM | DIASTOLIC BLOOD PRESSURE: 62 MMHG | TEMPERATURE: 98.4 F | WEIGHT: 180.78 LBS | RESPIRATION RATE: 20 BRPM

## 2022-02-01 PROCEDURE — 74011250637 HC RX REV CODE- 250/637: Performed by: INTERNAL MEDICINE

## 2022-02-01 PROCEDURE — 74011250637 HC RX REV CODE- 250/637: Performed by: NEUROLOGICAL SURGERY

## 2022-02-01 PROCEDURE — 74011250637 HC RX REV CODE- 250/637: Performed by: NURSE PRACTITIONER

## 2022-02-01 RX ORDER — LEVETIRACETAM 1000 MG/1
1000 TABLET ORAL EVERY 12 HOURS
Qty: 60 TABLET | Refills: 3 | Status: SHIPPED | OUTPATIENT
Start: 2022-02-01 | End: 2022-06-14

## 2022-02-01 RX ORDER — ACETAMINOPHEN 325 MG/1
650 TABLET ORAL
Qty: 30 TABLET | Refills: 0 | Status: SHIPPED
Start: 2022-02-01 | End: 2022-09-04

## 2022-02-01 RX ADMIN — METOPROLOL TARTRATE 12.5 MG: 25 TABLET, FILM COATED ORAL at 08:31

## 2022-02-01 RX ADMIN — PAROXETINE HYDROCHLORIDE 10 MG: 20 TABLET, FILM COATED ORAL at 08:30

## 2022-02-01 RX ADMIN — MICONAZOLE NITRATE 2 % TOPICAL POWDER: at 08:18

## 2022-02-01 RX ADMIN — LEVETIRACETAM 1000 MG: 500 TABLET, FILM COATED ORAL at 08:31

## 2022-02-01 RX ADMIN — LISINOPRIL 2.5 MG: 5 TABLET ORAL at 08:31

## 2022-02-01 RX ADMIN — BUSPIRONE HYDROCHLORIDE 15 MG: 10 TABLET ORAL at 08:30

## 2022-02-01 RX ADMIN — AMIODARONE HYDROCHLORIDE 100 MG: 200 TABLET ORAL at 08:31

## 2022-02-01 NOTE — DISCHARGE INSTRUCTIONS
Dysphagia- from baseline neurological issues- aspiration precautions  We decided on a pureed diet with thin liquids despite suspected mild aspiration   Patient seen by Speech and deemed at high risk for aspiration    Hearing loss- patient currently dose not have a working hearing aid and can benefit from having commands written so that he can read them    You were admitted with a  LEFT SUBDURAL HEMATOMA  And treatment included a CRANIOTOMY FOR LEFT FRONTAL SUBDURAL HEMATOMA 1/20/22    After [address]:  Discharge Instructions Craniot[facility]  [facility]    Patient Name: Erick Childress    Date of procedure: 1/20/2022  Date of discharge: 1/28/2022    Procedure: Procedure(s):  DARSHANA HOLES FOR LEFT FRONTAL SUBDURAL, with CRANIOTOMY/ REQ TF/ ER 23  PCP: Emma Tucker, DO    Follow up appointments  Follow up with your neurosurgeon in 10-14 days. Call [phone] to make an appointment as soon as you get home from the hospital.  Follow-up care is a key part of your treatment and safety. Be sure to make and go to all appointments, and call your doctor if you are having problems. It's also a good idea to know your test results and keep a list of the medicines you take. A craniotomy is surgery to open your skull to fix a problem in your brain. It can be done for many reasons. For example, you may need a craniotomy if your brain or blood vessels are damaged or if you have a tumor or an infection in your brain. You will probably feel very tired for several weeks after surgery. You may also have headaches or problems concentrating. It can take 4 to 8 weeks to recover from surgery. Your cuts (incisions) may be sore for about 5 days after surgery. You may also have numbness and shooting pains near your wound, or swelling and bruising around your eyes. As your wound starts to heal, it may begin to itch. Medicines and ice packs can help with headaches, pain, swelling, and itching.     The stitches that hold your incisions together may go away on their own or will be removed in 7 to 10 days. This depends on the type of stitches the doctor uses. Staples are usually removed in 10-14 days. It is common for your scalp to swell with fluid. After the swelling goes down, you may have a dent in your head. Some kinds of plates stay attached to hold the skull flap to your head. If your head was shaved, you may wear clean hats or scarves on your head until your hair grows back. This care sheet gives you a general idea about how long it will take for you to recover. But each person recovers at a different pace. Follow the steps below to get better as quickly as possible. When to call your Neurosurgeon:   You have trouble thinking clearly.  You are sleeping more than you are awake.  You have a fever with a stiff neck or a severe headache.  You have any sudden vision changes.  Nausea or vomiting, severe headache.  Loss of bowel or bladder function, inability to urinate.  Increased weakness-greater than before your surgery.  Severe pain or pain not relieved by medications.  Signs of a blood clot in your leg-calf pain, tenderness, redness, swelling of lower leg.  Signs of infection-if your incision is red; continues to have drainage; drainage has a foul odor or if you have a persistent fever over 101 degrees for 24 hours.  You fall and hit your head. When to call your Primary Care Physician:   Concerns about medical conditions such as diabetes, high blood pressure, asthma, congestive heart failure.  Call if blood sugars are elevated, persistent headache or dizziness, coughing or congestion, constipation or diarrhea, burning with urination, abnormal heart rate. When to call 911 and go to the nearest emergency room:   Acute onset of chest pain, shortness of breath, difficulty breathing   You passed out (lost consciousness).  It is hard to think, move, speak, or see.    Your body is jerking or shaking. Activity   Rest when you feel tired. It is normal to want to sleep during the day. It is a good idea to plan to take a nap every day. Getting enough sleep will help you recover.  Try not to lie flat when you rest or sleep. You can use a wedge pillow, or you can put a rolled towel or foam padding under your pillow. You can also raise the head of your bed by putting bricks or wooden blocks under the bed legs.  After lying down, bring your head up slowly. This can prevent headaches or dizziness.  Try to walk each day. Start by walking a little more than you did the day before. Bit by bit, increase the amount you walk. Walking boosts blood flow and helps prevent pneumonia and constipation.  Avoid heavy lifting until your doctor says it is okay.  Do not drive for 2 to 3 weeks or until your doctor says it is okay. When you begin driving again, start with short, familiar routes in the daytime.  Ask your doctor if it is safe for you to travel by plane.  Avoid risky activities, such as climbing a ladder, for 3 months after surgery.  Avoid strenuous activities, such as bicycle riding, jogging, weight lifting, or aerobic exercise, for 3 months or until your doctor says it is okay.  Do not play any rough or contact sports for 3 months or until your doctor says it is okay.  You may engage in sexual activity. Diet   Resume usual diet; drink plenty of fluids; eat foods high in fiber   It is important to have regular bowel movements. Pain medications may cause constipation. You may want to take a stool softener (such as Senokot-S or Colace) to prevent constipation.  If constipation occurs, take a laxative (such as Dulcolax tablets, Milk of Magnesia, or a suppository). Laxatives should only be used if the above preventable measures have failed and you still have not had a bowel movement after three days   Try to avoid constipation and straining with bowel movements.   Incision Care      You can wash your hair 7 days after your surgery. But do not soak your head or swim for 2 to 3 weeks.  Do not dye or color your hair for 4 weeks after your surgery.  Do not rub or apply any lotions or ointments to your incision site.  Do not scrub your wound    Medicines   If your doctor or nurse practitioner prescribed antibiotics, take them as directed. Do not stop taking them just because you feel better. You need to take the full course of antibiotics.  If you get medicines to prevent seizures, take them exactly as directed.  If the doctor or nurse practitioner gave you a prescription medicine for pain, take it as prescribed.  If you are not taking a prescription pain medicine, ask your doctor or nurse practitioner if you can take an over-the-counter medicine.    Pain Medication Safety  DO:   Read the Medication Guide    Take your medicine exactly as prescribed    Store your medicine away from children and in a safe place    Call your healthcare provider for medical advice about side effects. You may report side effects to FDA at 6-637-FDA-1827.  Please be aware that many medications contain Tylenol. We do not want you to over medicate so please read the information below as a guide. Do not take more than 4 Grams of Tylenol in a 24 hour period if you are under age 79 or 3 Grams in 24 hours if you are over 79years old. (There are 1000 milligrams in one Gram)  o Percocet contains 325 mg of Tylenol per tablet (do not take more than 12 tablets in 24 hours)  o Lortab contains 500 mg of Tylenol per tablet (do not take more than 8 tablets in 24 hours)  o Norco contains 325 mg of Tylenol per tablet (do not take more than 12 tablets in 24 hours). DO NOT:   Do not give your medicine to others.  Do not take medicine unless it was prescribed for you.  Do not stop taking your medicine without talking to your healthcare provider.     Do not break, chew, crush, dissolve, or inject your medicine. If you cannot swallow your medicine whole, talk to your healthcare provider.  Do not drink alcohol while taking this medicine.  Do not take anti-inflammatory medications or aspirin unless instructed by your physician.

## 2022-02-01 NOTE — PROGRESS NOTES
Speech pathology note  Reviewed chart and note plan for discharge today. Per hospitalist note, discussion was had regarding feeding options: \"feeding tube vs allow diet with transition to hospice (can eat while acknowledging risk however from a long term perspective hospice would be appropriate since he is declining Parkinson's treatment and chronic aspiration is not sustainable). \" However, decision not clear per chart. 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 \" SLP will follow as needed. Thank you.     Ernestina Carrel, Katty Almonte., CCC-SLP

## 2022-02-01 NOTE — PROGRESS NOTES
1314: I have reviewed discharge instructions with the patient and patient's daughter. The patient's daughter verbalized understanding. PIV and telemetry discontinued. Patient discharged to [facility] via [facility] with discharge instructions and patient belongings including right ear hearing aid and glasses. 1328: Report called to Arielle Fonseca RN at [employer].

## 2022-02-01 NOTE — PROGRESS NOTES
Transition of Care Plan   RUR- Low  14%   DISPOSITION: [facility] - today   F/U with PCP/Specialist     Transport: [facility] 1pm    CM left message for Tammy Gil at [facility] regarding bed confirmation. [facility] scheduled for 1pm.    10:42am: Bed confirmed for patient today. CM informed patient's daughter, Carlita Sheppard, and she is in agreement with discharge plan. Call [facility]#[phone]  Accepting physician: Dr. Dipti Contreras (Ron Bonus) CAMI BurgosSDIVYA

## 2022-02-01 NOTE — ROUTINE PROCESS
Bedside and Verbal shift change report given to Dariel Pinzon RN (oncoming nurse) by Lalo Lebron RN (offgoing nurse). Report included the following information SBAR, Kardex, ED Summary, Intake/Output, MAR, Recent Results, Cardiac Rhythm A-Paced, Alarm Parameters , Quality Measures and Dual Neuro Assessment.

## 2022-02-01 NOTE — PROGRESS NOTES
Hospitalist Progress Note      Hospital summary: 81 y.o man w/ DM, HTN, CVA, hyperlipidemia, Parkinson's disease, who was admitted for a traumatic subdural hematoma. He is s/p crani on 1/20. Assessment/Plan:  Traumatic SDH: s/p crani 1/20  -NSG following  -continue Keppra  -was on Precedexm then seroquel in the ICU-     Dysphagia: likely due to Parkinsons. Aspiration with all liquid consistencies on FEES. I spoke to his daughter regarding options - feeding tube vs allow diet with transition to hospice (can eat while acknowledging risk however from a long term perspective hospice would be appropriate since he is declining Parkinson's treatment and chronic aspiration is not sustainable). HTN:   -keep SBP<140    Type 2 DM:  -SSI/POC checks  -hold PO meds    Parkinson's disease: reportedly declined meds as an outpatient. Per his daughter he was taking Sinement but felt like it wasn't helping except at night time for sleep, so he decided not to see his neurologist again, and she weaned him off the medication. CAD:   -hold aspirin    AMS: delirium due to acute illness/hospitalization- resolving  -stopped quetiapine -much improved today    Has a pacemaker for bradycardia. Unclear why he is on amiodarone. Code status: DNR  DVT prophylaxis: SCDs  Disposition: TBD  ----------------------------------------------    CC: f/u SDH    S: poor historian, not offering any complaints     Review of Systems:  Pertinent items are noted in HPI.     O:  Visit Vitals  /62 (BP 1 Location: Right upper arm, BP Patient Position: At rest)   Pulse 60   Temp 98.1 °F (36.7 °C)   Resp 15   Ht 5' 7\" (1.702 m)   Wt 80.8 kg (178 lb 2.1 oz)   SpO2 98%   BMI 27.90 kg/m²     Patient Vitals for the past 24 hrs:   Temp Pulse Resp BP SpO2   01/31/22 2200 -- 60 -- -- --   01/31/22 2042 98.1 °F (36.7 °C) 60 15 103/62 98 %   01/31/22 2000 -- 60 -- -- --   01/31/22 1800 -- 60 -- -- --   01/31/22 1415 98.6 °F (37 °C) 60 20 (!) 112/56 96 %   01/31/22 1400 -- 60 -- -- --   01/31/22 1200 -- 60 -- -- --   01/31/22 1022 98.4 °F (36.9 °C) 60 25 (!) 101/53 --   01/31/22 1000 -- 60 -- -- --   01/31/22 0813 -- 60 -- 115/62 --   01/31/22 0610 98.1 °F (36.7 °C) 60 25 135/73 93 %   01/31/22 0210 98 °F (36.7 °C) 60 17 (!) 144/74 (!) 89 %       PHYSICAL EXAM:  Gen: NAD, non-toxic  HEENT: anicteric sclerae, normal conjunctiva, s/p crani  Neck: supple, trachea midline, no adenopathy  Heart: RRR, no MRG, no JVD, no peripheral edema  Lungs: CTA b/l, non-labored respirations  Abd: soft, NT, ND, BS+  Extr: warm  Skin: dry, no rash  Neuro: CN II-XII grossly intact  Psych: not agitation, flat affect    No intake or output data in the 24 hours ending 01/31/22 2237     Recent labs & imaging reviewed:  Recent Results (from the past 24 hour(s))   CBC W/O DIFF    Collection Time: 01/31/22 12:46 AM   Result Value Ref Range    WBC 12.8 (H) 4.1 - 11.1 K/uL    RBC 3.66 (L) 4.10 - 5.70 M/uL    HGB 11.4 (L) 12.1 - 17.0 g/dL    HCT 36.5 (L) 36.6 - 50.3 %    MCV 99.7 (H) 80.0 - 99.0 FL    MCH 31.1 26.0 - 34.0 PG    MCHC 31.2 30.0 - 36.5 g/dL    RDW 13.1 11.5 - 14.5 %    PLATELET 068 614 - 756 K/uL    MPV 9.8 8.9 - 12.9 FL    NRBC 0.0 0  WBC    ABSOLUTE NRBC 0.00 0.00 - 4.15 K/uL   METABOLIC PANEL, COMPREHENSIVE    Collection Time: 01/31/22 12:46 AM   Result Value Ref Range    Sodium 143 136 - 145 mmol/L    Potassium 4.3 3.5 - 5.1 mmol/L    Chloride 114 (H) 97 - 108 mmol/L    CO2 27 21 - 32 mmol/L    Anion gap 2 (L) 5 - 15 mmol/L    Glucose 130 (H) 65 - 100 mg/dL    BUN 22 (H) 6 - 20 MG/DL    Creatinine 1.15 0.70 - 1.30 MG/DL    BUN/Creatinine ratio 19 12 - 20      GFR est AA >60 >60 ml/min/1.73m2    GFR est non-AA >60 >60 ml/min/1.73m2    Calcium 8.3 (L) 8.5 - 10.1 MG/DL    Bilirubin, total 0.8 0.2 - 1.0 MG/DL    ALT (SGPT) 18 12 - 78 U/L    AST (SGOT) 26 15 - 37 U/L    Alk.  phosphatase 94 45 - 117 U/L    Protein, total 5.8 (L) 6.4 - 8.2 g/dL Albumin 2.8 (L) 3.5 - 5.0 g/dL    Globulin 3.0 2.0 - 4.0 g/dL    A-G Ratio 0.9 (L) 1.1 - 2.2     MAGNESIUM    Collection Time: 01/31/22 12:46 AM   Result Value Ref Range    Magnesium 2.2 1.6 - 2.4 mg/dL     Recent Labs     01/31/22 0046   WBC 12.8*   HGB 11.4*   HCT 36.5*        Recent Labs     01/31/22 0046      K 4.3   *   CO2 27   BUN 22*   CREA 1.15   *   CA 8.3*   MG 2.2     Recent Labs     01/31/22 0046   ALT 18   AP 94   TBILI 0.8   TP 5.8*   ALB 2.8*   GLOB 3.0     No results for input(s): INR, PTP, APTT, INREXT, INREXT in the last 72 hours. No results for input(s): FE, TIBC, PSAT, FERR in the last 72 hours. No results found for: FOL, RBCF   No results for input(s): PH, PCO2, PO2 in the last 72 hours. No results for input(s): CPK, CKNDX, TROIQ in the last 72 hours.     No lab exists for component: CPKMB  Lab Results   Component Value Date/Time    Cholesterol, total 110 07/29/2021 11:30 AM    HDL Cholesterol 31 07/29/2021 11:30 AM    LDL, calculated 25.2 07/29/2021 11:30 AM    Triglyceride 269 (H) 07/29/2021 11:30 AM    CHOL/HDL Ratio 3.5 07/29/2021 11:30 AM     Lab Results   Component Value Date/Time    Glucose (POC) 147 (H) 01/27/2022 09:49 PM    Glucose (POC) 112 01/27/2022 04:27 PM    Glucose (POC) 147 (H) 01/27/2022 11:17 AM    Glucose (POC) 138 (H) 01/27/2022 07:11 AM    Glucose (POC) 156 (H) 01/26/2022 09:53 PM     Lab Results   Component Value Date/Time    Color YELLOW/STRAW 01/26/2022 03:36 PM    Appearance CLEAR 01/26/2022 03:36 PM    Specific gravity 1.023 01/26/2022 03:36 PM    Specific gravity 1.016 10/28/2021 11:48 AM    pH (UA) 7.0 01/26/2022 03:36 PM    Protein TRACE (A) 01/26/2022 03:36 PM    Glucose Negative 01/26/2022 03:36 PM    Ketone 15 (A) 01/26/2022 03:36 PM    Bilirubin Negative 01/26/2022 03:36 PM    Urobilinogen 4.0 (H) 01/26/2022 03:36 PM    Nitrites Negative 01/26/2022 03:36 PM    Leukocyte Esterase Negative 01/26/2022 03:36 PM    Epithelial cells FEW 01/26/2022 03:36 PM    Bacteria Negative 01/26/2022 03:36 PM    WBC 0-4 01/26/2022 03:36 PM    RBC 10-20 01/26/2022 03:36 PM       Med list reviewed  Current Facility-Administered Medications   Medication Dose Route Frequency    QUEtiapine (SEROquel) tablet 12.5 mg  12.5 mg Oral DAILY WITH DINNER    [Held by provider] hydrOXYzine HCL (ATARAX) tablet 10 mg  10 mg Oral TID    levETIRAcetam (KEPPRA) tablet 1,000 mg  1,000 mg Oral Q12H    metoprolol tartrate (LOPRESSOR) tablet 12.5 mg  12.5 mg Oral Q12H    miconazole (MICOTIN) 2 % powder   Topical BID    ondansetron (ZOFRAN) injection 4 mg  4 mg IntraVENous Q6H PRN    naloxone (NARCAN) injection 0.4 mg  0.4 mg IntraVENous PRN    acetaminophen (TYLENOL) tablet 650 mg  650 mg Oral Q4H PRN    Or    acetaminophen (TYLENOL) solution 650 mg  650 mg Per NG tube Q4H PRN    Or    acetaminophen (TYLENOL) suppository 650 mg  650 mg Rectal Q4H PRN    lisinopriL (PRINIVIL, ZESTRIL) tablet 2.5 mg  2.5 mg Oral DAILY    busPIRone (BUSPAR) tablet 15 mg  15 mg Oral BID    PARoxetine (PAXIL) tablet 10 mg  10 mg Oral DAILY    amiodarone (CORDARONE) tablet 100 mg  100 mg Oral DAILY    docusate (COLACE) 50 mg/5 mL oral liquid 100 mg  100 mg Oral DAILY PRN    bisacodyL (DULCOLAX) suppository 10 mg  10 mg Rectal DAILY PRN       Care Plan discussed with:  Reginald Matos MD  Internal Medicine  Date of Service: 1/31/2022

## 2022-02-01 NOTE — DISCHARGE SUMMARY
Discharge Summary       PATIENT ID: Zeynep Callaway  MRN: [id number]   YOB: 1935    DATE OF ADMISSION: 1/20/2022  1:39 AM    DATE OF DISCHARGE: 2/1/22 1pm   PRIMARY CARE PROVIDER: Willy Boo DO     ATTENDING PHYSICIAN: Deloris Valdovinos  DISCHARGING PROVIDER: Matthew Sears MD    To contact this individual call [phone] and ask the  to page. If unavailable ask to be transferred the Adult Hospitalist Department. CONSULTATIONS: IP CONSULT TO NEUROSURGERY    PROCEDURES/SURGERIES: Procedure(s):  DARSHANA HOLES FOR LEFT FRONTAL SUBDURAL, with CRANIOTOMY/ REQ TF/ ER 23    DISCHARGE DIAGNOSES:   See Below  [address] COURSE:   Zeynep Clalaway is a 81 y.o. male with past medical history of noninsulin-dependent type 2 diabetes, hypertension, gout, CVA hard of hearing, dyslipidemia who presented today outside ER for evaluation after a fall. History is unobtainable from patient due to severe hard of hearing and lack of hearing aids. Family is not at bedside for further history. Per chart review, patient suffered a fall at home and landed on his right temple. He damaged his hearing aid and mild bleeding was noted from his right ear. EMS was activated and patient was taken to hospital for further evaluation and treatment.     Remarkable vitals on ER Presentations: /66  Labs Remarkable for: Creatinine 1.49, glucose 228  ER Images: Large left frontal subdural mixed density collection with  significant mass effect, probably acute on chronic subdural hematoma. HOSPITAL COURSE/ DISCHARGE DIAGNOSES / PLAN:      Assessment/Plan:  Traumatic SDH: s/p crani 1/20  -NSG following  -continue Keppra  -was on Precedex then seroquel in the ICU- off of both now     Dysphagia: likely due to Parkinsons. Aspiration with all liquid consistencies on FEES.  I spoke to his daughter regarding options - feeding tube vs allow diet with transition to hospice   I was decided to let him eat while acknowledging risk however from a long term perspective hospice would be appropriate since he is declining Parkinson's treatment and chronic aspiration is not sustainable).     HTN:   -keep SBP<140  BP stable on current meds     Type 2 DM:  -SSI/POC checks  -hold PO meds     Parkinson's disease: reportedly declined meds as an outpatient. Per his daughter he was taking Sinement but felt like it wasn't helping except at night time for sleep, so he decided not to see his neurologist again, and she weaned him off the medication.     CAD:   -hold aspirin     AMS: delirium due to acute illness/hospitalization- resolving  -stopped quetiapine -much improved today     Has a pacemaker for bradycardia. Unclear why he is on amiodarone.     Code status: DNR    PENDING TEST RESULTS:   At the time of discharge the following test results are still pending: none     ADDITIONAL CARE RECOMMENDATIONS:   Dysphagia- from baseline neurological issues- aspiration precautions  We decided on a pureed diet with thin liquids despite suspected mild aspiration   Patient seen by Speech and deemed at high risk for aspiration    Hearing loss- patient currently dose not have a working hearing aid and can benefit from having commands written so that he can read them    You were admitted with a  LEFT SUBDURAL HEMATOMA  And treatment included a CRANIOTOMY FOR LEFT FRONTAL SUBDURAL HEMATOMA 1/20/22    After [address]:  Discharge Instructions Craniotomy  [facility]    Patient Name: Zeynep Callaway    Date of procedure: 1/20/2022  Date of discharge: 1/28/2022    Procedure: Procedure(s):  DARSHANA HOLES FOR LEFT FRONTAL SUBDURAL, with CRANIOTOMY/ REQ TF/ ER 23  PCP: Willy Boo, DO    Follow up appointments  Follow up with your neurosurgeon in 10-14 days. Call [phone] to make an appointment as soon as you get home from the hospital.  Follow-up care is a key part of your treatment and safety.  Be sure to make and go to all appointments, and call your doctor if you are having problems. It's also a good idea to know your test results and keep a list of the medicines you take. A craniotomy is surgery to open your skull to fix a problem in your brain. It can be done for many reasons. For example, you may need a craniotomy if your brain or blood vessels are damaged or if you have a tumor or an infection in your brain. You will probably feel very tired for several weeks after surgery. You may also have headaches or problems concentrating. It can take 4 to 8 weeks to recover from surgery. Your cuts (incisions) may be sore for about 5 days after surgery. You may also have numbness and shooting pains near your wound, or swelling and bruising around your eyes. As your wound starts to heal, it may begin to itch. Medicines and ice packs can help with headaches, pain, swelling, and itching. The stitches that hold your incisions together may go away on their own or will be removed in 7 to 10 days. This depends on the type of stitches the doctor uses. Staples are usually removed in 10-14 days. It is common for your scalp to swell with fluid. After the swelling goes down, you may have a dent in your head. Some kinds of plates stay attached to hold the skull flap to your head. If your head was shaved, you may wear clean hats or scarves on your head until your hair grows back. This care sheet gives you a general idea about how long it will take for you to recover. But each person recovers at a different pace. Follow the steps below to get better as quickly as possible. When to call your Neurosurgeon:   You have trouble thinking clearly.  You are sleeping more than you are awake.  You have a fever with a stiff neck or a severe headache.  You have any sudden vision changes.  Nausea or vomiting, severe headache.  Loss of bowel or bladder function, inability to urinate.    Increased weakness-greater than before your surgery.  Severe pain or pain not relieved by medications.  Signs of a blood clot in your leg-calf pain, tenderness, redness, swelling of lower leg.  Signs of infection-if your incision is red; continues to have drainage; drainage has a foul odor or if you have a persistent fever over 101 degrees for 24 hours.  You fall and hit your head. When to call your Primary Care Physician:   Concerns about medical conditions such as diabetes, high blood pressure, asthma, congestive heart failure.  Call if blood sugars are elevated, persistent headache or dizziness, coughing or congestion, constipation or diarrhea, burning with urination, abnormal heart rate. When to call 911 and go to the nearest emergency room:   Acute onset of chest pain, shortness of breath, difficulty breathing   You passed out (lost consciousness).  It is hard to think, move, speak, or see.  Your body is jerking or shaking. Activity   Rest when you feel tired. It is normal to want to sleep during the day. It is a good idea to plan to take a nap every day. Getting enough sleep will help you recover.  Try not to lie flat when you rest or sleep. You can use a wedge pillow, or you can put a rolled towel or foam padding under your pillow. You can also raise the head of your bed by putting bricks or wooden blocks under the bed legs.  After lying down, bring your head up slowly. This can prevent headaches or dizziness.  Try to walk each day. Start by walking a little more than you did the day before. Bit by bit, increase the amount you walk. Walking boosts blood flow and helps prevent pneumonia and constipation.  Avoid heavy lifting until your doctor says it is okay.  Do not drive for 2 to 3 weeks or until your doctor says it is okay. When you begin driving again, start with short, familiar routes in the daytime.  Ask your doctor if it is safe for you to travel by plane.    Avoid risky activities, such as climbing a ladder, for 3 months after surgery.  Avoid strenuous activities, such as bicycle riding, jogging, weight lifting, or aerobic exercise, for 3 months or until your doctor says it is okay.  Do not play any rough or contact sports for 3 months or until your doctor says it is okay.  You may engage in sexual activity. Diet   Resume usual diet; drink plenty of fluids; eat foods high in fiber   It is important to have regular bowel movements. Pain medications may cause constipation. You may want to take a stool softener (such as Senokot-S or Colace) to prevent constipation.  If constipation occurs, take a laxative (such as Dulcolax tablets, Milk of Magnesia, or a suppository). Laxatives should only be used if the above preventable measures have failed and you still have not had a bowel movement after three days   Try to avoid constipation and straining with bowel movements. Incision Care      You can wash your hair 7 days after your surgery. But do not soak your head or swim for 2 to 3 weeks.  Do not dye or color your hair for 4 weeks after your surgery.  Do not rub or apply any lotions or ointments to your incision site.  Do not scrub your wound    Medicines   If your doctor or nurse practitioner prescribed antibiotics, take them as directed. Do not stop taking them just because you feel better. You need to take the full course of antibiotics.  If you get medicines to prevent seizures, take them exactly as directed.  If the doctor or nurse practitioner gave you a prescription medicine for pain, take it as prescribed.  If you are not taking a prescription pain medicine, ask your doctor or nurse practitioner if you can take an over-the-counter medicine.    Pain Medication Safety  DO:   Read the Medication Guide    Take your medicine exactly as prescribed    Store your medicine away from children and in a safe place    Call your healthcare provider for medical advice about side effects. You may report side effects to FDA at 1-070-YJM-0357.  Please be aware that many medications contain Tylenol. We do not want you to over medicate so please read the information below as a guide. Do not take more than 4 Grams of Tylenol in a 24 hour period if you are under age 79 or 3 Grams in 24 hours if you are over 79years old. (There are 1000 milligrams in one Gram)  o Percocet contains 325 mg of Tylenol per tablet (do not take more than 12 tablets in 24 hours)  o Lortab contains 500 mg of Tylenol per tablet (do not take more than 8 tablets in 24 hours)  o Norco contains 325 mg of Tylenol per tablet (do not take more than 12 tablets in 24 hours). DO NOT:   Do not give your medicine to others.  Do not take medicine unless it was prescribed for you.  Do not stop taking your medicine without talking to your healthcare provider.  Do not break, chew, crush, dissolve, or inject your medicine. If you cannot swallow your medicine whole, talk to your healthcare provider.  Do not drink alcohol while taking this medicine. Do not take anti-inflammatory medications or aspirin unless instructed by your physician. NOTIFY YOUR PHYSICIAN FOR ANY OF THE FOLLOWING:   Fever over 101 degrees for 24 hours. Chest pain, shortness of breath, fever, chills, nausea, vomiting, diarrhea, change in mentation, falling, weakness, bleeding. Severe pain or pain not relieved by medications, as well as any other signs or symptoms that you may have questions about.     FOLLOW UP APPOINTMENTS:    Follow-up Information     Follow up With Specialties Details Why Contact Info    Felicitas Oh MD Neurosurgery Schedule an appointment as soon as possible for a visit after discharge from rehab for a wound check [address] [address], [address] [facility], [address] [place] [address]   [address]  [place] [address]  [phone]           DIET: see above    ACTIVITY: Activity as tolerated and PT/OT Eval and Treat    EQUIPMENT needed: none    DISCHARGE MEDICATIONS:  Current Discharge Medication List      START taking these medications    Details   acetaminophen (TYLENOL) 325 mg tablet Take 2 Tablets by mouth every four (4) hours as needed for Pain or Fever (headache). Qty: 30 Tablet, Refills: 0  Start date: 2/1/2022      levETIRAcetam 1,000 mg tablet Take 1 Tablet by mouth every twelve (12) hours. Qty: 60 Tablet, Refills: 3  Start date: 2/1/2022         CONTINUE these medications which have NOT CHANGED    Details   simvastatin (Zocor) 20 mg tablet Take 20 mg by mouth nightly. albuterol sulfate (PROVENTIL;VENTOLIN) 2.5 mg/0.5 mL nebu nebulizer solution 2.5 mg by Nebulization route every eight (8) hours as needed for Wheezing. hydrOXYzine pamoate (VistariL) 25 mg capsule Take 1 Capsule by mouth two (2) times daily as needed for Itching. As needed for anxiety  Qty: 20 Capsule, Refills: 0      atorvastatin (LIPITOR) 80 mg tablet Take 1 Tablet by mouth nightly. Qty: 30 Tablet, Refills: 0      amiodarone (CORDARONE) 200 mg tablet Take 100 mg by mouth daily. metoprolol succinate (TOPROL-XL) 25 mg XL tablet Take 25 mg by mouth daily. lisinopril (PRINIVIL, ZESTRIL) 2.5 mg tablet Take 2.5 mg by mouth daily. allopurinol (ZYLOPRIM) 100 mg tablet Take 200 mg by mouth daily. metFORMIN (GLUCOPHAGE) 1,000 mg tablet Take 1,000 mg by mouth nightly. busPIRone (BUSPAR) 15 mg tablet Take 15 mg by mouth two (2) times a day. glimepiride (AMARYL) 1 mg tablet Take 1 mg by mouth Daily (before breakfast). PARoxetine (PAXIL) 10 mg tablet Take 10 mg by mouth daily. multivitamin (ONE A DAY) tablet Take 1 Tab by mouth daily.  50+         STOP taking these medications       aspirin 81 mg chewable tablet Comments:   Reason for Stopping:               DISPOSITION:    Home With:   OT  PT  HH  RN      X   SNF/Inpatient Rehab    Independent/assisted living    Hospice    Other:       PATIENT CONDITION AT DISCHARGE:     Functional status    Poor    X Deconditioned     Independent      Cognition     Lucid    X Forgetful     Dementia      Catheters/lines (plus indication)    Acuna     PICC     PEG    X None      Code status     Full code    X DNR      PHYSICAL EXAMINATION AT DISCHARGE:  Patient Vitals for the past 24 hrs:   Temp Pulse Resp BP SpO2   02/01/22 1200 -- 77 -- -- --   02/01/22 1000 -- 60 -- -- --   02/01/22 0952 98.4 °F (36.9 °C) 60 20 127/62 96 %   02/01/22 0831 -- 60 -- (!) 147/77 --   02/01/22 0600 -- (!) 55 -- -- --   02/01/22 0400 -- 60 -- -- --   02/01/22 0200 97.9 °F (36.6 °C) 60 15 121/79 94 %   01/31/22 2200 -- 60 -- -- --   01/31/22 2042 98.1 °F (36.7 °C) 60 15 103/62 98 %   01/31/22 2000 -- 60 -- -- --   01/31/22 1800 -- 60 -- -- --   01/31/22 1415 98.6 °F (37 °C) 60 20 (!) 112/56 96 %   01/31/22 1400 -- 60 -- -- --      Gen: NAD, non-toxic  HEENT: anicteric sclerae, normal conjunctiva, s/p crani  Neck: supple, trachea midline, no adenopathy  Heart: RRR, no MRG, no JVD, no peripheral edema  Lungs: CTA b/l, non-labored respirations  Abd: soft, NT, ND, BS+  Extr: warm  Skin: dry, no rash  Neuro: CN II-XII grossly intact  Psych: not agitation, flat affect     No intake or output data in the 24 hours ending 01/31/22 2237      Recent labs & imaging reviewed:  Recent Results         Recent Results (from the past 24 hour(s))   CBC W/O DIFF     Collection Time: 01/31/22 12:46 AM   Result Value Ref Range     WBC 12.8 (H) 4.1 - 11.1 K/uL     RBC 3.66 (L) 4.10 - 5.70 M/uL     HGB 11.4 (L) 12.1 - 17.0 g/dL     HCT 36.5 (L) 36.6 - 50.3 %     MCV 99.7 (H) 80.0 - 99.0 FL     MCH 31.1 26.0 - 34.0 PG     MCHC 31.2 30.0 - 36.5 g/dL     RDW 13.1 11.5 - 14.5 %     PLATELET 503 650 - 619 K/uL     MPV 9.8 8.9 - 12.9 FL     NRBC 0.0 0  WBC     ABSOLUTE NRBC 0.00 0.00 - 6.64 K/uL   METABOLIC PANEL, COMPREHENSIVE     Collection Time: 01/31/22 12:46 AM   Result Value Ref Range     Sodium 143 136 - 145 mmol/L     Potassium 4.3 3.5 - 5.1 mmol/L     Chloride 114 (H) 97 - 108 mmol/L     CO2 27 21 - 32 mmol/L     Anion gap 2 (L) 5 - 15 mmol/L     Glucose 130 (H) 65 - 100 mg/dL     BUN 22 (H) 6 - 20 MG/DL     Creatinine 1.15 0.70 - 1.30 MG/DL     BUN/Creatinine ratio 19 12 - 20       GFR est AA >60 >60 ml/min/1.73m2     GFR est non-AA >60 >60 ml/min/1.73m2     Calcium 8.3 (L) 8.5 - 10.1 MG/DL     Bilirubin, total 0.8 0.2 - 1.0 MG/DL     ALT (SGPT) 18 12 - 78 U/L     AST (SGOT) 26 15 - 37 U/L     Alk. phosphatase 94 45 - 117 U/L     Protein, total 5.8 (L) 6.4 - 8.2 g/dL     Albumin 2.8 (L) 3.5 - 5.0 g/dL     Globulin 3.0 2.0 - 4.0 g/dL     A-G Ratio 0.9 (L) 1.1 - 2.2     MAGNESIUM     Collection Time: 01/31/22 12:46 AM   Result Value Ref Range     Magnesium 2.2 1.6 - 2.4 mg/dL             Recent Labs     01/31/22  0046   WBC 12.8*   HGB 11.4*   HCT 36.5*             Recent Labs     01/31/22  0046      K 4.3   *   CO2 27   BUN 22*   CREA 1.15   *   CA 8.3*   MG 2.2          Recent Labs     01/31/22  0046   ALT 18   AP 94   TBILI 0.8   TP 5.8*   ALB 2.8*   GLOB 3.0      No results for input(s): INR, PTP, APTT, INREXT, INREXT in the last 72 hours. No results for input(s): FE, TIBC, PSAT, FERR in the last 72 hours. No results found for: FOL, RBCF   No results for input(s): PH, PCO2, PO2 in the last 72 hours.   No results for input(s): CPK, CKNDX, TROIQ in the last 72 hours.     No lab exists for component: CPKMB        Lab Results   Component Value Date/Time     Cholesterol, total 110 07/29/2021 11:30 AM     HDL Cholesterol 31 07/29/2021 11:30 AM     LDL, calculated 25.2 07/29/2021 11:30 AM     Triglyceride 269 (H) 07/29/2021 11:30 AM     CHOL/HDL Ratio 3.5 07/29/2021 11:30 AM            Lab Results   Component Value Date/Time     Glucose (POC) 147 (H) 01/27/2022 09:49 PM     Glucose (POC) 112 01/27/2022 04:27 PM     Glucose (POC) 147 (H) 01/27/2022 11:17 AM     Glucose (POC) 138 (H) 01/27/2022 07:11 AM     Glucose (POC) 156 (H) 01/26/2022 09:53 PM            Lab Results   Component Value Date/Time     Color YELLOW/STRAW 01/26/2022 03:36 PM     Appearance CLEAR 01/26/2022 03:36 PM     Specific gravity 1.023 01/26/2022 03:36 PM     Specific gravity 1.016 10/28/2021 11:48 AM     pH (UA) 7.0 01/26/2022 03:36 PM     Protein TRACE (A) 01/26/2022 03:36 PM     Glucose Negative 01/26/2022 03:36 PM     Ketone 15 (A) 01/26/2022 03:36 PM     Bilirubin Negative 01/26/2022 03:36 PM     Urobilinogen 4.0 (H) 01/26/2022 03:36 PM     Nitrites Negative 01/26/2022 03:36 PM     Leukocyte Esterase Negative 01/26/2022 03:36 PM     Epithelial cells FEW 01/26/2022 03:36 PM     Bacteria Negative 01/26/2022 03:36 PM     WBC 0-4 01/26/2022 03:36 PM     RBC 10-20 01/26/2022 03:36 PM     CT HEAD WO CONT     INDICATION: monitor progression of SAH     COMPARISON: 1/20/2022.     CONTRAST: None.     TECHNIQUE: Unenhanced CT of the head was performed using 5 mm images. Brain and  bone windows were generated. Coronal and sagittal reformats. CT dose reduction  was achieved through use of a standardized protocol tailored for this  examination and automatic exposure control for dose modulation.       FINDINGS:  There is improved shift to the right, which measures 3.9 mm, previously 7.7 mm. Periventricular hypoattenuation compatible with chronic small vessel ischemic  changes. The subdural hematoma is smaller status post drain placement and  measures 2.0 cm, previously 3.1 cm there is a small amount of acute hemorrhage  in the subdural space. The basilar cisterns are open. No CT evidence of acute  infarct. There is scalp soft tissue swelling.     The bone windows demonstrate left frontal craniectomy. The visualized portions  of the paranasal sinuses and mastoid air cells are clear.     IMPRESSION  Improved subdural hematoma status post drain placement.     All Micro Results     Procedure Component Value Units Date/Time    COVID-19 RAPID TEST [[id number]] Collected: 01/20/22 0946    Order Status: Completed Specimen: Nasopharyngeal Updated: 01/20/22 1007     Specimen source Nasopharyngeal        COVID-19 rapid test Not detected        Comment: Rapid Abbott ID Now       Rapid NAAT:  The specimen is NEGATIVE for SARS-CoV-2, the novel coronavirus associated with COVID-19. Negative results should be treated as presumptive and, if inconsistent with clinical signs and symptoms or necessary for patient management, should be tested with an alternative molecular assay. Negative results do not preclude SARS-CoV-2 infection and should not be used as the sole basis for patient management decisions. This test has been authorized by the FDA under an Emergency Use Authorization (EUA) for use by authorized laboratories.    Fact sheet for Healthcare Providers: ConventionUpdate.co.nz  Fact sheet for Patients: ConventionUpdate.co.nz       Methodology: Isothermal Nucleic Acid Amplification                 CHRONIC MEDICAL DIAGNOSES:  Problem List as of 2/1/2022 Date Reviewed: 12/7/2019          Codes Class Noted - Resolved    [facility] ICD-10-CM: C92.3S9F  ICD-9-CM: 432.1  1/20/2022 - Present        Dizziness ICD-10-CM: R42  ICD-9-CM: 780.4  7/28/2021 - Present        Pacemaker ICD-10-CM: Z95.0  ICD-9-CM: V45.01  4/18/2018 - Present        Chronic left mastoiditis ICD-10-CM: H70.12  ICD-9-CM: 383.1  8/4/2016 - Present              Greater than 30 minutes were spent with the patient on counseling and coordination of care    Signed:   Alesia Barnard MD  2/1/2022  12:59 PM   .

## 2022-06-14 ENCOUNTER — HOSPITAL ENCOUNTER (OUTPATIENT)
Age: 87
Setting detail: OBSERVATION
Discharge: HOME | End: 2022-06-15
Attending: EMERGENCY MEDICINE | Admitting: INTERNAL MEDICINE
Payer: MEDICARE

## 2022-06-14 ENCOUNTER — APPOINTMENT (OUTPATIENT)
Dept: CT IMAGING | Age: 87
End: 2022-06-14
Attending: EMERGENCY MEDICINE
Payer: MEDICARE

## 2022-06-14 DIAGNOSIS — R47.81 SLURRED SPEECH: Primary | ICD-10-CM

## 2022-06-14 LAB
ALBUMIN SERPL-MCNC: 3.2 G/DL (ref 3.5–5)
ALBUMIN/GLOB SERPL: 1.4 {RATIO} (ref 1.1–2.2)
ALP SERPL-CCNC: 65 U/L (ref 45–117)
ALT SERPL-CCNC: 27 U/L (ref 12–78)
ANION GAP SERPL CALC-SCNC: 7 MMOL/L (ref 5–15)
APTT PPP: 36.2 SEC (ref 21.2–34.1)
AST SERPL W P-5'-P-CCNC: 20 U/L (ref 15–37)
BASOPHILS # BLD: 0 K/UL (ref 0–0.1)
BASOPHILS NFR BLD: 0 % (ref 0–1)
BILIRUB SERPL-MCNC: 1.6 MG/DL (ref 0.2–1)
BUN SERPL-MCNC: 26 MG/DL (ref 6–20)
BUN/CREAT SERPL: 21 (ref 12–20)
CA-I BLD-MCNC: 8.7 MG/DL (ref 8.5–10.1)
CHLORIDE SERPL-SCNC: 109 MMOL/L (ref 97–108)
CO2 SERPL-SCNC: 26 MMOL/L (ref 21–32)
CREAT SERPL-MCNC: 1.25 MG/DL (ref 0.7–1.3)
DIFFERENTIAL METHOD BLD: ABNORMAL
EOSINOPHIL # BLD: 0.2 K/UL (ref 0–0.4)
EOSINOPHIL NFR BLD: 1 % (ref 0–7)
ERYTHROCYTE [DISTWIDTH] IN BLOOD BY AUTOMATED COUNT: 14.2 % (ref 11.5–14.5)
ERYTHROCYTE [DISTWIDTH] IN BLOOD BY AUTOMATED COUNT: 14.4 % (ref 11.5–14.5)
GLOBULIN SER CALC-MCNC: 2.3 G/DL (ref 2–4)
GLUCOSE BLD STRIP.AUTO-MCNC: 151 MG/DL (ref 65–117)
GLUCOSE SERPL-MCNC: 285 MG/DL (ref 65–100)
HCT VFR BLD AUTO: 41.4 % (ref 36.6–50.3)
HCT VFR BLD AUTO: 41.7 % (ref 36.6–50.3)
HGB BLD-MCNC: 13.2 G/DL (ref 12.1–17)
HGB BLD-MCNC: 13.6 G/DL (ref 12.1–17)
IMM GRANULOCYTES # BLD AUTO: 0.1 K/UL (ref 0–0.04)
IMM GRANULOCYTES NFR BLD AUTO: 0 % (ref 0–0.5)
INR PPP: 1.1 (ref 0.9–1.1)
LYMPHOCYTES # BLD: 8.9 K/UL (ref 0.8–3.5)
LYMPHOCYTES NFR BLD: 61 % (ref 12–49)
MAGNESIUM SERPL-MCNC: 1.8 MG/DL (ref 1.6–2.4)
MCH RBC QN AUTO: 30.4 PG (ref 26–34)
MCH RBC QN AUTO: 30.8 PG (ref 26–34)
MCHC RBC AUTO-ENTMCNC: 31.9 G/DL (ref 30–36.5)
MCHC RBC AUTO-ENTMCNC: 32.6 G/DL (ref 30–36.5)
MCV RBC AUTO: 94.6 FL (ref 80–99)
MCV RBC AUTO: 95.4 FL (ref 80–99)
MONOCYTES # BLD: 0.8 K/UL (ref 0–1)
MONOCYTES NFR BLD: 5 % (ref 5–13)
NEUTS SEG # BLD: 4.9 K/UL (ref 1.8–8)
NEUTS SEG NFR BLD: 33 % (ref 32–75)
NRBC # BLD: 0 K/UL (ref 0–0.01)
NRBC # BLD: 0 K/UL (ref 0–0.01)
NRBC BLD-RTO: 0 PER 100 WBC
NRBC BLD-RTO: 0 PER 100 WBC
PERFORMED BY, TECHID: ABNORMAL
PLATELET # BLD AUTO: 137 K/UL (ref 150–400)
PLATELET # BLD AUTO: 141 K/UL (ref 150–400)
PMV BLD AUTO: 10.2 FL (ref 8.9–12.9)
PMV BLD AUTO: 9.9 FL (ref 8.9–12.9)
POTASSIUM SERPL-SCNC: 5 MMOL/L (ref 3.5–5.1)
PROT SERPL-MCNC: 5.5 G/DL (ref 6.4–8.2)
PROTHROMBIN TIME: 14.5 SEC (ref 11.9–14.6)
RBC # BLD AUTO: 4.34 M/UL (ref 4.1–5.7)
RBC # BLD AUTO: 4.41 M/UL (ref 4.1–5.7)
SODIUM SERPL-SCNC: 142 MMOL/L (ref 136–145)
THERAPEUTIC RANGE,PTTT: ABNORMAL SEC (ref 82–109)
TROPONIN-HIGH SENSITIVITY: 10 NG/L (ref 0–76)
WBC # BLD AUTO: 14.8 K/UL (ref 4.1–11.1)
WBC # BLD AUTO: 17.7 K/UL (ref 4.1–11.1)

## 2022-06-14 PROCEDURE — 99285 EMERGENCY DEPT VISIT HI MDM: CPT

## 2022-06-14 PROCEDURE — 82962 GLUCOSE BLOOD TEST: CPT

## 2022-06-14 PROCEDURE — 93005 ELECTROCARDIOGRAM TRACING: CPT

## 2022-06-14 PROCEDURE — 85730 THROMBOPLASTIN TIME PARTIAL: CPT

## 2022-06-14 PROCEDURE — 74011000250 HC RX REV CODE- 250: Performed by: STUDENT IN AN ORGANIZED HEALTH CARE EDUCATION/TRAINING PROGRAM

## 2022-06-14 PROCEDURE — 36415 COLL VENOUS BLD VENIPUNCTURE: CPT

## 2022-06-14 PROCEDURE — 85610 PROTHROMBIN TIME: CPT

## 2022-06-14 PROCEDURE — 84484 ASSAY OF TROPONIN QUANT: CPT

## 2022-06-14 PROCEDURE — 85027 COMPLETE CBC AUTOMATED: CPT

## 2022-06-14 PROCEDURE — 83735 ASSAY OF MAGNESIUM: CPT

## 2022-06-14 PROCEDURE — 70450 CT HEAD/BRAIN W/O DYE: CPT

## 2022-06-14 PROCEDURE — 92610 EVALUATE SWALLOWING FUNCTION: CPT

## 2022-06-14 PROCEDURE — G0378 HOSPITAL OBSERVATION PER HR: HCPCS

## 2022-06-14 PROCEDURE — 70496 CT ANGIOGRAPHY HEAD: CPT

## 2022-06-14 PROCEDURE — 80053 COMPREHEN METABOLIC PANEL: CPT

## 2022-06-14 PROCEDURE — 74011000636 HC RX REV CODE- 636: Performed by: EMERGENCY MEDICINE

## 2022-06-14 PROCEDURE — 85025 COMPLETE CBC W/AUTO DIFF WBC: CPT

## 2022-06-14 RX ORDER — AMIODARONE HYDROCHLORIDE 200 MG/1
100 TABLET ORAL DAILY
Status: DISCONTINUED | OUTPATIENT
Start: 2022-06-15 | End: 2022-06-15 | Stop reason: HOSPADM

## 2022-06-14 RX ORDER — POLYETHYLENE GLYCOL 3350 17 G/17G
17 POWDER, FOR SOLUTION ORAL DAILY PRN
Status: DISCONTINUED | OUTPATIENT
Start: 2022-06-14 | End: 2022-06-15 | Stop reason: HOSPADM

## 2022-06-14 RX ORDER — ATORVASTATIN CALCIUM 40 MG/1
80 TABLET, FILM COATED ORAL
Status: DISCONTINUED | OUTPATIENT
Start: 2022-06-14 | End: 2022-06-15 | Stop reason: HOSPADM

## 2022-06-14 RX ORDER — SODIUM CHLORIDE 0.9 % (FLUSH) 0.9 %
5-40 SYRINGE (ML) INJECTION AS NEEDED
Status: DISCONTINUED | OUTPATIENT
Start: 2022-06-14 | End: 2022-06-15 | Stop reason: HOSPADM

## 2022-06-14 RX ORDER — ACETAMINOPHEN 325 MG/1
650 TABLET ORAL
Status: DISCONTINUED | OUTPATIENT
Start: 2022-06-14 | End: 2022-06-15 | Stop reason: HOSPADM

## 2022-06-14 RX ORDER — METOPROLOL SUCCINATE 50 MG/1
25 TABLET, EXTENDED RELEASE ORAL DAILY
Status: DISCONTINUED | OUTPATIENT
Start: 2022-06-15 | End: 2022-06-15 | Stop reason: HOSPADM

## 2022-06-14 RX ORDER — CHOLECALCIFEROL (VITAMIN D3) 125 MCG
5 CAPSULE ORAL
COMMUNITY
End: 2022-09-04

## 2022-06-14 RX ORDER — ONDANSETRON 2 MG/ML
4 INJECTION INTRAMUSCULAR; INTRAVENOUS
Status: DISCONTINUED | OUTPATIENT
Start: 2022-06-14 | End: 2022-06-15 | Stop reason: HOSPADM

## 2022-06-14 RX ORDER — METFORMIN HYDROCHLORIDE 500 MG/1
1000 TABLET ORAL 2 TIMES DAILY WITH MEALS
COMMUNITY

## 2022-06-14 RX ORDER — ONDANSETRON 4 MG/1
4 TABLET, ORALLY DISINTEGRATING ORAL
Status: DISCONTINUED | OUTPATIENT
Start: 2022-06-14 | End: 2022-06-15 | Stop reason: HOSPADM

## 2022-06-14 RX ORDER — CLOBETASOL PROPIONATE 0.5 MG/G
CREAM TOPICAL 2 TIMES DAILY
COMMUNITY

## 2022-06-14 RX ORDER — LISINOPRIL 5 MG/1
2.5 TABLET ORAL DAILY
Status: DISCONTINUED | OUTPATIENT
Start: 2022-06-15 | End: 2022-06-15 | Stop reason: HOSPADM

## 2022-06-14 RX ORDER — HYDROXYZINE PAMOATE 25 MG/1
25 CAPSULE ORAL
Status: DISCONTINUED | OUTPATIENT
Start: 2022-06-14 | End: 2022-06-15 | Stop reason: HOSPADM

## 2022-06-14 RX ORDER — SODIUM CHLORIDE 0.9 % (FLUSH) 0.9 %
5-40 SYRINGE (ML) INJECTION EVERY 8 HOURS
Status: DISCONTINUED | OUTPATIENT
Start: 2022-06-14 | End: 2022-06-15 | Stop reason: HOSPADM

## 2022-06-14 RX ORDER — LEVETIRACETAM 250 MG/1
1000 TABLET ORAL EVERY 12 HOURS
Status: DISCONTINUED | OUTPATIENT
Start: 2022-06-14 | End: 2022-06-14

## 2022-06-14 RX ORDER — ALBUTEROL SULFATE 2.5 MG/.5ML
2.5 SOLUTION RESPIRATORY (INHALATION)
Status: DISCONTINUED | OUTPATIENT
Start: 2022-06-14 | End: 2022-06-15 | Stop reason: HOSPADM

## 2022-06-14 RX ORDER — ACETAMINOPHEN 650 MG/1
650 SUPPOSITORY RECTAL
Status: DISCONTINUED | OUTPATIENT
Start: 2022-06-14 | End: 2022-06-15 | Stop reason: HOSPADM

## 2022-06-14 RX ORDER — METFORMIN HYDROCHLORIDE 500 MG/1
1000 TABLET ORAL
Status: DISCONTINUED | OUTPATIENT
Start: 2022-06-16 | End: 2022-06-15 | Stop reason: HOSPADM

## 2022-06-14 RX ADMIN — IOPAMIDOL 100 ML: 755 INJECTION, SOLUTION INTRAVENOUS at 13:09

## 2022-06-14 RX ADMIN — SODIUM CHLORIDE, PRESERVATIVE FREE 10 ML: 5 INJECTION INTRAVENOUS at 22:35

## 2022-06-14 NOTE — ACP (ADVANCE CARE PLANNING)
Advance Care Planning   Healthcare Decision Maker:       Primary Decision Maker: Whitney Curtis - Daughter - [phone]    Secondary Decision Maker: Mary Spurling - Son - [phone]

## 2022-06-14 NOTE — PROGRESS NOTES
Medicare Outpatient Observation Notice (MOON)/ [place] Outpatient Observation Notice (Ulises Thakkar) provided to patient/representative with verbal explanation of the notice. Time allotted for questions regarding the notice. Patient /representative provided a completed copy of the MOON/VOON notice. Copy placed on bedside chart. Pt & son in law Shannon Fish - [phone] - (wife's number ) informed & son in law signed.

## 2022-06-14 NOTE — H&P
History and Physical    Patient: Melissa Camara MRN: [id number]  SSN: [id number]    YOB: 1935  Age: 81 y.o. Sex: male      Subjective:      Melissa Camara is a 81 y.o. male with a past medical history for hemorrhagic stroke x2, diabetes mellitus, and hypertension presenting to the ED for a primary complaint of slurred speech. Family at bedside states patient was normal at oh 9:30 AM.  Family member went to another room to rest and when she returned saw the patient stand up, state \" I think I need to go to the ER\" and proceeded to fall backwards into a chair. Patient denies loss of consciousness, but does report dizziness at the time. Did not hit his head. Family checked patient's blood sugar which was 156 and subsequently to 299. Family member noticed some slurred speech afterwards. Patient denies any headache, visual changes, chest pain, palpitations, shortness of breath, cough, nausea, vomiting, abdominal pain, dysuria. In the ED, hemodynamically stable. Significant initial labs showing WBC 14.8. Glucose 285. CT head negative for acute intracranial abnormality. CTA head and neck pending. Neuro consult. Past Medical History:   Diagnosis Date    Anxiety     Bradycardia     Cancer ([address]     COLON    Diabetes ([address]     TYPE II    Gout     Hypertension     Pacemaker     Psoriasis     SCALP AND LT.  EAR     Past Surgical History:   Procedure Laterality Date    HX CATARACT REMOVAL Bilateral     HX GI      COLONOSCOPY    HX HERNIA REPAIR      UMBILICAL    HX PACEMAKER      HX RETINAL DETACHMENT REPAIR Right     HX TONSILLECTOMY      HX TOTAL COLECTOMY  12'S    1 FOOT REMOVED      Family History   Problem Relation Age of Onset    Liver Disease Mother 27        JAUNDICE    Heart Disease Father     Heart Attack Father     No Known Problems Sister     Alcohol abuse Brother     Heart Disease Brother     Muscular dystrophy Brother         FROM THEIR MOTHER, PT STEPMOM    Muscular dystrophy Brother         FROM THEIR MOTHER, PT STEPMOM    Muscular dystrophy Brother         FROM THEIR MOTHER, PT STEPMOM    No Known Problems Sister     Breast Cancer Daughter     Anesth Problems Neg Hx      Social History     Tobacco Use    Smoking status: Former Smoker     Packs/day: 1.00     Years: 10.00     Pack years: 10.00     Quit date: 1965     Years since quittin.9    Smokeless tobacco: Never Used   Substance Use Topics    Alcohol use: No     Comment: NOT SINCE       Prior to Admission medications    Medication Sig Start Date End Date Taking? Authorizing Provider   acetaminophen (TYLENOL) 325 mg tablet Take 2 Tablets by mouth every four (4) hours as needed for Pain or Fever (headache). 22   Shira Handy MD   levETIRAcetam 1,000 mg tablet Take 1 Tablet by mouth every twelve (12) hours. 22   Shira Handy MD   simvastatin (Zocor) 20 mg tablet Take 20 mg by mouth nightly. Other, MD Pravin   albuterol sulfate (PROVENTIL;VENTOLIN) 2.5 mg/0.5 mL nebu nebulizer solution 2.5 mg by Nebulization route every eight (8) hours as needed for Wheezing. Other, MD Pravin   hydrOXYzine pamoate (VistariL) 25 mg capsule Take 1 Capsule by mouth two (2) times daily as needed for Itching. As needed for anxiety 10/28/21   Pennie Palafox MD   atorvastatin (LIPITOR) 80 mg tablet Take 1 Tablet by mouth nightly. Patient not taking: Reported on 10/28/2021 7/29/21   Aldo Hull MD   amiodarone (CORDARONE) 200 mg tablet Take 100 mg by mouth daily. 3/19/18   Provider, Historical   metoprolol succinate (TOPROL-XL) 25 mg XL tablet Take 25 mg by mouth daily. 3/19/18   Provider, Historical   lisinopril (PRINIVIL, ZESTRIL) 2.5 mg tablet Take 2.5 mg by mouth daily. Provider, Historical   allopurinol (ZYLOPRIM) 100 mg tablet Take 200 mg by mouth daily. Provider, Historical   metFORMIN (GLUCOPHAGE) 1,000 mg tablet Take 1,000 mg by mouth nightly. Provider, Historical   busPIRone (BUSPAR) 15 mg tablet Take 15 mg by mouth two (2) times a day. Provider, Historical   glimepiride (AMARYL) 1 mg tablet Take 1 mg by mouth Daily (before breakfast). Patient not taking: Reported on 10/28/2021    Provider, Historical   PARoxetine (PAXIL) 10 mg tablet Take 10 mg by mouth daily. Provider, Historical   multivitamin (ONE A DAY) tablet Take 1 Tab by mouth daily. 50+    Provider, Historical        Allergies   Allergen Reactions    Monosodium Glutamate Anaphylaxis and Hives    Adhesive Tape-Silicones Other (comments)     BLISTERS       Review of Systems:  Constitutional: No fevers, No chills, No fatigue, No weakness  Eyes: No visual disturbance  ENT: No nasal congestion, No sore throat  Respiratory: No cough, No sputum, No wheezing, No SOB  Cardiovascular: No chest pain, No lower extremity edema, No Palpitations   Gastrointestinal: No nausea, No vomiting, No diarrhea, No constipation, No abdominal pain  Genitourinary: No frequency, No dysuria, No hematuria  Integument/Breast: No rash, No skin lesion(s), No dryness  Musculoskeletal: No arthralgias, No neck pain, No back pain  Neurological: No headaches, + dizziness, + slurred speech, No confusion,  No seizures  Behavioral/Psychiatric: No anxiety, No depression      Objective:     Vitals:    06/14/22 1230 06/14/22 1430   BP: 114/68 116/69   Pulse: 60 60   Resp: 16 16   Temp: 97.7 °F (36.5 °C)    SpO2: 94% 95%   Weight: 99.3 kg (219 lb)    Height: 5' 8\" (1.727 m)         Physical Exam:  General: alert, cooperative, no distress  Eye: conjunctivae/corneas clear. PERRL, EOM's intact. Throat and Neck: normal and no erythema or exudates noted. No mass   Lung: clear to auscultation bilaterally  Heart: regular rate and rhythm, +S1/S2. No murmur or gallop. Abdomen: soft, non-tender. Bowel sounds normal. No masses,  Extremities:  able to move all extremities normal, atraumatic  Skin: Normal.  Neurologic: AOx3.  Speech slow, not slurred. Strength 5/5 upper and lower extremities. Cranial nerves 2-12 and sensation grossly intact. Psychiatric: non focal    Recent Results (from the past 24 hour(s))   CBC WITH AUTOMATED DIFF    Collection Time: 06/14/22  1:00 PM   Result Value Ref Range    WBC 14.8 (H) 4.1 - 11.1 K/uL    RBC 4.41 4.10 - 5.70 M/uL    HGB 13.6 12.1 - 17.0 g/dL    HCT 41.7 36.6 - 50.3 %    MCV 94.6 80.0 - 99.0 FL    MCH 30.8 26.0 - 34.0 PG    MCHC 32.6 30.0 - 36.5 g/dL    RDW 14.2 11.5 - 14.5 %    PLATELET 027 (L) 724 - 400 K/uL    MPV 10.2 8.9 - 12.9 FL    NRBC 0.0 0.0  WBC    ABSOLUTE NRBC 0.00 0.00 - 0.01 K/uL    NEUTROPHILS 33 32 - 75 %    LYMPHOCYTES 61 (H) 12 - 49 %    MONOCYTES 5 5 - 13 %    EOSINOPHILS 1 0 - 7 %    BASOPHILS 0 0 - 1 %    IMMATURE GRANULOCYTES 0 0 - 0.5 %    ABS. NEUTROPHILS 4.9 1.8 - 8.0 K/UL    ABS. LYMPHOCYTES 8.9 (H) 0.8 - 3.5 K/UL    ABS. MONOCYTES 0.8 0.0 - 1.0 K/UL    ABS. EOSINOPHILS 0.2 0.0 - 0.4 K/UL    ABS. BASOPHILS 0.0 0.0 - 0.1 K/UL    ABS. IMM. GRANS. 0.1 (H) 0.00 - 0.04 K/UL    DF AUTOMATED     METABOLIC PANEL, COMPREHENSIVE    Collection Time: 06/14/22  1:00 PM   Result Value Ref Range    Sodium 142 136 - 145 mmol/L    Potassium 5.0 3.5 - 5.1 mmol/L    Chloride 109 (H) 97 - 108 mmol/L    CO2 26 21 - 32 mmol/L    Anion gap 7 5 - 15 mmol/L    Glucose 285 (H) 65 - 100 mg/dL    BUN 26 (H) 6 - 20 mg/dL    Creatinine 1.25 0.70 - 1.30 mg/dL    BUN/Creatinine ratio 21 (H) 12 - 20      GFR est AA >60 >60 ml/min/1.73m2    GFR est non-AA 55 (L) >60 ml/min/1.73m2    Calcium 8.7 8.5 - 10.1 mg/dL    Bilirubin, total 1.6 (H) 0.2 - 1.0 mg/dL    AST (SGOT) 20 15 - 37 U/L    ALT (SGPT) 27 12 - 78 U/L    Alk.  phosphatase 65 45 - 117 U/L    Protein, total 5.5 (L) 6.4 - 8.2 g/dL    Albumin 3.2 (L) 3.5 - 5.0 g/dL    Globulin 2.3 2.0 - 4.0 g/dL    A-G Ratio 1.4 1.1 - 2.2     PROTHROMBIN TIME + INR    Collection Time: 06/14/22  1:00 PM   Result Value Ref Range    Prothrombin time 14.5 11.9 - 14.6 sec INR 1.1 0.9 - 1.1     PTT    Collection Time: 06/14/22  1:00 PM   Result Value Ref Range    aPTT 36.2 (H) 21.2 - 34.1 sec    aPTT, therapeutic range   82 - 109 sec   TROPONIN-HIGH SENSITIVITY    Collection Time: 06/14/22  1:00 PM   Result Value Ref Range    Troponin-High Sensitivity 10 0 - 76 ng/L       XR Results (maximum last 3): Results from Hospital Encounter encounter on 07/28/21    XR CHEST PORT    Narrative  Clinical history: Dizziness    Comparison: None. Findings:  Single view chest. Left chest dual-lead cardiac pacer device in place. The lungs  are adequately expanded and clear. No pleural effusion or pneumothorax. The  cardiac silhouette is normal in size. Minimal aortic atherosclerosis. No  pulmonary edema. The osseous structures are intact. Impression  No acute cardiopulmonary abnormality. Results from [facility] encounter on 07/29/16    XR CHEST PA LAT    Narrative  **Final Report**      ICD Codes / Adm. Diagnosis:    /  Examination:  CR CHEST PA AND LATERAL  - [id number] - Jul 29 2016  3:52PM  Accession No:  [id number]  Reason:  PRE-OP 8/4/16 LT. TYMPANOMASTTOIDECTOMY  HX EX SMOKER, HTN      REPORT:  Chest PA and lateral    History: Preoperative evaluation for myringotomy. History of colon cancer  and hypertension    Comparison: None    Findings:  Curvilinear opacities in the lung bases are likely related to  discoid atelectasis versus scarring. No focal consolidation, pleural  effusion, or pneumothorax. The cardiomediastinal silhouette is  unremarkable. The visualized osseous structures are unremarkable. Impression  :  No acute cardiopulmonary process. Signing/Reading Doctor: Ailyn Soto ([id number])  Approved: Ailyn Soto ([id number])  Jul 29 2016  4:00PM      CT Results (maximum last 3):   Results from Hospital Encounter encounter on 06/14/22    CT CODE NEURO HEAD WO CONTRAST    Narrative  CT SCAN OF THE BRAIN WITHOUT CONTRAST:        CLINICAL HISTORY: CVA    Thin axial and coronal and sagittal reconstructions were obtained. All CT scans at this facility are performed using dose reduction optimization  techniques as appropriate to a performed exam including the following: Automated  exposure control, adjustments of the mA and/or kV according to patient size, or  use of iterative reconstruction technique. Ventricles are midline. Mild central and cortical atrophy is noted. Microvascular ischemic changes and chronic lacunar infarcts are noted. No intra  or extra-axial hemorrhage or acute infarction in a major arterial distribution  is demonstrated. No recurrent subdural hematoma on the left is noted. Left  frontal craniotomy and aury holes are again noted. Empty sella is again noted. Scans through the posterior fossa show no major infarction, mass or hemorrhage. 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 Visualized paranasal sinuses are clear. Mastoid air cells are clear. Both orbits have a normal CT appearance. Impression  No acute intracranial abnormality. Report has been communicated to the emergency room nurse supervisor. [address] Results from [facility] encounter on 01/20/22    CT HEAD WO CONT    Narrative  EXAM: CT HEAD WO CONT    INDICATION: monitor progression of SAH    COMPARISON: 1/20/2022. CONTRAST: None. TECHNIQUE: Unenhanced CT of the head was performed using 5 mm images. Brain and  bone windows were generated. Coronal and sagittal reformats. CT dose reduction  was achieved through use of a standardized protocol tailored for this  examination and automatic exposure control for dose modulation. FINDINGS:  There is improved shift to the right, which measures 3.9 mm, previously 7.7 mm. Periventricular hypoattenuation compatible with chronic small vessel ischemic  changes.  The subdural hematoma is smaller status post drain placement and  measures 2.0 cm, previously 3.1 cm there is a small amount of acute hemorrhage  in the subdural space. The basilar cisterns are open. No CT evidence of acute  infarct. There is scalp soft tissue swelling. The bone windows demonstrate left frontal craniectomy. The visualized portions  of the paranasal sinuses and mastoid air cells are clear. Impression  Improved subdural hematoma status post drain placement. Results from [facility] encounter on 01/19/22    CT HEAD WO CONT    Narrative  Indication: Fall. Right ear bleeding. Dose reduction: All CT scans done at this facility are performed using dose  reduction optimization techniques as appropriate to a performed exam including  the following: Automated exposure control, adjustments of the mA and/or kV  according to patient size, or use of iterative reconstruction technique. CT head unenhanced 19 January 2022. Comparison 29 July 2021. Interval large left frontal mixed primarily low density extra-axial likely  subdural fluid collection with significant mass effect, effacing the adjacent  sulci and left lateral ventricle, with midline shift to the right of 8 mm. There  are several areas of higher density in the periphery of the lesion suggesting  acute on chronic hemorrhage. Collection measures 3.2 cm transverse. No transtentorial herniation. Cerebral atrophy. Left mastoidectomy changes again noted. Normal right mastoid, right middle ear. Right optical globe banding again noted. Bilateral absent native lens. Impression  Large left frontal subdural mixed density collection with  significant mass effect, probably acute on chronic subdural hematoma. I called the report to Dr. Linda Henson 10:45 PM.      MRI Results (maximum last 3): No results found for this or any previous visit. Nuclear Medicine Results (maximum last 3): No results found for this or any previous visit. US Results (maximum last 3):   No results found for this or any previous visit. Active Problems:    Slurred speech (6/14/2022)        Assessment/Plan:     1. Slurred speech  - Speech slow, not slurred. No other neurologic deficits on exam.  - Neuro consult  - Neuro checks  - Speech consult    2. Dizziness  - CT head negative for acute intracranial abnormality  - CTA head and neck pending  - Urinalysis pending  - Orthostatics   - Neuro consult    3. Hx hemorrhagic stroke x2   - Continue atorvastatin  - Neurology consult     4. Leukocytosis  - Per family member, chronically elevated  - Monitor    5. Diabetes mellitus   - Continue metformin  - ISS and accu-checks    6. Hypertension  - Stable. Permissive hypertension for now. - Hold home amiodarone, lisinopril and metoprolol    7.  Parkinson Disease  - Patient no longer taking medication    DVT Prophylaxis: SCDs   Code Status: Full  POA    Total Time >55 minutes      Signed By: Mamadou Rudolph PA-C     June 14, 2022

## 2022-06-14 NOTE — ED PROVIDER NOTES
EMERGENCY DEPARTMENT HISTORY AND PHYSICAL EXAM        Date: 6/14/2022  Patient Name: Chacorta Silva    History of Presenting Illness     Chief Complaint   Patient presents with    Dysarthria       History Provided By: Patient's family    HPI: Chacorta Silva, 81 y.o. male with history of hypertension, diabetes, and hemorrhagic stroke who presents with slurred speech. Symptoms started sometime this morning. Last known normal was 9:30 AM.  Family noticed difficulty with speech around 11 AM.  No weakness or difficulty walking. No other associated symptoms. Patient denies headache.     PCP: Joaquín Mcginnis DO    Current Facility-Administered Medications   Medication Dose Route Frequency Provider Last Rate Last Admin    sodium chloride (NS) flush 5-40 mL  5-40 mL IntraVENous Q8H Shana Hansen PA-C   10 mL at 06/15/22 0534    sodium chloride (NS) flush 5-40 mL  5-40 mL IntraVENous PRN Shana Hansen PA-C        acetaminophen (TYLENOL) tablet 650 mg  650 mg Oral Q6H PRN Shana Hansen PA-C        Or    acetaminophen (TYLENOL) suppository 650 mg  650 mg Rectal Q6H PRN Shana Hansen PA-C        polyethylene glycol (MIRALAX) packet 17 g  17 g Oral DAILY PRN Shana Hansen PA-C        ondansetron (ZOFRAN ODT) tablet 4 mg  4 mg Oral Q8H PRN Shana Hansen PA-C        Or    [facility]) [facility] 4 mg  4 mg IntraVENous Q6H PRN Shana Hansen PA-C        albuterol CONCENTRATE 2.5mg/0.5 mL neb soln  2.5 mg Nebulization Q8H PRN Shana Hansen PA-C        [Held by provider] amiodarone (CORDARONE) tablet 100 mg  100 mg Oral DAILY Shana Hansen PA-C        atorvastatin (LIPITOR) tablet 80 mg  80 mg Oral QHS Shana Hansen PA-C        hydrOXYzine pamoate (VISTARIL) capsule 25 mg  25 mg Oral BID PRN Shana Hansen PA-C        [Held by provider] lisinopriL (PRINIVIL, ZESTRIL) tablet 2.5 mg  2.5 mg Oral DAILY Shana Hansen PA-C        [START ON 2022] metFORMIN (GLUCOPHAGE) tablet 1,000 mg  1,000 mg Oral QHS Pramod Orourke PA-C        [Held by provider] metoprolol succinate (TOPROL-XL) XL tablet 25 mg  25 mg Oral DAILY Pramod Orourke PA-C           Past History     Past Medical History:  Past Medical History:   Diagnosis Date    Aneurysm ([address]     Anxiety     Bradycardia     Cancer [address] [address]     COLON    Diabetes ([facility] [address]     TYPE II    Gout     Hypertension     Ill-defined condition     parkinsons disease    Pacemaker     Psoriasis     SCALP AND LT. EAR    Psychiatric disorder     depression and anxiety    Stroke [facility])        Past Surgical History:  Past Surgical History:   Procedure Laterality Date    HX CATARACT REMOVAL Bilateral     HX GI      COLONOSCOPY    HX HERNIA REPAIR      UMBILICAL    HX PACEMAKER      HX RETINAL DETACHMENT REPAIR Right     HX TONSILLECTOMY      HX TOTAL COLECTOMY      1 FOOT REMOVED       Family History:  Family History   Problem Relation Age of Onset    Liver Disease Mother 27        JAUNDICE    Heart Disease Father     Heart Attack Father     No Known Problems Sister     Alcohol abuse Brother     Heart Disease Brother     Muscular dystrophy Brother         FROM THEIR MOTHER, PT STEPMOM    Muscular dystrophy Brother         FROM THEIR MOTHER, PT STEPMOM    Muscular dystrophy Brother         FROM THEIR MOTHER, PT STEPMOM    No Known Problems Sister     Breast Cancer Daughter     Anesth Problems Neg Hx        Social History:  Social History     Tobacco Use    Smoking status: Former Smoker     Packs/day: 1.00     Years: 10.00     Pack years: 10.00     Quit date: 1965     Years since quittin.9    Smokeless tobacco: Never Used   Vaping Use    Vaping Use: Never used   Substance Use Topics    Alcohol use: No     Comment: NOT SINCE     Drug use: No       Allergies:   Allergies   Allergen Reactions    Monosodium Glutamate Anaphylaxis and Hives    Adhesive Tape-Silicones Other (comments)     BLISTERS       Review of Systems   Review of Systems   Constitutional: Negative for fever. HENT: Negative for congestion. Eyes: Negative for visual disturbance. Respiratory: Negative for shortness of breath. Cardiovascular: Negative for chest pain. Gastrointestinal: Negative for abdominal pain. Genitourinary: Negative for dysuria. Musculoskeletal: Negative for arthralgias. Skin: Negative for rash. Neurological: Positive for speech difficulty. Negative for headaches. Physical Exam   Constitutional: No acute distress. Well-nourished. Skin: No rash. ENT: No rhinorrhea. No cough. Head is normocephalic and atraumatic. Eye: No proptosis or conjunctival injections. Respiratory: No apparent respiratory distress. Gastrointestinal: Nondistended. Musculoskeletal: No obvious bony deformities. Neuro: Muffled and slightly slurred speech. Normal strength in the extremities. Diagnostic Study Results     Labs -     Recent Results (from the past 24 hour(s))   CBC WITH AUTOMATED DIFF    Collection Time: 06/14/22  1:00 PM   Result Value Ref Range    WBC 14.8 (H) 4.1 - 11.1 K/uL    RBC 4.41 4.10 - 5.70 M/uL    HGB 13.6 12.1 - 17.0 g/dL    HCT 41.7 36.6 - 50.3 %    MCV 94.6 80.0 - 99.0 FL    MCH 30.8 26.0 - 34.0 PG    MCHC 32.6 30.0 - 36.5 g/dL    RDW 14.2 11.5 - 14.5 %    PLATELET 283 (L) 011 - 400 K/uL    MPV 10.2 8.9 - 12.9 FL    NRBC 0.0 0.0  WBC    ABSOLUTE NRBC 0.00 0.00 - 0.01 K/uL    NEUTROPHILS 33 32 - 75 %    LYMPHOCYTES 61 (H) 12 - 49 %    MONOCYTES 5 5 - 13 %    EOSINOPHILS 1 0 - 7 %    BASOPHILS 0 0 - 1 %    IMMATURE GRANULOCYTES 0 0 - 0.5 %    ABS. NEUTROPHILS 4.9 1.8 - 8.0 K/UL    ABS. LYMPHOCYTES 8.9 (H) 0.8 - 3.5 K/UL    ABS. MONOCYTES 0.8 0.0 - 1.0 K/UL    ABS. EOSINOPHILS 0.2 0.0 - 0.4 K/UL    ABS. BASOPHILS 0.0 0.0 - 0.1 K/UL    ABS. IMM.  GRANS. 0.1 (H) 0.00 - 0.04 K/UL    DF AUTOMATED     METABOLIC PANEL, COMPREHENSIVE    Collection Time: 06/14/22  1:00 PM   Result Value Ref Range    Sodium 142 136 - 145 mmol/L    Potassium 5.0 3.5 - 5.1 mmol/L    Chloride 109 (H) 97 - 108 mmol/L    CO2 26 21 - 32 mmol/L    Anion gap 7 5 - 15 mmol/L    Glucose 285 (H) 65 - 100 mg/dL    BUN 26 (H) 6 - 20 mg/dL    Creatinine 1.25 0.70 - 1.30 mg/dL    BUN/Creatinine ratio 21 (H) 12 - 20      GFR est AA >60 >60 ml/min/1.73m2    GFR est non-AA 55 (L) >60 ml/min/1.73m2    Calcium 8.7 8.5 - 10.1 mg/dL    Bilirubin, total 1.6 (H) 0.2 - 1.0 mg/dL    AST (SGOT) 20 15 - 37 U/L    ALT (SGPT) 27 12 - 78 U/L    Alk.  phosphatase 65 45 - 117 U/L    Protein, total 5.5 (L) 6.4 - 8.2 g/dL    Albumin 3.2 (L) 3.5 - 5.0 g/dL    Globulin 2.3 2.0 - 4.0 g/dL    A-G Ratio 1.4 1.1 - 2.2     PROTHROMBIN TIME + INR    Collection Time: 06/14/22  1:00 PM   Result Value Ref Range    Prothrombin time 14.5 11.9 - 14.6 sec    INR 1.1 0.9 - 1.1     PTT    Collection Time: 06/14/22  1:00 PM   Result Value Ref Range    aPTT 36.2 (H) 21.2 - 34.1 sec    aPTT, therapeutic range   82 - 109 sec   TROPONIN-HIGH SENSITIVITY    Collection Time: 06/14/22  1:00 PM   Result Value Ref Range    Troponin-High Sensitivity 10 0 - 76 ng/L   CBC W/O DIFF    Collection Time: 06/14/22  4:27 PM   Result Value Ref Range    WBC 17.7 (H) 4.1 - 11.1 K/uL    RBC 4.34 4.10 - 5.70 M/uL    HGB 13.2 12.1 - 17.0 g/dL    HCT 41.4 36.6 - 50.3 %    MCV 95.4 80.0 - 99.0 FL    MCH 30.4 26.0 - 34.0 PG    MCHC 31.9 30.0 - 36.5 g/dL    RDW 14.4 11.5 - 14.5 %    PLATELET 052 (L) 839 - 400 K/uL    MPV 9.9 8.9 - 12.9 FL    NRBC 0.0 0.0  WBC    ABSOLUTE NRBC 0.00 0.00 - 0.01 K/uL   MAGNESIUM    Collection Time: 06/14/22  4:27 PM   Result Value Ref Range    Magnesium 1.8 1.6 - 2.4 mg/dL   GLUCOSE, POC    Collection Time: 06/14/22  4:48 PM   Result Value Ref Range    Glucose (POC) 151 (H) 65 - 117 mg/dL    Performed by MORAN MONCHO    METABOLIC PANEL, BASIC    Collection Time: 06/15/22  6:32 AM   Result Value Ref Range    Sodium 144 136 - 145 mmol/L    Potassium 4.9 3.5 - 5.1 mmol/L    Chloride 109 (H) 97 - 108 mmol/L    CO2 32 21 - 32 mmol/L    Anion gap 3 (L) 5 - 15 mmol/L    Glucose 117 (H) 65 - 100 mg/dL    BUN 23 (H) 6 - 20 mg/dL    Creatinine 1.11 0.70 - 1.30 mg/dL    BUN/Creatinine ratio 21 (H) 12 - 20      GFR est AA >60 >60 ml/min/1.73m2    GFR est non-AA >60 >60 ml/min/1.73m2    Calcium 8.8 8.5 - 10.1 mg/dL   CBC W/O DIFF    Collection Time: 06/15/22  6:32 AM   Result Value Ref Range    WBC 19.3 (H) 4.1 - 11.1 K/uL    RBC 4.43 4.10 - 5.70 M/uL    HGB 13.6 12.1 - 17.0 g/dL    HCT 42.1 36.6 - 50.3 %    MCV 95.0 80.0 - 99.0 FL    MCH 30.7 26.0 - 34.0 PG    MCHC 32.3 30.0 - 36.5 g/dL    RDW 14.4 11.5 - 14.5 %    PLATELET 672 (L) 604 - 400 K/uL    MPV 10.8 8.9 - 12.9 FL    NRBC 0.0 0.0  WBC    ABSOLUTE NRBC 0.00 0.00 - 0.01 K/uL       Radiologic Studies -   CT CODE NEURO HEAD WO CONTRAST   Final Result   No acute intracranial abnormality. Report has been communicated to the emergency room nurse supervisor. .               CTA CODE NEURO HEAD AND NECK W CONT   Final Result      1. No large vessel occlusion or high-grade intracranial stenosis. 2. No hemodynamically significant stenosis or cervical carotid or vertebral   arteries. All measurements are based on NASCET-like criteria. The results were discussed with/ relayed to Dr. Rosalind Sethi at the completion of   the performance of the examination. XR SWALLOW FUNC VIDEO    (Results Pending)     CT Results  (Last 48 hours)               06/14/22 1304  CT CODE NEURO HEAD WO CONTRAST Final result    Impression:  No acute intracranial abnormality. Report has been communicated to the emergency room nurse supervisor. .                   Narrative:  CT SCAN OF THE BRAIN WITHOUT CONTRAST:               CLINICAL HISTORY: CVA       Thin axial and coronal and sagittal reconstructions were obtained.        All CT scans at this facility are performed using dose reduction optimization techniques as appropriate to a performed exam including the following: Automated   exposure control, adjustments of the mA and/or kV according to patient size, or   use of iterative reconstruction technique. Ventricles are midline. Mild central and cortical atrophy is noted. Microvascular ischemic changes and chronic lacunar infarcts are noted. No intra   or extra-axial hemorrhage or acute infarction in a major arterial distribution   is demonstrated. No recurrent subdural hematoma on the left is noted. Left   frontal craniotomy and aury holes are again noted. Empty sella is again noted. Scans through the posterior fossa show no major infarction, mass or hemorrhage. 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 Visualized paranasal sinuses are clear. Mastoid air cells are clear. Both orbits have a normal CT appearance. 06/14/22 1304  CTA CODE NEURO HEAD AND NECK W CONT Final result    Impression:      1. No large vessel occlusion or high-grade intracranial stenosis. 2. No hemodynamically significant stenosis or cervical carotid or vertebral   arteries. All measurements are based on NASCET-like criteria. The results were discussed with/ relayed to Dr. Ivy Neville at the completion of   the performance of the examination. Narrative:  [place] - CODE NEURO CTA HEAD AND NECK:       HISTORY: Stroke       COMPARISON: CTA head/neck, 7/28/2021. Technique: Axial images were obtained through the brain and neck following IV   administration of contrast. CT angiography was performed of the head and neck   following additional bolus administration of contrast. Images of the head and   neck were separately reformatted in coronal, sagittal and axial planes.  In   addition, this institution utilizes the [employer] processing and   evaluation for acute stroke imaging and communication. CTA imaging analyzed by [facility] LVO ContaCT to enable computer-assisted triage   notification to rapidly detect a LVO and shorten time to notification via secure   communication to neurology and ED. All CT scans at this facility are performed using dose reduction optimization   techniques as appropriate to a performed exam including the following: Automated   exposure control, adjustments of the mA and/or kV according to patient size, or   use of iterative reconstruction technique. Contrast: Isovue-370 100 cc       Please note that this CT evaluation is optimized for examination of the vascular   structures and minor/chronic degenerative, bony, or other findings that do not   impact the intended evaluation and management of this patient will not be   included in this report. **All stenosis measurements are based on NASCET-like criteria*       CTA HEAD AND NECK:   Technique: Axial images were obtained through the brain and neck following IV   administration of contrast. CT perfusion imaging was initially obtained and CT   angiography was performed of the head and neck following additional bolus   administration of contrast. Images of the head and neck were separately   reformatted in coronal, sagittal and axial planes. In addition, MIP images of   the head and neck  vessels were separately reconstructed at an independent CT   work station. Vessel analysis was also performed when required. CTA Head:   Petrous ICAs are widely patent. Calcification along the course of the cavernous   and supraclinoid ICAs without flow-limiting stenosis. Ophthalmic arteries are   visualized bilaterally. The anterior cerebral arteries are widely patent without flow-limiting stenosis. The middle and posterior cerebral arteries are patent without flow-limiting   stenosis.        Both intradural vertebral, vertebrobasilar junction, basilar artery are patent   without flow-limiting stenosis. Robust visualization of left PICA. The superior   cerebral arteries are visualized proximally. No CT identifiable aneurysm. Major dural venous sinuses are patent. CTA Neck:   Mild calcific atheromatous disease within the aortic arch. Three-vessel origin   of the aortic arch. Type III aortic arch configuration. Great vessel origins are   patent without flow-limiting stenosis. The visualized subclavian arteries are   patent noting suboptimal evaluation of the mid and distal portions of the left   subclavian artery due to streak artifact from hyperdense contrast in the   adjacent left subclavian vein. Common carotid arteries are normal in course and   caliber. ECA origins are patent. No hemodynamically significant stenosis involving the proximal cervical internal   carotid arteries by NASCET criteria (0%). Mid and distal cervical ICAs are   patent without flow-limiting stenosis. There is moderate tortuosity involving   the mid portions of both cervical internal carotid arteries. Codominant cervical vertebral arteries. Cervical vertebral arteries are patent   from origins level of skull base without flow-limiting stenosis. No dissection or pseudoaneurysm. Other Findings:   - A right scleral band is noted. Bilateral lens replacements. - Streak artifact from dental amalgam limits evaluation of the oropharyngeal   soft tissues. Multiple absent dentition.   - Multilevel cervical spondylosis including posterior disc abnormalities,   uncovertebral, and facet hypertrophy, contributing to varying degrees of spinal   canal or neural foraminal narrowing.   - Dependent changes noted visualized lungs. CXR Results  (Last 48 hours)    None          Medical Decision Making and ED Course     I reviewed the available vital signs, nursing notes, past medical history, past surgical history, family history, and social history.     Vital Signs - Reviewed the patient's vital signs. Patient Vitals for the past 12 hrs:   Temp Pulse Resp BP SpO2   06/15/22 0755 98.3 °F (36.8 °C) 61 18 121/70 96 %   06/15/22 0348 -- 61 -- -- --   06/15/22 0345 98 °F (36.7 °C) 61 18 117/72 94 %   06/15/22 0014 98.1 °F (36.7 °C) 60 18 (!) 142/72 99 %   06/15/22 0000 -- 60 -- -- --       Medical Decision Making:   Presented with speech difficulty. The differential diagnosis is stroke, TIA, aphasia, dementia. Work-up shows no acute stroke. Due to difficulty speaking patient will be admitted for further evaluation and CT scan. I did discuss this case with the neurologist.  Admitted to hospitalist.         Procedures     Critical Care Note:   12:31 PM  Amount of critical care time: 40 minutes  Impending deterioration: CNS  Associated risk factors: CNS Decompensation  Management: Bedside Assessment and Supervision of Care  Interpretation: CT Scan  Interventions: Observation  Case review: Neurology  Treatment response: Stable  Performed by: Self  Notes: I have spent critical care time involved in lab review, decision making, bedside attention, and documentation. This time excludes time spent in any separate billed procedures. During this entire length of time I was immediately available to the patient. Xander Greenberg DO    Disposition     Admitted    Diagnosis     Clinical impression:   1. Slurred speech           Attestation:  Please note that this dictation was completed with [employer], the computer voice recognition software. Quite often unanticipated grammatical, syntax, homophones, and other interpretive errors are inadvertently transcribed by the computer software. Please disregard these errors. Please excuse any errors that have escaped final proofreading. Thank you.   Xander Greenberg DO

## 2022-06-14 NOTE — ED NOTES
TRANSFER - OUT REPORT:    Verbal report given to Mckayla(name) on Willow Cranker  being transferred to (unit) for routine progression of care       Report consisted of patients Situation, Background, Assessment and   Recommendations(SBAR). Information from the following report(s) SBAR, ED Summary, MAR, Accordion, Recent Results and Med Rec Status was reviewed with the receiving nurse. Lines:   Peripheral IV 06/14/22 Left;Posterior Wrist (Active)       Peripheral IV 06/14/22 Posterior;Right Hand (Active)        Opportunity for questions and clarification was provided.       Patient transported with:   Monitor  Tech

## 2022-06-14 NOTE — ED NOTES
Pt presented to the ER with complaint of CVA. Pt's family stated that pt has had two hemorraghic strokes resulting from falls. Family stated pt normally has slurred speech but is minimal and no other stroke deficits. Family stated pt was last seen normal at 0930. Family stated checked his sugar before and after symptoms started 156 & 219. Family stated pt was at rehab and suddenly stated take me to the hospital and pt fell backwards into chair. Pt did not strike his head on anything or fall to floor. oriented to room and call bell,, cardiac monitoring initiated , spO2 Monitoring initiated , IV  initiated , call bell within reach, side rails up x2, resting comfortable but easily arousable, no signs of acute distress. , bed in lowest position, will continue to monitor      Airway: Patent, Managing oral secretions and No obstruction    Respiratory: Normal Rate , Normal Depth, No acute Distress and Speaking in full sentences    Cardiac: WNL    Neuro: AVPU Alert, A&O4/4 and Current Stroke Deficits Increased slurred speech per family    GI: Soft and Non-tender    : WNL    Muscle/Skeletal/Skin: WNL

## 2022-06-14 NOTE — PROGRESS NOTES
Problem: Dysphagia (Adult)  Goal: *Acute Goals and Plan of Care (Insert Text)  Description: Speech Therapy Goals  Initiated 6/14/2022  -Patient stated goal: pt is eager to eat  -Patient will participate in modified barium swallow study within 7 day(s). [ ] Not met  [ ]  MET   [ ] Progressing  [ ] Lauren Doom  -Patient will tolerate soft and bite size diet with thin liquids without signs/symptoms of aspiration given minimal cues within 7 day(s). [ ] Not met  [ ]  MET   [ ] Progressing  [ ] Lauren Doom  -Patient will demonstrate understanding of swallow safety precautions and aspiration precautions, diet recs with minimal cues within 7 day(s). [ ] Not met  [ ]  MET   [ ] Progressing  [ ] Discontinue  Outcome: Not Met   SPEECH [address] EVALUATION  Patient: Blaire Arteaga (54 y.o. male)  Date: 6/14/2022  Primary Diagnosis: Slurred speech [R47.81]        Precautions: aspiration       ASSESSMENT :  Based on the objective data described below, the patient presents with mild oropharyngeal dysphagia. Pt seen for bedside sw evaluation, in ED. Pt being admitted for stroke workup due to slurred speech. 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 Pt is s/p Left frontal craniotomy. Pt's daughter reports pt has been receiving HH SLP after IRF stay and was recently d/c from speech on chopped/thin diet with slow rate. Previous SLP report from [facility] 1- 1132 by Jose Roblero, SLP reviewed. Pt reportedly received FEES at that time with aspiration with all liquids noted. Pt with oral motor function WFL. Some missing dentition. Pt is [place] with right hearing aid only. Pt benefits from slowed speech and increased amplitude provided. Vocal quality WFL for pt's age, min breathiness noted. Pt's speech is dysarthric, reportedly worse than baseline.  Language appears intact with limited responses. Pt given trials of thin via cup, puree and soft solids. Oral phase functional for trial provided. Pharyngeal phase with min delay, appears WFL once initiated. Pt with cough prior to initial swallow of thin, otherwise, pt tolerates trials without overt s/s aspiration. CT head results reviewed, below. Patient will benefit from skilled intervention to address the above impairments. Patients rehabilitation potential is considered to be Good     PLAN :  Recommendations and Planned Interventions: At this time, recommend resume home diet of soft and bite size/thin with 1:1 assistance with ALL PO intake, STRICT aspiration and GERD precautions, monitor pt closely for s/s aspiration, meds crushed if able in pudding. Given hx of dysphagia with aspiration as well as change in speech production, recommend MBS to further assess sw function and safety. Recommend complete speech/language assessment as indicated pending pt's progress. Pt and family are agreeable with above diet recs and MBS recs. Purpose and protocol of MBS discussed with pt and family. Frequency/Duration: Patient will be followed by speech-language pathology 5 times a week to address goals. Discharge Recommendations: cont SLP tx at this time. SUBJECTIVE:   Patient alert, agreeble, pleasant. Pt's daughter Hoeny Quintanilla and son in law Nathan Alicea present with pt's consent. Pt's daughter provides information as well. OBJECTIVE:     CT Results  (Last 48 hours)                 06/14/22 1304  CT CODE NEURO HEAD WO CONTRAST Final result    Impression:  No acute intracranial abnormality. Report has been communicated to the emergency room nurse supervisor. .                   Narrative:  CT SCAN OF THE BRAIN WITHOUT CONTRAST:               CLINICAL HISTORY: CVA       Thin axial and coronal and sagittal reconstructions were obtained.        All CT scans at this facility are performed using dose reduction optimization   techniques as appropriate to a performed exam including the following: Automated   exposure control, adjustments of the mA and/or kV according to patient size, or   use of iterative reconstruction technique. Ventricles are midline. Mild central and cortical atrophy is noted. Microvascular ischemic changes and chronic lacunar infarcts are noted. No intra   or extra-axial hemorrhage or acute infarction in a major arterial distribution   is demonstrated. No recurrent subdural hematoma on the left is noted. Left   frontal craniotomy and aury holes are again noted. Empty sella is again noted. Scans through the posterior fossa show no major infarction, mass or hemorrhage. 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 Visualized paranasal sinuses are clear. Mastoid air cells are clear. Both orbits have a normal CT appearance. Past Medical History:   Diagnosis Date    Anxiety     Bradycardia     Cancer ([address]     COLON    Diabetes ([address]     TYPE II    Gout     Hypertension     Pacemaker     Psoriasis     SCALP AND LT. EAR     Past Surgical History:   Procedure Laterality Date    HX CATARACT REMOVAL Bilateral     HX GI      COLONOSCOPY    HX HERNIA REPAIR      UMBILICAL    HX PACEMAKER      HX RETINAL DETACHMENT REPAIR Right     HX TONSILLECTOMY      HX TOTAL COLECTOMY  1908'S    1 FOOT REMOVED     Prior Level of Function/Home Situation:   Home Situation  Support Systems: Child(zayra) (Daughter & son in law. )  Patient Expects to be Discharged to[de-identified] Home (Daughter & her  lives with pt.  Call RXs into [facility] upon discharge. )  Diet prior to admission: soft and bite size/thin  Current Diet:  NPO   Cognitive and Communication Status:  Neurologic State: Alert  Orientation Level: Oriented to person,Oriented to place    Pain:  0    After treatment:   Patient left in no apparent distress in bed, Call bell within reach, Nursing notified, and Caregiver / family present    COMMUNICATION/EDUCATION:   Patient was educated regarding purpose of SLP assessment, POC, diet recs and sw safety precautions. Patient demonstrated Good understanding as evidenced by verbal responsiveness. The patient's plan of care including recommendations, planned interventions, and recommended diet changes were discussed with:  PA .     Patient/family have participated as able in goal setting and plan of care. Patient/family agree to work toward stated goals and plan of care.     Thank you for this referral.  David Lerma M.S. CCC-SLP  Time Calculation: 14 mins

## 2022-06-14 NOTE — PROGRESS NOTES
Reason for Admission:  Slurred Speech                     RUR Score:  N/A                   Plan for utilizing home health:    None 2 this time - awaiting therapy evals. Has cane/walker/w/c if needed. PCP: First and Last name:  Flor Taylor DO     Name of Practice:    Are you a current patient: Yes/No: Yes   Approximate date of last visit: Seen 2 mos ago. Can you participate in a virtual visit with your PCP: No, pt very [place]. Current Advanced Directive/Advance Care Plan: Full Code      Healthcare Decision Maker:              Primary Decision Maker: Neal Farley - Daughter - [phone]    Secondary Decision Maker: Constanza Bejarano - Son - [phone]                  Transition of Care Plan:  D/C Plan is home with daughter/family & they will transport home. Call Rxs into [facility] upon discharge.

## 2022-06-15 ENCOUNTER — APPOINTMENT (OUTPATIENT)
Dept: GENERAL RADIOLOGY | Age: 87
End: 2022-06-15
Attending: STUDENT IN AN ORGANIZED HEALTH CARE EDUCATION/TRAINING PROGRAM
Payer: MEDICARE

## 2022-06-15 VITALS
DIASTOLIC BLOOD PRESSURE: 70 MMHG | RESPIRATION RATE: 18 BRPM | OXYGEN SATURATION: 96 % | HEIGHT: 68 IN | TEMPERATURE: 98.3 F | SYSTOLIC BLOOD PRESSURE: 121 MMHG | WEIGHT: 219 LBS | HEART RATE: 61 BPM | BODY MASS INDEX: 33.19 KG/M2

## 2022-06-15 LAB
ANION GAP SERPL CALC-SCNC: 3 MMOL/L (ref 5–15)
ATRIAL RATE: 60 BPM
BUN SERPL-MCNC: 23 MG/DL (ref 6–20)
BUN/CREAT SERPL: 21 (ref 12–20)
CA-I BLD-MCNC: 8.8 MG/DL (ref 8.5–10.1)
CALCULATED R AXIS, ECG10: 16 DEGREES
CALCULATED T AXIS, ECG11: 31 DEGREES
CHLORIDE SERPL-SCNC: 109 MMOL/L (ref 97–108)
CO2 SERPL-SCNC: 32 MMOL/L (ref 21–32)
CREAT SERPL-MCNC: 1.11 MG/DL (ref 0.7–1.3)
DIAGNOSIS, 93000: NORMAL
ERYTHROCYTE [DISTWIDTH] IN BLOOD BY AUTOMATED COUNT: 14.4 % (ref 11.5–14.5)
GLUCOSE SERPL-MCNC: 117 MG/DL (ref 65–100)
HCT VFR BLD AUTO: 42.1 % (ref 36.6–50.3)
HGB BLD-MCNC: 13.6 G/DL (ref 12.1–17)
MCH RBC QN AUTO: 30.7 PG (ref 26–34)
MCHC RBC AUTO-ENTMCNC: 32.3 G/DL (ref 30–36.5)
MCV RBC AUTO: 95 FL (ref 80–99)
NRBC # BLD: 0 K/UL (ref 0–0.01)
NRBC BLD-RTO: 0 PER 100 WBC
P-R INTERVAL, ECG05: 304 MS
PLATELET # BLD AUTO: 140 K/UL (ref 150–400)
PMV BLD AUTO: 10.8 FL (ref 8.9–12.9)
POTASSIUM SERPL-SCNC: 4.9 MMOL/L (ref 3.5–5.1)
Q-T INTERVAL, ECG07: 462 MS
QRS DURATION, ECG06: 96 MS
QTC CALCULATION (BEZET), ECG08: 462 MS
RBC # BLD AUTO: 4.43 M/UL (ref 4.1–5.7)
SODIUM SERPL-SCNC: 144 MMOL/L (ref 136–145)
VENTRICULAR RATE, ECG03: 60 BPM
WBC # BLD AUTO: 19.3 K/UL (ref 4.1–11.1)

## 2022-06-15 PROCEDURE — 80048 BASIC METABOLIC PNL TOTAL CA: CPT

## 2022-06-15 PROCEDURE — 85027 COMPLETE CBC AUTOMATED: CPT

## 2022-06-15 PROCEDURE — 92611 MOTION FLUOROSCOPY/SWALLOW: CPT

## 2022-06-15 PROCEDURE — 36415 COLL VENOUS BLD VENIPUNCTURE: CPT

## 2022-06-15 PROCEDURE — 97161 PT EVAL LOW COMPLEX 20 MIN: CPT

## 2022-06-15 PROCEDURE — 74011000250 HC RX REV CODE- 250: Performed by: STUDENT IN AN ORGANIZED HEALTH CARE EDUCATION/TRAINING PROGRAM

## 2022-06-15 PROCEDURE — 74011250637 HC RX REV CODE- 250/637: Performed by: INTERNAL MEDICINE

## 2022-06-15 PROCEDURE — 74011000250 HC RX REV CODE- 250: Performed by: INTERNAL MEDICINE

## 2022-06-15 PROCEDURE — G0378 HOSPITAL OBSERVATION PER HR: HCPCS

## 2022-06-15 PROCEDURE — 97165 OT EVAL LOW COMPLEX 30 MIN: CPT

## 2022-06-15 PROCEDURE — 74230 X-RAY XM SWLNG FUNCJ C+: CPT

## 2022-06-15 PROCEDURE — 74011250637 HC RX REV CODE- 250/637: Performed by: PSYCHIATRY & NEUROLOGY

## 2022-06-15 PROCEDURE — 97530 THERAPEUTIC ACTIVITIES: CPT

## 2022-06-15 RX ORDER — METFORMIN HYDROCHLORIDE 1000 MG/1
1000 TABLET ORAL
Qty: 30 TABLET | Refills: 0 | Status: SHIPPED | OUTPATIENT
Start: 2022-06-15 | End: 2022-07-15

## 2022-06-15 RX ORDER — LISINOPRIL 2.5 MG/1
2.5 TABLET ORAL DAILY
Qty: 30 TABLET | Refills: 0 | Status: SHIPPED | OUTPATIENT
Start: 2022-06-15 | End: 2022-07-15

## 2022-06-15 RX ORDER — HYDROXYZINE PAMOATE 25 MG/1
25 CAPSULE ORAL
Qty: 20 CAPSULE | Refills: 0 | Status: SHIPPED | OUTPATIENT
Start: 2022-06-15 | End: 2022-09-04

## 2022-06-15 RX ORDER — DOXYCYCLINE 100 MG/1
100 CAPSULE ORAL EVERY 12 HOURS
Qty: 14 CAPSULE | Refills: 0 | Status: SHIPPED | OUTPATIENT
Start: 2022-06-15 | End: 2022-06-22

## 2022-06-15 RX ORDER — DOXYCYCLINE 100 MG/1
100 CAPSULE ORAL EVERY 12 HOURS
Status: DISCONTINUED | OUTPATIENT
Start: 2022-06-15 | End: 2022-06-15 | Stop reason: HOSPADM

## 2022-06-15 RX ORDER — ASPIRIN 325 MG
325 TABLET ORAL DAILY
Qty: 30 TABLET | Refills: 0 | Status: SHIPPED | OUTPATIENT
Start: 2022-06-16 | End: 2022-07-16

## 2022-06-15 RX ORDER — ASPIRIN 325 MG
325 TABLET ORAL DAILY
Status: DISCONTINUED | OUTPATIENT
Start: 2022-06-15 | End: 2022-06-15 | Stop reason: HOSPADM

## 2022-06-15 RX ADMIN — BARIUM SULFATE 60 ML: 0.81 POWDER, FOR SUSPENSION ORAL at 10:00

## 2022-06-15 RX ADMIN — SODIUM CHLORIDE, PRESERVATIVE FREE 10 ML: 5 INJECTION INTRAVENOUS at 05:34

## 2022-06-15 RX ADMIN — BARIUM SULFATE 30 ML: 400 PASTE ORAL at 10:00

## 2022-06-15 RX ADMIN — DOXYCYCLINE HYCLATE 100 MG: 100 CAPSULE ORAL at 11:37

## 2022-06-15 RX ADMIN — ASPIRIN 325 MG ORAL TABLET 325 MG: 325 PILL ORAL at 10:53

## 2022-06-15 RX ADMIN — SODIUM CHLORIDE, PRESERVATIVE FREE 10 ML: 5 INJECTION INTRAVENOUS at 14:00

## 2022-06-15 RX ADMIN — BARIUM SULFATE 30 ML: 400 SUSPENSION ORAL at 10:00

## 2022-06-15 NOTE — PROGRESS NOTES
OT eval order received and acknowledged. OT eval attempted at 08:30 AM, however, pt REDDY for x-ray at this time. OT will continue to follow and eval at a later time. Thank you.

## 2022-06-15 NOTE — PROGRESS NOTES
PT order received, PT attempted at 8:36am, however pt currently off unit for testing. PT will continue to follow.

## 2022-06-15 NOTE — PROGRESS NOTES
SPEECH PATHOLOGY MODIFIED BARIUM SWALLOW STUDY  Patient: Moriah Mccord (38 y.o. male)  Date: 6/15/2022  Primary Diagnosis: Slurred speech [R47.81]        Precautions: Fall    ASSESSMENT :  Based on the objective data described below, the patient presents with mild pharyngeal dysphagia. Oral phase adequate bolus formation, manipulation and mastication. W/ large volume thin via straw initiation at the level of the pyriforms. Remaining trials and consistencies initiate at the level of the vallecula. Overall, minimal swallow delay. BOT, HLE and pharyngeal constriction are wfl. Epiglottis w/ full inversion w/ mild delay in inversion. Laryngeal penetration during the swallow w/ large volume consecutive straw sips of thin w/ trace retention. No evidence of subsequent aspiration. No pen/asp w/ remaining trials and consistencies. No pharyngeal residue. UES wfl. Patient will benefit from skilled intervention to address the above impairments. Patients rehabilitation potential is considered to be Good     PLAN :  Recommendations and Planned Interventions:  Rec easy to chew w/ thin liquids and strict asp/GERD. Rec slow rate of intake, small bite/sips, single sips, no straws and remain upright 30 mins after PO intake. Speech eval as indicated. Frequency/Duration: Patient will be followed by speech-language pathology 2 times a week to address goals. Discharge Recommendations: Home Health     SUBJECTIVE:   Patient denies dysphagia    OBJECTIVE:     Past Medical History:   Diagnosis Date    Aneurysm ([address]     Anxiety     Bradycardia     Cancer ([address]     COLON    Diabetes ([facility]     TYPE II    Gout     Hypertension     Ill-defined condition     parkinsons disease    Pacemaker     Psoriasis     SCALP AND LT.  EAR    Psychiatric disorder     depression and anxiety    Stroke [facility])      Past Surgical History:   Procedure Laterality Date    HX CATARACT REMOVAL Bilateral     HX GI      COLONOSCOPY    HX HERNIA REPAIR      UMBILICAL    HX PACEMAKER      HX RETINAL DETACHMENT REPAIR Right     HX TONSILLECTOMY      HX TOTAL COLECTOMY  1908'S    1 FOOT REMOVED        CXR Results  (Last 48 hours)      None              Radiologist: WASHINGTON Unger  Film Views: Lateral  Patient Position: upright in chair    Video Flouroscopic Procedures  [x] Lateral View   [] A-P View [] Scanned to level of Sternum    [] Seated at 90 deg. [] Other:    Presentation:   [] Spoon   [x] Cup   [x] Straw   [] Syringe   [x] Consecutive Swallows  [] Other:    Consistencies:   [x] Ba+ liquid   [x] Ba+ liquid (nectar)   [] Ba+ liquid (honey)     [x] Ba+ pudding   [] Ba+ crunched cookie   [x] Ba+ cookie   [] Other:         Decreased Tongue Base Retraction?: No  Laryngeal Elevation: WFL (within functional limits)  Aspiration/Penetration Score: 3 (Penetration/Visible residue-Contrast remains above the folds/cords, but is not cleared)  Pharyngeal Symmetry: Symmetrical  Pharyngeal-Esophageal Segment: No impairment  Pharyngeal Dysfunction: Other (comment)      COMMUNICATION/EDUCATION:   Patient receptive of education regarding MBS results, diet recs, s/s aspiration and aspiration precaution. He demonstrated Fair understanding as evidenced by engagement. Hearing status negatively impacts education. Problem: Dysphagia (Adult)  Goal: *Acute Goals and Plan of Care (Insert Text)  Description: Speech Therapy Goals  Initiated 6/14/2022  -Patient stated goal: pt is eager to eat  -Patient will participate in modified barium swallow study within 7 day(s). [ ] Not met  [ x]  MET   [ ] Progressing  [ ] Lorenda Muster  -Patient will tolerate soft and bite size diet with thin liquids without signs/symptoms of aspiration given minimal cues within 7 day(s). [ ] Not met  [ ]  MET   [ x] Progressing  [ ] Discontinue  -Patient will demonstrate understanding of swallow safety precautions and aspiration precautions, diet recs with minimal cues within 7 day(s).         [ ] Not met  [ ]  MET   [x ] Progressing  [ ] Discontinue    Outcome: Progressing Towards Goal       Thank you for this referral.  Mary Medley M.S., M.Ed., CCC-SLP  Time Calculation: 25 mins

## 2022-06-15 NOTE — PROGRESS NOTES
PHYSICAL THERAPY EVALUATION  Patient: Rocio Lee (83 y.o. male)  Date: 6/15/2022  Primary Diagnosis: Slurred speech [R47.81]        Precautions: falls       ASSESSMENT  Pt is a 80 yo M w/ PMH of hemorrhagic stroke x2, history of parkinson's disease, diabetes mellitus, and HTN, presenting to ED with slurred speech. Per H&P note on 6/14, CT head negative for acute intracranial abnormality. Admitted 6/14/22 for further work up. Prior level pt reported being independent prior. Lives in mobile home with daughter and son in law, with 8 KRYSTA w/ B HR. Mod I with ambulation with use of RW at PLOF. Based on the objective data described below, the patient presents with grossly decr BLE strength, decr functional indep, and decr mobility. Mobility performed this session: Pt initially received in bedside chair. Primarily completed sit to stand with CGA, initial cue for hand placement however able to perform other performances with good hand placement. Ambulation performed with CGA with RW. No specific LOB, however ambulates with slight flex knee posture, and relatively shuffling pattern. No reported lightheadedness/dizziness. Once back in room practiced navigating step in bathroom, which pt was able to complete with CGA, completed 2x safely. Once back towards bed, pt able to complete sit to supine with supervision. All needs met in supine. Pt educated on having daughter / family close by initially at home for safety which he understood. /75 at start of session. Pt did good with session today. Pt will benefit from continued skilled PT to address above deficits and return to PLOF. Current PT DC recommendation home w/ assist and HHPT.      Current Level of Function Impacting Discharge (mobility/balance): supervision - CGA       PLAN :  Recommendations and Planned Interventions: bed mobility training, transfer training, gait training, therapeutic exercises, neuromuscular re-education, patient and family training/education and therapeutic activities      Frequency/Duration: Patient will be followed by physical therapy:  3-5x/week to address goals. Recommendation for discharge: (in order for the patient to meet his/her long term goals)  Home with [address] [place] [address]    This discharge recommendation:  Has been made in collaboration with the attending provider and/or case management    IF patient discharges home will need the following DME: pt has RW         SUBJECTIVE:   Patient agreeable to participation in therapy this session. OBJECTIVE DATA SUMMARY:   HISTORY:    Past Medical History:   Diagnosis Date    [facility]     Anxiety     Bradycardia     Cancer ([facility]     COLON    Diabetes ([facility]     TYPE II    Gout     Hypertension     Ill-defined condition     parkinsons disease    Pacemaker     Psoriasis     SCALP AND LT.  EAR    Psychiatric disorder     depression and anxiety    Stroke [facility])      Past Surgical History:   Procedure Laterality Date    HX CATARACT REMOVAL Bilateral     HX GI      COLONOSCOPY    HX HERNIA REPAIR      UMBILICAL    HX PACEMAKER      HX RETINAL DETACHMENT REPAIR Right     HX TONSILLECTOMY      HX TOTAL COLECTOMY  1908'S    1 FOOT REMOVED       Home Situation  Home Environment: Trailer/mobile home  # Steps to Enter: 8  Rails to Enter: Yes  Hand Rails : Bilateral  One/Two Story Residence: One story  Living Alone: No  Support Systems: Child(zayra),Other Family Member(s)  Patient Expects to be Discharged to[de-identified] Home with home health  Current DME Used/Available at Home: [employer], sacha,Wheelchair,Commode, bedside    EXAMINATION/PRESENTATION/DECISION MAKING:   Critical Behavior:  Neurologic State: Alert  Orientation Level: Oriented to person,Oriented to place,Oriented to time  Cognition: Appropriate decision making,Appropriate for age attention/concentration,Appropriate safety awareness  Safety/Judgement: Awareness of environment    Range Of Motion:  AROM: Within functional limits (Grossly WFL BLE ROM (knee, ankle))                       Strength:    Strength: Generally decreased, functional (Grossly 4/5 knee ext B, 4/5 DF B)               Sensation: Intact (BLE)               Coordination:  Coordination:  (Performed toe taps B, and shin rub B )  Vision:      Functional Mobility:  Bed Mobility:  Rolling:  (Pt received in chair)  Supine to Sit:  (Pt received in chair)  Sit to Supine: Supervision  Scooting: Stand-by assistance  Transfers:  Sit to Stand: Stand-by assistance  Stand to Sit: Stand-by assistance        Bed to Chair: Contact guard assistance              Balance:   Sitting: Intact; Without support  Standing: Intact; With support  Standing - Static: Good  Standing - Dynamic : Fair;Constant support  Ambulation/Gait Training:  Distance (ft): 100 Feet (ft)  Assistive Device: Walker, rolling  Ambulation - Level of Assistance: Contact guard assistance     Gait Description (WDL): Exceptions to WDL  Gait Abnormalities:  (Shuffle like pattern, slight knee flex B, step through gait)         Functional Measure:  [address] [place] [address] AM-PAC 6 Clicks         Basic Mobility Inpatient Short Form  How much difficulty does the patient currently have. .. Unable A Lot A Little None   1. Turning over in bed (including adjusting bedclothes, sheets and blankets)? [] 1   [] 2   [] 3   [x] 4   2. Sitting down on and standing up from a chair with arms ( e.g., wheelchair, bedside commode, etc.)   [] 1   [] 2   [] 3   [x] 4   3. Moving from lying on back to sitting on the side of the bed? [] 1   [] 2   [] 3   [x] 4          How much help from another person does the patient currently need. .. Total A Lot A Little None   4. Moving to and from a bed to a chair (including a wheelchair)? [] 1   [] 2   [] 3   [x] 4   5. Need to walk in hospital room? [] 1   [] 2   [x] 3   [] 4   6. Climbing 3-5 steps with a railing?    [] 1   [] 2   [x] 3   [] 4   © 2007, Trustees of [address] [place] [address], under license to [employer]. All rights reserved     Score:  Initial: 22/24 Most Recent: (Date: 6/15/22 )   Interpretation of Tool:  Represents activities that are increasingly more difficult (i.e. Bed mobility, Transfers, Gait). Score 24 23 22-20 19-15 14-10 9-7 6   Modifier CH CI CJ CK CL CM CN         Physical Therapy Evaluation Charge Determination   History Examination Presentation Decision-Making   MEDIUM  Complexity : 1-2 comorbidities / personal factors will impact the outcome/ POC  MEDIUM Complexity : 3 Standardized tests and measures addressing body structure, function, activity limitation and / or participation in recreation  LOW Complexity : Stable, uncomplicated  Other outcome measures [facility] 6  low      Based on the above components, the patient evaluation is determined to be of the following complexity level: LOW     Pain Rating:  Pt denied any reported pain. Activity Tolerance:   Good    After treatment patient left in no apparent distress:   Supine in bed and Call bell within reach and RN updated. GOALS:    Problem: Mobility Impaired (Adult and Pediatric)  Goal: *Acute Goals and Plan of Care (Insert Text)  Description:   Pt will be I with LE HEP in 7 days. Pt will perform bed mobility with mod I in 7 days. Pt will perform transfers with mod I in 7 days. Pt will amb >1100 feet with LRAD safely with mod I in 7 days. Pt will ascend/descend 8 steps with B handrails and CGA in 7 days  Outcome: Not Met       COMMUNICATION/EDUCATION:   The patients plan of care was discussed with: Registered nurse. Fall prevention education was provided and the patient/caregiver indicated understanding. and Patient/family have participated as able in goal setting and plan of care.        Thank you for this referral.  Natasha Jimenez, PT, PT, DPT   Time Calculation: 21 mins

## 2022-06-15 NOTE — CONSULTS
NEUROLOGY CONSULT    Name Crow Poster Age 81 y.o. MRN [id number]  1935     Referring Physician: Celsa Byrne DO    Chief Complaint: TIA     This is a 81 y.o. right handed pleasant  male with a past medical history for hemorrhagic stroke x2, diabetes mellitus, and hypertension presenting to the ED for a primary complaint of slurred speech. Family at bedside states patient was normal at oh 9:30 AM.  Family member went to another room to rest and when she returned saw the patient stand up, state \" I think I need to go to the ER\" and proceeded to fall backwards into a chair. Patient denies loss of consciousness, but does report dizziness at the time. Patient felt that his legs were weak and gave away under him. Did not hit his head. Family checked patient's blood sugar which was 156 and subsequently to 299. Family member noticed some slurred speech afterwards. Patient denies any headache, visual changes, chest pain, palpitations, shortness of breath, cough, nausea, vomiting, abdominal pain, dysuria. In the ED, hemodynamically stable. Significant initial labs showing WBC 14.8. Glucose 285. CT head negative for acute intracranial abnormality. CTA head and neck pending. Neuro consult. At the time of my evaluation this morning the patient is feeling much better, speech to he feels like back to normal    Assessment and Plan:  1. Acute onset of slurred speech and ataxia: The patient presented with the symptoms which have completely resolved and the differential will include TIA versus stroke. His CT scan of the head and a CTA of the head and the neck were unremarkable. He had an echocardiogram about a year ago which was essentially unremarkable with an EF of 55% and no shunt. I will recommend doing an MRI of the brain because of his history of strokes in the past and other risk factors. But he has a pacemaker and may not be able to get the MRI.   It seems like he had not been on any antiplatelet at home or anticoagulation and will start him on aspirin 325 mg once a day and he can be discharged on this dose once his work-up is complete  2. History of hemorrhagic stroke. 3.  Hypertension  4. Diabetes mellitus  5. History of bradycardia  6. History of colon cancer  7.   Status post pacemaker placement    Allergies   Allergen Reactions    Monosodium Glutamate Anaphylaxis and Hives    Adhesive Tape-Silicones Other (comments)     BLISTERS     Current Facility-Administered Medications   Medication Dose Route Frequency    [COMPLETED] barium sulfate (VARIBAR THIN) 81 % (w/w) oral powder 60 mL  60 mL Oral RAD ONCE    [COMPLETED] barium sulfate (VARIBAR NECTAR) 40 % (w/v) contrast suspension 30 mL  30 mL Oral RAD ONCE    [COMPLETED] barium sulfate (VARIBAR PUDDING) 40 % (w/v), 30% (w/w) contrast oral paste 30 mL  30 mL Oral RAD ONCE    sodium chloride (NS) flush 5-40 mL  5-40 mL IntraVENous Q8H    sodium chloride (NS) flush 5-40 mL  5-40 mL IntraVENous PRN    acetaminophen (TYLENOL) tablet 650 mg  650 mg Oral Q6H PRN    Or    acetaminophen (TYLENOL) suppository 650 mg  650 mg Rectal Q6H PRN    polyethylene glycol (MIRALAX) packet 17 g  17 g Oral DAILY PRN    ondansetron (ZOFRAN ODT) tablet 4 mg  4 mg Oral Q8H PRN    Or    ondansetron (ZOFRAN) injection 4 mg  4 mg IntraVENous Q6H PRN    albuterol CONCENTRATE 2.5mg/0.5 mL neb soln  2.5 mg Nebulization Q8H PRN    [Held by provider] amiodarone (CORDARONE) tablet 100 mg  100 mg Oral DAILY    atorvastatin (LIPITOR) tablet 80 mg  80 mg Oral QHS    hydrOXYzine pamoate (VISTARIL) capsule 25 mg  25 mg Oral BID PRN    [Held by provider] lisinopriL (PRINIVIL, ZESTRIL) tablet 2.5 mg  2.5 mg Oral DAILY    [START ON 6/16/2022] metFORMIN (GLUCOPHAGE) tablet 1,000 mg  1,000 mg Oral QHS    [Held by provider] metoprolol succinate (TOPROL-XL) XL tablet 25 mg  25 mg Oral DAILY     Past Medical History:   Diagnosis Date    Aneurysm (HCC)     Anxiety  Bradycardia     Cancer (HCC)     COLON    Diabetes ([facility] [address]     TYPE II    Gout     Hypertension     Ill-defined condition     parkinsons disease    Pacemaker     Psoriasis     SCALP AND LT. EAR    Psychiatric disorder     depression and anxiety    Stroke [facility])      Social History     Tobacco Use    Smoking status: Former Smoker     Packs/day: 1.00     Years: 10.00     Pack years: 10.00     Quit date: 1965     Years since quittin.9    Smokeless tobacco: Never Used   Vaping Use    Vaping Use: Never used   Substance Use Topics    Alcohol use: No     Comment: NOT SINCE     Drug use: No     Exam  Visit Vitals  /70 (BP 1 Location: Left upper arm)   Pulse 61   Temp 98.3 °F (36.8 °C)   Resp 18   Ht 5' 8\" (1.727 m)   Wt 99.3 kg (219 lb)   SpO2 96%   BMI 33.30 kg/m²     General: Well developed, well nourished. Patient in no distress   Head: Normocephalic, atraumatic, anicteric sclera   Neck Normal ROM, No thyromegally   Lungs:  Clear to auscultation    Cardiac: Regular rate and rhythm with no murmurs. Abd: Bowel sounds were audible   Ext: No pedal edema   Skin: Supple no rash     NeurologicExam:  Mental Status: Alert and oriented to person place and time   Speech: Fluent no aphasia or dysarthria. Cranial Nerves:   Pupils are equal round and reactive to light and accommodation, extraocular movements are intact and full, visual fields are intact by confrontation, no nystagmus noted, face is symmetric, sensation in face is intact and symmetric, hearing is intact and symmetric, tongue and uvula are in midline with normal movements, palate is elevating equally, shoulder shrug is intact and symmetric. Motor:  Full and symmetric strength of upper and lower ext . Normal bulk and tone. Reflexes:   Deep tendon reflexes 1/4 at the biceps, triceps and patellar, 0/4 at the ankle and symmetric. Sensory:   Symmetric and intact    Gait:  Gait is deferred   Tremor:   No tremor noted.    Cerebellar: Coordination intact for finger-nose-finger. Neurovascular: No carotid bruits.  No JVD     Lab Review  Lab Results   Component Value Date/Time    WBC 19.3 (H) 06/15/2022 06:32 AM    HCT 42.1 06/15/2022 06:32 AM    HGB 13.6 06/15/2022 06:32 AM    PLATELET 817 (L) 30/24/4812 06:32 AM     Lab Results   Component Value Date/Time    Sodium 144 06/15/2022 06:32 AM    Potassium 4.9 06/15/2022 06:32 AM    Chloride 109 (H) 06/15/2022 06:32 AM    CO2 32 06/15/2022 06:32 AM    Glucose 117 (H) 06/15/2022 06:32 AM    BUN 23 (H) 06/15/2022 06:32 AM    Creatinine 1.11 06/15/2022 06:32 AM    Calcium 8.8 06/15/2022 06:32 AM     No results found for: B12LT, FOL, RBCF  Lab Results   Component Value Date/Time    LDL, calculated 25.2 07/29/2021 11:30 AM     Lab Results   Component Value Date/Time    Hemoglobin A1c 6.7 (H) 07/29/2021 11:30 AM     No components found for: TROPQUANT  No results found for: EMILI

## 2022-06-15 NOTE — PROGRESS NOTES
CM noted discharge order. CM spoke with patient and daughter about patient having HH. Both stated that patient just finished [facility] therapy and did not need [facility] services at this time. No other CM needs at this time. Primary RN made aware. Discharge plan of care/case management plan validated with provider discharge order.

## 2022-06-15 NOTE — PROGRESS NOTES
OCCUPATIONAL THERAPY EVALUATION  Patient: Elda Saleh (88 y.o. male)  Date: 6/15/2022  Primary Diagnosis: Slurred speech [R47.81]       Precautions: fall risk       ASSESSMENT  Pt is a 82 y/o M with PMH of HTN, bradycardia, colon cancer, anxiety, gout, psoriasis, DM II, s/p pacemaker, aneurysm, depression and anxiety, previous stroke, and Parkinson's disease presenting to [facility] with c/o dysarthria, admitted 06/14/22 and being treated for slurred speech, dizziness, hx of hemorrhagic stroke, leukocytosis, DM, HTN, and Parkinson's disease. Pt's CT of head completed 06/14/22 is negative for occlusions or stenosis. Pt has had 5 falls in the last 3 months. Pt received semi-supine in bed upon arrival, AXO x4, and agreeable to OT evaluation at this time. Per pt report, pt lives with his daughter, son-in-law, and grandchildren in a mobile home with 8 KRYSTA and B HR, was IND with ADLs and Mod I using RW for mobility at [facility]. Other DME owned includes: wheelchair, [facility], shower chair, BSC     Based on current observations, pt presents with deficits in generalized strength, balance, functional activity tolerance, and vision (pt reports hx of cataracts) impacting overall performance of ADLs and functional transfers/mobility. Pt currently requires SBA for rolling. Pt requires min A for sup>sit EOB and SBA for scooting. Pt requires SBA for sit EOB>stand with RW. Pt ambulates to bathroom using RW with CGA. Pt completes toilet transfer with CGA. Pt educated to use grab bar with toilet transfer. Pt verbalized and demonstrated understanding. Pt ambulates back to bedside using RW with CGA and completes stand with RW>sit EOB with SBA. Pt sat EOB and doffed/donned hospital gown with set up. Pt requires total A to doff/don socks while seated EOB. Pt reports his bed at home has a lower height so he is able to complete LB dressing at home.  Pt completes sit EOB>stand with RW with SBA, stand pivot transfer to chair with CGA, and stand with RW>sit in chair with SBA. Overall, pt tolerates session fair. Pt would benefit from continued skilled OT services to address current impairments and improve IND and safety with self cares and functional transfers/mobility. Recommend discharge to home with [facility] once medically appropriate. Other factors to consider for discharge: home support, PLOF, severity of deficits     Patient will benefit from skilled therapy intervention to address the above noted impairments. PLAN :  Recommendations and Planned Interventions: self care training, functional mobility training, therapeutic exercise, balance training, therapeutic activities, endurance activities, patient education and home safety training    Frequency/Duration: Patient will be followed by occupational therapy:  3-5x/week to address goals. Recommendation for discharge: (in order for the patient to meet his/her long term goals)  Home with [address] [place] [address]    This discharge recommendation:  Has been made in collaboration with the attending provider and/or case management    IF patient discharges home will need the following DME: none       SUBJECTIVE:   Patient stated I need to change my gown.     OBJECTIVE DATA SUMMARY:   HISTORY:   Past Medical History:   Diagnosis Date    Aneurysm ([facility] [address]     Anxiety     Bradycardia     Cancer ([facility] [address]     COLON    Diabetes ([facility] [address]     TYPE II    Gout     Hypertension     Ill-defined condition     parkinsons disease    Pacemaker     Psoriasis     SCALP AND LT.  EAR    Psychiatric disorder     depression and anxiety    Stroke [facility])      Past Surgical History:   Procedure Laterality Date    HX CATARACT REMOVAL Bilateral     HX GI      COLONOSCOPY    HX HERNIA REPAIR      UMBILICAL    HX PACEMAKER      HX RETINAL DETACHMENT REPAIR Right     HX TONSILLECTOMY      HX TOTAL COLECTOMY  1908'S    1 FOOT REMOVED       Expanded or extensive additional review of patient history:     Home Situation  Home Environment: Trailer/mobile home  # Steps to Enter: 8  Rails to Enter: Yes  Hand Rails : Bilateral  One/Two Story Residence: One story  Living Alone: No  Support Systems: Child(zayra),Other Family Member(s) (Daughter, son-in-law, and grandson)  Patient Expects to be Discharged to[de-identified] Home with home health  Current DME Used/Available at Home: Cane, straight,Walker, rolling,Wheelchair,Shower chair,Commode, bedside    EXAMINATION OF PERFORMANCE DEFICITS:  Cognitive/Behavioral Status:  Neurologic State: Alert  Orientation Level: Oriented X4  Cognition: Appropriate decision making; Appropriate for age attention/concentration; Appropriate safety awareness        Safety/Judgement: Decreased awareness of need for assistance    Hearing: Auditory  Auditory Impairment: Hard of hearing, bilateral    Range of Motion:  AROM: Within functional limits    Strength:  Strength: Generally decreased, functional    Coordination:  Coordination: Within functional limits  Fine Motor Skills-Upper: Left Intact; Right Intact    Gross Motor Skills-Upper: Left Intact; Right Intact    Tone & Sensation:  Tone: Normal  Sensation: Intact    Balance:  Sitting: Intact; Without support  Standing: Impaired; With support  Standing - Static: Good  Standing - Dynamic : Fair;Constant support    Functional Mobility and Transfers for ADLs:  Bed Mobility:  Rolling: Stand-by assistance  Supine to Sit: Minimum assistance  Scooting: Stand-by assistance    Transfers:  Sit to Stand: Stand-by assistance  Stand to Sit: Stand-by assistance  Bed to Chair: Contact guard assistance  Bathroom Mobility: Contact guard assistance  Toilet Transfer : Contact guard assistance; Additional time    ADL Intervention and task modifications:  Upper Body [address]: Set-up    Lower Body Dressing Assistance  Socks:  Total assistance (dependent) (Pt's bed at home is lower)  Position Performed: Seated edge of bed    Cognitive Retraining  Safety/Judgement: Decreased awareness of need for assistance    Therapeutic Exercise:  Pt would benefit from UE HEP initiated at next session. Functional Measure:    [address] [place] [id number] AM-PACTM \"6 Clicks\"                                                       Daily Activity Inpatient Short Form  How much help from another person does the patient currently need. .. Total; A Lot A Little None   1. Putting on and taking off regular lower body clothing? []  1 [x]  2 []  3 []  4   2. Bathing (including washing, rinsing, drying)? []  1 []  2 [x]  3 []  4   3. Toileting, which includes using toilet, bedpan or urinal? [] 1 []  2 [x]  3 []  4   4. Putting on and taking off regular upper body clothing? []  1 []  2 [x]  3 []  4   5. Taking care of personal grooming such as brushing teeth? []  1 []  2 [x]  3 []  4   6. Eating meals? []  1 []  2 []  3 [x]  4   © , Trustees of [address] [place] [id number], under license to [employer]. All rights reserved     Score: 1824     Interpretation of Tool:  Represents clinically-significant functional categories (i.e. Activities of daily living).   Percentage of Impairment CH    0%   CI    1-19% CJ    20-39% CK    40-59% CL    60-79% CM    80-99% CN     100%   [facility]  Score 6-24 24 23 20-22 15-19 10-14 7-9 6         Occupational Therapy Evaluation Charge Determination   History Examination Decision-Making   LOW Complexity : Brief history review  LOW Complexity : 1-3 performance deficits relating to physical, cognitive , or psychosocial skils that result in activity limitations and / or participation restrictions  LOW Complexity : No comorbidities that affect functional and no verbal or physical assistance needed to complete eval tasks       Based on the above components, the patient evaluation is determined to be of the following complexity level: LOW   Pain Ratin/10    Activity Tolerance:   Good  Please refer to the flowsheet for vital signs taken during this treatment. After treatment patient left in no apparent distress:    Sitting in chair and working with PT    COMMUNICATION/EDUCATION:   The patients plan of care was discussed with: Physical therapist.     Patient/family agree to work toward stated goals and plan of care. This patients plan of care is appropriate for delegation to [facility]. Thank you for this referral.  Juan M Dunaway OT  Time Calculation: 33 mins    Problem: Self Care Deficits Care Plan (Adult)  Goal: *Acute Goals and Plan of Care (Insert Text)  Description: 1. Pt will be mod I sup <> sit in prep for EOB ADLs  2. Pt will be mod I grooming sitting EOB  3. Pt will be mod I LE dressing sitting EOB/sitting in chair LRAD  4. Pt will be mod I sit <>  prep for toileting LRAD  5. Pt will be mod I toileting/toilet transfer/cloth mgmt LRAD  6.  Pt will be IND following UE HEP in prep for self care tasks      Outcome: Not Met

## 2022-06-15 NOTE — DISCHARGE SUMMARY
Physician Discharge Summary     Patient ID:    Willow Cranker  [id number]  63 y.o.  1935    Admit date: 6/14/2022    Discharge date : 6/15/2022    Chronic Diagnoses:    Problem List as of 6/15/2022 Date Reviewed: 12/7/2019          Codes Class Noted - Resolved    Slurred speech ICD-10-CM: R47.81  ICD-9-CM: 784.59  6/14/2022 - Present        Subdural hematoma ([address] ICD-10-CM: A52.3F3M  ICD-9-CM: 432.1  1/20/2022 - Present        Dizziness ICD-10-CM: R42  ICD-9-CM: 780.4  7/28/2021 - Present        Pacemaker ICD-10-CM: Z95.0  ICD-9-CM: V45.01  4/18/2018 - Present        Chronic left mastoiditis ICD-10-CM: H70.12  ICD-9-CM: 383.1  8/4/2016 - Present          22    Final Diagnoses:   Probable TIA  History of hemorrhagic stroke x2  Leukocytosis concerning for UTI  Type 2 diabetes. Fairly stable  Benign essential hypertension stable  History of Parkinson's disease    Reason for Hospitalization:    Slurred speech    Hospital Course:     Per H and p,\"81 y.o. male with a past medical history for hemorrhagic stroke x2, diabetes mellitus, and hypertension presenting to the ED for a primary complaint of slurred speech. Family at bedside states patient was normal at oh 9:30 AM.  Family member went to another room to rest and when she returned saw the patient stand up, state \" I think I need to go to the ER\" and proceeded to fall backwards into a chair. Patient denies loss of consciousness, but does report dizziness at the time. Did not hit his head. Family checked patient's blood sugar which was 156 and subsequently to 299. Family member noticed some slurred speech afterwards. Patient denies any headache, visual changes, chest pain, palpitations, shortness of breath, cough, nausea, vomiting, abdominal pain, dysuria\"  CT brain and CTA head and neck negative for acute stroke. Unable to do MRI of the brain due to patient's pacemaker   Patient is back to baseline. No neurologic deficit. Paul Whitten And evaluated by neurologist and aspirin 325 mg oral daily added. Leukocytosis concerning for UTI therefore doxycycline 100 mg twice daily added to medication regimen. Discharge Medications:   Current Discharge Medication List      START taking these medications    Details   aspirin (ASPIRIN) 325 mg tablet Take 1 Tablet by mouth daily for 30 days. Qty: 30 Tablet, Refills: 0  Start date: 6/16/2022, End date: 7/16/2022      doxycycline (MONODOX) 100 mg capsule Take 1 Capsule by mouth every twelve (12) hours for 7 days. Qty: 14 Capsule, Refills: 0  Start date: 6/15/2022, End date: 6/22/2022         CONTINUE these medications which have CHANGED    Details   hydrOXYzine pamoate (VistariL) 25 mg capsule Take 1 Capsule by mouth two (2) times daily as needed for Itching. As needed for anxiety  Qty: 20 Capsule, Refills: 0  Start date: 6/15/2022      !! metFORMIN (GLUCOPHAGE) 1,000 mg tablet Take 1 Tablet by mouth nightly for 30 days. Qty: 30 Tablet, Refills: 0  Start date: 6/15/2022, End date: 7/15/2022      lisinopriL (PRINIVIL, ZESTRIL) 2.5 mg tablet Take 1 Tablet by mouth daily for 30 days. Qty: 30 Tablet, Refills: 0  Start date: 6/15/2022, End date: 7/15/2022       !! - Potential duplicate medications found. Please discuss with provider. CONTINUE these medications which have NOT CHANGED    Details   melatonin 5 mg tablet Take 5 mg by mouth nightly as needed. !! metFORMIN (GLUCOPHAGE) 500 mg tablet Take 1,000 mg by mouth two (2) times daily (with meals). clobetasoL (TEMOVATE) 0.05 % topical cream Apply  to affected area two (2) times a day. As needed for psoriasis      atorvastatin (LIPITOR) 80 mg tablet Take 1 Tablet by mouth nightly. Qty: 30 Tablet, Refills: 0      amiodarone (CORDARONE) 200 mg tablet Take 100 mg by mouth daily. metoprolol succinate (TOPROL-XL) 25 mg XL tablet Take 25 mg by mouth daily. busPIRone (BUSPAR) 15 mg tablet Take 15 mg by mouth two (2) times a day. PARoxetine (PAXIL) 10 mg tablet Take 10 mg by mouth daily. acetaminophen (TYLENOL) 325 mg tablet Take 2 Tablets by mouth every four (4) hours as needed for Pain or Fever (headache). Qty: 30 Tablet, Refills: 0       !! - Potential duplicate medications found. Please discuss with provider. STOP taking these medications       albuterol sulfate (PROVENTIL;VENTOLIN) 2.5 mg/0.5 mL nebu nebulizer solution Comments:   Reason for Stopping: Follow up Care:    Angelito. Pawel Greenberg DO in 1-2 weeks. Please call to set up an appointment shortly after discharge. Diet:  Cardiac Diet    Disposition:  Home. Advanced Directive:   FULL    DNR      Discharge Exam:  Visit Vitals  /70 (BP 1 Location: Left upper arm)   Pulse 61   Temp 98.3 °F (36.8 °C)   Resp 18   Ht 5' 8\" (1.727 m)   Wt 99.3 kg (219 lb)   SpO2 96%   BMI 33.30 kg/m²      O2 Device: None (Room air)    Temp (24hrs), Av.9 °F (36.6 °C), Min:97.5 °F (36.4 °C), Max:98.3 °F (36.8 °C)    No intake/output data recorded.  1901 - 06/15 0700  In: 300 [P.O.:300]  Out: -     General:  Alert, cooperative, no distress, appears stated age. Lungs:   Clear to auscultation bilaterally. Chest wall:  No tenderness or deformity. Heart:  Regular rate and rhythm, S1, S2 normal, no murmur, click, rub or gallop. Abdomen:   Soft, non-tender. Bowel sounds normal. No masses,  No organomegaly. Extremities: Extremities normal, atraumatic, no cyanosis or edema. Pulses: 2+ and symmetric all extremities. Skin: Skin color, texture, turgor normal. No rashes or lesions   Neurologic: CNII-XII intact. No gross sensory or motor deficits         CONSULTATIONS: Neurology    Significant Diagnostic Studies:   2022: BUN 26 mg/dL (H; Ref range: 6 - 20 mg/dL); Calcium 8.7 mg/dL (Ref range: 8.5 - 10.1 mg/dL); CO2 26 mmol/L (Ref range: 21 - 32 mmol/L); Creatinine 1.25 mg/dL (Ref range: 0.70 - 1.30 mg/dL);  Glucose 285 mg/dL (H; Ref range: 65 - 100 mg/dL); HCT 41.7 % (Ref range: 36.6 - 50.3 %); HCT 41.4 % (Ref range: 36.6 - 50.3 %); HGB 13.6 g/dL (Ref range: 12.1 - 17.0 g/dL); HGB 13.2 g/dL (Ref range: 12.1 - 17.0 g/dL); Potassium 5.0 mmol/L (Ref range: 3.5 - 5.1 mmol/L); Sodium 142 mmol/L (Ref range: 136 - 145 mmol/L)  6/15/2022: BUN 23 mg/dL (H; Ref range: 6 - 20 mg/dL); Calcium 8.8 mg/dL (Ref range: 8.5 - 10.1 mg/dL); CO2 32 mmol/L (Ref range: 21 - 32 mmol/L); Creatinine 1.11 mg/dL (Ref range: 0.70 - 1.30 mg/dL); Glucose 117 mg/dL (H; Ref range: 65 - 100 mg/dL); HCT 42.1 % (Ref range: 36.6 - 50.3 %); HGB 13.6 g/dL (Ref range: 12.1 - 17.0 g/dL); Potassium 4.9 mmol/L (Ref range: 3.5 - 5.1 mmol/L); Sodium 144 mmol/L (Ref range: 136 - 145 mmol/L)  Recent Labs     06/15/22  0632 06/14/22  1627   WBC 19.3* 17.7*   HGB 13.6 13.2   HCT 42.1 41.4   * 141*     Recent Labs     06/15/22  0632 06/14/22  1627 06/14/22  1300     --  142   K 4.9  --  5.0   *  --  109*   CO2 32  --  26   BUN 23*  --  26*   CREA 1.11  --  1.25   *  --  285*   CA 8.8  --  8.7   MG  --  1.8  --      Recent Labs     06/14/22  1300   ALT 27   AP 65   TBILI 1.6*   TP 5.5*   ALB 3.2*   GLOB 2.3     Recent Labs     06/14/22  1300   INR 1.1   PTP 14.5   APTT 36.2*      No results for input(s): FE, TIBC, PSAT, FERR in the last 72 hours. No results for input(s): PH, PCO2, PO2 in the last 72 hours. No results for input(s): CPK, CKMB in the last 72 hours.     No lab exists for component: TROPONINI  Lab Results   Component Value Date/Time    Glucose (POC) 151 (H) 06/14/2022 04:48 PM    Glucose (POC) 147 (H) 01/27/2022 09:49 PM    Glucose (POC) 112 01/27/2022 04:27 PM    Glucose (POC) 147 (H) 01/27/2022 11:17 AM    Glucose (POC) 138 (H) 01/27/2022 07:11 AM       Total Time: 35 minutes    Signed:  Trini Nicholson MD  6/15/2022  11:32 AM

## 2022-08-29 ENCOUNTER — APPOINTMENT (OUTPATIENT)
Dept: CT IMAGING | Age: 87
DRG: 871 | End: 2022-08-29
Attending: EMERGENCY MEDICINE
Payer: MEDICARE

## 2022-08-29 ENCOUNTER — HOSPITAL ENCOUNTER (INPATIENT)
Age: 87
LOS: 6 days | Discharge: HOME HEALTH | DRG: 871 | End: 2022-09-04
Attending: EMERGENCY MEDICINE | Admitting: HOSPITALIST
Payer: MEDICARE

## 2022-08-29 ENCOUNTER — APPOINTMENT (OUTPATIENT)
Dept: GENERAL RADIOLOGY | Age: 87
DRG: 871 | End: 2022-08-29
Attending: EMERGENCY MEDICINE
Payer: MEDICARE

## 2022-08-29 DIAGNOSIS — A41.9 SEPSIS, DUE TO UNSPECIFIED ORGANISM, UNSPECIFIED WHETHER ACUTE ORGAN DYSFUNCTION PRESENT (HCC): ICD-10-CM

## 2022-08-29 DIAGNOSIS — R41.82 ALTERED MENTAL STATUS, UNSPECIFIED ALTERED MENTAL STATUS TYPE: Primary | ICD-10-CM

## 2022-08-29 LAB
ALBUMIN SERPL-MCNC: 2.8 G/DL (ref 3.5–5)
ALBUMIN/GLOB SERPL: 1.2 {RATIO} (ref 1.1–2.2)
ALP SERPL-CCNC: 85 U/L (ref 45–117)
ALT SERPL-CCNC: 61 U/L (ref 12–78)
AMPHET UR QL SCN: NEGATIVE
ANION GAP SERPL CALC-SCNC: 12 MMOL/L (ref 5–15)
APPEARANCE UR: CLEAR
ARTERIAL PATENCY WRIST A: YES
AST SERPL W P-5'-P-CCNC: 93 U/L (ref 15–37)
BACTERIA URNS QL MICRO: ABNORMAL /HPF
BARBITURATES UR QL SCN: NEGATIVE
BASE DEFICIT BLDA-SCNC: 3.5 MMOL/L
BASOPHILS # BLD: 0 K/UL (ref 0–0.1)
BASOPHILS NFR BLD: 0 % (ref 0–1)
BDY SITE: ABNORMAL
BENZODIAZ UR QL: NEGATIVE
BILIRUB SERPL-MCNC: 1.3 MG/DL (ref 0.2–1)
BILIRUB UR QL: NEGATIVE
BUN SERPL-MCNC: 32 MG/DL (ref 6–20)
BUN/CREAT SERPL: 19 (ref 12–20)
CA-I BLD-MCNC: 8.2 MG/DL (ref 8.5–10.1)
CANNABINOIDS UR QL SCN: NEGATIVE
CHLORIDE SERPL-SCNC: 106 MMOL/L (ref 97–108)
CO2 SERPL-SCNC: 22 MMOL/L (ref 21–32)
COCAINE UR QL SCN: NEGATIVE
COHGB MFR BLD: 0.3 % (ref 1–2)
COLOR UR: ABNORMAL
COVID-19 RAPID TEST, COVR: NOT DETECTED
CREAT SERPL-MCNC: 1.72 MG/DL (ref 0.7–1.3)
DIFFERENTIAL METHOD BLD: ABNORMAL
DRUG SCRN COMMENT,DRGCM: NORMAL
EOSINOPHIL # BLD: 0.2 K/UL (ref 0–0.4)
EOSINOPHIL NFR BLD: 1 % (ref 0–7)
ERYTHROCYTE [DISTWIDTH] IN BLOOD BY AUTOMATED COUNT: 13.8 % (ref 11.5–14.5)
FIO2 ON VENT: 28 %
FLUAV AG NPH QL IA: NEGATIVE
FLUBV AG NOSE QL IA: NEGATIVE
GAS FLOW.O2 O2 DELIVERY SYS: 2 L/MIN
GLOBULIN SER CALC-MCNC: 2.3 G/DL (ref 2–4)
GLUCOSE SERPL-MCNC: 361 MG/DL (ref 65–100)
GLUCOSE UR STRIP.AUTO-MCNC: NEGATIVE MG/DL
HCO3 BLDA-SCNC: 19 MMOL/L (ref 22–26)
HCT VFR BLD AUTO: 34.2 % (ref 36.6–50.3)
HGB BLD-MCNC: 11.2 G/DL (ref 12.1–17)
HGB UR QL STRIP: ABNORMAL
HYALINE CASTS URNS QL MICRO: ABNORMAL /LPF (ref 0–5)
IMM GRANULOCYTES # BLD AUTO: 0.1 K/UL (ref 0–0.04)
IMM GRANULOCYTES NFR BLD AUTO: 0 % (ref 0–0.5)
KETONES UR QL STRIP.AUTO: NEGATIVE MG/DL
LACTATE SERPL-SCNC: 3 MMOL/L (ref 0.4–2)
LACTATE SERPL-SCNC: 6 MMOL/L (ref 0.4–2)
LEUKOCYTE ESTERASE UR QL STRIP.AUTO: NEGATIVE
LYMPHOCYTES # BLD: 6.3 K/UL (ref 0.8–3.5)
LYMPHOCYTES NFR BLD: 44 % (ref 12–49)
MCH RBC QN AUTO: 32.6 PG (ref 26–34)
MCHC RBC AUTO-ENTMCNC: 32.7 G/DL (ref 30–36.5)
MCV RBC AUTO: 99.4 FL (ref 80–99)
METHADONE UR QL: NEGATIVE
METHGB MFR BLD: 0.5 % (ref 0–1.4)
MONOCYTES # BLD: 1 K/UL (ref 0–1)
MONOCYTES NFR BLD: 7 % (ref 5–13)
MUCOUS THREADS URNS QL MICRO: ABNORMAL /LPF
NEUTS SEG # BLD: 6.7 K/UL (ref 1.8–8)
NEUTS SEG NFR BLD: 48 % (ref 32–75)
NITRITE UR QL STRIP.AUTO: NEGATIVE
NRBC # BLD: 0 K/UL (ref 0–0.01)
NRBC BLD-RTO: 0 PER 100 WBC
OPIATES UR QL: NEGATIVE
OXYHGB MFR BLD: 98.2 % (ref 95–99)
PCO2 BLDA: 28 MMHG (ref 35–45)
PCP UR QL: NEGATIVE
PERFORMED BY, TECHID: ABNORMAL
PH BLDA: 7.45 [PH] (ref 7.35–7.45)
PH UR STRIP: 5 [PH]
PLATELET # BLD AUTO: 138 K/UL (ref 150–400)
PMV BLD AUTO: 11.2 FL (ref 8.9–12.9)
PO2 BLDA: 189 MMHG (ref 80–100)
POTASSIUM SERPL-SCNC: 4.7 MMOL/L (ref 3.5–5.1)
PROT SERPL-MCNC: 5.1 G/DL (ref 6.4–8.2)
PROT UR STRIP-MCNC: NEGATIVE MG/DL
RBC # BLD AUTO: 3.44 M/UL (ref 4.1–5.7)
RBC #/AREA URNS HPF: ABNORMAL /HPF (ref 0–5)
SAO2 % BLD: 99 % (ref 95–99)
SAO2% DEVICE SAO2% SENSOR NAME: ABNORMAL
SODIUM SERPL-SCNC: 140 MMOL/L (ref 136–145)
SP GR UR REFRACTOMETRY: 1.02 (ref 1–1.03)
SPECIMEN SITE: ABNORMAL
TROPONIN-HIGH SENSITIVITY: 22 NG/L (ref 0–76)
UA: UC IF INDICATED,UAUC: ABNORMAL
UROBILINOGEN UR QL STRIP.AUTO: 1 EU/DL (ref 0.2–1)
WBC # BLD AUTO: 14.3 K/UL (ref 4.1–11.1)
WBC URNS QL MICRO: ABNORMAL /HPF (ref 0–4)

## 2022-08-29 PROCEDURE — 96365 THER/PROPH/DIAG IV INF INIT: CPT

## 2022-08-29 PROCEDURE — 71045 X-RAY EXAM CHEST 1 VIEW: CPT

## 2022-08-29 PROCEDURE — 74011000258 HC RX REV CODE- 258: Performed by: EMERGENCY MEDICINE

## 2022-08-29 PROCEDURE — 36415 COLL VENOUS BLD VENIPUNCTURE: CPT

## 2022-08-29 PROCEDURE — 87804 INFLUENZA ASSAY W/OPTIC: CPT

## 2022-08-29 PROCEDURE — 70450 CT HEAD/BRAIN W/O DYE: CPT

## 2022-08-29 PROCEDURE — 96360 HYDRATION IV INFUSION INIT: CPT

## 2022-08-29 PROCEDURE — 85025 COMPLETE CBC W/AUTO DIFF WBC: CPT

## 2022-08-29 PROCEDURE — 96361 HYDRATE IV INFUSION ADD-ON: CPT

## 2022-08-29 PROCEDURE — 87040 BLOOD CULTURE FOR BACTERIA: CPT

## 2022-08-29 PROCEDURE — 74011250636 HC RX REV CODE- 250/636: Performed by: EMERGENCY MEDICINE

## 2022-08-29 PROCEDURE — 99285 EMERGENCY DEPT VISIT HI MDM: CPT

## 2022-08-29 PROCEDURE — 84484 ASSAY OF TROPONIN QUANT: CPT

## 2022-08-29 PROCEDURE — 74011000250 HC RX REV CODE- 250: Performed by: HOSPITALIST

## 2022-08-29 PROCEDURE — 36600 WITHDRAWAL OF ARTERIAL BLOOD: CPT | Performed by: HOSPITALIST

## 2022-08-29 PROCEDURE — 80053 COMPREHEN METABOLIC PANEL: CPT

## 2022-08-29 PROCEDURE — 93005 ELECTROCARDIOGRAM TRACING: CPT

## 2022-08-29 PROCEDURE — 82803 BLOOD GASES ANY COMBINATION: CPT

## 2022-08-29 PROCEDURE — 87635 SARS-COV-2 COVID-19 AMP PRB: CPT

## 2022-08-29 PROCEDURE — 81001 URINALYSIS AUTO W/SCOPE: CPT

## 2022-08-29 PROCEDURE — 65270000029 HC RM PRIVATE

## 2022-08-29 PROCEDURE — 80307 DRUG TEST PRSMV CHEM ANLYZR: CPT

## 2022-08-29 PROCEDURE — 74011250637 HC RX REV CODE- 250/637: Performed by: HOSPITALIST

## 2022-08-29 PROCEDURE — 36600 WITHDRAWAL OF ARTERIAL BLOOD: CPT

## 2022-08-29 PROCEDURE — 83605 ASSAY OF LACTIC ACID: CPT

## 2022-08-29 RX ORDER — ACETAMINOPHEN 325 MG/1
650 TABLET ORAL
Status: DISCONTINUED | OUTPATIENT
Start: 2022-08-29 | End: 2022-08-29 | Stop reason: SDUPTHER

## 2022-08-29 RX ORDER — OFLOXACIN 3 MG/ML
1 SOLUTION/ DROPS OPHTHALMIC 4 TIMES DAILY
COMMUNITY

## 2022-08-29 RX ORDER — ONDANSETRON 2 MG/ML
4 INJECTION INTRAMUSCULAR; INTRAVENOUS
Status: DISCONTINUED | OUTPATIENT
Start: 2022-08-29 | End: 2022-09-04 | Stop reason: HOSPADM

## 2022-08-29 RX ORDER — AMIODARONE HYDROCHLORIDE 200 MG/1
100 TABLET ORAL DAILY
Status: DISCONTINUED | OUTPATIENT
Start: 2022-08-30 | End: 2022-09-04 | Stop reason: HOSPADM

## 2022-08-29 RX ORDER — ACETAMINOPHEN 650 MG/1
650 SUPPOSITORY RECTAL
Status: DISCONTINUED | OUTPATIENT
Start: 2022-08-29 | End: 2022-09-04 | Stop reason: HOSPADM

## 2022-08-29 RX ORDER — SODIUM CHLORIDE 0.9 % (FLUSH) 0.9 %
5-40 SYRINGE (ML) INJECTION AS NEEDED
Status: DISCONTINUED | OUTPATIENT
Start: 2022-08-29 | End: 2022-09-04 | Stop reason: HOSPADM

## 2022-08-29 RX ORDER — PREDNISOLONE ACETATE 10 MG/ML
1 SUSPENSION/ DROPS OPHTHALMIC 4 TIMES DAILY
COMMUNITY

## 2022-08-29 RX ORDER — SODIUM CHLORIDE 0.9 % (FLUSH) 0.9 %
5-40 SYRINGE (ML) INJECTION EVERY 8 HOURS
Status: DISCONTINUED | OUTPATIENT
Start: 2022-08-29 | End: 2022-09-04 | Stop reason: HOSPADM

## 2022-08-29 RX ORDER — ATORVASTATIN CALCIUM 40 MG/1
80 TABLET, FILM COATED ORAL
Status: DISCONTINUED | OUTPATIENT
Start: 2022-08-29 | End: 2022-09-04 | Stop reason: HOSPADM

## 2022-08-29 RX ORDER — CHOLECALCIFEROL (VITAMIN D3) 125 MCG
5 CAPSULE ORAL
Status: DISCONTINUED | OUTPATIENT
Start: 2022-08-29 | End: 2022-09-04 | Stop reason: HOSPADM

## 2022-08-29 RX ORDER — SODIUM CHLORIDE 0.9 % (FLUSH) 0.9 %
5-10 SYRINGE (ML) INJECTION AS NEEDED
Status: DISCONTINUED | OUTPATIENT
Start: 2022-08-29 | End: 2022-09-02

## 2022-08-29 RX ORDER — HALOPERIDOL 5 MG/1
5 TABLET ORAL
Status: DISCONTINUED | OUTPATIENT
Start: 2022-08-29 | End: 2022-09-04 | Stop reason: HOSPADM

## 2022-08-29 RX ORDER — ENOXAPARIN SODIUM 100 MG/ML
40 INJECTION SUBCUTANEOUS DAILY
Status: DISCONTINUED | OUTPATIENT
Start: 2022-08-30 | End: 2022-08-29

## 2022-08-29 RX ORDER — NOREPINEPHRINE BITARTRATE/D5W 8 MG/250ML
0-16 PLASTIC BAG, INJECTION (ML) INTRAVENOUS
Status: DISCONTINUED | OUTPATIENT
Start: 2022-08-29 | End: 2022-08-29

## 2022-08-29 RX ORDER — METOPROLOL SUCCINATE 50 MG/1
25 TABLET, EXTENDED RELEASE ORAL DAILY
Status: DISCONTINUED | OUTPATIENT
Start: 2022-08-30 | End: 2022-09-04 | Stop reason: HOSPADM

## 2022-08-29 RX ORDER — LISINOPRIL 2.5 MG/1
2.5 TABLET ORAL DAILY
COMMUNITY

## 2022-08-29 RX ORDER — LEVOFLOXACIN 5 MG/ML
750 INJECTION, SOLUTION INTRAVENOUS
Status: DISCONTINUED | OUTPATIENT
Start: 2022-08-31 | End: 2022-09-02

## 2022-08-29 RX ORDER — ACETAMINOPHEN 325 MG/1
650 TABLET ORAL
Status: DISCONTINUED | OUTPATIENT
Start: 2022-08-29 | End: 2022-09-04 | Stop reason: HOSPADM

## 2022-08-29 RX ORDER — LEVOFLOXACIN 5 MG/ML
750 INJECTION, SOLUTION INTRAVENOUS EVERY 24 HOURS
Status: DISCONTINUED | OUTPATIENT
Start: 2022-08-29 | End: 2022-08-29

## 2022-08-29 RX ORDER — NOREPINEPHRINE BIT/0.9 % NACL 8 MG/250ML
.5-16 INFUSION BOTTLE (ML) INTRAVENOUS
Status: DISCONTINUED | OUTPATIENT
Start: 2022-08-29 | End: 2022-09-02

## 2022-08-29 RX ORDER — ONDANSETRON 4 MG/1
4 TABLET, ORALLY DISINTEGRATING ORAL
Status: DISCONTINUED | OUTPATIENT
Start: 2022-08-29 | End: 2022-09-04 | Stop reason: HOSPADM

## 2022-08-29 RX ORDER — POLYETHYLENE GLYCOL 3350 17 G/17G
17 POWDER, FOR SOLUTION ORAL DAILY PRN
Status: DISCONTINUED | OUTPATIENT
Start: 2022-08-29 | End: 2022-09-04 | Stop reason: HOSPADM

## 2022-08-29 RX ADMIN — HALOPERIDOL 5 MG: 5 TABLET ORAL at 23:43

## 2022-08-29 RX ADMIN — ATORVASTATIN CALCIUM 80 MG: 40 TABLET, FILM COATED ORAL at 22:40

## 2022-08-29 RX ADMIN — MELATONIN TAB 5 MG 5 MG: 5 TAB at 23:43

## 2022-08-29 RX ADMIN — PIPERACILLIN SODIUM AND TAZOBACTAM SODIUM 3.38 G: 3; .375 INJECTION, POWDER, LYOPHILIZED, FOR SOLUTION INTRAVENOUS at 22:40

## 2022-08-29 RX ADMIN — SODIUM CHLORIDE 2979 ML: 9 INJECTION, SOLUTION INTRAVENOUS at 15:18

## 2022-08-29 RX ADMIN — SODIUM CHLORIDE, PRESERVATIVE FREE 10 ML: 5 INJECTION INTRAVENOUS at 22:39

## 2022-08-29 RX ADMIN — PIPERACILLIN SODIUM AND TAZOBACTAM SODIUM 3.38 G: 3; .375 INJECTION, POWDER, LYOPHILIZED, FOR SOLUTION INTRAVENOUS at 15:24

## 2022-08-29 NOTE — ED NOTES
.. TRANSFER - OUT REPORT:    Verbal report given to 5E nurse on Italia Klein  being transferred to [phone] on 5E for routine progression of care       Report consisted of patients Situation, Background, Assessment and   Recommendations(SBAR). Information from the following report(s) SBAR, ED Summary and MAR was reviewed with the receiving nurse. Lines:   Peripheral IV 08/29/22 Left Antecubital (Active)       Peripheral IV 08/29/22 Posterior; Left Hand (Active)        Opportunity for questions and clarification was provided.       Patient transported with:   [facility]

## 2022-08-29 NOTE — ED PROVIDER NOTES
EMERGENCY DEPARTMENT HISTORY AND PHYSICAL EXAM      Date: 8/29/2022  Patient Name: Octavia Ferris    History of Presenting Illness     Chief Complaint   Patient presents with    Altered mental status    Hypotension       History Provided By: EMS    HPI: Octavia Ferris, 81 y.o. male brought to the emergency department by EMS when they were called for altered mental status. Per EMS, family states patient has not been his normal self since yesterday, is unable to stand and had multiple falls this morning. EMS states patient has history of TIA, leukemia and subarachnoid hemorrhage. Additional history unable to be obtained due to patient being altered. There are no other complaints, changes, or physical findings at this time. PCP: Domonique Greenberg DO    Current Facility-Administered Medications   Medication Dose Route Frequency Provider Last Rate Last Admin    piperacillin-tazobactam (ZOSYN) 3.375 g in 0.9% sodium chloride (MBP/ADV) 100 mL MBP  3.375 g IntraVENous Q8H Sally Kaur MD   IV Completed at 08/29/22 1603    sodium chloride (NS) flush 5-10 mL  5-10 mL IntraVENous PRN Sally Kaur MD        [START ON 8/31/2022] levoFLOXacin (LEVAQUIN) 750 mg in D5W IVPB  750 mg IntraVENous Q48H Sally Kaur MD        NOREPINephrine (LEVOPHED) 8 mg in 0.9% NS 250ml infusion  0.5-16 mcg/min IntraVENous TITRATE Sally Kaur MD   Held at 08/29/22 1552     Current Outpatient Medications   Medication Sig Dispense Refill    hydrOXYzine pamoate (VistariL) 25 mg capsule Take 1 Capsule by mouth two (2) times daily as needed for Itching. As needed for anxiety 20 Capsule 0    melatonin 5 mg tablet Take 5 mg by mouth nightly as needed.  metFORMIN (GLUCOPHAGE) 500 mg tablet Take 1,000 mg by mouth two (2) times daily (with meals).  clobetasoL (TEMOVATE) 0.05 % topical cream Apply  to affected area two (2) times a day.  As needed for psoriasis      acetaminophen (TYLENOL) 325 mg tablet Take 2 Tablets by mouth every four (4) hours as needed for Pain or Fever (headache). 30 Tablet 0    atorvastatin (LIPITOR) 80 mg tablet Take 1 Tablet by mouth nightly. 30 Tablet 0    amiodarone (CORDARONE) 200 mg tablet Take 100 mg by mouth daily.  metoprolol succinate (TOPROL-XL) 25 mg XL tablet Take 25 mg by mouth daily.  busPIRone (BUSPAR) 15 mg tablet Take 15 mg by mouth two (2) times a day.  PARoxetine (PAXIL) 10 mg tablet Take 10 mg by mouth daily. Past History   Past Medical History:  Past Medical History:   Diagnosis Date    Aneurysm ([facility] [address]     Anxiety     Bradycardia     Cancer (HCC)     COLON    Diabetes ([facility] [address]     TYPE II    Gout     Hypertension     Ill-defined condition     parkinsons disease    Pacemaker     Psoriasis     SCALP AND LT.  EAR    Psychiatric disorder     depression and anxiety    Stroke [facility])        Past Surgical History:  Past Surgical History:   Procedure Laterality Date    HX CATARACT REMOVAL Bilateral     HX GI      COLONOSCOPY    HX HERNIA REPAIR      UMBILICAL    HX PACEMAKER      HX RETINAL DETACHMENT REPAIR Right     HX TONSILLECTOMY      HX TOTAL COLECTOMY  1908'S    1 FOOT REMOVED       Family History:  Family History   Problem Relation Age of Onset    Liver Disease Mother 27        JAUNDICE    Heart Disease Father     Heart Attack Father     No Known Problems Sister     Alcohol abuse Brother     Heart Disease Brother     Muscular dystrophy Brother         FROM THEIR MOTHER, PT STEPMOM    Muscular dystrophy Brother         FROM THEIR MOTHER, PT STEPMOM    Muscular dystrophy Brother         FROM THEIR MOTHER, PT STEPMOM    No Known Problems Sister     Breast Cancer Daughter     Anesth Problems Neg Hx        Social History:  Social History     Tobacco Use    Smoking status: Former     Packs/day: 1.00     Years: 10.00     Pack years: 10.00     Types: Cigarettes     Quit date: 7/29/1965 Years since quittin.4    Smokeless tobacco: Never   Vaping Use    Vaping Use: Never used   Substance Use Topics    Alcohol use: No     Comment: NOT SINCE     Drug use: No       Allergies: Allergies   Allergen Reactions    Monosodium Glutamate Anaphylaxis and Hives    Adhesive Tape-Silicones Other (comments)     BLISTERS     Review of Systems   Review of Systems  Unable to obtain 12 system ROS due to patient being altered. Physical Exam   Physical Exam  Physical Exam  Constitutional:       General: Responds to voice     Appearance: Chronically ill-appearing  HENT:      Head: Normocephalic and atraumatic. Nose: Nose normal.      Mouth/Throat:      Mouth: Mucous membranes are moist.   Eyes:      Extraocular Movements: Extraocular movements intact. Pupils: Pupils are equal, round, and reactive to light. Cardiovascular:      Rate and Rhythm: Normal rate. Pulses: Normal pulses. Pulmonary:      Effort: Pulmonary effort is normal.      Breath sounds: No stridor. Abdominal:      General: Abdomen is flat. There is no distension. Musculoskeletal:         General: Moves all extremities     Cervical back: Normal range of motion and neck supple. Skin:     General: Skin is warm and dry. Capillary Refill: Capillary refill takes less than 2 seconds. Neurological:      General: Moves all extremities     Mental Status: Decreased responsiveness.    Psychiatric:         Mood and Affect: Unable to test due to patient's altered mental status    Lab and Diagnostic Study Results   Labs -     Recent Results (from the past 12 hour(s))   CBC WITH AUTOMATED DIFF    Collection Time: 22  2:25 PM   Result Value Ref Range    WBC 14.3 (H) 4.1 - 11.1 K/uL    RBC 3.44 (L) 4.10 - 5.70 M/uL    HGB 11.2 (L) 12.1 - 17.0 g/dL    HCT 34.2 (L) 36.6 - 50.3 %    MCV 99.4 (H) 80.0 - 99.0 FL    MCH 32.6 26.0 - 34.0 PG    MCHC 32.7 30.0 - 36.5 g/dL    RDW 13.8 11.5 - 14.5 %    PLATELET 290 (L) 101 - 400 K/uL MPV 11.2 8.9 - 12.9 FL    NRBC 0.0 0.0  WBC    ABSOLUTE NRBC 0.00 0.00 - 0.01 K/uL    NEUTROPHILS 48 32 - 75 %    LYMPHOCYTES 44 12 - 49 %    MONOCYTES 7 5 - 13 %    EOSINOPHILS 1 0 - 7 %    BASOPHILS 0 0 - 1 %    IMMATURE GRANULOCYTES 0 0 - 0.5 %    ABS. NEUTROPHILS 6.7 1.8 - 8.0 K/UL    ABS. LYMPHOCYTES 6.3 (H) 0.8 - 3.5 K/UL    ABS. MONOCYTES 1.0 0.0 - 1.0 K/UL    ABS. EOSINOPHILS 0.2 0.0 - 0.4 K/UL    ABS. BASOPHILS 0.0 0.0 - 0.1 K/UL    ABS. IMM. GRANS. 0.1 (H) 0.00 - 0.04 K/UL    DF AUTOMATED     METABOLIC PANEL, COMPREHENSIVE    Collection Time: 08/29/22  2:25 PM   Result Value Ref Range    Sodium 140 136 - 145 mmol/L    Potassium 4.7 3.5 - 5.1 mmol/L    Chloride 106 97 - 108 mmol/L    CO2 22 21 - 32 mmol/L    Anion gap 12 5 - 15 mmol/L    Glucose 361 (H) 65 - 100 mg/dL    BUN 32 (H) 6 - 20 mg/dL    Creatinine 1.72 (H) 0.70 - 1.30 mg/dL    BUN/Creatinine ratio 19 12 - 20      GFR est AA 46 (L) >60 ml/min/1.73m2    GFR est non-AA 38 (L) >60 ml/min/1.73m2    Calcium 8.2 (L) 8.5 - 10.1 mg/dL    Bilirubin, total 1.3 (H) 0.2 - 1.0 mg/dL    AST (SGOT) 93 (H) 15 - 37 U/L    ALT (SGPT) 61 12 - 78 U/L    Alk.  phosphatase 85 45 - 117 U/L    Protein, total 5.1 (L) 6.4 - 8.2 g/dL    Albumin 2.8 (L) 3.5 - 5.0 g/dL    Globulin 2.3 2.0 - 4.0 g/dL    A-G Ratio 1.2 1.1 - 2.2     TROPONIN-HIGH SENSITIVITY    Collection Time: 08/29/22  2:25 PM   Result Value Ref Range    Troponin-High Sensitivity 22 0 - 76 ng/L   INFLUENZA A & B AG (RAPID TEST)    Collection Time: 08/29/22  2:55 PM   Result Value Ref Range    Influenza A Antigen Negative Negative      Influenza B Antigen Negative Negative     COVID-19 RAPID TEST    Collection Time: 08/29/22  2:55 PM   Result Value Ref Range    COVID-19 rapid test Not Detected Not Detected     LACTIC ACID    Collection Time: 08/29/22  2:55 PM   Result Value Ref Range    Lactic acid 6.0 (HH) 0.4 - 2.0 mmol/L   BLOOD GAS, ARTERIAL    Collection Time: 08/29/22  3:21 PM   Result Value Ref Range    pH 7.45 7.35 - 7.45      PCO2 28 (L) 35 - 45 mmHg    PO2 189 (H) 80 - 100 mmHg    O2 SATURATION 99 95 - 99 %    BICARBONATE 19 (L) 22 - 26 mmol/L    BASE DEFICIT 3.5 mmol/L    O2 METHOD Nasal Cannula      O2 FLOW RATE 2.00 L/min    FIO2 28 %    Sample source Arterial      SITE Left Radial      JAMESON'S TEST YES      Carboxy-Hgb 0.3 (L) 1 - 2 %    Methemoglobin 0.5 0 - 1.4 %    Oxyhemoglobin 98.2 95 - 99 %    Performed by Cynthia Onrelas    BLOOD GAS, ARTERIAL    Collection Time: 08/29/22  3:21 PM   Result Value Ref Range    pH 7.45 7.35 - 7.45      PCO2 28 (L) 35 - 45 mmHg    PO2 189 (H) 80 - 100 mmHg    O2 SATURATION 99 95 - 99 %    BICARBONATE 19 (L) 22 - 26 mmol/L    BASE DEFICIT 3.5 mmol/L    O2 METHOD Nasal Cannula      O2 FLOW RATE 2.00 L/min    FIO2 28 %    Sample source Arterial      SITE Left Radial      JAMESON'S TEST YES      Carboxy-Hgb 0.3 (L) 1 - 2 %    Methemoglobin 0.5 0 - 1.4 %    Oxyhemoglobin 98.2 95 - 99 %    Performed by Cynthia Ornelas    BLOOD GAS, ARTERIAL    Collection Time: 08/29/22  3:21 PM   Result Value Ref Range    pH 7.45 7.35 - 7.45      PCO2 28 (L) 35 - 45 mmHg    PO2 189 (H) 80 - 100 mmHg    O2 SATURATION 99 95 - 99 %    BICARBONATE 19 (L) 22 - 26 mmol/L    BASE DEFICIT 3.5 mmol/L    O2 METHOD Nasal Cannula      O2 FLOW RATE 2.00 L/min    FIO2 28 %    Sample source Arterial      SITE Left Radial      JAMESON'S TEST YES      Carboxy-Hgb 0.3 (L) 1 - 2 %    Methemoglobin 0.5 0 - 1.4 %    Oxyhemoglobin 98.2 95 - 99 %    Performed by Cynthia Ornelas    URINALYSIS W/ REFLEX CULTURE    Collection Time: 08/29/22  4:40 PM    Specimen: Urine   Result Value Ref Range    Color Yellow/Straw      Appearance Clear Clear      Specific gravity 1.020 1.003 - 1.030      pH (UA) 5.0      Protein Negative Negative mg/dL    Glucose Negative Negative mg/dL    Ketone Negative Negative mg/dL    Bilirubin Negative Negative      Blood Trace (A) Negative      Urobilinogen 1.0 0.2 - 1.0 EU/dL    Nitrites Negative Negative Leukocyte Esterase Negative Negative      WBC 5-10 0 - 4 /hpf    RBC 0-5 0 - 5 /hpf    Bacteria 1+ (A) Negative /hpf    UA:UC IF INDICATED Culture not indicated by UA result Culture not indicated by UA result      Mucus Trace (A) Negative /lpf    Hyaline cast 0-2 0 - 5 /lpf   DRUG SCREEN, URINE    Collection Time: 08/29/22  4:40 PM   Result Value Ref Range    AMPHETAMINES Negative Negative      BARBITURATES Negative Negative      BENZODIAZEPINES Negative Negative      COCAINE Negative Negative      METHADONE Negative Negative      OPIATES Negative Negative      PCP(PHENCYCLIDINE) Negative Negative      THC (TH-CANNABINOL) Negative Negative      Drug screen comment        This test is a screen for drugs of abuse in a medical setting only (i.e., they are unconfirmed results and as such must not be used for non-medical purposes, e.g.,employment testing, legal testing). Due to its inherent nature, false positive (FP) and false negative (FN) results may be obtained. Therefore, if necessary for medical care, recommend confirmation of positive findings by GC/MS. LACTIC ACID    Collection Time: 08/29/22  5:05 PM   Result Value Ref Range    Lactic acid 3.0 (HH) 0.4 - 2.0 mmol/L       Radiologic Studies -   [unfilled]  CT Results  (Last 48 hours)                 08/29/22 1621  CT HEAD WO CONT Final result    Impression:  No acute intracranial process. Imaging findings consistent with mild chronic microvascular ischemic change. There is a moderate to severe degree of cerebral atrophy. Narrative:  CLINICAL HISTORY: AMS   INDICATION: AMS   COMPARISON: 6/14/2022. CT dose reduction was achieved through use of a standardized protocol tailored   for this examination and automatic exposure control for dose modulation. TECHNIQUE: Serial axial images with a collimation of 5 mm were obtained from the   skull base through the vertex     FINDINGS:    There is sulcal and ventricular prominence. Confluent periventricular and   scattered foci of hypodensity in the cerebral white matter. There is no evidence   of an acute infarction, hemorrhage, or mass-effect. There is no evidence of   midline shift or hydrocephalus. Posterior fossa structures are unremarkable. No   extra-axial collections are seen. Post craniectomy changes on the left. Mastoid air cells are well pneumatized and clear. There is no evidence of depressed skull fractures of soft tissue swelling. CXR Results  (Last 48 hours)                 08/29/22 1445  XR CHEST PORT Final result    Impression:  Persistent or recurrent bibasilar opacities. Two-view radiography   would be more sensitive and specific in this regard. Narrative:  EXAM: XR CHEST PORT       INDICATION: chest pain       COMPARISON: 7/28/2021       FINDINGS: A portable AP radiograph of the chest was obtained at 1442 hours. The   patient is on a cardiac monitor. There are persistent or recurrent patchy   opacities at both lung bases. The cardiac and mediastinal contours and pulmonary   vascularity are stable. . A dual-lead pacemaker is present. The bones and soft   tissues are grossly within normal limits. Medical Decision Making and ED Course   Differential Diagnosis & Medical Decision Making Provider Note:   Patient hypotensive upon arrival, improved with fluids. Lactic acid elevated and improved along with patient's blood pressure. Unable to find source, no skin infection, urine unremarkable. Will admit for continued monitoring and treatment. - I am the first and primary provider for this patient. I reviewed the vital signs, available nursing notes, past medical history, past surgical history, family history and social history. The patient's presenting problems have been discussed, and the staff are in agreement with the care plan formulated and outlined with them.   I have encouraged them to ask questions as they arise throughout their visit. Vital Signs-Reviewed the patient's vital signs. Patient Vitals for the past 12 hrs:   Temp Pulse Resp BP SpO2   08/29/22 1720 -- 60 26 100/62 98 %   08/29/22 1705 -- 60 27 (!) 105/38 98 %   08/29/22 1605 -- 60 29 (!) 114/91 98 %   08/29/22 1530 -- 60 28 (!) 107/58 96 %   08/29/22 1513 -- 60 23 (!) 105/51 97 %   08/29/22 1420 97.9 °F (36.6 °C) 60 19 (!) 89/44 97 %         ED Course:   ED Course as of 08/29/22 1758   Mon Aug 29, 2022   1754 Sepsis reassessment performed by me at this time. [CS]      ED Course User Index  [CS] Gene Lewis MD           Procedures and Critical Care       CRITICAL CARE NOTE :    2:52 PM    IMPENDING DETERIORATION -Cardiovascular and Metabolic  ASSOCIATED RISK FACTORS - Hypotension  MANAGEMENT- Bedside Assessment, Supervision of Care, and admission  INTERPRETATION -  Blood Pressure  INTERVENTIONS - hemodynamic mngmt  CASE REVIEW - Hospitalist/Intensivist  TREATMENT RESPONSE -Improved  PERFORMED BY - Self    NOTES   :  I have spent 35 minutes of critical care time involved in lab review, consultations with specialist, family decision- making, bedside attention and documentation. This time excludes time spent in any separate billed procedures. During this entire length of time I was immediately available to the patient . Barney Keane MD    Disposition   Disposition: Admitted to Floor Stepdown Unit the case was discussed with the admitting physician       Diagnosis/Clinical Impression     Clinical Impression:   1. Altered mental status, unspecified altered mental status type    2. Sepsis, due to unspecified organism, unspecified whether acute organ dysfunction present [facility])        Attestations: Wayne Celeste MD, am the primary clinician of record. Please note that this dictation was completed with [employer], the [employer] voice recognition software.   Quite often unanticipated grammatical, syntax, homophones, and other interpretive errors are inadvertently transcribed by the computer software. Please disregard these errors. Please excuse any errors that have escaped final proofreading. Thank you.

## 2022-08-29 NOTE — ED NOTES
Assumed care of pt at this time. No signs of acute or respiratory distress noted at this time. Pt appears alert but confused at this time. Off going nurse attempted report, floor refused report. Will attempt after shift change. Bed in lowest position, call bell within reach, both side rails up.

## 2022-08-29 NOTE — H&P
History and Physical    Subjective:   Chief Complaint : confusion and not acting right since yesterday  Source of information : EMS and charts    History of present illness:     87M, with h/o SSS on amiodarone, leukemia with confusion and not acting right since yesterday    Per EMS, family stated that the patient has not been normal self since yesterday, he is unable to stand off and has multiple falls this morning. He has a h/o subarachnoid hemorrhage and TIA. He has been having loose stools as meniotned by EMS    Unable to obtain further history    ED course:  Found to be in sepsis, BP improved after IVF, lactic acid 6->3, WBC 14.3, likely course pneumonia or gastroenteritis. Past Medical History:   Diagnosis Date    Aneurysm ([facility] [address]     Anxiety     Bradycardia     Cancer ([facility] [address]     COLON    Diabetes ([facility] [address]     TYPE II    Gout     Hypertension     Ill-defined condition     parkinsons disease    Pacemaker     Psoriasis     SCALP AND LT.  EAR    Psychiatric disorder     depression and anxiety    Stroke [facility])      Past Surgical History:   Procedure Laterality Date    HX CATARACT REMOVAL Bilateral     HX GI      COLONOSCOPY    HX HERNIA REPAIR      UMBILICAL    HX PACEMAKER      HX RETINAL DETACHMENT REPAIR Right     HX TONSILLECTOMY      HX TOTAL COLECTOMY  1908'S    1 FOOT REMOVED     Family History   Problem Relation Age of Onset    Liver Disease Mother 27        JAUNDICE    Heart Disease Father     Heart Attack Father     No Known Problems Sister     Alcohol abuse Brother     Heart Disease Brother     Muscular dystrophy Brother         FROM THEIR MOTHER, PT STEPMOM    Muscular dystrophy Brother         FROM THEIR MOTHER, PT STEPMOM    Muscular dystrophy Brother         FROM THEIR MOTHER, PT STEPMOM    No Known Problems Sister     Breast Cancer Daughter     Anesth Problems Neg Hx       Social History     Tobacco Use    Smoking status: Former     Packs/day: 1.00     Years: 10.00     Pack years: 10.00 Types: Cigarettes     Quit date: 1965     Years since quittin.1    Smokeless tobacco: Never   Substance Use Topics    Alcohol use: No     Comment: NOT SINCE        Prior to Admission medications    Medication Sig Start Date End Date Taking? Authorizing Provider   hydrOXYzine pamoate (VistariL) 25 mg capsule Take 1 Capsule by mouth two (2) times daily as needed for Itching. As needed for anxiety 6/15/22   Brian Mann MD   melatonin 5 mg tablet Take 5 mg by mouth nightly as needed. Provider, Historical   metFORMIN (GLUCOPHAGE) 500 mg tablet Take 1,000 mg by mouth two (2) times daily (with meals). Provider, Historical   clobetasoL (TEMOVATE) 0.05 % topical cream Apply  to affected area two (2) times a day. As needed for psoriasis    Provider, Historical   acetaminophen (TYLENOL) 325 mg tablet Take 2 Tablets by mouth every four (4) hours as needed for Pain or Fever (headache). 22   Morales Burks MD   atorvastatin (LIPITOR) 80 mg tablet Take 1 Tablet by mouth nightly. 21   Linda Campos MD   amiodarone (CORDARONE) 200 mg tablet Take 100 mg by mouth daily. 3/19/18   Provider, Historical   metoprolol succinate (TOPROL-XL) 25 mg XL tablet Take 25 mg by mouth daily. 3/19/18   Provider, Historical   busPIRone (BUSPAR) 15 mg tablet Take 15 mg by mouth two (2) times a day. Provider, Historical   PARoxetine (PAXIL) 10 mg tablet Take 10 mg by mouth daily.     Provider, Historical     Allergies   Allergen Reactions    Monosodium Glutamate Anaphylaxis and Hives    Adhesive Tape-Silicones Other (comments)     BLISTERS             Review of Systems:  Cannot be ascertained due to mental status    Vitals:   Visit Vitals  /62   Pulse 60   Temp 97.9 °F (36.6 °C)   Resp 26   Ht 5' 8\" (1.727 m)   Wt 99.3 kg (219 lb)   SpO2 98%   BMI 33.30 kg/m²       Physical Exam  Physical Exam  Constitutional:       General: Responds to voice     Appearance: Chronically ill-appearing  HENT:      Head: Normocephalic and atraumatic. Nose: Nose normal.      Mouth/Throat:      Mouth: Mucous membranes are moist.   Eyes:      Extraocular Movements: Extraocular movements intact. Pupils: Pupils are equal, round, and reactive to light. Cardiovascular:      Rate and Rhythm: Normal rate. Pulses: Normal pulses. Pulmonary:      Effort: Pulmonary effort is normal.      Breath sounds: No stridor. Abdominal:      General: Abdomen is flat. There is no distension. Musculoskeletal:         General: Moves all extremities     Cervical back: Normal range of motion and neck supple. Skin:     General: Skin is warm and dry. Capillary Refill: Capillary refill takes less than 2 seconds. Neurological:      General: Moves all extremities     Mental Status: Decreased responsiveness. Psychiatric:         Mood and Affect: Unable to test due to patient's altered mental status        Data Review:   Recent Results (from the past 24 hour(s))   CBC WITH AUTOMATED DIFF    Collection Time: 08/29/22  2:25 PM   Result Value Ref Range    WBC 14.3 (H) 4.1 - 11.1 K/uL    RBC 3.44 (L) 4.10 - 5.70 M/uL    HGB 11.2 (L) 12.1 - 17.0 g/dL    HCT 34.2 (L) 36.6 - 50.3 %    MCV 99.4 (H) 80.0 - 99.0 FL    MCH 32.6 26.0 - 34.0 PG    MCHC 32.7 30.0 - 36.5 g/dL    RDW 13.8 11.5 - 14.5 %    PLATELET 719 (L) 992 - 400 K/uL    MPV 11.2 8.9 - 12.9 FL    NRBC 0.0 0.0  WBC    ABSOLUTE NRBC 0.00 0.00 - 0.01 K/uL    NEUTROPHILS 48 32 - 75 %    LYMPHOCYTES 44 12 - 49 %    MONOCYTES 7 5 - 13 %    EOSINOPHILS 1 0 - 7 %    BASOPHILS 0 0 - 1 %    IMMATURE GRANULOCYTES 0 0 - 0.5 %    ABS. NEUTROPHILS 6.7 1.8 - 8.0 K/UL    ABS. LYMPHOCYTES 6.3 (H) 0.8 - 3.5 K/UL    ABS. MONOCYTES 1.0 0.0 - 1.0 K/UL    ABS. EOSINOPHILS 0.2 0.0 - 0.4 K/UL    ABS. BASOPHILS 0.0 0.0 - 0.1 K/UL    ABS. IMM.  GRANS. 0.1 (H) 0.00 - 0.04 K/UL    DF AUTOMATED     METABOLIC PANEL, COMPREHENSIVE    Collection Time: 08/29/22  2:25 PM   Result Value Ref Range    Sodium 140 136 - 145 mmol/L    Potassium 4.7 3.5 - 5.1 mmol/L    Chloride 106 97 - 108 mmol/L    CO2 22 21 - 32 mmol/L    Anion gap 12 5 - 15 mmol/L    Glucose 361 (H) 65 - 100 mg/dL    BUN 32 (H) 6 - 20 mg/dL    Creatinine 1.72 (H) 0.70 - 1.30 mg/dL    BUN/Creatinine ratio 19 12 - 20      GFR est AA 46 (L) >60 ml/min/1.73m2    GFR est non-AA 38 (L) >60 ml/min/1.73m2    Calcium 8.2 (L) 8.5 - 10.1 mg/dL    Bilirubin, total 1.3 (H) 0.2 - 1.0 mg/dL    AST (SGOT) 93 (H) 15 - 37 U/L    ALT (SGPT) 61 12 - 78 U/L    Alk.  phosphatase 85 45 - 117 U/L    Protein, total 5.1 (L) 6.4 - 8.2 g/dL    Albumin 2.8 (L) 3.5 - 5.0 g/dL    Globulin 2.3 2.0 - 4.0 g/dL    A-G Ratio 1.2 1.1 - 2.2     TROPONIN-HIGH SENSITIVITY    Collection Time: 08/29/22  2:25 PM   Result Value Ref Range    Troponin-High Sensitivity 22 0 - 76 ng/L   INFLUENZA A & B AG (RAPID TEST)    Collection Time: 08/29/22  2:55 PM   Result Value Ref Range    Influenza A Antigen Negative Negative      Influenza B Antigen Negative Negative     COVID-19 RAPID TEST    Collection Time: 08/29/22  2:55 PM   Result Value Ref Range    COVID-19 rapid test Not Detected Not Detected     LACTIC ACID    Collection Time: 08/29/22  2:55 PM   Result Value Ref Range    Lactic acid 6.0 (HH) 0.4 - 2.0 mmol/L   BLOOD GAS, ARTERIAL    Collection Time: 08/29/22  3:21 PM   Result Value Ref Range    pH 7.45 7.35 - 7.45      PCO2 28 (L) 35 - 45 mmHg    PO2 189 (H) 80 - 100 mmHg    O2 SATURATION 99 95 - 99 %    BICARBONATE 19 (L) 22 - 26 mmol/L    BASE DEFICIT 3.5 mmol/L    O2 METHOD Nasal Cannula      O2 FLOW RATE 2.00 L/min    FIO2 28 %    Sample source Arterial      SITE Left Radial      JAMESON'S TEST YES      Carboxy-Hgb 0.3 (L) 1 - 2 %    Methemoglobin 0.5 0 - 1.4 %    Oxyhemoglobin 98.2 95 - 99 %    Performed by Marquita Castillo    BLOOD GAS, ARTERIAL    Collection Time: 08/29/22  3:21 PM   Result Value Ref Range    pH 7.45 7.35 - 7.45      PCO2 28 (L) 35 - 45 mmHg    PO2 189 (H) 80 - 100 mmHg    O2 SATURATION 99 95 - 99 %    BICARBONATE 19 (L) 22 - 26 mmol/L    BASE DEFICIT 3.5 mmol/L    O2 METHOD Nasal Cannula      O2 FLOW RATE 2.00 L/min    FIO2 28 %    Sample source Arterial      SITE Left Radial      JAMESON'S TEST YES      Carboxy-Hgb 0.3 (L) 1 - 2 %    Methemoglobin 0.5 0 - 1.4 %    Oxyhemoglobin 98.2 95 - 99 %    Performed by Amy Meza    BLOOD GAS, ARTERIAL    Collection Time: 08/29/22  3:21 PM   Result Value Ref Range    pH 7.45 7.35 - 7.45      PCO2 28 (L) 35 - 45 mmHg    PO2 189 (H) 80 - 100 mmHg    O2 SATURATION 99 95 - 99 %    BICARBONATE 19 (L) 22 - 26 mmol/L    BASE DEFICIT 3.5 mmol/L    O2 METHOD Nasal Cannula      O2 FLOW RATE 2.00 L/min    FIO2 28 %    Sample source Arterial      SITE Left Radial      JAMESON'S TEST YES      Carboxy-Hgb 0.3 (L) 1 - 2 %    Methemoglobin 0.5 0 - 1.4 %    Oxyhemoglobin 98.2 95 - 99 %    Performed by Amy Meza    URINALYSIS W/ REFLEX CULTURE    Collection Time: 08/29/22  4:40 PM    Specimen: Urine   Result Value Ref Range    Color Yellow/Straw      Appearance Clear Clear      Specific gravity 1.020 1.003 - 1.030      pH (UA) 5.0      Protein Negative Negative mg/dL    Glucose Negative Negative mg/dL    Ketone Negative Negative mg/dL    Bilirubin Negative Negative      Blood Trace (A) Negative      Urobilinogen 1.0 0.2 - 1.0 EU/dL    Nitrites Negative Negative      Leukocyte Esterase Negative Negative      WBC 5-10 0 - 4 /hpf    RBC 0-5 0 - 5 /hpf    Bacteria 1+ (A) Negative /hpf    UA:UC IF INDICATED Culture not indicated by UA result Culture not indicated by UA result      Mucus Trace (A) Negative /lpf    Hyaline cast 0-2 0 - 5 /lpf   DRUG SCREEN, URINE    Collection Time: 08/29/22  4:40 PM   Result Value Ref Range    AMPHETAMINES Negative Negative      BARBITURATES Negative Negative      BENZODIAZEPINES Negative Negative      COCAINE Negative Negative      METHADONE Negative Negative      OPIATES Negative Negative      PCP(PHENCYCLIDINE) Negative Negative      THC (TH-CANNABINOL) Negative Negative      Drug screen comment        This test is a screen for drugs of abuse in a medical setting only (i.e., they are unconfirmed results and as such must not be used for non-medical purposes, e.g.,employment testing, legal testing). Due to its inherent nature, false positive (FP) and false negative (FN) results may be obtained. Therefore, if necessary for medical care, recommend confirmation of positive findings by GC/MS.      LACTIC ACID    Collection Time: 08/29/22  5:05 PM   Result Value Ref Range    Lactic acid 3.0 (HH) 0.4 - 2.0 mmol/L             Assessment and Plan :     (1) Acute encephalopathy : septic    (2) Sepsis    (3) probable acute egastroenteritis    (4) pneumonia     (5) ERICKSON    (6) SSS on amiodarone     PLAN:  Continue IVF  Zosyn for pneumonia and gastroenteritis  RN swallow, else NPO      Signed By: Vikas Sahu MD     August 29, 2022

## 2022-08-29 NOTE — ROUTINE PROCESS
TRANSFER - OUT REPORT:    Verbal report Tube report sheet given on Osiris Kovacs  being transferred to  for routine progression of care       Report consisted of patients Situation, Background, Assessment and   Recommendations(SBAR). Information from the following report(s) SBAR, ED Summary, Recent Results, and Cardiac Rhythm sinus tach  was reviewed with the receiving nurse. Lines:   Peripheral IV 08/29/22 Left Antecubital (Active)       Peripheral IV 08/29/22 Posterior; Left Hand (Active)        Opportunity for questions and clarification was provided.       Patient transported with:   Monitor  O2 @ 1 liters  Tech

## 2022-08-29 NOTE — ED TRIAGE NOTES
EMS dispatched for AMS. .. Pt from home not acting right since yesterday. Multiple falls just this morning. Unable to stand. Hx TIA, Leukemia  with recent eye surgery. .. Also reports loose stools recently.

## 2022-08-30 LAB
ANION GAP SERPL CALC-SCNC: 5 MMOL/L (ref 5–15)
ATRIAL RATE: 40 BPM
BASOPHILS # BLD: 0 K/UL (ref 0–0.1)
BASOPHILS NFR BLD: 0 % (ref 0–1)
BUN SERPL-MCNC: 35 MG/DL (ref 6–20)
BUN/CREAT SERPL: 19 (ref 12–20)
CA-I BLD-MCNC: 8 MG/DL (ref 8.5–10.1)
CALCULATED R AXIS, ECG10: 41 DEGREES
CALCULATED T AXIS, ECG11: 11 DEGREES
CHLORIDE SERPL-SCNC: 111 MMOL/L (ref 97–108)
CO2 SERPL-SCNC: 25 MMOL/L (ref 21–32)
CREAT SERPL-MCNC: 1.83 MG/DL (ref 0.7–1.3)
DIAGNOSIS, 93000: NORMAL
DIFFERENTIAL METHOD BLD: ABNORMAL
EOSINOPHIL # BLD: 0.2 K/UL (ref 0–0.4)
EOSINOPHIL NFR BLD: 1 % (ref 0–7)
ERYTHROCYTE [DISTWIDTH] IN BLOOD BY AUTOMATED COUNT: 14 % (ref 11.5–14.5)
GLUCOSE SERPL-MCNC: 198 MG/DL (ref 65–100)
HCT VFR BLD AUTO: 32.9 % (ref 36.6–50.3)
HGB BLD-MCNC: 10.9 G/DL (ref 12.1–17)
IMM GRANULOCYTES # BLD AUTO: 0.1 K/UL (ref 0–0.04)
IMM GRANULOCYTES NFR BLD AUTO: 0 % (ref 0–0.5)
LYMPHOCYTES # BLD: 9.2 K/UL (ref 0.8–3.5)
LYMPHOCYTES NFR BLD: 58 % (ref 12–49)
MCH RBC QN AUTO: 32.7 PG (ref 26–34)
MCHC RBC AUTO-ENTMCNC: 33.1 G/DL (ref 30–36.5)
MCV RBC AUTO: 98.8 FL (ref 80–99)
MONOCYTES # BLD: 0.8 K/UL (ref 0–1)
MONOCYTES NFR BLD: 5 % (ref 5–13)
NEUTS SEG # BLD: 5.8 K/UL (ref 1.8–8)
NEUTS SEG NFR BLD: 36 % (ref 32–75)
NRBC # BLD: 0 K/UL (ref 0–0.01)
NRBC BLD-RTO: 0 PER 100 WBC
PLATELET # BLD AUTO: 136 K/UL (ref 150–400)
PMV BLD AUTO: 10.9 FL (ref 8.9–12.9)
POTASSIUM SERPL-SCNC: 4.9 MMOL/L (ref 3.5–5.1)
Q-T INTERVAL, ECG07: 480 MS
QRS DURATION, ECG06: 94 MS
QTC CALCULATION (BEZET), ECG08: 480 MS
RBC # BLD AUTO: 3.33 M/UL (ref 4.1–5.7)
SODIUM SERPL-SCNC: 141 MMOL/L (ref 136–145)
VENTRICULAR RATE, ECG03: 60 BPM
WBC # BLD AUTO: 16.1 K/UL (ref 4.1–11.1)

## 2022-08-30 PROCEDURE — 74011250637 HC RX REV CODE- 250/637: Performed by: HOSPITALIST

## 2022-08-30 PROCEDURE — 80048 BASIC METABOLIC PNL TOTAL CA: CPT

## 2022-08-30 PROCEDURE — 74011000258 HC RX REV CODE- 258: Performed by: EMERGENCY MEDICINE

## 2022-08-30 PROCEDURE — 74011000250 HC RX REV CODE- 250: Performed by: HOSPITALIST

## 2022-08-30 PROCEDURE — 74011250636 HC RX REV CODE- 250/636: Performed by: INTERNAL MEDICINE

## 2022-08-30 PROCEDURE — 65270000029 HC RM PRIVATE

## 2022-08-30 PROCEDURE — 36415 COLL VENOUS BLD VENIPUNCTURE: CPT

## 2022-08-30 PROCEDURE — 94761 N-INVAS EAR/PLS OXIMETRY MLT: CPT

## 2022-08-30 PROCEDURE — 94640 AIRWAY INHALATION TREATMENT: CPT

## 2022-08-30 PROCEDURE — 77010033678 HC OXYGEN DAILY

## 2022-08-30 PROCEDURE — 74011000250 HC RX REV CODE- 250: Performed by: INTERNAL MEDICINE

## 2022-08-30 PROCEDURE — 85025 COMPLETE CBC W/AUTO DIFF WBC: CPT

## 2022-08-30 PROCEDURE — 74011250636 HC RX REV CODE- 250/636: Performed by: EMERGENCY MEDICINE

## 2022-08-30 RX ORDER — PREDNISOLONE ACETATE 10 MG/ML
1 SUSPENSION/ DROPS OPHTHALMIC 4 TIMES DAILY
Status: DISCONTINUED | OUTPATIENT
Start: 2022-08-30 | End: 2022-09-04 | Stop reason: HOSPADM

## 2022-08-30 RX ORDER — SODIUM CHLORIDE 9 MG/ML
75 INJECTION, SOLUTION INTRAVENOUS CONTINUOUS
Status: DISCONTINUED | OUTPATIENT
Start: 2022-08-30 | End: 2022-09-04 | Stop reason: HOSPADM

## 2022-08-30 RX ORDER — OFLOXACIN 3 MG/ML
1 SOLUTION/ DROPS OPHTHALMIC 4 TIMES DAILY
Status: DISCONTINUED | OUTPATIENT
Start: 2022-08-30 | End: 2022-09-04 | Stop reason: HOSPADM

## 2022-08-30 RX ORDER — IPRATROPIUM BROMIDE AND ALBUTEROL SULFATE 2.5; .5 MG/3ML; MG/3ML
3 SOLUTION RESPIRATORY (INHALATION)
Status: DISCONTINUED | OUTPATIENT
Start: 2022-08-30 | End: 2022-09-04 | Stop reason: HOSPADM

## 2022-08-30 RX ADMIN — IPRATROPIUM BROMIDE AND ALBUTEROL SULFATE 3 ML: 2.5; .5 SOLUTION RESPIRATORY (INHALATION) at 18:02

## 2022-08-30 RX ADMIN — PIPERACILLIN SODIUM AND TAZOBACTAM SODIUM 3.38 G: 3; .375 INJECTION, POWDER, LYOPHILIZED, FOR SOLUTION INTRAVENOUS at 14:41

## 2022-08-30 RX ADMIN — MELATONIN TAB 5 MG 5 MG: 5 TAB at 20:41

## 2022-08-30 RX ADMIN — AMIODARONE HYDROCHLORIDE 100 MG: 200 TABLET ORAL at 08:30

## 2022-08-30 RX ADMIN — PREDNISOLONE ACETATE 1 DROP: 10 SUSPENSION/ DROPS OPHTHALMIC at 22:16

## 2022-08-30 RX ADMIN — METOPROLOL SUCCINATE 25 MG: 50 TABLET, EXTENDED RELEASE ORAL at 08:30

## 2022-08-30 RX ADMIN — OFLOXACIN 1 DROP: 3 SOLUTION/ DROPS OPHTHALMIC at 22:15

## 2022-08-30 RX ADMIN — ATORVASTATIN CALCIUM 80 MG: 40 TABLET, FILM COATED ORAL at 22:15

## 2022-08-30 RX ADMIN — HALOPERIDOL 5 MG: 5 TABLET ORAL at 20:41

## 2022-08-30 RX ADMIN — SODIUM CHLORIDE, PRESERVATIVE FREE 10 ML: 5 INJECTION INTRAVENOUS at 22:16

## 2022-08-30 RX ADMIN — OFLOXACIN 1 DROP: 3 SOLUTION/ DROPS OPHTHALMIC at 17:38

## 2022-08-30 RX ADMIN — PIPERACILLIN SODIUM AND TAZOBACTAM SODIUM 3.38 G: 3; .375 INJECTION, POWDER, LYOPHILIZED, FOR SOLUTION INTRAVENOUS at 06:09

## 2022-08-30 RX ADMIN — SODIUM CHLORIDE, PRESERVATIVE FREE 10 ML: 5 INJECTION INTRAVENOUS at 14:42

## 2022-08-30 RX ADMIN — SODIUM CHLORIDE 75 ML/HR: 9 INJECTION, SOLUTION INTRAVENOUS at 14:41

## 2022-08-30 RX ADMIN — PIPERACILLIN SODIUM AND TAZOBACTAM SODIUM 3.38 G: 3; .375 INJECTION, POWDER, LYOPHILIZED, FOR SOLUTION INTRAVENOUS at 22:15

## 2022-08-30 RX ADMIN — PREDNISOLONE ACETATE 1 DROP: 10 SUSPENSION/ DROPS OPHTHALMIC at 17:38

## 2022-08-30 RX ADMIN — SODIUM CHLORIDE, PRESERVATIVE FREE 10 ML: 5 INJECTION INTRAVENOUS at 05:32

## 2022-08-30 NOTE — CONSULTS
Pulmonary and Critical Care Consult    Subjective:   Consult Note: 8/30/2022 @no control      Chief Complaint:   Chief Complaint   Patient presents with    Altered mental status    Hypotension        This patient has been seen and evaluated at the request of Dr. Gonzalez Samson    81year-old overweight  gentleman  I am asked to see for acute respiratory failure with hypoxia    Patient is confused with altered mental status  History not available except by chart review    Patient has a history of hemorrhagic stroke x2, Parkinson's disease, sick sinus syndrome on amiodarone, leukemia  Originally admitted to [facility] 6/14/2022 for suspected TIA and discharged home on 6/15/2022    Admitted 8/29/2022 for increasing confusion and altered mental status  Also had some falls  Admitted with diagnosis of sepsis along with hypotension and lactic acidosis  Treated with antibiotics for pneumonia or gastroenteritis    CT scan of the head showed mild microvascular changes with moderate to severe degree of cerebral atrophy  No other acute changes    Subsequently patient has been found to be hypoxic requiring 2 L nasal cannula oxygen  Chest x-ray reviewed by me personally shows bilateral basal left more than right infiltrate  Lactic acidosis improved after hydration    ABG showed pH of 7.45, PCO2 28, PO2 189, bicarb 19, saturation 99% on 2 L nasal cannula oxygen    Review of Systems:  Review of systems not obtained due to patient factors. Past Medical History:   Diagnosis Date    Aneurysm ([address]     Anxiety     Bradycardia     Cancer ([address]     COLON    Diabetes ([address]     TYPE II    Gout     Hypertension     Ill-defined condition     parkinsons disease    Pacemaker     Psoriasis     SCALP AND LT.  EAR    Psychiatric disorder     depression and anxiety    Stroke [facility])      Past Surgical History:   Procedure Laterality Date    HX CATARACT REMOVAL Bilateral     HX GI      COLONOSCOPY    HX HERNIA REPAIR      UMBILICAL    HX PACEMAKER      HX RETINAL DETACHMENT REPAIR Right     HX TONSILLECTOMY      HX TOTAL COLECTOMY  12'S    1 FOOT REMOVED      Family History   Problem Relation Age of Onset    Liver Disease Mother 27        JAUNDICE    Heart Disease Father     Heart Attack Father     No Known Problems Sister     Alcohol abuse Brother     Heart Disease Brother     Muscular dystrophy Brother         FROM THEIR MOTHER, PT STEPMOM    Muscular dystrophy Brother         FROM THEIR MOTHER, PT STEPMOM    Muscular dystrophy Brother         FROM THEIR MOTHER, PT STEPMOM    No Known Problems Sister     Breast Cancer Daughter     Anesth Problems Neg Hx      Social History     Tobacco Use    Smoking status: Former     Packs/day: 1.00     Years: 10.00     Pack years: 10.00     Types: Cigarettes     Quit date: 1965     Years since quittin.1    Smokeless tobacco: Never   Substance Use Topics    Alcohol use: No     Comment: NOT SINCE       Current Facility-Administered Medications   Medication Dose Route Frequency Provider Last Rate Last Admin    0.9% sodium chloride infusion  75 mL/hr IntraVENous CONTINUOUS Joann Gambino MD 75 mL/hr at 22 1441 75 mL/hr at 22 1441    albuterol-ipratropium (DUO-NEB) 2.5 MG-0.5 MG/3 ML  3 mL Nebulization Q4H PRN Joann Gambino MD        prednisoLONE acetate (PRED FORTE) 1 % ophthalmic suspension 1 Drop (Patient Supplied)  1 Drop Right Eye QID Joann Gambino MD        ofloxacin (FLOXIN) 0.3 % ophthalmic solution 1 Drop (Patient Supplied)  1 Drop Right Eye QID Joann Gambino MD        piperacillin-tazobactam (ZOSYN) 3.375 g in 0.9% sodium chloride (MBP/ADV) 100 mL MBP  3.375 g IntraVENous Q8H Cedrick Van MD 25 mL/hr at 22 1441 3.375 g at 22 1441    sodium chloride (NS) flush 5-10 mL  5-10 mL IntraVENous PRN Cedrick Van MD        [START ON 2022] levoFLOXacin (LEVAQUIN) 750 mg in D5W IVPB  750 mg IntraVENous Benjamin Vázquez MD NOREPINephrine (LEVOPHED) 8 mg in 0.9% NS 250ml infusion  0.5-16 mcg/min IntraVENous TITRATE Zaria aDle MD   Held at 22 1552    amiodarone (CORDARONE) tablet 100 mg  100 mg Oral DAILY Katty Garza MD   100 mg at 22 0830    atorvastatin (LIPITOR) tablet 80 mg  80 mg Oral QHS Katty Garza MD   80 mg at 22 2240    melatonin tablet 5 mg  5 mg Oral QHS PRN Katty Garza MD   5 mg at 22 2343    metoprolol succinate (TOPROL-XL) XL tablet 25 mg  25 mg Oral DAILY Katty Garza MD   25 mg at 22 0830    sodium chloride (NS) flush 5-40 mL  5-40 mL IntraVENous Q8H Katty Garza MD   10 mL at 22 1442    sodium chloride (NS) flush 5-40 mL  5-40 mL IntraVENous PRN Katty Garza MD        acetaminophen (TYLENOL) tablet 650 mg  650 mg Oral Q6H PRN Katty Garza MD        Or    acetaminophen (TYLENOL) suppository 650 mg  650 mg Rectal Q6H PRN Katty Garza MD        polyethylene glycol (MIRALAX) packet 17 g  17 g Oral DAILY PRN Katty Garza MD        ondansetron (ZOFRAN ODT) tablet 4 mg  4 mg Oral Q8H PRN Katty Garza MD        Or    ondansetron [facility]) injection 4 mg  4 mg IntraVENous Q6H PRN Katty Garza MD        haloperidoL (HALDOL) tablet 5 mg  5 mg Oral TID PRN Katty Garza MD   5 mg at 22 2343          Allergies   Allergen Reactions    Monosodium Glutamate Anaphylaxis and Hives    Adhesive Tape-Silicones Other (comments)     BLISTERS           Objective:     Blood pressure 137/66, pulse (!) 59, temperature 98.5 °F (36.9 °C), resp. rate 18, height 5' 8\" (1.727 m), weight 99.3 kg (219 lb), SpO2 96 %. Temp (24hrs), Av.2 °F (36.8 °C), Min:97.4 °F (36.3 °C), Max:98.5 °F (36.9 °C)      Intake and Output:  Current Shift: No intake/output data recorded.   Last 3 Shifts:  1901 -  0700  In: 3279 [I.V.:3279]  Out: 150 [Urine:150]    Intake/Output Summary (Last 24 hours) at 2022 1704  Last data filed at 8/30/2022 8189  Gross per 24 hour   Intake 3179 ml   Output 150 ml   Net 3029 ml        Physical Exam:     General: Lying in bed comfortably, no acute distress, pleasantly confused, sleepy  Eye: Reactive, symmetric  Throat and Neck: Supple  Lung: Reduced air entry bilaterally with prolonged exhalation but no wheezing. Occasional crackles. Heart: S1+S2. No murmurs  Abdomen: soft, non-tender. Bowel sounds normal. No masses; obese  Extremities: No edema having pill-rolling tremor of both upper extremities  : Not done  Skin: No cyanosis  Neurologic: Sleepy and lethargic, pleasantly confused, grossly nonfocal  Psychiatric: Unable to perform due to patient's condition      Lab/Data Review:    Recent Results (from the past 24 hour(s))   LACTIC ACID    Collection Time: 08/29/22  5:05 PM   Result Value Ref Range    Lactic acid 3.0 (HH) 0.4 - 2.0 mmol/L   CBC WITH AUTOMATED DIFF    Collection Time: 08/30/22  3:18 AM   Result Value Ref Range    WBC 16.1 (H) 4.1 - 11.1 K/uL    RBC 3.33 (L) 4.10 - 5.70 M/uL    HGB 10.9 (L) 12.1 - 17.0 g/dL    HCT 32.9 (L) 36.6 - 50.3 %    MCV 98.8 80.0 - 99.0 FL    MCH 32.7 26.0 - 34.0 PG    MCHC 33.1 30.0 - 36.5 g/dL    RDW 14.0 11.5 - 14.5 %    PLATELET 673 (L) 517 - 400 K/uL    MPV 10.9 8.9 - 12.9 FL    NRBC 0.0 0.0  WBC    ABSOLUTE NRBC 0.00 0.00 - 0.01 K/uL    NEUTROPHILS 36 32 - 75 %    LYMPHOCYTES 58 (H) 12 - 49 %    MONOCYTES 5 5 - 13 %    EOSINOPHILS 1 0 - 7 %    BASOPHILS 0 0 - 1 %    IMMATURE GRANULOCYTES 0 0 - 0.5 %    ABS. NEUTROPHILS 5.8 1.8 - 8.0 K/UL    ABS. LYMPHOCYTES 9.2 (H) 0.8 - 3.5 K/UL    ABS. MONOCYTES 0.8 0.0 - 1.0 K/UL    ABS. EOSINOPHILS 0.2 0.0 - 0.4 K/UL    ABS. BASOPHILS 0.0 0.0 - 0.1 K/UL    ABS. IMM.  GRANS. 0.1 (H) 0.00 - 0.04 K/UL    DF AUTOMATED     METABOLIC PANEL, BASIC    Collection Time: 08/30/22  3:18 AM   Result Value Ref Range    Sodium 141 136 - 145 mmol/L    Potassium 4.9 3.5 - 5.1 mmol/L    Chloride 111 (H) 97 - 108 mmol/L    CO2 25 21 - 32 mmol/L    Anion gap 5 5 - 15 mmol/L    Glucose 198 (H) 65 - 100 mg/dL    BUN 35 (H) 6 - 20 mg/dL    Creatinine 1.83 (H) 0.70 - 1.30 mg/dL    BUN/Creatinine ratio 19 12 - 20      GFR est AA 43 (L) >60 ml/min/1.73m2    GFR est non-AA 35 (L) >60 ml/min/1.73m2    Calcium 8.0 (L) 8.5 - 10.1 mg/dL       CT HEAD WO CONT   Final Result   No acute intracranial process. Imaging findings consistent with mild chronic microvascular ischemic change. There is a moderate to severe degree of cerebral atrophy. XR CHEST PORT   Final Result   Persistent or recurrent bibasilar opacities. Two-view radiography   would be more sensitive and specific in this regard. CT Results  (Last 48 hours)                 08/29/22 1621  CT HEAD WO CONT Final result    Impression:  No acute intracranial process. Imaging findings consistent with mild chronic microvascular ischemic change. There is a moderate to severe degree of cerebral atrophy. Narrative:  CLINICAL HISTORY: AMS   INDICATION: AMS   COMPARISON: 6/14/2022. CT dose reduction was achieved through use of a standardized protocol tailored   for this examination and automatic exposure control for dose modulation. TECHNIQUE: Serial axial images with a collimation of 5 mm were obtained from the   skull base through the vertex     FINDINGS:    There is sulcal and ventricular prominence. Confluent periventricular and   scattered foci of hypodensity in the cerebral white matter. There is no evidence   of an acute infarction, hemorrhage, or mass-effect. There is no evidence of   midline shift or hydrocephalus. Posterior fossa structures are unremarkable. No   extra-axial collections are seen. Post craniectomy changes on the left. Mastoid air cells are well pneumatized and clear. There is no evidence of depressed skull fractures of soft tissue swelling. Assessment:     1. Acute respiratory failure with hypoxia  2. Bilateral pneumonia  3. Sepsis  4. Altered mental status  5. Acute metabolic acidosis  6. Leukocytosis  7. Acute kidney injury    Plan:     Patient admitted to the hospital  Will be watched here closely    Currently on 2 L nasal cannula oxygen  Will use supplemental oxygen as needed to keep saturation above 92%    Admitted with sepsis  Pressure improved without vasopressors however patient is still having elevated lactic acid and now with worsening metabolic acidosis  Will repeat CMP in a.m.   Gentle IV fluid hydration to continue  Continue broad-spectrum antibiotics Zosyn Levaquin  Culture sent  Further changes in antibiotics based on clinical response and culture results  Will follow chest x-ray for pneumonia    Altered mental status  Likely due to combination of infection along with age  Monitor clinically  CT scan negative    Monitor renal function with fluid changes  Check electrolytes and replace as needed    DVT and GI prophylaxis    Patient is full code, if declines may require transfer to ICU  Would recommend discussion of CODE STATUS and goals of care with patient's family    Questions of patient were answered at bedside in detail  Case discussed in detail with RN, RT, and care team  Thank you for involving me in the care of this patient  I will follow with you closely during hospitalization      Daisha Cardona MD  [facility]  8/30/2022  5:04 PM

## 2022-08-30 NOTE — PROGRESS NOTES
Reason for Admission: Sepsis                  RUR Score:  17%                PCP: First and Last name:   Yuniel Trevino DO     Name of Practice:    Are you a current patient: Yes/No:    Approximate date of last visit: A few weeks ago   Can you participate in a virtual visit if needed:     Do you (patient/family) have any concerns for transition/discharge? Plan for utilizing home health:  None     Current Advanced Directive/Advance Care Plan:  Full Code    Healthcare Decision Maker:   Click here to complete [address] including selection of the Healthcare Decision Maker Relationship (ie \"Primary\")            Primary Decision MakerJeroya Sheila - Daughter - [phone]    Secondary Decision Maker: Lyla Diaz Son - [phone]    Transition of Care Plan: CM left 2 VM's with patients daughter. CM called patients son, Jung Rojas. To complete assessment. Patient lives with daughter who provides 24/7 support. DME: walker. Patient dependent in all ADL's and IADL's. Son states that CM will need to follow-up with patients daughter for further questions and decision-making as she is the patients POA.     DCP: TBD

## 2022-08-30 NOTE — PROGRESS NOTES
Per chart patient is confused and patient has a sitter at bedside. CM called patients daughter, Mirta Gabriel @ [phone] to complete CM assessment. No answer, JULIA left VM.    ---------------------    1945- JULIA called patients daughter again. No answer, JULIA left VM.

## 2022-08-30 NOTE — PROGRESS NOTES
[facility] Hospitalist Progress Note  Adrian Jordan MD    Date:2022       Room:[facility]  Patient Name:Shalom Bateman     YOB: 1935     Age:87 y.o.    22 admission course  87M with history of hemorrhagic stroke x2, parkinson's disease. Admitted to hospital for suspected TIA. Imaging unrevealing, unable to get MRI because of pacemaker. At discharge advised towards home health but patient and daughter reported the patient just finished home health therapy and did not need further services. Discharged home 6/15/22    8/29/22 admission course  87M, with h/o SSS on amiodarone, leukemia with confusion and not acting right since yesterday   Per EMS, family stated that the patient has not been normal self since yesterday, he is unable to stand off and has multiple falls this morning. He has a h/o subarachnoid hemorrhage and TIA. He has been having loose stools as meniotned by EMS   Unable to obtain further history   ED course:  Found to be in sepsis, BP improved after IVF, lactic acid 6->3, WBC 14.3, likely course pneumonia or gastroenteritis. 22 CT HEAD  IMPRESSION  No acute intracranial process. Imaging findings consistent with mild chronic microvascular ischemic change. There is a moderate to severe degree of cerebral atrophy. 22 remains with altered mentation  Unable to reach daughter by telephone  Blood pressures improved  Lactic acidosis improved  Creatinine remains elevated    Subjective    Subjective:  Symptoms:  Stable. Review of Systems   All other systems reviewed and are negative.   Objective         Vitals Last 24 Hours:  TEMPERATURE:  Temp  Av.1 °F (36.7 °C)  Min: 97.4 °F (36.3 °C)  Max: 98.4 °F (36.9 °C)  RESPIRATIONS RANGE: Resp  Av  Min: 17  Max: 29  PULSE OXIMETRY RANGE: SpO2  Av.7 %  Min: 95 %  Max: 98 %  PULSE RANGE: Pulse  Av.5  Min: 60  Max: 64  BLOOD PRESSURE RANGE: Systolic (24TFA), BXJ:190 , Min:98 , MEQ:052   ; Diastolic (67OFC), Av, Min:38, Max:91    I/O (24Hr): Intake/Output Summary (Last 24 hours) at 2022 1455  Last data filed at 2022 0611  Gross per 24 hour   Intake 3279 ml   Output 150 ml   Net 3129 ml     Objective:  General Appearance:  Comfortable. Vital signs: (most recent): Blood pressure 121/61, pulse 60, temperature 98.4 °F (36.9 °C), resp. rate 20, height 5' 8\" (1.727 m), weight 99.3 kg (219 lb), SpO2 95 %. HEENT: Normal HEENT exam.    Lungs:  Normal effort. Heart: Normal rate. Regular rhythm. Abdomen: Abdomen is soft. Bowel sounds are normal.   There is no abdominal tenderness. Extremities: Normal range of motion. Pulses: Distal pulses are intact. Neurological: Patient is alert. Skin:  Warm and dry. Labs/Imaging/Diagnostics    Labs:  CBC:  Recent Labs     22  0318 22  1425   WBC 16.1* 14.3*   RBC 3.33* 3.44*   HGB 10.9* 11.2*   HCT 32.9* 34.2*   MCV 98.8 99.4*   RDW 14.0 13.8   * 138*     CHEMISTRIES:  Recent Labs     22  0318 22  1425    140   K 4.9 4.7   * 106   CO2 25 22   BUN 35* 32*   CA 8.0* 8.2*   PT/INR:No results for input(s): INR, INREXT in the last 72 hours. No lab exists for component: PROTIME  APTT:No results for input(s): APTT in the last 72 hours. LIVER PROFILE:  Recent Labs     22  1425   AST 93*   ALT 61     Lab Results   Component Value Date/Time    ALT (SGPT) 61 2022 02:25 PM    AST (SGOT) 93 (H) 2022 02:25 PM    Alk. phosphatase 85 2022 02:25 PM    Bilirubin, direct 0.1 2022 11:30 PM    Bilirubin, total 1.3 (H) 2022 02:25 PM       Imaging Last 24 Hours:  CT HEAD WO CONT    Result Date: 2022  CLINICAL HISTORY: AMS INDICATION: AMS COMPARISON: 2022. CT dose reduction was achieved through use of a standardized protocol tailored for this examination and automatic exposure control for dose modulation.  TECHNIQUE: Serial axial images with a collimation of 5 mm were obtained from the skull base through the vertex  FINDINGS: There is sulcal and ventricular prominence. Confluent periventricular and scattered foci of hypodensity in the cerebral white matter. There is no evidence of an acute infarction, hemorrhage, or mass-effect. There is no evidence of midline shift or hydrocephalus. Posterior fossa structures are unremarkable. No extra-axial collections are seen. Post craniectomy changes on the left. Mastoid air cells are well pneumatized and clear. There is no evidence of depressed skull fractures of soft tissue swelling. No acute intracranial process. Imaging findings consistent with mild chronic microvascular ischemic change. There is a moderate to severe degree of cerebral atrophy. Assessment//Plan   Active Problems:    Sepsis ([address] (8/29/2022)      Assessment & Plan  6/14/22 admission course  87M with history of hemorrhagic stroke x2, parkinson's disease. Admitted to hospital for suspected TIA. Imaging unrevealing, unable to get MRI because of pacemaker. At discharge advised towards home health but patient and daughter reported the patient just finished home health therapy and did not need further services. Discharged home 6/15/22    8/29/22 admission course  87M, with h/o SSS on amiodarone, leukemia with confusion and not acting right since yesterday   Per EMS, family stated that the patient has not been normal self since yesterday, he is unable to stand off and has multiple falls this morning. He has a h/o subarachnoid hemorrhage and TIA. He has been having loose stools as meniotned by EMS   Unable to obtain further history   ED course:  Found to be in sepsis, BP improved after IVF, lactic acid 6->3, WBC 14.3, likely course pneumonia or gastroenteritis. 8/29/22 CT HEAD  IMPRESSION  No acute intracranial process. Imaging findings consistent with mild chronic microvascular ischemic change. There is a moderate to severe degree of cerebral atrophy.     8/30/22 remains with altered mentation  Unable to reach daughter by telephone  Blood pressures improved  Lactic acidosis improved  Creatinine remains elevated    MICROBIOLOGY    8/29/22 Blood  Pending    ASSESSMENT AND PLAN    1) Acute hypoxic distress in the setting of persistent or recurrent patchy opacities at both lung bases. Supportive care with respiratory support as needed   Bronchodilators   Started on Zosyn and levofloxacin at admission    2) Metabolic encephalopathy on an already diminished baseline mental status. History of parkinsons as well as subarachnoid hemorrhage. CT this admission negative for acute process, notable for moderate to severe cerebral atrophy. Supportive care    3) Acute renal failure in the setting of volume contraction. Intravenous hydration    4) Cardiovascular issues including SSS, dyslipidemia, pacemaker.      Amiodarone   Atorvastatin   Metoprolol          Electronically signed by Mateus Cole MD on 8/30/2022 at 2:55 PM

## 2022-08-30 NOTE — PROGRESS NOTES
Problem: Falls - Risk of  Goal: *Absence of Falls  Description: Document Nilesh Lama Fall Risk and appropriate interventions in the flowsheet.   Outcome: Progressing Towards Goal  Note: Fall Risk Interventions:  Mobility Interventions: Bed/chair exit alarm, OT consult for ADLs, Patient to call before getting OOB, PT Consult for mobility concerns, PT Consult for assist device competence, Strengthening exercises (ROM-active/passive), Utilize gait belt for transfers/ambulation, Utilize walker, cane, or other assistive device    Mentation Interventions: Bed/chair exit alarm         Elimination Interventions: Bed/chair exit alarm, Call light in reach, Patient to call for help with toileting needs, Urinal in reach    History of Falls Interventions: Bed/chair exit alarm         Problem: Patient Education: Go to Patient Education Activity  Goal: Patient/Family Education  Outcome: Progressing Towards Goal     Problem: Patient Education: Go to Patient Education Activity  Goal: Patient/Family Education  Outcome: Progressing Towards Goal     Problem: Discharge Planning  Goal: *Discharge to safe environment  Outcome: Progressing Towards Goal  Goal: *Knowledge of medication management  Outcome: Progressing Towards Goal  Goal: *Knowledge of discharge instructions  Outcome: Progressing Towards Goal     Problem: Patient Education: Go to Patient Education Activity  Goal: Patient/Family Education  Outcome: Progressing Towards Goal     Problem: Patient Education: Go to Patient Education Activity  Goal: Patient/Family Education  Outcome: Progressing Towards Goal

## 2022-08-31 ENCOUNTER — APPOINTMENT (OUTPATIENT)
Dept: NON INVASIVE DIAGNOSTICS | Age: 87
DRG: 871 | End: 2022-08-31
Attending: INTERNAL MEDICINE
Payer: MEDICARE

## 2022-08-31 LAB
ALBUMIN SERPL-MCNC: 3.1 G/DL (ref 3.5–5)
ALBUMIN/GLOB SERPL: 1.3 {RATIO} (ref 1.1–2.2)
ALP SERPL-CCNC: 75 U/L (ref 45–117)
ALT SERPL-CCNC: 45 U/L (ref 12–78)
ANION GAP SERPL CALC-SCNC: 6 MMOL/L (ref 5–15)
AST SERPL W P-5'-P-CCNC: 42 U/L (ref 15–37)
BILIRUB SERPL-MCNC: 2.2 MG/DL (ref 0.2–1)
BUN SERPL-MCNC: 26 MG/DL (ref 6–20)
BUN/CREAT SERPL: 19 (ref 12–20)
CA-I BLD-MCNC: 8.4 MG/DL (ref 8.5–10.1)
CHLORIDE SERPL-SCNC: 113 MMOL/L (ref 97–108)
CO2 SERPL-SCNC: 26 MMOL/L (ref 21–32)
CREAT SERPL-MCNC: 1.35 MG/DL (ref 0.7–1.3)
ECHO AV AREA PEAK VELOCITY: 1.9 CM2
ECHO AV AREA VTI: 2 CM2
ECHO AV AREA/BSA PEAK VELOCITY: 0.9 CM2/M2
ECHO AV AREA/BSA VTI: 1 CM2/M2
ECHO AV MEAN GRADIENT: 4 MMHG
ECHO AV MEAN VELOCITY: 0.9 M/S
ECHO AV PEAK GRADIENT: 7 MMHG
ECHO AV PEAK VELOCITY: 1.3 M/S
ECHO AV VELOCITY RATIO: 0.77
ECHO AV VTI: 27.4 CM
ECHO EST RA PRESSURE: 3 MMHG
ECHO LA AREA 4C: 17.9 CM2
ECHO LA MAJOR AXIS: 6.6 CM
ECHO LV E' LATERAL VELOCITY: 18 CM/S
ECHO LV E' SEPTAL VELOCITY: 13 CM/S
ECHO LV EDV A4C: 71 ML
ECHO LV EDV INDEX A4C: 35 ML/M2
ECHO LV EJECTION FRACTION A4C: 45 %
ECHO LV ESV A4C: 39 ML
ECHO LV ESV INDEX A4C: 19 ML/M2
ECHO LV FRACTIONAL SHORTENING: 39 % (ref 28–44)
ECHO LV INTERNAL DIMENSION DIASTOLE INDEX: 2.24 CM/M2
ECHO LV INTERNAL DIMENSION DIASTOLIC: 4.6 CM (ref 4.2–5.9)
ECHO LV INTERNAL DIMENSION SYSTOLIC INDEX: 1.37 CM/M2
ECHO LV INTERNAL DIMENSION SYSTOLIC: 2.8 CM
ECHO LV IVSD: 1.1 CM (ref 0.6–1)
ECHO LV MASS 2D: 169.9 G (ref 88–224)
ECHO LV MASS INDEX 2D: 82.9 G/M2 (ref 49–115)
ECHO LV POSTERIOR WALL DIASTOLIC: 1 CM (ref 0.6–1)
ECHO LV RELATIVE WALL THICKNESS RATIO: 0.43
ECHO LVOT AREA: 2.5 CM2
ECHO LVOT AV VTI INDEX: 0.77
ECHO LVOT DIAM: 1.8 CM
ECHO LVOT MEAN GRADIENT: 2 MMHG
ECHO LVOT PEAK GRADIENT: 4 MMHG
ECHO LVOT PEAK VELOCITY: 1 M/S
ECHO LVOT STROKE VOLUME INDEX: 26.1 ML/M2
ECHO LVOT SV: 53.4 ML
ECHO LVOT VTI: 21 CM
ECHO MV A VELOCITY: 0.69 M/S
ECHO MV E VELOCITY: 1.17 M/S
ECHO MV E/A RATIO: 1.7
ECHO MV E/E' LATERAL: 6.5
ECHO MV E/E' RATIO (AVERAGED): 7.75
ECHO MV E/E' SEPTAL: 9
ECHO MV REGURGITANT PEAK GRADIENT: 7 MMHG
ECHO MV REGURGITANT PEAK VELOCITY: 1.3 M/S
ECHO PV MAX VELOCITY: 0.8 M/S
ECHO PV PEAK GRADIENT: 3 MMHG
ECHO RIGHT VENTRICULAR SYSTOLIC PRESSURE (RVSP): 11 MMHG
ECHO RV TAPSE: 2.9 CM (ref 1.7–?)
ECHO TV REGURGITANT MAX VELOCITY: 1.39 M/S
ECHO TV REGURGITANT PEAK GRADIENT: 8 MMHG
ERYTHROCYTE [DISTWIDTH] IN BLOOD BY AUTOMATED COUNT: 13.9 % (ref 11.5–14.5)
GLOBULIN SER CALC-MCNC: 2.4 G/DL (ref 2–4)
GLUCOSE SERPL-MCNC: 132 MG/DL (ref 65–100)
HCT VFR BLD AUTO: 33.6 % (ref 36.6–50.3)
HGB BLD-MCNC: 10.9 G/DL (ref 12.1–17)
LACTATE SERPL-SCNC: 0.9 MMOL/L (ref 0.4–2)
MCH RBC QN AUTO: 31.8 PG (ref 26–34)
MCHC RBC AUTO-ENTMCNC: 32.4 G/DL (ref 30–36.5)
MCV RBC AUTO: 98 FL (ref 80–99)
NRBC # BLD: 0 K/UL (ref 0–0.01)
NRBC BLD-RTO: 0 PER 100 WBC
PLATELET # BLD AUTO: 155 K/UL (ref 150–400)
PMV BLD AUTO: 10.3 FL (ref 8.9–12.9)
POTASSIUM SERPL-SCNC: 4.7 MMOL/L (ref 3.5–5.1)
PROT SERPL-MCNC: 5.5 G/DL (ref 6.4–8.2)
RBC # BLD AUTO: 3.43 M/UL (ref 4.1–5.7)
SODIUM SERPL-SCNC: 145 MMOL/L (ref 136–145)
WBC # BLD AUTO: 17 K/UL (ref 4.1–11.1)

## 2022-08-31 PROCEDURE — 74011250637 HC RX REV CODE- 250/637: Performed by: HOSPITALIST

## 2022-08-31 PROCEDURE — 85027 COMPLETE CBC AUTOMATED: CPT

## 2022-08-31 PROCEDURE — 65270000029 HC RM PRIVATE

## 2022-08-31 PROCEDURE — 74011000258 HC RX REV CODE- 258: Performed by: EMERGENCY MEDICINE

## 2022-08-31 PROCEDURE — 83605 ASSAY OF LACTIC ACID: CPT

## 2022-08-31 PROCEDURE — 80053 COMPREHEN METABOLIC PANEL: CPT

## 2022-08-31 PROCEDURE — 94640 AIRWAY INHALATION TREATMENT: CPT

## 2022-08-31 PROCEDURE — 74011250636 HC RX REV CODE- 250/636: Performed by: HOSPITALIST

## 2022-08-31 PROCEDURE — 74011250636 HC RX REV CODE- 250/636: Performed by: EMERGENCY MEDICINE

## 2022-08-31 PROCEDURE — 74011000250 HC RX REV CODE- 250: Performed by: HOSPITALIST

## 2022-08-31 PROCEDURE — 94761 N-INVAS EAR/PLS OXIMETRY MLT: CPT

## 2022-08-31 PROCEDURE — 93306 TTE W/DOPPLER COMPLETE: CPT

## 2022-08-31 PROCEDURE — 77010033678 HC OXYGEN DAILY

## 2022-08-31 PROCEDURE — 74011000250 HC RX REV CODE- 250: Performed by: INTERNAL MEDICINE

## 2022-08-31 PROCEDURE — 36415 COLL VENOUS BLD VENIPUNCTURE: CPT

## 2022-08-31 RX ORDER — HALOPERIDOL 5 MG/ML
2 INJECTION INTRAMUSCULAR ONCE
Status: COMPLETED | OUTPATIENT
Start: 2022-08-31 | End: 2022-08-31

## 2022-08-31 RX ADMIN — SODIUM CHLORIDE, PRESERVATIVE FREE 10 ML: 5 INJECTION INTRAVENOUS at 06:18

## 2022-08-31 RX ADMIN — PIPERACILLIN SODIUM AND TAZOBACTAM SODIUM 3.38 G: 3; .375 INJECTION, POWDER, LYOPHILIZED, FOR SOLUTION INTRAVENOUS at 22:23

## 2022-08-31 RX ADMIN — MELATONIN TAB 5 MG 5 MG: 5 TAB at 22:23

## 2022-08-31 RX ADMIN — PREDNISOLONE ACETATE 1 DROP: 10 SUSPENSION/ DROPS OPHTHALMIC at 13:20

## 2022-08-31 RX ADMIN — HALOPERIDOL 5 MG: 5 TABLET ORAL at 22:23

## 2022-08-31 RX ADMIN — OFLOXACIN 1 DROP: 3 SOLUTION/ DROPS OPHTHALMIC at 22:24

## 2022-08-31 RX ADMIN — SODIUM CHLORIDE, PRESERVATIVE FREE 10 ML: 5 INJECTION INTRAVENOUS at 22:38

## 2022-08-31 RX ADMIN — ATORVASTATIN CALCIUM 80 MG: 40 TABLET, FILM COATED ORAL at 22:23

## 2022-08-31 RX ADMIN — LEVOFLOXACIN 750 MG: 5 INJECTION, SOLUTION INTRAVENOUS at 15:38

## 2022-08-31 RX ADMIN — AMIODARONE HYDROCHLORIDE 100 MG: 200 TABLET ORAL at 09:06

## 2022-08-31 RX ADMIN — PIPERACILLIN SODIUM AND TAZOBACTAM SODIUM 3.38 G: 3; .375 INJECTION, POWDER, LYOPHILIZED, FOR SOLUTION INTRAVENOUS at 06:18

## 2022-08-31 RX ADMIN — PREDNISOLONE ACETATE 1 DROP: 10 SUSPENSION/ DROPS OPHTHALMIC at 09:08

## 2022-08-31 RX ADMIN — OFLOXACIN 1 DROP: 3 SOLUTION/ DROPS OPHTHALMIC at 09:08

## 2022-08-31 RX ADMIN — OFLOXACIN 1 DROP: 3 SOLUTION/ DROPS OPHTHALMIC at 18:10

## 2022-08-31 RX ADMIN — PREDNISOLONE ACETATE 1 DROP: 10 SUSPENSION/ DROPS OPHTHALMIC at 22:24

## 2022-08-31 RX ADMIN — PREDNISOLONE ACETATE 1 DROP: 10 SUSPENSION/ DROPS OPHTHALMIC at 18:10

## 2022-08-31 RX ADMIN — HALOPERIDOL LACTATE 2 MG: 5 INJECTION, SOLUTION INTRAMUSCULAR at 01:18

## 2022-08-31 RX ADMIN — IPRATROPIUM BROMIDE AND ALBUTEROL SULFATE 3 ML: 2.5; .5 SOLUTION RESPIRATORY (INHALATION) at 13:39

## 2022-08-31 RX ADMIN — PIPERACILLIN SODIUM AND TAZOBACTAM SODIUM 3.38 G: 3; .375 INJECTION, POWDER, LYOPHILIZED, FOR SOLUTION INTRAVENOUS at 16:59

## 2022-08-31 RX ADMIN — SODIUM CHLORIDE, PRESERVATIVE FREE 10 ML: 5 INJECTION INTRAVENOUS at 13:20

## 2022-08-31 RX ADMIN — OFLOXACIN 1 DROP: 3 SOLUTION/ DROPS OPHTHALMIC at 13:20

## 2022-08-31 RX ADMIN — METOPROLOL SUCCINATE 25 MG: 50 TABLET, EXTENDED RELEASE ORAL at 09:06

## 2022-08-31 NOTE — PROGRESS NOTES
Problem: Falls - Risk of  Goal: *Absence of Falls  Description: Document Alexandru Miller Fall Risk and appropriate interventions in the flowsheet. Outcome: Progressing Towards Goal  Note: Fall Risk Interventions:  Mobility Interventions: Bed/chair exit alarm, OT consult for ADLs, PT Consult for mobility concerns, PT Consult for assist device competence, Strengthening exercises (ROM-active/passive), Utilize walker, cane, or other assistive device, Utilize gait belt for transfers/ambulation    Mentation Interventions: Bed/chair exit alarm    Medication Interventions: Bed/chair exit alarm, Utilize gait belt for transfers/ambulation    Elimination Interventions: Bed/chair exit alarm, Call light in reach, Patient to call for help with toileting needs, Urinal in reach    History of Falls Interventions: Bed/chair exit alarm         Problem: Patient Education: Go to Patient Education Activity  Goal: Patient/Family Education  Outcome: Progressing Towards Goal     Problem: Patient Education: Go to Patient Education Activity  Goal: Patient/Family Education  Outcome: Progressing Towards Goal     Problem: Discharge Planning  Goal: *Discharge to safe environment  Outcome: Progressing Towards Goal  Goal: *Knowledge of medication management  Outcome: Progressing Towards Goal  Goal: *Knowledge of discharge instructions  Outcome: Progressing Towards Goal     Problem: Patient Education: Go to Patient Education Activity  Goal: Patient/Family Education  Outcome: Progressing Towards Goal     Problem: Pressure Injury - Risk of  Goal: *Prevention of pressure injury  Description: Document Jose Alberto Scale and appropriate interventions in the flowsheet.   Outcome: Progressing Towards Goal  Note: Pressure Injury Interventions:  Sensory Interventions: Minimize linen layers, Float heels    Moisture Interventions: Minimize layers    Activity Interventions: PT/OT evaluation    Mobility Interventions: HOB 30 degrees or less    Nutrition Interventions: Document food/fluid/supplement intake    Friction and Shear Interventions: Minimize layers, HOB 30 degrees or less                Problem: Patient Education: Go to Patient Education Activity  Goal: Patient/Family Education  Outcome: Progressing Towards Goal

## 2022-08-31 NOTE — PROGRESS NOTES
Pt has clear/ dim breath sounds. Pt has slight stridor due to pt needing to cough; mucous blocking airway. Pt will cough if asked to but swallows sputum. Pt on RA is 94%, HR 60, and RR 20 with abdominal breathing. Idrisonebs will not fix this issue.

## 2022-08-31 NOTE — PROGRESS NOTES
Problem: Falls - Risk of  Goal: *Absence of Falls  Description: Document Nilesh Lama Fall Risk and appropriate interventions in the flowsheet. Outcome: Progressing Towards Goal  Note: Fall Risk Interventions:  Mobility Interventions: Bed/chair exit alarm, Communicate number of staff needed for ambulation/transfer, Patient to call before getting OOB    Mentation Interventions: Bed/chair exit alarm, Door open when patient unattended, Evaluate medications/consider consulting pharmacy    Medication Interventions: Evaluate medications/consider consulting pharmacy, Bed/chair exit alarm, Patient to call before getting OOB    Elimination Interventions: Bed/chair exit alarm, Call light in reach, Patient to call for help with toileting needs, Stay With Me (per policy)    History of Falls Interventions: Consult care management for discharge planning, Door open when patient unattended, Evaluate medications/consider consulting pharmacy         Problem: Patient Education: Go to Patient Education Activity  Goal: Patient/Family Education  Outcome: Progressing Towards Goal     Problem: Patient Education: Go to Patient Education Activity  Goal: Patient/Family Education  Outcome: Progressing Towards Goal     Problem: Discharge Planning  Goal: *Discharge to safe environment  Outcome: Progressing Towards Goal  Goal: *Knowledge of medication management  Outcome: Progressing Towards Goal  Goal: *Knowledge of discharge instructions  Outcome: Progressing Towards Goal     Problem: Patient Education: Go to Patient Education Activity  Goal: Patient/Family Education  Outcome: Progressing Towards Goal     Problem: Pressure Injury - Risk of  Goal: *Prevention of pressure injury  Description: Document Jose Alberto Scale and appropriate interventions in the flowsheet.   Outcome: Progressing Towards Goal  Note: Pressure Injury Interventions:  Sensory Interventions: Assess need for specialty bed, Float heels, Keep linens dry and wrinkle-free, Minimize linen layers, Turn and reposition approx.  every two hours (pillows and wedges if needed)    Moisture Interventions: Absorbent underpads, Internal/External urinary devices, Minimize layers    Activity Interventions: PT/OT evaluation    Mobility Interventions: HOB 30 degrees or less, PT/OT evaluation    Nutrition Interventions: Document food/fluid/supplement intake, Offer support with meals,snacks and hydration    Friction and Shear Interventions: Apply protective barrier, creams and emollients, HOB 30 degrees or less, Minimize layers                Problem: Patient Education: Go to Patient Education Activity  Goal: Patient/Family Education  Outcome: Progressing Towards Goal

## 2022-08-31 NOTE — PROGRESS NOTES
Attempted bedside swallow evaluation. Per RN, patient off floor for echo. RN reported concerns for wheeze and notified RT. Will cont to follow.

## 2022-08-31 NOTE — PROGRESS NOTES
Pulmonary and Critical Care progress note    Subjective:   Consult Note: 8/31/2022 @no control      Chief Complaint:   Chief Complaint   Patient presents with    Altered mental status    Hypotension        This patient has been seen and evaluated at the request of Dr. Maura Castillo    Patient seen and examined  Overnight events noted    Lying in bed comfortably  No acute distress  On nasal cannula oxygen    Review of Systems:  Review of systems not obtained due to patient factors.        Current Facility-Administered Medications   Medication Dose Route Frequency Provider Last Rate Last Admin    0.9% sodium chloride infusion  75 mL/hr IntraVENous CONTINUOUS Iam Wood MD 75 mL/hr at 08/30/22 1441 75 mL/hr at 08/30/22 1441    albuterol-ipratropium (DUO-NEB) 2.5 MG-0.5 MG/3 ML  3 mL Nebulization Q4H PRN Iam Wood MD   3 mL at 08/30/22 1802    prednisoLONE acetate (PRED FORTE) 1 % ophthalmic suspension 1 Drop (Patient Supplied)  1 Drop Right Eye QID Iam Wood MD   1 Drop at 08/31/22 0908    ofloxacin (FLOXIN) 0.3 % ophthalmic solution 1 Drop (Patient Supplied)  1 Drop Right Eye QID Iam Wood MD   1 Drop at 08/31/22 0908    piperacillin-tazobactam (ZOSYN) 3.375 g in 0.9% sodium chloride (MBP/ADV) 100 mL MBP  3.375 g IntraVENous Q8H Desi Gomez MD 25 mL/hr at 08/31/22 0618 3.375 g at 08/31/22 0618    sodium chloride (NS) flush 5-10 mL  5-10 mL IntraVENous PRN Desi Gomez MD        levoFLOXacin (LEVAQUIN) 750 mg in D5W IVPB  750 mg IntraVENous Q48H Desi Gomez MD        NOREPINephrine (LEVOPHED) 8 mg in 0.9% NS 250ml infusion  0.5-16 mcg/min IntraVENous TITRATE Desi Gomez MD   Held at 08/29/22 1552    amiodarone (CORDARONE) tablet 100 mg  100 mg Oral DAILY Di Arias MD   100 mg at 08/31/22 0906    atorvastatin (LIPITOR) tablet 80 mg  80 mg Oral QHS Di Arias MD   80 mg at 08/30/22 2215    melatonin tablet 5 mg  5 mg Oral QHS PRN Prieto Abhilash Arrington MD   5 mg at 22    metoprolol succinate (TOPROL-XL) XL tablet 25 mg  25 mg Oral DAILY Shannen Yost MD   25 mg at 22 0906    sodium chloride (NS) flush 5-40 mL  5-40 mL IntraVENous Q8H Shannen Yost MD   10 mL at 22 0618    sodium chloride (NS) flush 5-40 mL  5-40 mL IntraVENous PRN Shannen Yost MD        acetaminophen (TYLENOL) tablet 650 mg  650 mg Oral Q6H PRN Shannen Yost MD        Or    acetaminophen (TYLENOL) suppository 650 mg  650 mg Rectal Q6H PRN Shannen Yost MD        polyethylene glycol (MIRALAX) packet 17 g  17 g Oral DAILY PRN Shannen Yost MD        ondansetron (ZOFRAN ODT) tablet 4 mg  4 mg Oral Q8H PRN Shannen Yost MD        Or    ondansetron [facility]) injection 4 mg  4 mg IntraVENous Q6H PRN Shannen Yost MD        haloperidoL (HALDOL) tablet 5 mg  5 mg Oral TID PRN Shannen Yost MD   5 mg at 22          Allergies   Allergen Reactions    Monosodium Glutamate Anaphylaxis and Hives    Adhesive Tape-Silicones Other (comments)     BLISTERS           Objective:     Blood pressure (!) 130/95, pulse 60, temperature 98.1 °F (36.7 °C), resp. rate 18, height 5' 8\" (1.727 m), weight 91.5 kg (201 lb 11.5 oz), SpO2 97 %. Temp (24hrs), Av.2 °F (36.8 °C), Min:98.1 °F (36.7 °C), Max:98.5 °F (36.9 °C)      Intake and Output:  Current Shift: No intake/output data recorded. Last 3 Shifts:  1901 -  0700  In: 23759 [P.O.:10; I.V.:61788]  Out: 900 [Urine:900]    Intake/Output Summary (Last 24 hours) at 2022 1122  Last data filed at 2022 7654  Gross per 24 hour   Intake 26083 ml   Output 750 ml   Net 69832 ml          Physical Exam:     General: Lying in bed comfortably, no acute distress, pleasantly confused, sleepy  Eye: Reactive, symmetric  Throat and Neck: Supple  Lung: Reduced air entry bilaterally with prolonged exhalation but no wheezing. Occasional crackles. Heart: S1+S2.   No murmurs  Abdomen: soft, non-tender. Bowel sounds normal. No masses; obese  Extremities: No edema having pill-rolling tremor of both upper extremities  : Not done  Skin: No cyanosis  Neurologic: Sleepy and lethargic, pleasantly confused, grossly nonfocal  Psychiatric: Unable to perform due to patient's condition      Lab/Data Review:    Recent Results (from the past 24 hour(s))   METABOLIC PANEL, COMPREHENSIVE    Collection Time: 08/31/22  8:47 AM   Result Value Ref Range    Sodium 145 136 - 145 mmol/L    Potassium 4.7 3.5 - 5.1 mmol/L    Chloride 113 (H) 97 - 108 mmol/L    CO2 26 21 - 32 mmol/L    Anion gap 6 5 - 15 mmol/L    Glucose 132 (H) 65 - 100 mg/dL    BUN 26 (H) 6 - 20 mg/dL    Creatinine 1.35 (H) 0.70 - 1.30 mg/dL    BUN/Creatinine ratio 19 12 - 20      GFR est AA >60 >60 ml/min/1.73m2    GFR est non-AA 50 (L) >60 ml/min/1.73m2    Calcium 8.4 (L) 8.5 - 10.1 mg/dL    Bilirubin, total 2.2 (H) 0.2 - 1.0 mg/dL    AST (SGOT) 42 (H) 15 - 37 U/L    ALT (SGPT) 45 12 - 78 U/L    Alk. phosphatase 75 45 - 117 U/L    Protein, total 5.5 (L) 6.4 - 8.2 g/dL    Albumin 3.1 (L) 3.5 - 5.0 g/dL    Globulin 2.4 2.0 - 4.0 g/dL    A-G Ratio 1.3 1.1 - 2.2     CBC W/O DIFF    Collection Time: 08/31/22  8:47 AM   Result Value Ref Range    WBC 17.0 (H) 4.1 - 11.1 K/uL    RBC 3.43 (L) 4.10 - 5.70 M/uL    HGB 10.9 (L) 12.1 - 17.0 g/dL    HCT 33.6 (L) 36.6 - 50.3 %    MCV 98.0 80.0 - 99.0 FL    MCH 31.8 26.0 - 34.0 PG    MCHC 32.4 30.0 - 36.5 g/dL    RDW 13.9 11.5 - 14.5 %    PLATELET 200 593 - 122 K/uL    MPV 10.3 8.9 - 12.9 FL    NRBC 0.0 0.0  WBC    ABSOLUTE NRBC 0.00 0.00 - 0.01 K/uL   LACTIC ACID    Collection Time: 08/31/22  8:47 AM   Result Value Ref Range    Lactic acid 0.9 0.4 - 2.0 mmol/L       CT HEAD WO CONT   Final Result   No acute intracranial process. Imaging findings consistent with mild chronic microvascular ischemic change. There is a moderate to severe degree of cerebral atrophy.              XR CHEST PORT   Final Result   Persistent or recurrent bibasilar opacities. Two-view radiography   would be more sensitive and specific in this regard. CT Results  (Last 48 hours)                 08/29/22 1621  CT HEAD WO CONT Final result    Impression:  No acute intracranial process. Imaging findings consistent with mild chronic microvascular ischemic change. There is a moderate to severe degree of cerebral atrophy. Narrative:  CLINICAL HISTORY: AMS   INDICATION: AMS   COMPARISON: 6/14/2022. CT dose reduction was achieved through use of a standardized protocol tailored   for this examination and automatic exposure control for dose modulation. TECHNIQUE: Serial axial images with a collimation of 5 mm were obtained from the   skull base through the vertex     FINDINGS:    There is sulcal and ventricular prominence. Confluent periventricular and   scattered foci of hypodensity in the cerebral white matter. There is no evidence   of an acute infarction, hemorrhage, or mass-effect. There is no evidence of   midline shift or hydrocephalus. Posterior fossa structures are unremarkable. No   extra-axial collections are seen. Post craniectomy changes on the left. Mastoid air cells are well pneumatized and clear. There is no evidence of depressed skull fractures of soft tissue swelling. Assessment:     1. Acute respiratory failure with hypoxia  2. Bilateral pneumonia  3. Sepsis  4. Altered mental status  5. Acute metabolic acidosis  6. Leukocytosis  7. Acute kidney injury    Plan:     Patient admitted to the hospital  Will be watched here closely    Currently on 2 L nasal cannula oxygen  Will use supplemental oxygen as needed to keep saturation above 92%    Admitted with sepsis  Pressure improved without vasopressors however patient is still having elevated lactic acid and now with worsening metabolic acidosis  Will repeat CMP in a.m.   Gentle IV fluid hydration to continue  Continue broad-spectrum antibiotics Zosyn Levaquin  Culture sent  Further changes in antibiotics based on clinical response and culture results  Will follow chest x-ray for pneumonia    Altered mental status  Likely due to combination of infection along with age  Monitor clinically  CT scan negative    Monitor renal function with fluid changes  Check electrolytes and replace as needed    DVT and GI prophylaxis    Patient is full code, if declines may require transfer to ICU  Would recommend discussion of CODE STATUS and goals of care with patient's family    Questions of patient were answered at bedside in detail  Case discussed in detail with RN, RT, and care team  Thank you for involving me in the care of this patient  I will follow with you closely during hospitalization    Time spent more than 30 minutes in direct patient care with no overlap    Kathryn Joseph MD  [facility]  8/31/2022  5:04 PM

## 2022-08-31 NOTE — PROGRESS NOTES
[facility] Hospitalist Progress Note  Adrian Jordan MD    Date:2022       Room:[facility]  Patient Name:Shalom Bateman     YOB: 1935     Age:87 y.o.    22 admission course  87M with history of hemorrhagic stroke x2, parkinson's disease. Admitted to hospital for suspected TIA. Imaging unrevealing, unable to get MRI because of pacemaker. At discharge advised towards home health but patient and daughter reported the patient just finished home health therapy and did not need further services. Discharged home 6/15/22    8/29/22 admission course  87M, with h/o SSS on amiodarone, leukemia with confusion and not acting right since yesterday   Per EMS, family stated that the patient has not been normal self since yesterday, he is unable to stand off and has multiple falls this morning. He has a h/o subarachnoid hemorrhage and TIA. He has been having loose stools as meniotned by EMS   Unable to obtain further history   ED course:  Found to be in sepsis, BP improved after IVF, lactic acid 6->3, WBC 14.3, likely course pneumonia or gastroenteritis. 22 CT HEAD  IMPRESSION  No acute intracranial process. Imaging findings consistent with mild chronic microvascular ischemic change. There is a moderate to severe degree of cerebral atrophy. 22 remains with altered mentation  Unable to reach daughter by telephone  Blood pressures improved  Lactic acidosis improved  Creatinine remains elevated    22 no new complaints, tolerating medications well  Remains on 2LNC  Lactic acidosis improved  Creatinine improving    Subjective    Subjective:  Symptoms:  Stable. Review of Systems   All other systems reviewed and are negative.   Objective         Vitals Last 24 Hours:  TEMPERATURE:  Temp  Av.2 °F (36.8 °C)  Min: 98.1 °F (36.7 °C)  Max: 98.5 °F (36.9 °C)  RESPIRATIONS RANGE: Resp  Av.5  Min: 18  Max: 20  PULSE OXIMETRY RANGE: SpO2  Av.2 %  Min: 95 %  Max: 97 %  PULSE RANGE: Pulse Av  Min: 61  Max: 61  BLOOD PRESSURE RANGE: Systolic (19RCP), EEL:799 , Min:130 , EJT:403   ; Diastolic (04OZZ), NKR:72, Min:63, Max:95    I/O (24Hr): Intake/Output Summary (Last 24 hours) at 2022 1151  Last data filed at 2022 0643  Gross per 24 hour   Intake 00676 ml   Output 750 ml   Net 76457 ml       Objective:  General Appearance:  Comfortable. Vital signs: (most recent): Blood pressure (!) 130/95, pulse 60, temperature 98.1 °F (36.7 °C), resp. rate 18, height 5' 8\" (1.727 m), weight 91.5 kg (201 lb 11.5 oz), SpO2 97 %. HEENT: Normal HEENT exam.    Lungs:  Normal effort. Heart: Normal rate. Regular rhythm. Abdomen: Abdomen is soft. Bowel sounds are normal.   There is no abdominal tenderness. Extremities: Normal range of motion. Pulses: Distal pulses are intact. Neurological: Patient is alert. Skin:  Warm and dry. Labs/Imaging/Diagnostics    Labs:  CBC:  Recent Labs     22  0847 22  0318 22  1425   WBC 17.0* 16.1* 14.3*   RBC 3.43* 3.33* 3.44*   HGB 10.9* 10.9* 11.2*   HCT 33.6* 32.9* 34.2*   MCV 98.0 98.8 99.4*   RDW 13.9 14.0 13.8    136* 138*       CHEMISTRIES:  Recent Labs     22  0847 22  0318 22  1425    141 140   K 4.7 4.9 4.7   * 111* 106   CO2 26 25 22   BUN 26* 35* 32*   CA 8.4* 8.0* 8.2*     PT/INR:No results for input(s): INR, INREXT, INREXT in the last 72 hours. No lab exists for component: PROTIME  APTT:No results for input(s): APTT in the last 72 hours. LIVER PROFILE:  Recent Labs     22  0847 22  1425   AST 42* 93*   ALT 45 61       Lab Results   Component Value Date/Time    ALT (SGPT) 45 2022 08:47 AM    AST (SGOT) 42 (H) 2022 08:47 AM    Alk. phosphatase 75 2022 08:47 AM    Bilirubin, direct 0.1 2022 11:30 PM    Bilirubin, total 2.2 (H) 2022 08:47 AM       Imaging Last 24 Hours:  No results found.   Assessment//Plan   Active Problems:    Sepsis [facility]) (8/29/2022)    Assessment & Plan  6/14/22 admission course  87M with history of hemorrhagic stroke x2, parkinson's disease. Admitted to hospital for suspected TIA. Imaging unrevealing, unable to get MRI because of pacemaker. At discharge advised towards home health but patient and daughter reported the patient just finished home health therapy and did not need further services. Discharged home 6/15/22    8/29/22 admission course  87M, with h/o SSS on amiodarone, leukemia with confusion and not acting right since yesterday   Per EMS, family stated that the patient has not been normal self since yesterday, he is unable to stand off and has multiple falls this morning. He has a h/o subarachnoid hemorrhage and TIA. He has been having loose stools as meniotned by EMS   Unable to obtain further history   ED course:  Found to be in sepsis, BP improved after IVF, lactic acid 6->3, WBC 14.3, likely course pneumonia or gastroenteritis. 8/29/22 CT HEAD  IMPRESSION  No acute intracranial process. Imaging findings consistent with mild chronic microvascular ischemic change. There is a moderate to severe degree of cerebral atrophy. 8/30/22 remains with altered mentation  Unable to reach daughter by telephone  Blood pressures improved  Lactic acidosis improved  Creatinine remains elevated    8/31/22 no new complaints, tolerating medications well  Remains on [facility]  Lactic acidosis improved  Creatinine improving    MICROBIOLOGY    8/29/22 Influenza AB Negative  8/29/22 Covid 19 Negative    8/29/22 Blood  Negative so far    ASSESSMENT AND PLAN    1) Acute hypoxic distress in the setting of persistent or recurrent patchy opacities at both lung bases. Supportive care with respiratory support as needed   Bronchodilators   Started on Zosyn and levofloxacin at admission    2) Metabolic encephalopathy on an already diminished baseline mental status. History of parkinsons as well as subarachnoid hemorrhage.  CT this admission negative for acute process, notable for moderate to severe cerebral atrophy. Supportive care    3) Acute renal failure in the setting of volume contraction. Improving    4) Cardiovascular issues including SSS, dyslipidemia, pacemaker.      Amiodarone   Atorvastatin   Metoprolol          Electronically signed by Marge Anne MD on 8/31/2022 at 2:55 PM

## 2022-09-01 ENCOUNTER — APPOINTMENT (OUTPATIENT)
Dept: GENERAL RADIOLOGY | Age: 87
DRG: 871 | End: 2022-09-01
Attending: INTERNAL MEDICINE
Payer: MEDICARE

## 2022-09-01 LAB
AMMONIA PLAS-SCNC: 26 UMOL/L
ANION GAP SERPL CALC-SCNC: 2 MMOL/L (ref 5–15)
BUN SERPL-MCNC: 24 MG/DL (ref 6–20)
BUN/CREAT SERPL: 20 (ref 12–20)
CA-I BLD-MCNC: 8.3 MG/DL (ref 8.5–10.1)
CHLORIDE SERPL-SCNC: 112 MMOL/L (ref 97–108)
CHOLEST SERPL-MCNC: 74 MG/DL
CO2 SERPL-SCNC: 27 MMOL/L (ref 21–32)
CREAT SERPL-MCNC: 1.2 MG/DL (ref 0.7–1.3)
ERYTHROCYTE [DISTWIDTH] IN BLOOD BY AUTOMATED COUNT: 13.8 % (ref 11.5–14.5)
GLUCOSE SERPL-MCNC: 132 MG/DL (ref 65–100)
HCT VFR BLD AUTO: 34.2 % (ref 36.6–50.3)
HDLC SERPL-MCNC: 36 MG/DL
HDLC SERPL: 2.1 {RATIO} (ref 0–5)
HGB BLD-MCNC: 11 G/DL (ref 12.1–17)
LDLC SERPL CALC-MCNC: 23.6 MG/DL (ref 0–100)
LIPID PROFILE,FLP: NORMAL
MCH RBC QN AUTO: 31.7 PG (ref 26–34)
MCHC RBC AUTO-ENTMCNC: 32.2 G/DL (ref 30–36.5)
MCV RBC AUTO: 98.6 FL (ref 80–99)
NRBC # BLD: 0 K/UL (ref 0–0.01)
NRBC BLD-RTO: 0 PER 100 WBC
PLATELET # BLD AUTO: 151 K/UL (ref 150–400)
PMV BLD AUTO: 10.2 FL (ref 8.9–12.9)
POTASSIUM SERPL-SCNC: 4.6 MMOL/L (ref 3.5–5.1)
PROLACTIN SERPL-MCNC: 17.1 NG/ML
RBC # BLD AUTO: 3.47 M/UL (ref 4.1–5.7)
SODIUM SERPL-SCNC: 141 MMOL/L (ref 136–145)
TRIGL SERPL-MCNC: 72 MG/DL (ref ?–150)
TSH SERPL DL<=0.05 MIU/L-ACNC: 0.81 UIU/ML (ref 0.36–3.74)
VLDLC SERPL CALC-MCNC: 14.4 MG/DL
WBC # BLD AUTO: 17 K/UL (ref 4.1–11.1)

## 2022-09-01 PROCEDURE — 74011000250 HC RX REV CODE- 250: Performed by: HOSPITALIST

## 2022-09-01 PROCEDURE — 84146 ASSAY OF PROLACTIN: CPT

## 2022-09-01 PROCEDURE — 77010033678 HC OXYGEN DAILY

## 2022-09-01 PROCEDURE — 74011250636 HC RX REV CODE- 250/636: Performed by: EMERGENCY MEDICINE

## 2022-09-01 PROCEDURE — 36415 COLL VENOUS BLD VENIPUNCTURE: CPT

## 2022-09-01 PROCEDURE — 74011000258 HC RX REV CODE- 258: Performed by: EMERGENCY MEDICINE

## 2022-09-01 PROCEDURE — 71045 X-RAY EXAM CHEST 1 VIEW: CPT

## 2022-09-01 PROCEDURE — 95816 EEG AWAKE AND DROWSY: CPT | Performed by: PSYCHIATRY & NEUROLOGY

## 2022-09-01 PROCEDURE — 74011250637 HC RX REV CODE- 250/637: Performed by: HOSPITALIST

## 2022-09-01 PROCEDURE — 97161 PT EVAL LOW COMPLEX 20 MIN: CPT

## 2022-09-01 PROCEDURE — 94761 N-INVAS EAR/PLS OXIMETRY MLT: CPT

## 2022-09-01 PROCEDURE — 84443 ASSAY THYROID STIM HORMONE: CPT

## 2022-09-01 PROCEDURE — 65270000029 HC RM PRIVATE

## 2022-09-01 PROCEDURE — 97530 THERAPEUTIC ACTIVITIES: CPT

## 2022-09-01 PROCEDURE — 80061 LIPID PANEL: CPT

## 2022-09-01 PROCEDURE — 85027 COMPLETE CBC AUTOMATED: CPT

## 2022-09-01 PROCEDURE — 80048 BASIC METABOLIC PNL TOTAL CA: CPT

## 2022-09-01 PROCEDURE — 82140 ASSAY OF AMMONIA: CPT

## 2022-09-01 RX ADMIN — PIPERACILLIN SODIUM AND TAZOBACTAM SODIUM 3.38 G: 3; .375 INJECTION, POWDER, LYOPHILIZED, FOR SOLUTION INTRAVENOUS at 22:15

## 2022-09-01 RX ADMIN — PIPERACILLIN SODIUM AND TAZOBACTAM SODIUM 3.38 G: 3; .375 INJECTION, POWDER, LYOPHILIZED, FOR SOLUTION INTRAVENOUS at 06:32

## 2022-09-01 RX ADMIN — PREDNISOLONE ACETATE 1 DROP: 10 SUSPENSION/ DROPS OPHTHALMIC at 09:20

## 2022-09-01 RX ADMIN — SODIUM CHLORIDE, PRESERVATIVE FREE 10 ML: 5 INJECTION INTRAVENOUS at 06:32

## 2022-09-01 RX ADMIN — METOPROLOL SUCCINATE 25 MG: 50 TABLET, EXTENDED RELEASE ORAL at 09:19

## 2022-09-01 RX ADMIN — SODIUM CHLORIDE, PRESERVATIVE FREE 10 ML: 5 INJECTION INTRAVENOUS at 13:14

## 2022-09-01 RX ADMIN — ATORVASTATIN CALCIUM 80 MG: 40 TABLET, FILM COATED ORAL at 22:15

## 2022-09-01 RX ADMIN — OFLOXACIN 1 DROP: 3 SOLUTION/ DROPS OPHTHALMIC at 13:14

## 2022-09-01 RX ADMIN — PIPERACILLIN SODIUM AND TAZOBACTAM SODIUM 3.38 G: 3; .375 INJECTION, POWDER, LYOPHILIZED, FOR SOLUTION INTRAVENOUS at 15:13

## 2022-09-01 RX ADMIN — OFLOXACIN 1 DROP: 3 SOLUTION/ DROPS OPHTHALMIC at 17:32

## 2022-09-01 RX ADMIN — PREDNISOLONE ACETATE 1 DROP: 10 SUSPENSION/ DROPS OPHTHALMIC at 17:32

## 2022-09-01 RX ADMIN — OFLOXACIN 1 DROP: 3 SOLUTION/ DROPS OPHTHALMIC at 22:18

## 2022-09-01 RX ADMIN — AMIODARONE HYDROCHLORIDE 100 MG: 200 TABLET ORAL at 09:19

## 2022-09-01 RX ADMIN — OFLOXACIN 1 DROP: 3 SOLUTION/ DROPS OPHTHALMIC at 09:20

## 2022-09-01 RX ADMIN — PREDNISOLONE ACETATE 1 DROP: 10 SUSPENSION/ DROPS OPHTHALMIC at 22:18

## 2022-09-01 RX ADMIN — PREDNISOLONE ACETATE 1 DROP: 10 SUSPENSION/ DROPS OPHTHALMIC at 13:14

## 2022-09-01 NOTE — PROGRESS NOTES
Spiritual Care Assessment/Progress Note  [facility]      NAME: Italia Klein      MRN: [id number]  AGE: 81 y.o. SEX: male  Yarsanism Affiliation: Jehovah witness   Language: English     9/1/2022     Total Time (in minutes): 10     Spiritual Assessment begun in [address] through conversation with:         [x]Patient        [] Family    [] Friend(s)        Reason for Consult: Initial visit     Spiritual beliefs: (Please include comment if needed)     [] Identifies with a kalyn tradition:         [] Supported by a kalyn community:            [] Claims no spiritual orientation:           [] Seeking spiritual identity:                [] Adheres to an individual form of spirituality:           [x] Not able to assess:                           Identified resources for coping:      [] Prayer                               [] Music                  [] Guided Imagery     [] Family/friends                 [] Pet visits     [] Devotional reading                         [x] Unknown     [] Other:                                             Interventions offered during this visit: (See comments for more details)    Patient Interventions: Initial visit, Other (comment) (Silent support)           Plan of Care:     [] Support spiritual and/or cultural needs    [] Support AMD and/or advance care planning process      [] Support grieving process   [] Coordinate Rites and/or Rituals    [] Coordination with community clergy   [] No spiritual needs identified at this time   [] Detailed Plan of Care below (See Comments)  [] Make referral to Music Therapy  [] Make referral to Pet Therapy     [] Make referral to Addiction services  [] Make referral to [facility]  [] Make referral to Spiritual Care Partner  [] No future visits requested        [x] Contact Spiritual Care for further referrals     Comments:  Visited patient in [address] [place] [address] for initial assessment. Patient seemed to be resting during the visit.   One staff was in the room but no family members. Provided support of presence. Contact chaplains for further referrals. Chaplain Marquita Thakur M.Div.    can be reached by calling the  at [facility]  [phone]

## 2022-09-01 NOTE — PROGRESS NOTES
Problem: Mobility Impaired (Adult and Pediatric)  Goal: *Acute Goals and Plan of Care (Insert Text)  Note: Pt will be able to participate with LE exercises with min verbal cuing for technique x 7 days without c/o SOB  SBA with supine to sit EOB x 7 days  Sit to stand with CGAx1 x7 days  Amb 25-50ft with RW and CGAx1 x7 days without c/o SOB    Pt stated goal: none stated, pt not able to state    PHYSICAL THERAPY EVALUATION  Patient: Wyatt Lerma (28 y.o. male)  Date: 9/1/2022  Primary Diagnosis: Sepsis ([facility] [address] [A41.9]       Precautions:        ASSESSMENT    82yo M admitted to hospital with sepsis/confusion/resp failure and presents to PT with decreased bed mob, transfers, LE strength, gt, balance, activity tolerance, and overall functional mobility. PMH listed below. Pt currently on 1:1 sitter. Pt sleeping upon PT arrival, drowsy during session but awakens with verbal cues. Pt very [place]. Pt alert to name/place, confused to time/situation. Pt states he lives with his dtr in 1 story home with 3 KRYSTA home with B rails. PTA pt reports amb with cane. Unable to state how much assist he needs with ADLS. Currently, pt is overall min/mod A with bed mob x 1 for supine to sit, rolling to R. Pt min A with sit to stand transfer. Pt was able to amb approx 5ft at EOB with min Ax1. Pt has hx of Parkinsons, therefore gt was shuffling and pt did demo tremors at times when attempting to hold onto RW. Slight posterior lean at times. Pt appears SOB at times during mobility, SpO2 88-89% throughout tx during session, nsg present and aware. Pt min A to return to seated EOB and return to supine. Pt may benefit from skilled PT to address his functional deficits. Recommend SNF upon d/c at this time.       PLAN :  Recommendations and Planned Interventions: bed mobility training, transfer training, gait training, therapeutic exercises, patient and family training/education, and therapeutic activities      Frequency/Duration: Patient will be followed by physical therapy:  5 times a week to address goals. Recommendation for discharge: (in order for the patient to meet his/her long term goals)  [address]:   Patient sleeping upon PT arrival, awakens with verbal cuing    OBJECTIVE DATA SUMMARY:   HISTORY:    Past Medical History:   Diagnosis Date    [facility]neurysm ([facility] [address]     Anxiety     Bradycardia     Cancer ([facility] [address]     COLON    Diabetes ([facility] [address]     TYPE II    Gout     Hypertension     Ill-defined condition     parkinsons disease    Pacemaker     Psoriasis     SCALP AND LT. EAR    Psychiatric disorder     depression and anxiety    Stroke [facility])      Past Surgical History:   Procedure Laterality Date    HX CATARACT REMOVAL Bilateral     HX GI      COLONOSCOPY    HX HERNIA REPAIR      UMBILICAL    HX PACEMAKER      HX RETINAL DETACHMENT REPAIR Right     HX TONSILLECTOMY      HX TOTAL COLECTOMY  1908'S    1 FOOT REMOVED       Personal factors and/or comorbidities impacting plan of care:     Home Situation  Home Environment: Private residence  # Steps to Enter: 3  Rails to Enter: Yes  Hand Rails : Bilateral  One/Two Story Residence: One story  Living Alone: No  Support Systems: Spouse/Significant Other  Patient Expects to be Discharged to[de-identified] Skilled nursing facility  Current DME Used/Available at Home: [employer] Dolores, Wheelchair, Shower chair        EXAMINATION/PRESENTATION/DECISION MAKING:   Critical Behavior:  Neurologic State: Drowsy  Orientation Level: Oriented to place, Oriented to person, Disoriented to time, Disoriented to situation  Cognition: Follows commands           Range Of Motion:  AROM: Generally decreased, functional  B LE    Strength:    Strength: Generally decreased, functional     Grossly 3+/5 B LE       Tone & Sensation:   Intact to LT B LE       Functional Mobility:  Bed Mobility:  Rolling: Contact guard assistance  Supine to Sit: Minimum assistance  Sit to Supine:  Moderate assistance  Scooting: Contact guard assistance  Transfers:  Sit to Stand: Minimum assistance;Assist x1  Stand to Sit: Minimum assistance;Assist x1                       Balance:   Sitting: Intact; With support  Standing: Pull to stand; With support; Impaired  Standing - Static: Constant support; Fair  Standing - Dynamic : Constant support; Fair  Ambulation/Gait Training:  Distance (ft): 5 Feet (ft)  Assistive Device: Walker, rolling;Gait belt  Ambulation - Level of Assistance: Minimal assistance;Assist x1                  Functional Measure:  [address] [place] [address] AM-PAC 6 Clicks         Basic Mobility Inpatient Short Form  How much difficulty does the patient currently have. .. Unable A Lot A Little None   1. Turning over in bed (including adjusting bedclothes, sheets and blankets)? [] 1   [] 2   [x] 3   [] 4   2. Sitting down on and standing up from a chair with arms ( e.g., wheelchair, bedside commode, etc.)   [] 1   [] 2   [x] 3   [] 4   3. Moving from lying on back to sitting on the side of the bed? [] 1   [] 2   [x] 3   [] 4          How much help from another person does the patient currently need. .. Total A Lot A Little None   4. Moving to and from a bed to a chair (including a wheelchair)? [] 1   [] 2   [x] 3   [] 4   5. Need to walk in hospital room? [] 1   [x] 2   [] 3   [] 4   6. Climbing 3-5 steps with a railing? [] 1   [x] 2   [] 3   [] 4   © 2007, Trustees of [address] [place] [address], under license to [employer]. All rights reserved     Score:  Initial: 16 Most Recent: X (Date: -- )   Interpretation of Tool:  Represents activities that are increasingly more difficult (i.e. Bed mobility, Transfers, Gait).   Score 24 23 22-20 19-15 14-10 9-7 6   Modifier CH CI CJ CK CL CM CN           Physical Therapy Evaluation Charge Determination   History Examination Presentation Decision-Making   MEDIUM  Complexity : 1-2 comorbidities / personal factors will impact the outcome/ POC  MEDIUM Complexity : 3 Standardized tests and measures addressing body structure, function, activity limitation and / or participation in recreation  LOW Complexity : Stable, uncomplicated  Other Functional Measure [facility] 6 MED      Based on the above components, the patient evaluation is determined to be of the following complexity level: LOW     Pain Rating:  No c/o pain during session    Activity Tolerance:   Fair      After treatment patient left in no apparent distress:   Supine in bed, Call bell within reach, Caregiver / family present, Side rails x 3, and bed locked and in lowest level          COMMUNICATION/EDUCATION:   The patients plan of care was discussed with: Registered nurse. Patient is unable to participate in goal setting and plan of care.       Thank you for this referral.  Lana Anderson   Time Calculation: 20 mins

## 2022-09-01 NOTE — CONSULTS
NEURO CONSULT      REASON FOR ADMISSION:  Mental status changes      HISTORY:  Mr. Luli Singleton is 81years old with a history of diabetes, dyslipidemia, possible hypertension, anxiety disorder, who was consulted to neurology for progressive mental status changes. Unfortunately, patient is not in a position to provide a history at this time. Patient came to the ER. In the ER he had a head CT without contrast that is showing chronic microvascular disease secondary to ischemic changes. He also has some atrophy. Patient's creatinine continues to creep up and currently 1.83. His last glucose was 361. Patient was subsequently transferred to the floor where he is still nonverbally responsive. He has intermittent tremulousness in his arms. Reports at that he appears to have lung infiltrates on his chest x-ray. His labs for of little abnormalities.     ROS: As per above    General:                     No fever, no chills, no sweats, no generalized weakness, no weight loss/gain,                                       No loss of appetite   Eyes:                           No blurred vision, no eye pain, no loss of vision, no double vision  ENT:                            rhinorrhea, no pharyngitis   Respiratory:               No cough, no sputum production, no SOB, no SALEEM, no wheezing, no pleuritic pain   Cardiology:                No chest pain, no palpitations, no orthopnea, no PND, no edema, no syncope   Gastrointestinal:       No abdominal pain , no N/V, no diarrhea, no dysphagia, no constipation, no bleeding   Genitourinary:           frequency, no urgency, no dysuria, no hematuria, no incontinence   Muskuloskeletal :      No arthralgia, no myalgia, no back pain  Hematology:              No easy bruising, no nose or gum bleeding, no lymphadenopathy   Dermatological:         No rash, no ulceration, no pruritis, no color change / jaundice  Endocrine:                 hot flashes or polydipsia   Neurological: No headache, no dizziness, no confusion, no focal weakness, no paresthesia,                                      No Speech difficulties, no memory loss, no gait difficulty  Psychological:          No neelings of anxiety, no depression, no agitation      NEURO EXAM:    Mental status: Patient is nonresponsive to verbal stimulus at this time. He has arm tremulousness. He reacts to pain by grimacing    Cranial nerves: Corneal and pupillary reflexes are intact there is no clear-cut central 7th nerve palsy at this time    Motor exam: Patient is slightly rigid at the wrists reflexes are depressed. As already stated patient is intermittently tremulous in both arms. Sensory exam: Patient reacts to deep pain to extremities    Coordination: Cannot perform that exam at this time    Gait and Station: Was not ambulated    ASSESSMENT:  Mental status changes likely multifactorial  Apparent pneumonia  Is several minor lab abnormalities, but this patient is 81years old and any abnormalities can take him over  Hyperglycemia in a patient with diabetes. His last glucose was 361      PLAN:  All infectious, metabolic and other blood abnormalities are being addressed.   Doubt stroke  Seizure precautions  It should be noted that this patient is also 81years old which is significant in his reaction to any insult  TSH  Prolactin  Ammonia  Seizure precautions  EEG  This is a high-level complexity case, total amount of time spent 43 minutes      ALLERGIES:    Allergies   Allergen Reactions    Monosodium Glutamate Anaphylaxis and Hives    Adhesive Tape-Silicones Other (comments)     BLISTERS       MEDS:      Current Facility-Administered Medications:     0.9% sodium chloride infusion, 75 mL/hr, IntraVENous, CONTINUOUS, Adrian Stratton MD, Last Rate: 75 mL/hr at 08/30/22 1441, 75 mL/hr at 08/30/22 1441    albuterol-ipratropium (DUO-NEB) 2.5 MG-0.5 MG/3 ML, 3 mL, Nebulization, Q4H PRN, Chris Macario MD, 3 mL at 08/31/22 3800 prednisoLONE acetate (PRED FORTE) 1 % ophthalmic suspension 1 Drop (Patient Supplied), 1 Drop, Right Eye, QID, Adrian Benítez MD, 1 Drop at 09/01/22 0920    ofloxacin (FLOXIN) 0.3 % ophthalmic solution 1 Drop (Patient Supplied), 1 Drop, Right Eye, QID, Adrian Benítez MD, 1 Drop at 09/01/22 0920    piperacillin-tazobactam (ZOSYN) 3.375 g in 0.9% sodium chloride (MBP/ADV) 100 mL MBP, 3.375 g, IntraVENous, Q8H, William Rodriguez MD, Last Rate: 25 mL/hr at 09/01/22 0632, 3.375 g at 09/01/22 6681    sodium chloride (NS) flush 5-10 mL, 5-10 mL, IntraVENous, PRN, William Rodriguez MD    levoFLOXacin (LEVAQUIN) 750 mg in D5W IVPB, 750 mg, IntraVENous, Q48H, William Rodriguez MD, Last Rate: 100 mL/hr at 08/31/22 1538, 750 mg at 08/31/22 1538    NOREPINephrine (LEVOPHED) 8 mg in 0.9% NS 250ml infusion, 0.5-16 mcg/min, IntraVENous, TITRATE, William Rodriguez MD, Held at 08/29/22 1552    amiodarone (CORDARONE) tablet 100 mg, 100 mg, Oral, DAILY, Richar Kelly MD, 100 mg at 09/01/22 0919    atorvastatin (LIPITOR) tablet 80 mg, 80 mg, Oral, QHS, Richar Kelly MD, 80 mg at 08/31/22 2223    melatonin tablet 5 mg, 5 mg, Oral, QHS PRN, Richar Kelly MD, 5 mg at 08/31/22 2223    metoprolol succinate (TOPROL-XL) XL tablet 25 mg, 25 mg, Oral, DAILY, Richar Kelly MD, 25 mg at 09/01/22 0919    sodium chloride (NS) flush 5-40 mL, 5-40 mL, IntraVENous, Q8H, Oswald Moreau MD, 10 mL at 09/01/22 9615    sodium chloride (NS) flush 5-40 mL, 5-40 mL, IntraVENous, PRN, Richar eKlly MD    acetaminophen (TYLENOL) tablet 650 mg, 650 mg, Oral, Q6H PRN **OR** acetaminophen (TYLENOL) suppository 650 mg, 650 mg, Rectal, Q6H PRN, Richar Kelly MD    polyethylene glycol (MIRALAX) packet 17 g, 17 g, Oral, DAILY PRN, Richar Kelly MD    ondansetron (ZOFRAN ODT) tablet 4 mg, 4 mg, Oral, Q8H PRN **OR** ondansetron (ZOFRAN) injection 4 mg, 4 mg, IntraVENous, Q6H PRN, Richar Kelly MD haloperidoL (HALDOL) tablet 5 mg, 5 mg, Oral, TID PRN, Chyrl MD Sophia, 5 mg at 08/31/22 6834    LABS:  Recent Results (from the past 24 hour(s))   ECHO ADULT COMPLETE    Collection Time: 08/31/22  3:15 PM   Result Value Ref Range    LV EDV A4C 71 mL    LV ESV A4C 39 mL    IVSd 1.1 0.6 - 1.0 cm    LVIDd 4.6 4.2 - 5.9 cm    LVIDs 2.8 cm    LVOT Diameter 1.8 cm    LVOT Mean Gradient 2 mmHg    LVOT VTI 21.0 cm    LVOT Peak Velocity 1.0 m/s    LVOT Peak Gradient 4 mmHg    LVPWd 1.0 0.6 - 1.0 cm    LV E' Lateral Velocity 18 cm/s    LV E' Septal Velocity 13 cm/s    LV Ejection Fraction A4C 45 %    LVOT Area 2.5 cm2    LVOT SV 53.4 ml    LA Major Axis 6.6 cm    LA Area 4C 17.9 cm2    AV Mean Gradient 4 mmHg    AV VTI 27.4 cm    AV Mean Velocity 0.9 m/s    AV Peak Velocity 1.3 m/s    AV Peak Gradient 7 mmHg    AV Area by VTI 2.0 cm2    AV Area by Peak Velocity 1.9 cm2    MR Peak Velocity 1.3 m/s    MR Peak Gradient 7 mmHg    MV A Velocity 0.69 m/s    MV E Velocity 1.17 m/s    PV Max Velocity 0.8 m/s    PV Peak Gradient 3 mmHg    TAPSE 2.9 1.7 cm    TR Max Velocity 1.39 m/s    TR Peak Gradient 8 mmHg    Fractional Shortening 2D 39 28 - 44 %    LV ESV Index A4C 19 mL/m2    LV EDV Index A4C 35 mL/m2    LVIDd Index 2.24 cm/m2    LVIDs Index 1.37 cm/m2    LV RWT Ratio 0.43     LV Mass 2D 169.9 88 - 224 g    LV Mass 2D Index 82.9 49 - 115 g/m2    MV E/A 1.70     E/E' Ratio (Averaged) 7.75     E/E' Lateral 6.50     E/E' Septal 9.00     LVOT Stroke Volume Index 26.1 mL/m2    AV Velocity Ratio 0.77     LVOT:AV VTI Index 0.77     ZAY/BSA VTI 1.0 cm2/m2    ZAY/BSA Peak Velocity 0.9 cm2/m2    Est. RA Pressure 3 mmHg    RVSP 11 mmHg   CBC W/O DIFF    Collection Time: 09/01/22  3:37 AM   Result Value Ref Range    WBC 17.0 (H) 4.1 - 11.1 K/uL    RBC 3.47 (L) 4.10 - 5.70 M/uL    HGB 11.0 (L) 12.1 - 17.0 g/dL    HCT 34.2 (L) 36.6 - 50.3 %    MCV 98.6 80.0 - 99.0 FL    MCH 31.7 26.0 - 34.0 PG    MCHC 32.2 30.0 - 36.5 g/dL    RDW 13.8 11.5 - 14.5 %    PLATELET 700 041 - 096 K/uL    MPV 10.2 8.9 - 12.9 FL    NRBC 0.0 0.0  WBC    ABSOLUTE NRBC 0.00 0.00 - 8.34 K/uL   METABOLIC PANEL, BASIC    Collection Time: 09/01/22  3:37 AM   Result Value Ref Range    Sodium 141 136 - 145 mmol/L    Potassium 4.6 3.5 - 5.1 mmol/L    Chloride 112 (H) 97 - 108 mmol/L    CO2 27 21 - 32 mmol/L    Anion gap 2 (L) 5 - 15 mmol/L    Glucose 132 (H) 65 - 100 mg/dL    BUN 24 (H) 6 - 20 mg/dL    Creatinine 1.20 0.70 - 1.30 mg/dL    BUN/Creatinine ratio 20 12 - 20      GFR est AA >60 >60 ml/min/1.73m2    GFR est non-AA 57 (L) >60 ml/min/1.73m2    Calcium 8.3 (L) 8.5 - 10.1 mg/dL       Visit Vitals  BP (!) 174/73 (BP 1 Location: Left leg, BP Patient Position: Lying; At rest)   Pulse 62   Temp 98.6 °F (37 °C)   Resp 19   Ht 5' 8\" (1.727 m)   Wt 91.5 kg (201 lb 11.5 oz)   SpO2 96%   BMI 30.67 kg/m²       Imaging:  CT HEAD WO CONT   Final Result   No acute intracranial process. Imaging findings consistent with mild chronic microvascular ischemic change. There is a moderate to severe degree of cerebral atrophy. XR CHEST PORT   Final Result   Persistent or recurrent bibasilar opacities. Two-view radiography   would be more sensitive and specific in this regard.

## 2022-09-01 NOTE — PROGRESS NOTES
[facility] Hospitalist Progress Note  Adrian Benedict MD    QRRK:9/5/3323       QLPV:059/45  Patient Name:Shalom Jose     YOB: 1935     Age:87 y.o.    1/20/22 Brief Postoperative Note    Date of Procedure: 1/20/2022    Pre-Op Diagnosis: LEFT SUBDURAL HEMATOMA   Post-Op Diagnosis: SAME   Procedure(s):  CRANIOTOMY FOR LEFT FRONTAL SUBDURAL HEMATOMA   Surgeon(s):  Alida Pretty MD    Anesthesia: General    Estimated Blood Loss (mL): 44NM   Complications: none    1/46/57 admission course  87M with history of hemorrhagic stroke x2, parkinson's disease. Admitted to hospital for suspected TIA. Imaging unrevealing, unable to get MRI because of pacemaker. At discharge advised towards home health but patient and daughter reported the patient just finished home health therapy and did not need further services. Discharged home 6/15/22    8/29/22 admission course  87M, with h/o SSS on amiodarone, leukemia with confusion and not acting right since yesterday   Per EMS, family stated that the patient has not been normal self since yesterday, he is unable to stand off and has multiple falls this morning. He has a h/o subarachnoid hemorrhage and TIA. He has been having loose stools as meniotned by EMS   Unable to obtain further history   ED course:  Found to be in sepsis, BP improved after IVF, lactic acid 6->3, WBC 14.3, likely course pneumonia or gastroenteritis. 8/29/22 CT HEAD  IMPRESSION  No acute intracranial process. Imaging findings consistent with mild chronic microvascular ischemic change. There is a moderate to severe degree of cerebral atrophy.     8/30/22 remains with altered mentation  Unable to reach daughter by telephone  Blood pressures improved  Lactic acidosis improved  Creatinine remains elevated    8/31/22 no new complaints, tolerating medications well  Remains on [facility]  Lactic acidosis improved  Creatinine improving    8/31/22 echocardiogram  Result status: Final result     Left Ventricle: Normal left ventricular systolic function with a visually estimated EF of 55 - 60%. Left ventricle size is normal. Normal wall thickness. Mild hypokinesis of the following segments: mid anteroseptal and apical septal. Normal diastolic function. Right Ventricle: Mildly reduced systolic function. Left Atrium: Left atrium is mildly dilated. Left atrial volume index is normal (16-34 mL/m2). Technical qualifiers: Echo study was limited due to patient's condition and technically difficult due to low parasternal window. 22 no new complaints, tolerating medications well  Remains on [facility]  Despite improvement in his metabolic condition, remains altered mentation from baseline    Subjective    Subjective:  Symptoms:  Stable. Review of Systems   All other systems reviewed and are negative. Objective         Vitals Last 24 Hours:  TEMPERATURE:  Temp  Av.6 °F (37 °C)  Min: 97.5 °F (36.4 °C)  Max: 99.9 °F (37.7 °C)  RESPIRATIONS RANGE: Resp  Av.3  Min: 18  Max: 20  PULSE OXIMETRY RANGE: SpO2  Av %  Min: 90 %  Max: 98 %  PULSE RANGE: Pulse  Av.5  Min: 59  Max: 62  BLOOD PRESSURE RANGE: Systolic (49JAT), BGA:530 , Min:112 , QRS:898   ; Diastolic (73ZFW), QZC:56, Min:58, Max:79    I/O (24Hr): Intake/Output Summary (Last 24 hours) at 2022 0917  Last data filed at 2022 6327  Gross per 24 hour   Intake 1050 ml   Output --   Net 1050 ml       Objective:  General Appearance:  Comfortable. Vital signs: (most recent): Blood pressure (!) 174/73, pulse 62, temperature 98.6 °F (37 °C), resp. rate 19, height 5' 8\" (1.727 m), weight 91.5 kg (201 lb 11.5 oz), SpO2 96 %. HEENT: Normal HEENT exam.    Lungs:  Normal effort. Heart: Normal rate. Regular rhythm. Abdomen: Abdomen is soft. Bowel sounds are normal.   There is no abdominal tenderness. Extremities: Normal range of motion. Pulses: Distal pulses are intact. Neurological: Patient is alert.     Skin:  Warm and dry.    Labs/Imaging/Diagnostics    Labs:  CBC:  Recent Labs     09/01/22  0337 08/31/22  0847 08/30/22  0318   WBC 17.0* 17.0* 16.1*   RBC 3.47* 3.43* 3.33*   HGB 11.0* 10.9* 10.9*   HCT 34.2* 33.6* 32.9*   MCV 98.6 98.0 98.8   RDW 13.8 13.9 14.0    155 136*       CHEMISTRIES:  Recent Labs     09/01/22  0337 08/31/22  0847 08/30/22 0318    145 141   K 4.6 4.7 4.9   * 113* 111*   CO2 27 26 25   BUN 24* 26* 35*   CA 8.3* 8.4* 8.0*     PT/INR:No results for input(s): INR, INREXT, INREXT in the last 72 hours. No lab exists for component: PROTIME  APTT:No results for input(s): APTT in the last 72 hours. LIVER PROFILE:  Recent Labs     08/31/22  0847 08/29/22  1425   AST 42* 93*   ALT 45 61       Lab Results   Component Value Date/Time    ALT (SGPT) 45 08/31/2022 08:47 AM    AST (SGOT) 42 (H) 08/31/2022 08:47 AM    Alk. phosphatase 75 08/31/2022 08:47 AM    Bilirubin, direct 0.1 01/19/2022 11:30 PM    Bilirubin, total 2.2 (H) 08/31/2022 08:47 AM       Imaging Last 24 Hours:  ECHO ADULT COMPLETE    Result Date: 8/31/2022  Formatting of this result is different from the original.   Left Ventricle: Normal left ventricular systolic function with a visually estimated EF of 55 - 60%. Left ventricle size is normal. Normal wall thickness. Mild hypokinesis of the following segments: mid anteroseptal and apical septal. Normal diastolic function. Right Ventricle: Mildly reduced systolic function. Left Atrium: Left atrium is mildly dilated. Left atrial volume index is normal (16-34 mL/m2). Technical qualifiers: Echo study was limited due to patient's condition and technically difficult due to low parasternal window.     Assessment//Plan   Active Problems:    Sepsis ([address] (8/29/2022)    Assessment & Plan    1/20/22 Brief Postoperative Note    Date of Procedure: 1/20/2022    Pre-Op Diagnosis: LEFT SUBDURAL HEMATOMA   Post-Op Diagnosis: SAME   Procedure(s):  CRANIOTOMY FOR LEFT FRONTAL SUBDURAL HEMATOMA Surgeon(s):  Pradeep Jones MD    Anesthesia: General    Estimated Blood Loss (mL): 80FK   Complications: none    6/62/26 admission course  87M with history of hemorrhagic stroke x2, parkinson's disease. Admitted to hospital for suspected TIA. Imaging unrevealing, unable to get MRI because of pacemaker. At discharge advised towards home health but patient and daughter reported the patient just finished home health therapy and did not need further services. Discharged home 6/15/22    8/29/22 admission course  87M, with h/o SSS on amiodarone, leukemia with confusion and not acting right since yesterday   Per EMS, family stated that the patient has not been normal self since yesterday, he is unable to stand off and has multiple falls this morning. He has a h/o subarachnoid hemorrhage and TIA. He has been having loose stools as meniotned by EMS   Unable to obtain further history   ED course:  Found to be in sepsis, BP improved after IVF, lactic acid 6->3, WBC 14.3, likely course pneumonia or gastroenteritis. 8/29/22 CT HEAD  IMPRESSION  No acute intracranial process. Imaging findings consistent with mild chronic microvascular ischemic change. There is a moderate to severe degree of cerebral atrophy. 8/30/22 remains with altered mentation  Unable to reach daughter by telephone  Blood pressures improved  Lactic acidosis improved  Creatinine remains elevated    8/31/22 no new complaints, tolerating medications well  Remains on [facility]  Lactic acidosis improved  Creatinine improving    8/31/22 echocardiogram  Result status: Final result     Left Ventricle: Normal left ventricular systolic function with a visually estimated EF of 55 - 60%. Left ventricle size is normal. Normal wall thickness. Mild hypokinesis of the following segments: mid anteroseptal and apical septal. Normal diastolic function. Right Ventricle: Mildly reduced systolic function. Left Atrium: Left atrium is mildly dilated.  Left atrial volume index is normal (16-34 mL/m2). Technical qualifiers: Echo study was limited due to patient's condition and technically difficult due to low parasternal window. 9/1/22 no new complaints, tolerating medications well  Remains on [facility]  Despite improvement in his metabolic condition, remains altered mentation from baseline    MICROBIOLOGY    8/29/22 Influenza AB Negative  8/29/22 Covid 19 Negative    8/29/22 Blood  Negative so far    ASSESSMENT AND PLAN    1) Acute hypoxic distress in the setting of persistent or recurrent patchy opacities at both lung bases. Supportive care with respiratory support as needed   Bronchodilators   Started on Zosyn and levofloxacin at admission    2) Metabolic encephalopathy on an already diminished baseline mental status. History of parkinsons as well as subarachnoid hemorrhage. CT this admission negative for acute process, notable for moderate to severe cerebral atrophy. Supportive care   Despite improvement in his metabolic condition, remains altered mentation form diminished baseline   Unable to do MRI because of pacemaker    3) Acute renal failure in the setting of volume contraction. Improving    4) Cardiovascular issues including SSS, dyslipidemia, pacemaker.      Amiodarone   Atorvastatin   Metoprolol    5) Recent ophthalmologic surgery     Continue his prescribed eye drops    Ofloxacin    Prednisolone        Electronically signed by Darylene Rosier, MD on 9/1/2022 at 2:55 PM

## 2022-09-01 NOTE — PROGRESS NOTES
Pulmonary and Critical Care progress note    Subjective:   Consult Note: 9/1/2022 @no control      Chief Complaint:   Chief Complaint   Patient presents with    Altered mental status    Hypotension        This patient has been seen and evaluated at the request of Dr. Brandi Hair    Patient seen and examined  Overnight events noted    Sleepy and lethargic  Lying in bed comfortably  No acute distress  On nasal cannula oxygen    Review of Systems:  Review of systems not obtained due to patient factors.        Current Facility-Administered Medications   Medication Dose Route Frequency Provider Last Rate Last Admin    0.9% sodium chloride infusion  75 mL/hr IntraVENous CONTINUOUS Governor Yoel MD 75 mL/hr at 08/30/22 1441 75 mL/hr at 08/30/22 1441    albuterol-ipratropium (DUO-NEB) 2.5 MG-0.5 MG/3 ML  3 mL Nebulization Q4H PRN Governor Yoel MD   3 mL at 08/31/22 1339    prednisoLONE acetate (PRED FORTE) 1 % ophthalmic suspension 1 Drop (Patient Supplied)  1 Drop Right Eye QID Governor Yoel MD   1 Drop at 09/01/22 0920    ofloxacin (FLOXIN) 0.3 % ophthalmic solution 1 Drop (Patient Supplied)  1 Drop Right Eye QID Governor Yoel MD   1 Drop at 09/01/22 0920    piperacillin-tazobactam (ZOSYN) 3.375 g in 0.9% sodium chloride (MBP/ADV) 100 mL MBP  3.375 g IntraVENous Q8H Tristan Quintanilla MD 25 mL/hr at 09/01/22 0632 3.375 g at 09/01/22 3419    sodium chloride (NS) flush 5-10 mL  5-10 mL IntraVENous PRN Tristan Quintanilla MD        levoFLOXacin [facility]) 750 mg in D5W IVPB  750 mg IntraVENous Q48H Tristan Quintanilla  mL/hr at 08/31/22 1538 750 mg at 08/31/22 1538    NOREPINephrine (LEVOPHED) 8 mg in 0.9% NS 250ml infusion  0.5-16 mcg/min IntraVENous TITRATE Tristan Quintanilla MD   Held at 08/29/22 1552    amiodarone (CORDARONE) tablet 100 mg  100 mg Oral DAILY Negin Rae MD   100 mg at 09/01/22 0919    atorvastatin (LIPITOR) tablet 80 mg  80 mg Oral QHS Dorothey Rae, MD   80 mg at 22 2223    melatonin tablet 5 mg  5 mg Oral QHS PRN Juancho Trotter MD   5 mg at 22    metoprolol succinate (TOPROL-XL) XL tablet 25 mg  25 mg Oral DAILY Juancho Trotter MD   25 mg at 22 0919    sodium chloride (NS) flush 5-40 mL  5-40 mL IntraVENous Q8H Juancho Trotter MD   10 mL at 22 7685    sodium chloride (NS) flush 5-40 mL  5-40 mL IntraVENous PRN Juancho Trotter MD        acetaminophen (TYLENOL) tablet 650 mg  650 mg Oral Q6H PRN Juancho Trotter MD        Or    acetaminophen (TYLENOL) suppository 650 mg  650 mg Rectal Q6H PRN Juancho Trotter MD        polyethylene glycol (MIRALAX) packet 17 g  17 g Oral DAILY PRN Juancho Trotter MD        ondansetron (ZOFRAN ODT) tablet 4 mg  4 mg Oral Q8H PRN Juancho Trotter MD        Or    ondansetron [facility]) injection 4 mg  4 mg IntraVENous Q6H PRN Juancho Trotter MD        haloperidoL (HALDOL) tablet 5 mg  5 mg Oral TID PRN Juancho Trotter MD   5 mg at 22          Allergies   Allergen Reactions    Monosodium Glutamate Anaphylaxis and Hives    Adhesive Tape-Silicones Other (comments)     BLISTERS           Objective:     Blood pressure (!) 174/73, pulse 62, temperature 98.6 °F (37 °C), resp. rate 19, height 5' 8\" (1.727 m), weight 91.5 kg (201 lb 11.5 oz), SpO2 96 %. Temp (24hrs), Av.6 °F (37 °C), Min:97.5 °F (36.4 °C), Max:99.9 °F (37.7 °C)      Intake and Output:  Current Shift: No intake/output data recorded. Last 3 Shifts:  1901 -  0700  In: 74149 [P.O.:260; I.V.:17698]  Out: 250 [Urine:250]    Intake/Output Summary (Last 24 hours) at 2022 1202  Last data filed at 2022 6892  Gross per 24 hour   Intake 1050 ml   Output --   Net 1050 ml          Physical Exam:     General: Lying in bed comfortably, no acute distress, pleasantly confused, sleepy  Eye: Reactive, symmetric  Throat and Neck: Supple  Lung: Reduced air entry bilaterally with prolonged exhalation but no wheezing. Occasional crackles. Heart: S1+S2. No murmurs  Abdomen: soft, non-tender.  Bowel sounds normal. No masses; obese  Extremities: No edema having pill-rolling tremor of both upper extremities  : Not done  Skin: No cyanosis  Neurologic: Sleepy and lethargic, pleasantly confused, grossly nonfocal  Psychiatric: Unable to perform due to patient's condition      Lab/Data Review:    Recent Results (from the past 24 hour(s))   ECHO ADULT COMPLETE    Collection Time: 08/31/22  3:15 PM   Result Value Ref Range    LV EDV A4C 71 mL    LV ESV A4C 39 mL    IVSd 1.1 0.6 - 1.0 cm    LVIDd 4.6 4.2 - 5.9 cm    LVIDs 2.8 cm    LVOT Diameter 1.8 cm    LVOT Mean Gradient 2 mmHg    LVOT VTI 21.0 cm    LVOT Peak Velocity 1.0 m/s    LVOT Peak Gradient 4 mmHg    LVPWd 1.0 0.6 - 1.0 cm    LV E' Lateral Velocity 18 cm/s    LV E' Septal Velocity 13 cm/s    LV Ejection Fraction A4C 45 %    LVOT Area 2.5 cm2    LVOT SV 53.4 ml    LA Major Axis 6.6 cm    LA Area 4C 17.9 cm2    AV Mean Gradient 4 mmHg    AV VTI 27.4 cm    AV Mean Velocity 0.9 m/s    AV Peak Velocity 1.3 m/s    AV Peak Gradient 7 mmHg    AV Area by VTI 2.0 cm2    AV Area by Peak Velocity 1.9 cm2    MR Peak Velocity 1.3 m/s    MR Peak Gradient 7 mmHg    MV A Velocity 0.69 m/s    MV E Velocity 1.17 m/s    PV Max Velocity 0.8 m/s    PV Peak Gradient 3 mmHg    TAPSE 2.9 1.7 cm    TR Max Velocity 1.39 m/s    TR Peak Gradient 8 mmHg    Fractional Shortening 2D 39 28 - 44 %    LV ESV Index A4C 19 mL/m2    LV EDV Index A4C 35 mL/m2    LVIDd Index 2.24 cm/m2    LVIDs Index 1.37 cm/m2    LV RWT Ratio 0.43     LV Mass 2D 169.9 88 - 224 g    LV Mass 2D Index 82.9 49 - 115 g/m2    MV E/A 1.70     E/E' Ratio (Averaged) 7.75     E/E' Lateral 6.50     E/E' Septal 9.00     LVOT Stroke Volume Index 26.1 mL/m2    AV Velocity Ratio 0.77     LVOT:AV VTI Index 0.77     ZAY/BSA VTI 1.0 cm2/m2    ZAY/BSA Peak Velocity 0.9 cm2/m2    Est. RA Pressure 3 mmHg    RVSP 11 mmHg   CBC W/O DIFF    Collection Time: 09/01/22  3:37 AM   Result Value Ref Range    WBC 17.0 (H) 4.1 - 11.1 K/uL    RBC 3.47 (L) 4.10 - 5.70 M/uL    HGB 11.0 (L) 12.1 - 17.0 g/dL    HCT 34.2 (L) 36.6 - 50.3 %    MCV 98.6 80.0 - 99.0 FL    MCH 31.7 26.0 - 34.0 PG    MCHC 32.2 30.0 - 36.5 g/dL    RDW 13.8 11.5 - 14.5 %    PLATELET 197 894 - 042 K/uL    MPV 10.2 8.9 - 12.9 FL    NRBC 0.0 0.0  WBC    ABSOLUTE NRBC 0.00 0.00 - 3.77 K/uL   METABOLIC PANEL, BASIC    Collection Time: 09/01/22  3:37 AM   Result Value Ref Range    Sodium 141 136 - 145 mmol/L    Potassium 4.6 3.5 - 5.1 mmol/L    Chloride 112 (H) 97 - 108 mmol/L    CO2 27 21 - 32 mmol/L    Anion gap 2 (L) 5 - 15 mmol/L    Glucose 132 (H) 65 - 100 mg/dL    BUN 24 (H) 6 - 20 mg/dL    Creatinine 1.20 0.70 - 1.30 mg/dL    BUN/Creatinine ratio 20 12 - 20      GFR est AA >60 >60 ml/min/1.73m2    GFR est non-AA 57 (L) >60 ml/min/1.73m2    Calcium 8.3 (L) 8.5 - 10.1 mg/dL   AMMONIA    Collection Time: 09/01/22 11:10 AM   Result Value Ref Range    Ammonia, plasma 26 <32 umol/L   TSH 3RD GENERATION    Collection Time: 09/01/22 11:10 AM   Result Value Ref Range    TSH 0.81 0.36 - 3.74 uIU/mL       CT HEAD WO CONT   Final Result   No acute intracranial process. Imaging findings consistent with mild chronic microvascular ischemic change. There is a moderate to severe degree of cerebral atrophy. XR CHEST PORT   Final Result   Persistent or recurrent bibasilar opacities. Two-view radiography   would be more sensitive and specific in this regard. CT Results  (Last 48 hours)      None              Assessment:     1. Acute respiratory failure with hypoxia  2. Bilateral pneumonia  3. Sepsis  4. Altered mental status  5. Acute metabolic acidosis  6. Leukocytosis  7.   Acute kidney injury    Plan:     Patient admitted to the hospital  Will be watched here closely    On room air  Will use supplemental oxygen as needed to keep saturation above 92%    Admitted with sepsis  Pressure improved without vasopressors however patient is still having elevated lactic acid and now with worsening metabolic acidosis  Will repeat CMP in a.m.   Gentle IV fluid hydration to continue  Continue broad-spectrum antibiotics Zosyn Levaquin  Culture sent  Further changes in antibiotics based on clinical response and culture results  Will follow chest x-ray for pneumonia    Altered mental status  Likely due to combination of infection along with age  Monitor clinically  CT scan negative    Monitor renal function with fluid changes  Check electrolytes and replace as needed    DVT and GI prophylaxis    Patient is full code, if declines may require transfer to ICU  Would recommend discussion of CODE STATUS and goals of care with patient's family    Questions of patient were answered at bedside in detail  Case discussed in detail with RN, RT, and care team  Thank you for involving me in the care of this patient  I will follow with you closely during hospitalization    Time spent more than 30 minutes in direct patient care with no overlap    Ankita Moya MD  [facility]  9/1/2022  5:04 PM

## 2022-09-01 NOTE — PROGRESS NOTES
Problem: Falls - Risk of  Goal: *Absence of Falls  Description: Document [place] Flow Fall Risk and appropriate interventions in the flowsheet. Outcome: Progressing Towards Goal  Note: Fall Risk Interventions:  Mobility Interventions: Bed/chair exit alarm    Mentation Interventions: Bed/chair exit alarm    Medication Interventions: Bed/chair exit alarm    Elimination Interventions: Bed/chair exit alarm, Call light in reach    History of Falls Interventions: Bed/chair exit alarm         Problem: Patient Education: Go to Patient Education Activity  Goal: Patient/Family Education  Outcome: Progressing Towards Goal     Problem: Patient Education: Go to Patient Education Activity  Goal: Patient/Family Education  Outcome: Progressing Towards Goal     Problem: Discharge Planning  Goal: *Discharge to safe environment  Outcome: Progressing Towards Goal  Goal: *Knowledge of medication management  Outcome: Progressing Towards Goal  Goal: *Knowledge of discharge instructions  Outcome: Progressing Towards Goal     Problem: Patient Education: Go to Patient Education Activity  Goal: Patient/Family Education  Outcome: Progressing Towards Goal     Problem: Pressure Injury - Risk of  Goal: *Prevention of pressure injury  Description: Document Jose Alberto Scale and appropriate interventions in the flowsheet. Outcome: Progressing Towards Goal  Note: Pressure Injury Interventions:  Sensory Interventions: Float heels, Keep linens dry and wrinkle-free, Minimize linen layers    Moisture Interventions: Absorbent underpads, Apply protective barrier, creams and emollients, Check for incontinence Q2 hours and as needed, Internal/External urinary devices, Minimize layers, Moisture barrier    Activity Interventions: Increase time out of bed, PT/OT evaluation    Mobility Interventions: Float heels, HOB 30 degrees or less, PT/OT evaluation, Turn and reposition approx.  every two hours(pillow and wedges)    Nutrition Interventions: Document food/fluid/supplement intake, Offer support with meals,snacks and hydration    Friction and Shear Interventions: HOB 30 degrees or less, Minimize layers                Problem: Patient Education: Go to Patient Education Activity  Goal: Patient/Family Education  Outcome: Progressing Towards Goal

## 2022-09-01 NOTE — PROGRESS NOTES
Attempted bedside swallow evaluation. Patient is drowsy briefly opens eyes but unable to maintain alertness for evaluation. PCT at bedside, educated only to feed if patient is alert. Will cont to follow.

## 2022-09-02 LAB
ANION GAP SERPL CALC-SCNC: 4 MMOL/L (ref 5–15)
BUN SERPL-MCNC: 21 MG/DL (ref 6–20)
BUN/CREAT SERPL: 18 (ref 12–20)
CA-I BLD-MCNC: 8.2 MG/DL (ref 8.5–10.1)
CHLORIDE SERPL-SCNC: 112 MMOL/L (ref 97–108)
CO2 SERPL-SCNC: 27 MMOL/L (ref 21–32)
CREAT SERPL-MCNC: 1.15 MG/DL (ref 0.7–1.3)
ERYTHROCYTE [DISTWIDTH] IN BLOOD BY AUTOMATED COUNT: 13.5 % (ref 11.5–14.5)
GLUCOSE SERPL-MCNC: 109 MG/DL (ref 65–100)
HCT VFR BLD AUTO: 32.7 % (ref 36.6–50.3)
HGB BLD-MCNC: 10.7 G/DL (ref 12.1–17)
MCH RBC QN AUTO: 32.1 PG (ref 26–34)
MCHC RBC AUTO-ENTMCNC: 32.7 G/DL (ref 30–36.5)
MCV RBC AUTO: 98.2 FL (ref 80–99)
NRBC # BLD: 0 K/UL (ref 0–0.01)
NRBC BLD-RTO: 0 PER 100 WBC
PLATELET # BLD AUTO: 152 K/UL (ref 150–400)
PMV BLD AUTO: 12.1 FL (ref 8.9–12.9)
POTASSIUM SERPL-SCNC: 4.3 MMOL/L (ref 3.5–5.1)
RBC # BLD AUTO: 3.33 M/UL (ref 4.1–5.7)
SODIUM SERPL-SCNC: 143 MMOL/L (ref 136–145)
WBC # BLD AUTO: 14.1 K/UL (ref 4.1–11.1)

## 2022-09-02 PROCEDURE — 74011250636 HC RX REV CODE- 250/636: Performed by: EMERGENCY MEDICINE

## 2022-09-02 PROCEDURE — 92610 EVALUATE SWALLOWING FUNCTION: CPT

## 2022-09-02 PROCEDURE — 74011000250 HC RX REV CODE- 250: Performed by: INTERNAL MEDICINE

## 2022-09-02 PROCEDURE — 36415 COLL VENOUS BLD VENIPUNCTURE: CPT

## 2022-09-02 PROCEDURE — 94761 N-INVAS EAR/PLS OXIMETRY MLT: CPT

## 2022-09-02 PROCEDURE — 97110 THERAPEUTIC EXERCISES: CPT

## 2022-09-02 PROCEDURE — 94640 AIRWAY INHALATION TREATMENT: CPT

## 2022-09-02 PROCEDURE — 85027 COMPLETE CBC AUTOMATED: CPT

## 2022-09-02 PROCEDURE — 74011000250 HC RX REV CODE- 250: Performed by: HOSPITALIST

## 2022-09-02 PROCEDURE — 65270000029 HC RM PRIVATE

## 2022-09-02 PROCEDURE — 74011250637 HC RX REV CODE- 250/637: Performed by: HOSPITALIST

## 2022-09-02 PROCEDURE — 74011250636 HC RX REV CODE- 250/636: Performed by: INTERNAL MEDICINE

## 2022-09-02 PROCEDURE — 74011000258 HC RX REV CODE- 258: Performed by: EMERGENCY MEDICINE

## 2022-09-02 PROCEDURE — 80048 BASIC METABOLIC PNL TOTAL CA: CPT

## 2022-09-02 PROCEDURE — 97116 GAIT TRAINING THERAPY: CPT

## 2022-09-02 RX ORDER — LEVOFLOXACIN 5 MG/ML
750 INJECTION, SOLUTION INTRAVENOUS EVERY 24 HOURS
Status: DISCONTINUED | OUTPATIENT
Start: 2022-09-03 | End: 2022-09-04 | Stop reason: HOSPADM

## 2022-09-02 RX ADMIN — OFLOXACIN 1 DROP: 3 SOLUTION/ DROPS OPHTHALMIC at 17:20

## 2022-09-02 RX ADMIN — SODIUM CHLORIDE, PRESERVATIVE FREE 10 ML: 5 INJECTION INTRAVENOUS at 13:36

## 2022-09-02 RX ADMIN — METOPROLOL SUCCINATE 25 MG: 50 TABLET, EXTENDED RELEASE ORAL at 09:56

## 2022-09-02 RX ADMIN — PREDNISOLONE ACETATE 1 DROP: 10 SUSPENSION/ DROPS OPHTHALMIC at 09:58

## 2022-09-02 RX ADMIN — PREDNISOLONE ACETATE 1 DROP: 10 SUSPENSION/ DROPS OPHTHALMIC at 17:20

## 2022-09-02 RX ADMIN — SODIUM CHLORIDE, PRESERVATIVE FREE 10 ML: 5 INJECTION INTRAVENOUS at 22:03

## 2022-09-02 RX ADMIN — SODIUM CHLORIDE 75 ML/HR: 9 INJECTION, SOLUTION INTRAVENOUS at 05:21

## 2022-09-02 RX ADMIN — PIPERACILLIN SODIUM AND TAZOBACTAM SODIUM 3.38 G: 3; .375 INJECTION, POWDER, LYOPHILIZED, FOR SOLUTION INTRAVENOUS at 07:39

## 2022-09-02 RX ADMIN — ATORVASTATIN CALCIUM 80 MG: 40 TABLET, FILM COATED ORAL at 21:08

## 2022-09-02 RX ADMIN — LEVOFLOXACIN 750 MG: 5 INJECTION, SOLUTION INTRAVENOUS at 14:47

## 2022-09-02 RX ADMIN — PIPERACILLIN SODIUM AND TAZOBACTAM SODIUM 3.38 G: 3; .375 INJECTION, POWDER, LYOPHILIZED, FOR SOLUTION INTRAVENOUS at 22:03

## 2022-09-02 RX ADMIN — PIPERACILLIN SODIUM AND TAZOBACTAM SODIUM 3.38 G: 3; .375 INJECTION, POWDER, LYOPHILIZED, FOR SOLUTION INTRAVENOUS at 16:35

## 2022-09-02 RX ADMIN — IPRATROPIUM BROMIDE AND ALBUTEROL SULFATE 3 ML: 2.5; .5 SOLUTION RESPIRATORY (INHALATION) at 02:35

## 2022-09-02 RX ADMIN — MELATONIN TAB 5 MG 5 MG: 5 TAB at 21:57

## 2022-09-02 RX ADMIN — PREDNISOLONE ACETATE 1 DROP: 10 SUSPENSION/ DROPS OPHTHALMIC at 21:09

## 2022-09-02 RX ADMIN — OFLOXACIN 1 DROP: 3 SOLUTION/ DROPS OPHTHALMIC at 09:58

## 2022-09-02 RX ADMIN — AMIODARONE HYDROCHLORIDE 100 MG: 200 TABLET ORAL at 09:55

## 2022-09-02 RX ADMIN — OFLOXACIN 1 DROP: 3 SOLUTION/ DROPS OPHTHALMIC at 13:31

## 2022-09-02 RX ADMIN — OFLOXACIN 1 DROP: 3 SOLUTION/ DROPS OPHTHALMIC at 21:09

## 2022-09-02 RX ADMIN — PREDNISOLONE ACETATE 1 DROP: 10 SUSPENSION/ DROPS OPHTHALMIC at 13:31

## 2022-09-02 NOTE — PROGRESS NOTES
Alert and oriented x 1 educated to use the call bell to call for assistance to prevent falls and for pain management.   No evidence of learning noted

## 2022-09-02 NOTE — PROGRESS NOTES
Hospitalist Progress Note    Subjective:   Daily Progress Note: 9/2/2022 8:29 AM    Patient was alert to self, place, and time. According to aide at bedside this is much more alert than he usually is. Patient did have fluctuating mentation and was unable to recall his daughter's name.     Current Facility-Administered Medications   Medication Dose Route Frequency    0.9% sodium chloride infusion  75 mL/hr IntraVENous CONTINUOUS    albuterol-ipratropium (DUO-NEB) 2.5 MG-0.5 MG/3 ML  3 mL Nebulization Q4H PRN    prednisoLONE acetate (PRED FORTE) 1 % ophthalmic suspension 1 Drop (Patient Supplied)  1 Drop Right Eye QID    ofloxacin (FLOXIN) 0.3 % ophthalmic solution 1 Drop (Patient Supplied)  1 Drop Right Eye QID    piperacillin-tazobactam (ZOSYN) 3.375 g in 0.9% sodium chloride (MBP/ADV) 100 mL MBP  3.375 g IntraVENous Q8H    sodium chloride (NS) flush 5-10 mL  5-10 mL IntraVENous PRN    levoFLOXacin (LEVAQUIN) 750 mg in D5W IVPB  750 mg IntraVENous Q48H    amiodarone (CORDARONE) tablet 100 mg  100 mg Oral DAILY    atorvastatin (LIPITOR) tablet 80 mg  80 mg Oral QHS    melatonin tablet 5 mg  5 mg Oral QHS PRN    metoprolol succinate (TOPROL-XL) XL tablet 25 mg  25 mg Oral DAILY    sodium chloride (NS) flush 5-40 mL  5-40 mL IntraVENous Q8H    sodium chloride (NS) flush 5-40 mL  5-40 mL IntraVENous PRN    acetaminophen (TYLENOL) tablet 650 mg  650 mg Oral Q6H PRN    Or    acetaminophen (TYLENOL) suppository 650 mg  650 mg Rectal Q6H PRN    polyethylene glycol (MIRALAX) packet 17 g  17 g Oral DAILY PRN    ondansetron (ZOFRAN ODT) tablet 4 mg  4 mg Oral Q8H PRN    Or    ondansetron (ZOFRAN) injection 4 mg  4 mg IntraVENous Q6H PRN    haloperidoL (HALDOL) tablet 5 mg  5 mg Oral TID PRN        Review of Systems  Review of Systems   Unable to perform ROS: Dementia          Objective:     Visit Vitals  BP (!) 153/60 (BP 1 Location: Right upper arm, BP Patient Position: At rest)   Pulse 63   Temp 98.6 °F (37 °C)   Resp 18 Ht 5' 8\" (1.727 m)   Wt 91.5 kg (201 lb 11.5 oz)   SpO2 97%   BMI 30.67 kg/m²    O2 Flow Rate (L/min): 2 l/min O2 Device: None (Room air)    Temp (24hrs), Av.8 °F (37.1 °C), Min:98.6 °F (37 °C), Max:99.2 °F (37.3 °C)      701 - 1900  In: 100 [I.V.:100]  Out: -   1901 - 700  In: 9029 [P.O.:620; I.V.:2877]  Out: -     XR CHEST PORT   Final Result   Mild basilar airspace disease: atelectasis or edema favored over pneumonia. Probable cardiomegaly. CT HEAD WO CONT   Final Result   No acute intracranial process. Imaging findings consistent with mild chronic microvascular ischemic change. There is a moderate to severe degree of cerebral atrophy. XR CHEST PORT   Final Result   Persistent or recurrent bibasilar opacities. Two-view radiography   would be more sensitive and specific in this regard. PHYSICAL EXAM:    Physical Exam  Vitals and nursing note reviewed. Constitutional:       Appearance: He is obese. HENT:      Head: Normocephalic and atraumatic. Cardiovascular:      Rate and Rhythm: Normal rate and regular rhythm. Pulmonary:      Effort: No respiratory distress. Breath sounds: Wheezing present. Comments: Occasional expiratory wheezing  Abdominal:      General: Bowel sounds are normal. There is no distension. Palpations: Abdomen is soft. Tenderness: There is no abdominal tenderness. Genitourinary:     Comments: External spaulding in place  Musculoskeletal:      Right lower leg: No edema. Left lower leg: No edema. Skin:     General: Skin is warm. Capillary Refill: Capillary refill takes less than 2 seconds. Neurological:      Mental Status: He is alert and oriented to person, place, and time.       Comments: Fluctuating mentation        Data Review    Recent Results (from the past 24 hour(s))   AMMONIA    Collection Time: 22 11:10 AM   Result Value Ref Range    Ammonia, plasma 26 <32 umol/L   PROLACTIN Collection Time: 09/01/22 11:10 AM   Result Value Ref Range    Prolactin 17.1 ng/mL   TSH 3RD GENERATION    Collection Time: 09/01/22 11:10 AM   Result Value Ref Range    TSH 0.81 0.36 - 3.74 uIU/mL   LIPID PANEL    Collection Time: 09/01/22 11:10 AM   Result Value Ref Range    LIPID PROFILE        Cholesterol, total 74 <200 mg/dL    Triglyceride 72 <150 mg/dL    HDL Cholesterol 36 mg/dL    LDL, calculated 23.6 0 - 100 mg/dL    VLDL, calculated 14.4 mg/dL    CHOL/HDL Ratio 2.1 0.0 - 5.0     CBC W/O DIFF    Collection Time: 09/02/22  5:17 AM   Result Value Ref Range    WBC 14.1 (H) 4.1 - 11.1 K/uL    RBC 3.33 (L) 4.10 - 5.70 M/uL    HGB 10.7 (L) 12.1 - 17.0 g/dL    HCT 32.7 (L) 36.6 - 50.3 %    MCV 98.2 80.0 - 99.0 FL    MCH 32.1 26.0 - 34.0 PG    MCHC 32.7 30.0 - 36.5 g/dL    RDW 13.5 11.5 - 14.5 %    PLATELET 112 960 - 601 K/uL    MPV 12.1 8.9 - 12.9 FL    NRBC 0.0 0.0  WBC    ABSOLUTE NRBC 0.00 0.00 - 9.01 K/uL   METABOLIC PANEL, BASIC    Collection Time: 09/02/22  5:17 AM   Result Value Ref Range    Sodium 143 136 - 145 mmol/L    Potassium 4.3 3.5 - 5.1 mmol/L    Chloride 112 (H) 97 - 108 mmol/L    CO2 27 21 - 32 mmol/L    Anion gap 4 (L) 5 - 15 mmol/L    Glucose 109 (H) 65 - 100 mg/dL    BUN 21 (H) 6 - 20 mg/dL    Creatinine 1.15 0.70 - 1.30 mg/dL    BUN/Creatinine ratio 18 12 - 20      GFR est AA >60 >60 ml/min/1.73m2    GFR est non-AA >60 >60 ml/min/1.73m2    Calcium 8.2 (L) 8.5 - 10.1 mg/dL        Assessment/Plan:     Active Problems:    Sepsis (HCC) (8/29/2022)        Hospital Course:    Chrystal Cuenca is a 81year old male with a PMH of hemorrhagic stroke x2, parkinsons disease, SSS and leukemia who presented with confusion and loose stools. In ED hypotensive and tachypneic. Initial labs significant for WBC of 14.3, hemoglobin of 11.2, creatinine of 1.72, lactic of 6, glucose of 651, and metabolic acidosis, UA unremarkable. CXR with persistent or recurrent bibasilar opacities.  CT of the head with no acute intracranial process. Patient mated for further work-up. Patient started on IV fluid and given dose of pressor. Neurology and pulmonology consulted. Echo showed EF of 55 to 60% with hypokinesis of the mid anterior septal and apical septal segments and normal diastolic function. Neurology consulted for encephalopathy from already diminished baseline. Lipids unremarkable. TSH normal. EEG pending. PT/OT recommending SNF. Acute metabolic encephalopathy  Seizure precautions  EEG pending  Neurology following    Acute respiratory failure with hypoxia  Currently on 2 L n/c, wean as tolerated  DuoNebs as needed  Pulmonology following    Sepsis POA  Bilateral community acquired pneumonia  Inflammatory markers improving  Continue on zosyn and levaquin  DuoNebs PRN  CXR with persistent atelectasis, pulmonary toileting encouraged  8/29 blood: No growth to date, preliminary    Sick Sinus Syndrome  S/p PPM  Continue on metoprolol and amiodarone    ERICKSON - resolved  Continue on IV fluids    Dysphagia  SLP recommending easy to chew/mildly thick liquids    DVT Prophylaxis: h/o SAH, SCDs  GI Prophylaxis: tolerating po diet  Discharge and disposition barriers: neurology clearance, 24 - 48hr    Rashida Wang (Daughter) [phone] - did not answer    Care Plan discussed with: Patient/Family, Nurse, and     Total time spent with patient: 35 minutes.

## 2022-09-02 NOTE — PROCEDURES
[facility]  EEG    Name:  Jackie Posey  MR#:  [id number]  :  1935  ACCOUNT #:  [de-identified]  DATE OF SERVICE:  2022    DIAGNOSIS:  Mental status changes. DESCRIPTION:  Recording is done digitally on computer. Electrodes are placed in a 10-20 international system. Initial coordinates are equipment calibration followed by biocalibration. Initial montages are bipolar montages. TECHNIQUE:  Tracings started with the patient drowsy. As the recording continues, the patient is hooked up to an EKG machine that shows a heart rate of 84. Tracings continue with the patient being exposed to photic stimulation without any abnormality. Hyperventilation is not recorded. IMPRESSION:  This is an EEG showing the patient drowsy.       Tan Dhillon MD      TT/ANA [email or web address]/ANA [email or web address]  D:  2022 11:19  T:  2022 12:37  JOB #:  [id number]
PROVIDER:[TOKEN:[[id number]],FOLLOWUP:[1 week]]

## 2022-09-02 NOTE — PROGRESS NOTES
Pulmonary and Critical Care progress note    Subjective:   Consult Note: 9/2/2022 @no control      Chief Complaint:   Chief Complaint   Patient presents with    Altered mental status    Hypotension        This patient has been seen and evaluated at the request of Dr. Liana Walters    Patient seen and examined  Overnight events noted    Much more awake and alert today  Answering simple questions appropriately  On nasal cannula oxygen  No acute distress    Review of Systems:  Review of systems not obtained due to patient factors.        Current Facility-Administered Medications   Medication Dose Route Frequency Provider Last Rate Last Admin    0.9% sodium chloride infusion  75 mL/hr IntraVENous CONTINUOUS Matthew Hernandez MD 75 mL/hr at 09/02/22 0521 75 mL/hr at 09/02/22 0521    albuterol-ipratropium (DUO-NEB) 2.5 MG-0.5 MG/3 ML  3 mL Nebulization Q4H PRN Matthew Hernandez MD   3 mL at 09/02/22 0235    prednisoLONE acetate (PRED FORTE) 1 % ophthalmic suspension 1 Drop (Patient Supplied)  1 Drop Right Eye QID Matthew Hernandez MD   1 Drop at 09/02/22 0958    ofloxacin (FLOXIN) 0.3 % ophthalmic solution 1 Drop (Patient Supplied)  1 Drop Right Eye QID Matthew Hernandez MD   1 Drop at 09/02/22 0958    piperacillin-tazobactam (ZOSYN) 3.375 g in 0.9% sodium chloride (MBP/ADV) 100 mL MBP  3.375 g IntraVENous Q8H Gene Lewis MD 25 mL/hr at 09/02/22 0739 3.375 g at 09/02/22 0739    sodium chloride (NS) flush 5-10 mL  5-10 mL IntraVENous PRN Gene Lewis MD        levoFLOXacin [facility]) 750 mg in D5W IVPB  750 mg IntraVENous Q48H Gene Lewis  mL/hr at 08/31/22 1538 750 mg at 08/31/22 1538    amiodarone (CORDARONE) tablet 100 mg  100 mg Oral DAILY Ely Henderson MD   100 mg at 09/02/22 0955    atorvastatin (LIPITOR) tablet 80 mg  80 mg Oral QHS Ely Henderson MD   80 mg at 09/01/22 2215    melatonin tablet 5 mg  5 mg Oral QHS PRN Ely Henderson MD   5 mg at 08/31/22 2223    metoprolol succinate (TOPROL-XL) XL tablet 25 mg  25 mg Oral DAILY Angel Ryan MD   25 mg at 22 0956    sodium chloride (NS) flush 5-40 mL  5-40 mL IntraVENous Q8H Angel Ryan MD   10 mL at 22 1314    sodium chloride (NS) flush 5-40 mL  5-40 mL IntraVENous PRN Angel Ryan MD        acetaminophen (TYLENOL) tablet 650 mg  650 mg Oral Q6H PRN Angel Ryan MD        Or    acetaminophen (TYLENOL) suppository 650 mg  650 mg Rectal Q6H PRN Angel Ryan MD        polyethylene glycol (MIRALAX) packet 17 g  17 g Oral DAILY PRN Angel Ryan MD        ondansetron (ZOFRAN ODT) tablet 4 mg  4 mg Oral Q8H PRN Angel Ryan MD        Or    ondansetron [facility]) injection 4 mg  4 mg IntraVENous Q6H PRN Angel Ryan MD        haloperidoL (HALDOL) tablet 5 mg  5 mg Oral TID PRN Angel Ryan MD   5 mg at 22 2223          Allergies   Allergen Reactions    Monosodium Glutamate Anaphylaxis and Hives    Adhesive Tape-Silicones Other (comments)     BLISTERS           Objective:     Blood pressure (!) 153/60, pulse 63, temperature 98.6 °F (37 °C), resp. rate 18, height 5' 8\" (1.727 m), weight 91.5 kg (201 lb 11.5 oz), SpO2 97 %. Temp (24hrs), Av.8 °F (37.1 °C), Min:98.6 °F (37 °C), Max:99.2 °F (37.3 °C)      Intake and Output:  Current Shift:  07 -  1900  In: 280 [P.O.:180; I.V.:100]  Out: 150 [Urine:150]  Last 3 Shifts: 1901 -  0700  In: 4873 [P.O.:620; I.V.:2877]  Out: -     Intake/Output Summary (Last 24 hours) at 2022 1054  Last data filed at 2022 1607  Gross per 24 hour   Intake 2727 ml   Output 150 ml   Net 2577 ml          Physical Exam:     General: Lying in bed comfortably, no acute distress, pleasantly confused, sleepy  Eye: Reactive, symmetric  Throat and Neck: Supple  Lung: Reduced air entry bilaterally with prolonged exhalation but no wheezing. Occasional crackles. Heart: S1+S2. No murmurs  Abdomen: soft, non-tender.  Bowel sounds normal. No masses; obese  Extremities: No edema having pill-rolling tremor of both upper extremities  : Not done  Skin: No cyanosis  Neurologic: Sleepy and lethargic, pleasantly confused, grossly nonfocal  Psychiatric: Unable to perform due to patient's condition      Lab/Data Review:    Recent Results (from the past 24 hour(s))   AMMONIA    Collection Time: 09/01/22 11:10 AM   Result Value Ref Range    Ammonia, plasma 26 <32 umol/L   PROLACTIN    Collection Time: 09/01/22 11:10 AM   Result Value Ref Range    Prolactin 17.1 ng/mL   TSH 3RD GENERATION    Collection Time: 09/01/22 11:10 AM   Result Value Ref Range    TSH 0.81 0.36 - 3.74 uIU/mL   LIPID PANEL    Collection Time: 09/01/22 11:10 AM   Result Value Ref Range    LIPID PROFILE        Cholesterol, total 74 <200 mg/dL    Triglyceride 72 <150 mg/dL    HDL Cholesterol 36 mg/dL    LDL, calculated 23.6 0 - 100 mg/dL    VLDL, calculated 14.4 mg/dL    CHOL/HDL Ratio 2.1 0.0 - 5.0     CBC W/O DIFF    Collection Time: 09/02/22  5:17 AM   Result Value Ref Range    WBC 14.1 (H) 4.1 - 11.1 K/uL    RBC 3.33 (L) 4.10 - 5.70 M/uL    HGB 10.7 (L) 12.1 - 17.0 g/dL    HCT 32.7 (L) 36.6 - 50.3 %    MCV 98.2 80.0 - 99.0 FL    MCH 32.1 26.0 - 34.0 PG    MCHC 32.7 30.0 - 36.5 g/dL    RDW 13.5 11.5 - 14.5 %    PLATELET 721 110 - 206 K/uL    MPV 12.1 8.9 - 12.9 FL    NRBC 0.0 0.0  WBC    ABSOLUTE NRBC 0.00 0.00 - 3.45 K/uL   METABOLIC PANEL, BASIC    Collection Time: 09/02/22  5:17 AM   Result Value Ref Range    Sodium 143 136 - 145 mmol/L    Potassium 4.3 3.5 - 5.1 mmol/L    Chloride 112 (H) 97 - 108 mmol/L    CO2 27 21 - 32 mmol/L    Anion gap 4 (L) 5 - 15 mmol/L    Glucose 109 (H) 65 - 100 mg/dL    BUN 21 (H) 6 - 20 mg/dL    Creatinine 1.15 0.70 - 1.30 mg/dL    BUN/Creatinine ratio 18 12 - 20      GFR est AA >60 >60 ml/min/1.73m2    GFR est non-AA >60 >60 ml/min/1.73m2    Calcium 8.2 (L) 8.5 - 10.1 mg/dL       XR CHEST PORT   Final Result   Mild basilar airspace disease: atelectasis or edema favored over pneumonia. Probable cardiomegaly. CT HEAD WO CONT   Final Result   No acute intracranial process. Imaging findings consistent with mild chronic microvascular ischemic change. There is a moderate to severe degree of cerebral atrophy. XR CHEST PORT   Final Result   Persistent or recurrent bibasilar opacities. Two-view radiography   would be more sensitive and specific in this regard. CT Results  (Last 48 hours)      None              Assessment:     1. Acute respiratory failure with hypoxia  2. Bilateral pneumonia  3. Sepsis  4. Altered mental status  5. Acute metabolic acidosis  6. Leukocytosis  7. Acute kidney injury    Plan:     Patient admitted to the hospital  Will be watched here closely    On 2 L nasal cannula oxygen  Will use supplemental oxygen as needed to keep saturation above 92%    Admitted with sepsis  Pressure improved without vasopressors however patient is still having elevated lactic acid and now with worsening metabolic acidosis  Will repeat CMP in a.m.   Gentle IV fluid hydration to continue  Continue broad-spectrum antibiotics Zosyn Levaquin  Culture sent  Further changes in antibiotics based on clinical response and culture results  Will follow chest x-ray for pneumonia    Altered mental status  Likely due to combination of infection along with age  Monitor clinically  CT scan negative   more awake and alert today    Monitor renal function with fluid changes  Check electrolytes and replace as needed    DVT and GI prophylaxis    Patient is full code, if declines may require transfer to ICU  Would recommend discussion of CODE STATUS and goals of care with patient's family    Questions of patient were answered at bedside in detail  Case discussed in detail with RN, RT, and care team  Thank you for involving me in the care of this patient  I will follow with you closely during hospitalization    Time spent more than 30 minutes in direct patient care with no overlap    Juan Arteaga MD  [facility]  9/2/2022  5:04 PM

## 2022-09-02 NOTE — PROGRESS NOTES
PHYSICAL THERAPY TREATMENT  Patient: Guadalupe Apley (71 y.o. male)  Date: 9/2/2022  Diagnosis: Sepsis ([facility] [A41.9] <principal problem not specified>      Precautions:    Chart, physical therapy assessment, plan of care and goals were reviewed. ASSESSMENT  Patient continues with skilled PT services and is progressing towards goals. Patient supine in bed upon approach and agreed to therapy session today. Sitter present throughout entire session. Patient  was also on 2L/min via NC throughout entire session with starting O2 at 97-98%. Patient then was CGA for bed mobility, supine<>sit and SBA for scooting to EOB where patient demonstrated good unsupported sitting balance. Patient then performed STS to RW at [facility]  and ambulated twice about room ( 15 feet each ambulation, with short 1-2 minute rest break in between ambulations). After each ambulation patients O2 was 94-97%. During both ambulations patient demonstrated slightly unsteady giat with narrow base of support step through gait pattern and 1 minor LOB during directional turning( patient most unsteady during directional turns) and no knee buckling. Patient then sat EOB and performed seated TE ( see details below). Patient then returned to supine in bed at [facility] and then was able to self reposition without assistance. Patient then left supine in bed with call bell within reach and all needs meet with sitter present. Current Level of Function Impacting Discharge (mobility/balance): impaired balance ,general weakness, poor activity tolerance    Other factors to consider for discharge: PLOF, assistance at home, level of deficits, acute medical state         PLAN :  Patient continues to benefit from skilled intervention to address the above impairments. Continue treatment per established plan of care. to address goals.     Recommendation for discharge: (in order for the patient to meet his/her long term goals)  Pawel Robb discharge recommendation:  Has been made in collaboration with the attending provider and/or case management    IF patient discharges home will need the following DME: portable oxygen and rolling walker       SUBJECTIVE:   Patient stated I feel fine after walking.     OBJECTIVE DATA SUMMARY:   Critical Behavior:  Neurologic State: Alert  Orientation Level: Disoriented to time, Oriented to person, Oriented to place, Oriented to situation  Cognition: Follows commands     Functional Mobility Training:  Bed Mobility:  Rolling: Contact guard assistance  Supine to Sit: Contact guard assistance  Sit to Supine: Contact guard assistance  Scooting: Stand-by assistance  Transfers:  Sit to Stand: Minimum assistance;Assist x1  Stand to Sit: Minimum assistance;Assist x1  Balance:  Sitting: Intact; Without support  Standing: Pull to stand; Impaired; With support  Standing - Static: Fair;Constant support  Standing - Dynamic : Fair;Constant support  Ambulation/Gait Training:  Distance (ft): 30 Feet (ft)  Assistive Device: Gait belt;Walker, rolling  Ambulation - Level of Assistance: Minimal assistance;Assist x1    Therapeutic Exercises:   1x20 AP  1x20 LAQ  1x20 seated marches  Pain Rating:  No pain reported during session    Activity Tolerance:   Bed locked and in lowest position Good  Please refer to the flowsheet for vital signs taken during this treatment. After treatment patient left in no apparent distress:   Bed returned to lowest position, Supine in bed, Call bell within reach, and Side rails x 3    COMMUNICATION/COLLABORATION:   The patients plan of care was discussed with: Registered nurse.        Problem: Mobility Impaired (Adult and Pediatric)  Goal: *Acute Goals and Plan of Care (Insert Text)  Description: Pt will be able to participate with LE exercises with min verbal cuing for technique x 7 days without c/o SOB  SBA with supine to sit EOB x 7 days  Sit to stand with CGAx1 x7 days  Amb 25-50ft with RW and CGAx1 x7 days without c/o SOB     Pt stated goal: none stated, pt not able to state    Outcome: Progressing Towards Goal       Whit Dowell PTA   Time Calculation: 32 mins

## 2022-09-02 NOTE — PROGRESS NOTES
NEURO PROGRESS NOTE        SUBJECTIVE:   Mental status changes      EXAM:  Mental status significantly improved this morning  Awake responds verbally as opposed to yesterday  Knows where he lives  Follows commands  Tremulousness in arms decreased  No new focality      ASSESSMENT/PLAN:  Overall neurological status significantly improved from yesterday    ALLERGIES:    Allergies   Allergen Reactions    Monosodium Glutamate Anaphylaxis and Hives    Adhesive Tape-Silicones Other (comments)     BLISTERS       MEDS:      Current Facility-Administered Medications:     0.9% sodium chloride infusion, 75 mL/hr, IntraVENous, CONTINUOUS, Adrian Ziegler MD, Last Rate: 75 mL/hr at 09/02/22 0521, 75 mL/hr at 09/02/22 0521    albuterol-ipratropium (DUO-NEB) 2.5 MG-0.5 MG/3 ML, 3 mL, Nebulization, Q4H PRN, Yani Bennett MD, 3 mL at 09/02/22 0235    prednisoLONE acetate (PRED FORTE) 1 % ophthalmic suspension 1 Drop (Patient Supplied), 1 Drop, Right Eye, QID, Yani Bennett MD, 1 Drop at 09/02/22 0958    ofloxacin (FLOXIN) 0.3 % ophthalmic solution 1 Drop (Patient Supplied), 1 Drop, Right Eye, QID, Adrian Ziegler MD, 1 Drop at 09/02/22 0958    piperacillin-tazobactam (ZOSYN) 3.375 g in 0.9% sodium chloride (MBP/ADV) 100 mL MBP, 3.375 g, IntraVENous, Q8H, Didier Ayala MD, Last Rate: 25 mL/hr at 09/02/22 0739, 3.375 g at 09/02/22 0739    sodium chloride (NS) flush 5-10 mL, 5-10 mL, IntraVENous, PRN, Didier Ayala MD    levoFLOXacin (LEVAQUIN) 750 mg in D5W IVPB, 750 mg, IntraVENous, Q48H, Didier Ayala MD, Last Rate: 100 mL/hr at 08/31/22 1538, 750 mg at 08/31/22 1538    amiodarone (CORDARONE) tablet 100 mg, 100 mg, Oral, DAILY, Thad Lewis MD, 100 mg at 09/02/22 0955    atorvastatin (LIPITOR) tablet 80 mg, 80 mg, Oral, QHS, Thad Lewis MD, 80 mg at 09/01/22 2215    melatonin tablet 5 mg, 5 mg, Oral, QHS PRN, Thad Lewis MD, 5 mg at 08/31/22 2223    metoprolol succinate (TOPROL-XL) XL tablet 25 mg, 25 mg, Oral, DAILY, Thad Lewis MD, 25 mg at 09/02/22 0956    sodium chloride (NS) flush 5-40 mL, 5-40 mL, IntraVENous, Q8H, Didier Moreau MD, 10 mL at 09/01/22 1314    sodium chloride (NS) flush 5-40 mL, 5-40 mL, IntraVENous, PRN, Thad Lewis MD    acetaminophen (TYLENOL) tablet 650 mg, 650 mg, Oral, Q6H PRN **OR** acetaminophen (TYLENOL) suppository 650 mg, 650 mg, Rectal, Q6H PRN, Thad Lewis MD    polyethylene glycol (MIRALAX) packet 17 g, 17 g, Oral, DAILY PRN, Thad Lewis MD    ondansetron (ZOFRAN ODT) tablet 4 mg, 4 mg, Oral, Q8H PRN **OR** ondansetron (ZOFRAN) injection 4 mg, 4 mg, IntraVENous, Q6H PRN, Thad Lewis MD    haloperidoL (HALDOL) tablet 5 mg, 5 mg, Oral, TID PRN, Thad Lewis MD, 5 mg at 08/31/22 2223    LABS:  Recent Results (from the past 24 hour(s))   CBC W/O DIFF    Collection Time: 09/02/22  5:17 AM   Result Value Ref Range    WBC 14.1 (H) 4.1 - 11.1 K/uL    RBC 3.33 (L) 4.10 - 5.70 M/uL    HGB 10.7 (L) 12.1 - 17.0 g/dL    HCT 32.7 (L) 36.6 - 50.3 %    MCV 98.2 80.0 - 99.0 FL    MCH 32.1 26.0 - 34.0 PG    MCHC 32.7 30.0 - 36.5 g/dL    RDW 13.5 11.5 - 14.5 %    PLATELET 826 993 - 285 K/uL    MPV 12.1 8.9 - 12.9 FL    NRBC 0.0 0.0  WBC    ABSOLUTE NRBC 0.00 0.00 - 7.55 K/uL   METABOLIC PANEL, BASIC    Collection Time: 09/02/22  5:17 AM   Result Value Ref Range    Sodium 143 136 - 145 mmol/L    Potassium 4.3 3.5 - 5.1 mmol/L    Chloride 112 (H) 97 - 108 mmol/L    CO2 27 21 - 32 mmol/L    Anion gap 4 (L) 5 - 15 mmol/L    Glucose 109 (H) 65 - 100 mg/dL    BUN 21 (H) 6 - 20 mg/dL    Creatinine 1.15 0.70 - 1.30 mg/dL    BUN/Creatinine ratio 18 12 - 20      GFR est AA >60 >60 ml/min/1.73m2    GFR est non-AA >60 >60 ml/min/1.73m2    Calcium 8.2 (L) 8.5 - 10.1 mg/dL       Visit Vitals  BP (!) 153/60 (BP 1 Location: Right upper arm, BP Patient Position: At rest)   Pulse 63   Temp 98.6 °F (37 °C)   Resp 18   Ht 5' 8\" (1.727 m)   Wt 91.5 kg (201 lb 11.5 oz)   SpO2 97%   BMI 30.67 kg/m²       Imaging:  XR CHEST PORT   Final Result   Mild basilar airspace disease: atelectasis or edema favored over pneumonia. Probable cardiomegaly. CT HEAD WO CONT   Final Result   No acute intracranial process. Imaging findings consistent with mild chronic microvascular ischemic change. There is a moderate to severe degree of cerebral atrophy. XR CHEST PORT   Final Result   Persistent or recurrent bibasilar opacities. Two-view radiography   would be more sensitive and specific in this regard.        NEURO PROGRESS NOTE        SUBJECTIVE:         EXAM:        ASSESSMENT/PLAN:      ALLERGIES:    Allergies   Allergen Reactions    Monosodium Glutamate Anaphylaxis and Hives    Adhesive Tape-Silicones Other (comments)     BLISTERS       MEDS:      Current Facility-Administered Medications:     0.9% sodium chloride infusion, 75 mL/hr, IntraVENous, CONTINUOUS, Adrian Mensah MD, Last Rate: 75 mL/hr at 09/02/22 0521, 75 mL/hr at 09/02/22 0521    albuterol-ipratropium (DUO-NEB) 2.5 MG-0.5 MG/3 ML, 3 mL, Nebulization, Q4H PRN, Minnie Buchanan MD, 3 mL at 09/02/22 0235    prednisoLONE acetate (PRED FORTE) 1 % ophthalmic suspension 1 Drop (Patient Supplied), 1 Drop, Right Eye, QID, Minnie Buchanan MD, 1 Drop at 09/02/22 0958    ofloxacin (FLOXIN) 0.3 % ophthalmic solution 1 Drop (Patient Supplied), 1 Drop, Right Eye, QID, Adrian Mensah MD, 1 Drop at 09/02/22 0958    piperacillin-tazobactam (ZOSYN) 3.375 g in 0.9% sodium chloride (MBP/ADV) 100 mL MBP, 3.375 g, IntraVENous, Q8H, Mykel Bar MD, Last Rate: 25 mL/hr at 09/02/22 0739, 3.375 g at 09/02/22 0739    sodium chloride (NS) flush 5-10 mL, 5-10 mL, IntraVENous, PRN, Mykel Bar MD    levoFLOXacin (LEVAQUIN) 750 mg in D5W IVPB, 750 mg, IntraVENous, Q48H, Mykel Bar MD, Last Rate: 100 mL/hr at 08/31/22 1538, 750 mg at 08/31/22 1538 amiodarone (CORDARONE) tablet 100 mg, 100 mg, Oral, DAILY, Wendie Mansfield MD, 100 mg at 09/02/22 0955    atorvastatin (LIPITOR) tablet 80 mg, 80 mg, Oral, QHS, Wendie Mansfield MD, 80 mg at 09/01/22 2215    melatonin tablet 5 mg, 5 mg, Oral, QHS PRN, Wendie Mansfield MD, 5 mg at 08/31/22 2223    metoprolol succinate (TOPROL-XL) XL tablet 25 mg, 25 mg, Oral, DAILY, Wendie Mansfield MD, 25 mg at 09/02/22 0956    sodium chloride (NS) flush 5-40 mL, 5-40 mL, IntraVENous, Q8H, BhatyFauzia MD, 10 mL at 09/01/22 1314    sodium chloride (NS) flush 5-40 mL, 5-40 mL, IntraVENous, PRN, Wendie Mansfield MD    acetaminophen (TYLENOL) tablet 650 mg, 650 mg, Oral, Q6H PRN **OR** acetaminophen (TYLENOL) suppository 650 mg, 650 mg, Rectal, Q6H PRN, Wendie Mansfield MD    polyethylene glycol (MIRALAX) packet 17 g, 17 g, Oral, DAILY PRN, Wendie Mansfield MD    ondansetron (ZOFRAN ODT) tablet 4 mg, 4 mg, Oral, Q8H PRN **OR** ondansetron (ZOFRAN) injection 4 mg, 4 mg, IntraVENous, Q6H PRN, Wendie Mansfield MD    haloperidoL (HALDOL) tablet 5 mg, 5 mg, Oral, TID PRN, Wendie Mansfield MD, 5 mg at 08/31/22 2223    LABS:  Recent Results (from the past 24 hour(s))   CBC W/O DIFF    Collection Time: 09/02/22  5:17 AM   Result Value Ref Range    WBC 14.1 (H) 4.1 - 11.1 K/uL    RBC 3.33 (L) 4.10 - 5.70 M/uL    HGB 10.7 (L) 12.1 - 17.0 g/dL    HCT 32.7 (L) 36.6 - 50.3 %    MCV 98.2 80.0 - 99.0 FL    MCH 32.1 26.0 - 34.0 PG    MCHC 32.7 30.0 - 36.5 g/dL    RDW 13.5 11.5 - 14.5 %    PLATELET 438 322 - 481 K/uL    MPV 12.1 8.9 - 12.9 FL    NRBC 0.0 0.0  WBC    ABSOLUTE NRBC 0.00 0.00 - 6.92 K/uL   METABOLIC PANEL, BASIC    Collection Time: 09/02/22  5:17 AM   Result Value Ref Range    Sodium 143 136 - 145 mmol/L    Potassium 4.3 3.5 - 5.1 mmol/L    Chloride 112 (H) 97 - 108 mmol/L    CO2 27 21 - 32 mmol/L    Anion gap 4 (L) 5 - 15 mmol/L    Glucose 109 (H) 65 - 100 mg/dL    BUN 21 (H) 6 - 20 mg/dL    Creatinine 1. 15 0.70 - 1.30 mg/dL    BUN/Creatinine ratio 18 12 - 20      GFR est AA >60 >60 ml/min/1.73m2    GFR est non-AA >60 >60 ml/min/1.73m2    Calcium 8.2 (L) 8.5 - 10.1 mg/dL       Visit Vitals  BP (!) 153/60 (BP 1 Location: Right upper arm, BP Patient Position: At rest)   Pulse 63   Temp 98.6 °F (37 °C)   Resp 18   Ht 5' 8\" (1.727 m)   Wt 91.5 kg (201 lb 11.5 oz)   SpO2 97%   BMI 30.67 kg/m²       Imaging:  XR CHEST PORT   Final Result   Mild basilar airspace disease: atelectasis or edema favored over pneumonia. Probable cardiomegaly. CT HEAD WO CONT   Final Result   No acute intracranial process. Imaging findings consistent with mild chronic microvascular ischemic change. There is a moderate to severe degree of cerebral atrophy. XR CHEST PORT   Final Result   Persistent or recurrent bibasilar opacities. Two-view radiography   would be more sensitive and specific in this regard.

## 2022-09-02 NOTE — PROGRESS NOTES
Problem: Dysphagia (Adult)  Goal: *Acute Goals and Plan of Care (Insert Text)  Description: Speech Therapy Goals  Initiated 9/2/2022  -Patient stated goal: Pt agreeable with SLP recs at this time  -Patient will tolerate easy to chew diet with mildly/nectar thick liquids without signs/symptoms of aspiration given no cues within 7 day(s). [ ] Not met  [ ]  MET   [ ] Progressing  [ ] Miguel Chand  -Patient will demonstrate understanding of swallow safety precautions and aspiration precautions, diet recs with minimal cues within 7 day(s). [ ] Not met  [ ]  MET   [ ] Progressing  [ ] Discontinue  Outcome: Not Met   SPEECH [address] EVALUATION  Patient: Ivanna Castillo (59 y.o. male)  Date: 9/2/2022  Primary Diagnosis: Sepsis ([facility] [address] [A41.9]       Precautions: aspiration, 1:1        ASSESSMENT :  Based on the objective data described below, the patient presents with mild oropharyngeal dysphagia. Pt seen for bedside sw evaluation. Pt known to clinician from recent acute admission. Pt admitted with sepsis, AMS. Hx includes hemorrhagic CVA x 2, s/p left frontal craniotomy, [place] with right hearing aid present. Refer to initial assessment by this clinician dated 6- for further details re: hx.  Most recent MBS 6- results noted below. Oral motor function WNL. Some missing teeth. Vocal quality with mild hoarseness. UE tremulousness noted. PCT reports significant coughing with thin liquids, more with warm vs cold liquids. Pt given trials of thin via cup/straw, mildly thick via straw, puree and hard solids. Oral phase WNL. Pharyngeal phase with min delay and min weakness to palpation, appears consistent with recent MBS results. Coughing not elicited during assessment; however, may reflect underlying persistent penetration with aspiration risk with PO intake. CXR results noted below. Pt's speech is clear and language intact with verbalizations during assessment. Patient will benefit from skilled intervention to address the above impairments. Patients rehabilitation potential is considered to be Good     PLAN :  Recommendations and Planned Interventions: At this time, recommend diet modification to easy to chew/mildly thick liquids with aspiration and GERD precautions, 1:1 assistance with ALL PO intake, monitor pt closely for s/s aspiration, meds with puree or mildly thick liquids, FEED ONLY IF AWAKE AND ALERT. Frequency/Duration: Patient will be followed by speech-language pathology 4 times a week to address goals. Discharge Recommendations: cont SLP tx at this time     SUBJECTIVE:   Patient alert, agreeable, pleasant. OBJECTIVE:     CXR Results  (Last 48 hours)                 09/01/22 1233  XR CHEST PORT Final result    Impression:  Mild basilar airspace disease: atelectasis or edema favored over pneumonia. Probable cardiomegaly. Narrative:  PORTABLE CHEST RADIOGRAPH/S: 9/1/2022 12:33 PM       INDICATION: Pneumonia. COMPARISON: 8/29/2022, 7/28/2021. TECHNIQUE: Portable frontal semiupright radiograph/s of the chest.       FINDINGS:    Mild patchy airspace disease in the bases may represent pneumonia, atelectasis,   or edema. The central airways are patent. The heart is probably enlarged, even   given portable technique. A pacemaker is in place. No large pneumothorax; the   chin obscures the apices. No large pleural effusion; the lungs are hypoinflated. [facility] results 6- per Eric Reis CCC-SLP:   \"Based on the objective data described below, the patient presents with mild pharyngeal dysphagia. Oral phase adequate bolus formation, manipulation and mastication. W/ large volume thin via straw initiation at the level of the pyriforms. Remaining trials and consistencies initiate at the level of the vallecula. Overall, minimal swallow delay.   BOT, HLE and pharyngeal constriction are wfl. Epiglottis w/ full inversion w/ mild delay in inversion. Laryngeal penetration during the swallow w/ large volume consecutive straw sips of thin w/ trace retention. No evidence of subsequent aspiration. No pen/asp w/ remaining trials and consistencies. No pharyngeal residue. UES wfl. Patient will benefit from skilled intervention to address the above impairments. Patients rehabilitation potential is considered to be Good      PLAN :  Recommendations and Planned Interventions:  Rec easy to chew w/ thin liquids and strict asp/GERD. Rec slow rate of intake, small bite/sips, single sips, no straws and remain upright 30 mins after PO intake. Speech eval as indicated. Frequency/Duration: Patient will be followed by speech-language pathology 2 times a week to address goals. Discharge Recommendations: Home Health\"       Past Medical History:   Diagnosis Date    Aneurysm ([facility] [address]     Anxiety     Bradycardia     Cancer ([facility] [address]     COLON    Diabetes ([facility] [address]     TYPE II    Gout     Hypertension     Ill-defined condition     parkinsons disease    Pacemaker     Psoriasis     SCALP AND LT.  EAR    Psychiatric disorder     depression and anxiety    Stroke [facility])      Past Surgical History:   Procedure Laterality Date    HX CATARACT REMOVAL Bilateral     HX GI      COLONOSCOPY    HX HERNIA REPAIR      UMBILICAL    HX PACEMAKER      HX RETINAL DETACHMENT REPAIR Right     HX TONSILLECTOMY      HX TOTAL COLECTOMY  1908'S    1 FOOT REMOVED     Prior Level of Function/Home Situation:   Home Situation  Home Environment: Private residence  # Steps to Enter: 3  Rails to Enter: Yes  Hand Rails : Bilateral  One/Two Story Residence: One story  Living Alone: No  Support Systems: Spouse/Significant Other  Patient Expects to be Discharged to[de-identified] Skilled nursing facility  Current DME Used/Available at Home: Guillermina Holloway, Wheelchair, [address] chair  Diet prior to admission: easy to chew/thin  Current Diet:  regular/thin   Cognitive and Communication Status:  Neurologic State: Alert  Orientation Level: Oriented to person, Oriented to place  Cognition: Follows commands    Pain:  0    After treatment:   Patient left in no apparent distress in bed, Call bell within reach, Nursing notified, and 1:1 sitter present    COMMUNICATION/EDUCATION:   Patient was educated regarding purpose of SLP assessment, POC, diet recs and sw safety precautions. Patient demonstrated [address] understanding as evidenced by verbal responsiveness, needs reinforcement. The patient's plan of care including recommendations, planned interventions, and recommended diet changes were discussed with:  PCT and PA . Patient/family have participated as able in goal setting and plan of care. Patient/family agree to work toward stated goals and plan of care.     Thank you for this referral.  Jamie Owens M.S. CCC-SLP  Time Calculation: 13 mins

## 2022-09-03 PROBLEM — J96.01 ACUTE RESPIRATORY FAILURE WITH HYPOXIA (HCC): Status: ACTIVE | Noted: 2022-09-03

## 2022-09-03 PROBLEM — G93.41 ACUTE METABOLIC ENCEPHALOPATHY: Status: ACTIVE | Noted: 2022-09-03

## 2022-09-03 PROBLEM — E87.20 METABOLIC ACIDOSIS: Status: ACTIVE | Noted: 2022-09-03

## 2022-09-03 PROBLEM — R13.10 DYSPHAGIA: Status: ACTIVE | Noted: 2022-09-03

## 2022-09-03 PROBLEM — N17.9 AKI (ACUTE KIDNEY INJURY) (HCC): Status: ACTIVE | Noted: 2022-09-03

## 2022-09-03 PROBLEM — J18.9 COMMUNITY ACQUIRED PNEUMONIA: Status: ACTIVE | Noted: 2022-09-03

## 2022-09-03 LAB
ANION GAP SERPL CALC-SCNC: 7 MMOL/L (ref 5–15)
BUN SERPL-MCNC: 17 MG/DL (ref 6–20)
BUN/CREAT SERPL: 15 (ref 12–20)
CA-I BLD-MCNC: 8.4 MG/DL (ref 8.5–10.1)
CHLORIDE SERPL-SCNC: 112 MMOL/L (ref 97–108)
CO2 SERPL-SCNC: 26 MMOL/L (ref 21–32)
CREAT SERPL-MCNC: 1.13 MG/DL (ref 0.7–1.3)
ERYTHROCYTE [DISTWIDTH] IN BLOOD BY AUTOMATED COUNT: 13.5 % (ref 11.5–14.5)
GLUCOSE SERPL-MCNC: 132 MG/DL (ref 65–100)
HCT VFR BLD AUTO: 34 % (ref 36.6–50.3)
HGB BLD-MCNC: 11.2 G/DL (ref 12.1–17)
MCH RBC QN AUTO: 32 PG (ref 26–34)
MCHC RBC AUTO-ENTMCNC: 32.9 G/DL (ref 30–36.5)
MCV RBC AUTO: 97.1 FL (ref 80–99)
NRBC # BLD: 0 K/UL (ref 0–0.01)
NRBC BLD-RTO: 0 PER 100 WBC
PLATELET # BLD AUTO: 155 K/UL (ref 150–400)
PMV BLD AUTO: 11.5 FL (ref 8.9–12.9)
POTASSIUM SERPL-SCNC: 4 MMOL/L (ref 3.5–5.1)
RBC # BLD AUTO: 3.5 M/UL (ref 4.1–5.7)
SODIUM SERPL-SCNC: 145 MMOL/L (ref 136–145)
WBC # BLD AUTO: 13.6 K/UL (ref 4.1–11.1)

## 2022-09-03 PROCEDURE — 74011250636 HC RX REV CODE- 250/636: Performed by: EMERGENCY MEDICINE

## 2022-09-03 PROCEDURE — 36415 COLL VENOUS BLD VENIPUNCTURE: CPT

## 2022-09-03 PROCEDURE — 74011250637 HC RX REV CODE- 250/637: Performed by: HOSPITALIST

## 2022-09-03 PROCEDURE — 85027 COMPLETE CBC AUTOMATED: CPT

## 2022-09-03 PROCEDURE — 80048 BASIC METABOLIC PNL TOTAL CA: CPT

## 2022-09-03 PROCEDURE — 74011000258 HC RX REV CODE- 258: Performed by: EMERGENCY MEDICINE

## 2022-09-03 PROCEDURE — 65270000029 HC RM PRIVATE

## 2022-09-03 PROCEDURE — 74011250637 HC RX REV CODE- 250/637: Performed by: STUDENT IN AN ORGANIZED HEALTH CARE EDUCATION/TRAINING PROGRAM

## 2022-09-03 PROCEDURE — 74011000250 HC RX REV CODE- 250: Performed by: HOSPITALIST

## 2022-09-03 PROCEDURE — 74011250636 HC RX REV CODE- 250/636: Performed by: INTERNAL MEDICINE

## 2022-09-03 PROCEDURE — 94761 N-INVAS EAR/PLS OXIMETRY MLT: CPT

## 2022-09-03 PROCEDURE — 77010033678 HC OXYGEN DAILY

## 2022-09-03 RX ORDER — GUAIFENESIN 600 MG/1
1200 TABLET, EXTENDED RELEASE ORAL EVERY 12 HOURS
Status: DISCONTINUED | OUTPATIENT
Start: 2022-09-03 | End: 2022-09-04 | Stop reason: HOSPADM

## 2022-09-03 RX ORDER — BENZONATATE 100 MG/1
100 CAPSULE ORAL
Status: DISCONTINUED | OUTPATIENT
Start: 2022-09-03 | End: 2022-09-04 | Stop reason: HOSPADM

## 2022-09-03 RX ADMIN — GUAIFENESIN 1200 MG: 600 TABLET, EXTENDED RELEASE ORAL at 21:47

## 2022-09-03 RX ADMIN — PIPERACILLIN SODIUM AND TAZOBACTAM SODIUM 3.38 G: 3; .375 INJECTION, POWDER, LYOPHILIZED, FOR SOLUTION INTRAVENOUS at 07:27

## 2022-09-03 RX ADMIN — HALOPERIDOL 5 MG: 5 TABLET ORAL at 21:47

## 2022-09-03 RX ADMIN — OFLOXACIN 1 DROP: 3 SOLUTION/ DROPS OPHTHALMIC at 12:53

## 2022-09-03 RX ADMIN — SODIUM CHLORIDE, PRESERVATIVE FREE 10 ML: 5 INJECTION INTRAVENOUS at 15:12

## 2022-09-03 RX ADMIN — PREDNISOLONE ACETATE 1 DROP: 10 SUSPENSION/ DROPS OPHTHALMIC at 17:27

## 2022-09-03 RX ADMIN — MELATONIN TAB 5 MG 5 MG: 5 TAB at 23:22

## 2022-09-03 RX ADMIN — AMIODARONE HYDROCHLORIDE 100 MG: 200 TABLET ORAL at 09:37

## 2022-09-03 RX ADMIN — PREDNISOLONE ACETATE 1 DROP: 10 SUSPENSION/ DROPS OPHTHALMIC at 12:53

## 2022-09-03 RX ADMIN — OFLOXACIN 1 DROP: 3 SOLUTION/ DROPS OPHTHALMIC at 21:47

## 2022-09-03 RX ADMIN — OFLOXACIN 1 DROP: 3 SOLUTION/ DROPS OPHTHALMIC at 09:37

## 2022-09-03 RX ADMIN — GUAIFENESIN 1200 MG: 600 TABLET, EXTENDED RELEASE ORAL at 15:11

## 2022-09-03 RX ADMIN — SODIUM CHLORIDE 75 ML/HR: 9 INJECTION, SOLUTION INTRAVENOUS at 04:16

## 2022-09-03 RX ADMIN — PIPERACILLIN SODIUM AND TAZOBACTAM SODIUM 3.38 G: 3; .375 INJECTION, POWDER, LYOPHILIZED, FOR SOLUTION INTRAVENOUS at 22:28

## 2022-09-03 RX ADMIN — SODIUM CHLORIDE, PRESERVATIVE FREE 10 ML: 5 INJECTION INTRAVENOUS at 22:28

## 2022-09-03 RX ADMIN — PREDNISOLONE ACETATE 1 DROP: 10 SUSPENSION/ DROPS OPHTHALMIC at 09:36

## 2022-09-03 RX ADMIN — SODIUM CHLORIDE, PRESERVATIVE FREE 10 ML: 5 INJECTION INTRAVENOUS at 06:47

## 2022-09-03 RX ADMIN — OFLOXACIN 1 DROP: 3 SOLUTION/ DROPS OPHTHALMIC at 17:27

## 2022-09-03 RX ADMIN — ATORVASTATIN CALCIUM 80 MG: 40 TABLET, FILM COATED ORAL at 21:47

## 2022-09-03 RX ADMIN — METOPROLOL SUCCINATE 25 MG: 50 TABLET, EXTENDED RELEASE ORAL at 09:37

## 2022-09-03 RX ADMIN — PREDNISOLONE ACETATE 1 DROP: 10 SUSPENSION/ DROPS OPHTHALMIC at 21:47

## 2022-09-03 RX ADMIN — PIPERACILLIN SODIUM AND TAZOBACTAM SODIUM 3.38 G: 3; .375 INJECTION, POWDER, LYOPHILIZED, FOR SOLUTION INTRAVENOUS at 15:15

## 2022-09-03 RX ADMIN — LEVOFLOXACIN 750 MG: 5 INJECTION, SOLUTION INTRAVENOUS at 15:14

## 2022-09-03 NOTE — PROGRESS NOTES
NEURO PROGRESS NOTE        SUBJECTIVE:   Mental status changes  Recurrent falls      EXAM:  Awake, in relative good spirits.   Oriented to location  Tremulousness decreased  Follows commands  No new focality  States he wants to go home      ASSESSMENT/PLAN:  Overall neurological status significantly improved    ALLERGIES:    Allergies   Allergen Reactions    Monosodium Glutamate Anaphylaxis and Hives    Adhesive Tape-Silicones Other (comments)     BLISTERS       MEDS:      Current Facility-Administered Medications:     guaiFENesin ER (MUCINEX) tablet 1,200 mg, 1,200 mg, Oral, Q12H, Zayra Copeland PA    benzonatate (TESSALON) capsule 100 mg, 100 mg, Oral, TID PRN, Zayra Copeland PA    levoFLOXacin (LEVAQUIN) 750 mg in D5W IVPB, 750 mg, IntraVENous, Q24H, Cedrick Van MD    0.9% sodium chloride infusion, 75 mL/hr, IntraVENous, CONTINUOUS, Adrian Morales MD, Last Rate: 75 mL/hr at 09/03/22 0416, 75 mL/hr at 09/03/22 0416    albuterol-ipratropium (DUO-NEB) 2.5 MG-0.5 MG/3 ML, 3 mL, Nebulization, Q4H PRN, Joann Gambino MD, 3 mL at 09/02/22 0235    prednisoLONE acetate (PRED FORTE) 1 % ophthalmic suspension 1 Drop (Patient Supplied), 1 Drop, Right Eye, QID, Joann Gambino MD, 1 Drop at 09/03/22 1253    ofloxacin (FLOXIN) 0.3 % ophthalmic solution 1 Drop (Patient Supplied), 1 Drop, Right Eye, QID, Adrian Morales MD, 1 Drop at 09/03/22 1253    piperacillin-tazobactam (ZOSYN) 3.375 g in 0.9% sodium chloride (MBP/ADV) 100 mL MBP, 3.375 g, IntraVENous, Q8H, Cedrick Van MD, Last Rate: 25 mL/hr at 09/03/22 0727, 3.375 g at 09/03/22 6193    amiodarone (CORDARONE) tablet 100 mg, 100 mg, Oral, DAILY, Surjit Davalos MD, 100 mg at 09/03/22 0937    atorvastatin (LIPITOR) tablet 80 mg, 80 mg, Oral, QHS, Surjit Davalos MD, 80 mg at 09/02/22 2108    melatonin tablet 5 mg, 5 mg, Oral, QHS PRN, Surjit Davalos MD, 5 mg at 09/02/22 2157    metoprolol succinate (TOPROL-XL) XL tablet 25 mg, 25 mg, Oral, DAILY, Sparkle Mccoy MD, 25 mg at 09/03/22 2026    sodium chloride (NS) flush 5-40 mL, 5-40 mL, IntraVENous, Q8H, Charline Moreau MD, 10 mL at 09/03/22 0647    sodium chloride (NS) flush 5-40 mL, 5-40 mL, IntraVENous, PRN, Sparkle Mccoy MD    acetaminophen (TYLENOL) tablet 650 mg, 650 mg, Oral, Q6H PRN **OR** acetaminophen (TYLENOL) suppository 650 mg, 650 mg, Rectal, Q6H PRN, Sparkle Mccoy MD    polyethylene glycol (MIRALAX) packet 17 g, 17 g, Oral, DAILY PRN, Sparkle Mccoy MD    ondansetron (ZOFRAN ODT) tablet 4 mg, 4 mg, Oral, Q8H PRN **OR** ondansetron (ZOFRAN) injection 4 mg, 4 mg, IntraVENous, Q6H PRN, Sparkle Mccoy MD    haloperidoL (HALDOL) tablet 5 mg, 5 mg, Oral, TID PRN, Sparkle Mccoy MD, 5 mg at 08/31/22 2223    LABS:  Recent Results (from the past 24 hour(s))   CBC W/O DIFF    Collection Time: 09/03/22  5:42 AM   Result Value Ref Range    WBC 13.6 (H) 4.1 - 11.1 K/uL    RBC 3.50 (L) 4.10 - 5.70 M/uL    HGB 11.2 (L) 12.1 - 17.0 g/dL    HCT 34.0 (L) 36.6 - 50.3 %    MCV 97.1 80.0 - 99.0 FL    MCH 32.0 26.0 - 34.0 PG    MCHC 32.9 30.0 - 36.5 g/dL    RDW 13.5 11.5 - 14.5 %    PLATELET 196 934 - 507 K/uL    MPV 11.5 8.9 - 12.9 FL    NRBC 0.0 0.0  WBC    ABSOLUTE NRBC 0.00 0.00 - 6.17 K/uL   METABOLIC PANEL, BASIC    Collection Time: 09/03/22  5:42 AM   Result Value Ref Range    Sodium 145 136 - 145 mmol/L    Potassium 4.0 3.5 - 5.1 mmol/L    Chloride 112 (H) 97 - 108 mmol/L    CO2 26 21 - 32 mmol/L    Anion gap 7 5 - 15 mmol/L    Glucose 132 (H) 65 - 100 mg/dL    BUN 17 6 - 20 mg/dL    Creatinine 1.13 0.70 - 1.30 mg/dL    BUN/Creatinine ratio 15 12 - 20      GFR est AA >60 >60 ml/min/1.73m2    GFR est non-AA >60 >60 ml/min/1.73m2    Calcium 8.4 (L) 8.5 - 10.1 mg/dL       Visit Vitals  /76   Pulse 61   Temp 98.7 °F (37.1 °C)   Resp 16   Ht 5' 8\" (1.727 m)   Wt 91.5 kg (201 lb 11.5 oz)   SpO2 96%   BMI 30.67 kg/m²       Imaging:  XR CHEST PORT   Final Result   Mild basilar airspace disease: atelectasis or edema favored over pneumonia. Probable cardiomegaly. CT HEAD WO CONT   Final Result   No acute intracranial process. Imaging findings consistent with mild chronic microvascular ischemic change. There is a moderate to severe degree of cerebral atrophy. XR CHEST PORT   Final Result   Persistent or recurrent bibasilar opacities. Two-view radiography   would be more sensitive and specific in this regard.

## 2022-09-03 NOTE — PROGRESS NOTES
Hospitalist Progress Note    Subjective:   Daily Progress Note: 9/3/2022 8:29 AM    Patient with fluctuating mentation but is pleasantly confused. Current Facility-Administered Medications   Medication Dose Route Frequency    levoFLOXacin (LEVAQUIN) 750 mg in D5W IVPB  750 mg IntraVENous Q24H    0.9% sodium chloride infusion  75 mL/hr IntraVENous CONTINUOUS    albuterol-ipratropium (DUO-NEB) 2.5 MG-0.5 MG/3 ML  3 mL Nebulization Q4H PRN    prednisoLONE acetate (PRED FORTE) 1 % ophthalmic suspension 1 Drop (Patient Supplied)  1 Drop Right Eye QID    ofloxacin (FLOXIN) 0.3 % ophthalmic solution 1 Drop (Patient Supplied)  1 Drop Right Eye QID    piperacillin-tazobactam (ZOSYN) 3.375 g in 0.9% sodium chloride (MBP/ADV) 100 mL MBP  3.375 g IntraVENous Q8H    amiodarone (CORDARONE) tablet 100 mg  100 mg Oral DAILY    atorvastatin (LIPITOR) tablet 80 mg  80 mg Oral QHS    melatonin tablet 5 mg  5 mg Oral QHS PRN    metoprolol succinate (TOPROL-XL) XL tablet 25 mg  25 mg Oral DAILY    sodium chloride (NS) flush 5-40 mL  5-40 mL IntraVENous Q8H    sodium chloride (NS) flush 5-40 mL  5-40 mL IntraVENous PRN    acetaminophen (TYLENOL) tablet 650 mg  650 mg Oral Q6H PRN    Or    acetaminophen (TYLENOL) suppository 650 mg  650 mg Rectal Q6H PRN    polyethylene glycol (MIRALAX) packet 17 g  17 g Oral DAILY PRN    ondansetron (ZOFRAN ODT) tablet 4 mg  4 mg Oral Q8H PRN    Or    ondansetron (ZOFRAN) injection 4 mg  4 mg IntraVENous Q6H PRN    haloperidoL (HALDOL) tablet 5 mg  5 mg Oral TID PRN        Review of Systems  Review of Systems   Unable to perform ROS: Dementia          Objective:     Visit Vitals  /76   Pulse 61   Temp 98.7 °F (37.1 °C)   Resp 16   Ht 5' 8\" (1.727 m)   Wt 91.5 kg (201 lb 11.5 oz)   SpO2 97%   BMI 30.67 kg/m²    O2 Flow Rate (L/min): 2 l/min O2 Device: Nasal cannula    Temp (24hrs), Av.1 °F (37.3 °C), Min:98.7 °F (37.1 °C), Max:99.7 °F (37.6 °C)      No intake/output data recorded.    1901 - 09/03 0700  In: 5988 [P.O.:300; I.V.:1177]  Out: 650 [Urine:650]    XR CHEST PORT   Final Result   Mild basilar airspace disease: atelectasis or edema favored over pneumonia. Probable cardiomegaly. CT HEAD WO CONT   Final Result   No acute intracranial process. Imaging findings consistent with mild chronic microvascular ischemic change. There is a moderate to severe degree of cerebral atrophy. XR CHEST PORT   Final Result   Persistent or recurrent bibasilar opacities. Two-view radiography   would be more sensitive and specific in this regard. PHYSICAL EXAM:    Physical Exam  Vitals and nursing note reviewed. Constitutional:       Appearance: He is obese. HENT:      Head: Normocephalic and atraumatic. Cardiovascular:      Rate and Rhythm: Normal rate and regular rhythm. Pulmonary:      Effort: No respiratory distress. Breath sounds: Wheezing present. Comments: Occasional expiratory wheezing  Abdominal:      General: Bowel sounds are normal. There is no distension. Palpations: Abdomen is soft. Tenderness: There is no abdominal tenderness. Genitourinary:     Comments: External spaulding in place  Musculoskeletal:      Right lower leg: No edema. Left lower leg: No edema. Skin:     General: Skin is warm. Capillary Refill: Capillary refill takes less than 2 seconds. Neurological:      Mental Status: He is alert and oriented to person, place, and time.       Comments: Fluctuating mentation        Data Review    Recent Results (from the past 24 hour(s))   CBC W/O DIFF    Collection Time: 09/03/22  5:42 AM   Result Value Ref Range    WBC 13.6 (H) 4.1 - 11.1 K/uL    RBC 3.50 (L) 4.10 - 5.70 M/uL    HGB 11.2 (L) 12.1 - 17.0 g/dL    HCT 34.0 (L) 36.6 - 50.3 %    MCV 97.1 80.0 - 99.0 FL    MCH 32.0 26.0 - 34.0 PG    MCHC 32.9 30.0 - 36.5 g/dL    RDW 13.5 11.5 - 14.5 %    PLATELET 131 063 - 609 K/uL    MPV 11.5 8.9 - 12.9 FL    NRBC 0.0 0.0  WBC    ABSOLUTE NRBC 0.00 0.00 - 6.41 K/uL   METABOLIC PANEL, BASIC    Collection Time: 09/03/22  5:42 AM   Result Value Ref Range    Sodium 145 136 - 145 mmol/L    Potassium 4.0 3.5 - 5.1 mmol/L    Chloride 112 (H) 97 - 108 mmol/L    CO2 26 21 - 32 mmol/L    Anion gap 7 5 - 15 mmol/L    Glucose 132 (H) 65 - 100 mg/dL    BUN 17 6 - 20 mg/dL    Creatinine 1.13 0.70 - 1.30 mg/dL    BUN/Creatinine ratio 15 12 - 20      GFR est AA >60 >60 ml/min/1.73m2    GFR est non-AA >60 >60 ml/min/1.73m2    Calcium 8.4 (L) 8.5 - 10.1 mg/dL        Assessment/Plan:     Active Problems:    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 [address] (8/29/2022)      ERICKSON (acute kidney injury) ([facility] [address] (9/3/2022)      Acute metabolic encephalopathy (9/4/7725)      Acute respiratory failure with hypoxia ([facility] [address] (9/3/2022)      Community acquired pneumonia (9/3/2022)      Dysphagia (0/4/6797)      Metabolic acidosis (2/8/9607)      Hospital Course:    Lyn Fonseca is a 81year old male with a PMH of hemorrhagic stroke x2, parkinsons disease, SSS and leukemia who presented with confusion and loose stools. In ED hypotensive and tachypneic. Initial labs significant for WBC of 14.3, hemoglobin of 11.2, creatinine of 1.72, lactic of 6, glucose of 543, and metabolic acidosis, UA unremarkable. CXR with persistent or recurrent bibasilar opacities. CT of the head with no acute intracranial process. Patient admitted for further work-up. Patient started on IV fluid, zosyn, levaquin, and given one dose of pressor. Neurology and pulmonology consulted. Echo showed EF of 55 to 60% with hypokinesis of the mid anterior septal and apical septal segments and normal diastolic function. Neurology consulted for encephalopathy from already diminished baseline. Lipids unremarkable. TSH normal. EEG pending. PT/OT recommending SNF. Patient family would like to have patient at home with home health.     Acute metabolic encephalopathy  Seizure precautions  EEG pending  Neurology following    Acute respiratory failure with hypoxia  Currently on 2 L n/c, wean as tolerated  DuoNebs as needed  Pulmonology following    Sepsis POA  Bilateral community acquired pneumonia  Continue on zosyn and levaquin  CXR with persistent atelectasis, pulmonary toileting encouraged  8/29 blood: No growth to date, preliminary  Pulmonology following    Sick Sinus Syndrome  S/p PPM  Continue on metoprolol and amiodarone    Dysphagia  SLP recommending easy to chew/mildly thick liquids    Right eye infection  Continue on ofloxacin and prednisone drops    DVT Prophylaxis: h/o SAH, SCDs  GI Prophylaxis: tolerating po diet  Discharge and disposition barriers: neurology clearance, 24hr    Nimco Johnston, (Daughter) [phone] - spoke to at [facility] discussed with: Patient/Family, Nurse, and     Total time spent with patient: 35 minutes.

## 2022-09-03 NOTE — PROGRESS NOTES
Pulmonary and Critical Care progress note    Subjective:   Consult Note: 9/3/2022 @no control      Chief Complaint:   Chief Complaint   Patient presents with    Altered mental status    Hypotension        This patient has been seen and evaluated at the request of Dr. Deven Copeland    Patient seen and examined  Overnight events noted    Much more awake and alert today  Answering simple questions appropriately  No acute distress  Has tremor of right hand mostly intentional tremor    Review of Systems:  Review of systems not obtained due to patient factors.        Current Facility-Administered Medications   Medication Dose Route Frequency Provider Last Rate Last Admin    guaiFENesin ER (MUCINEX) tablet 1,200 mg  1,200 mg Oral Q12H Zayra Copeland PA        benzonatate (TESSALON) capsule 100 mg  100 mg Oral TID PRN Zayra Copeland PA        levoFLOXacin (LEVAQUIN) 750 mg in D5W IVPB  750 mg IntraVENous Q24H Zaria Dale MD        0.9% sodium chloride infusion  75 mL/hr IntraVENous CONTINUOUS Ellie Toth MD 75 mL/hr at 09/03/22 0416 75 mL/hr at 09/03/22 0416    albuterol-ipratropium (DUO-NEB) 2.5 MG-0.5 MG/3 ML  3 mL Nebulization Q4H PRN Ellie Toth MD   3 mL at 09/02/22 0235    prednisoLONE acetate (PRED FORTE) 1 % ophthalmic suspension 1 Drop (Patient Supplied)  1 Drop Right Eye QID Ellie Toth MD   1 Drop at 09/03/22 0936    ofloxacin (FLOXIN) 0.3 % ophthalmic solution 1 Drop (Patient Supplied)  1 Drop Right Eye QID Ellie Toth MD   1 Drop at 09/03/22 0937    piperacillin-tazobactam (ZOSYN) 3.375 g in 0.9% sodium chloride (MBP/ADV) 100 mL MBP  3.375 g IntraVENous Q8H Zaria Dale MD 25 mL/hr at 09/03/22 0727 3.375 g at 09/03/22 8787    amiodarone (CORDARONE) tablet 100 mg  100 mg Oral DAILY Katty Garza MD   100 mg at 09/03/22 0937    atorvastatin (LIPITOR) tablet 80 mg  80 mg Oral QHS Katty Garza MD   80 mg at 09/02/22 2108    melatonin tablet 5 mg  5 mg Oral QHS PRN Shannen Yost MD   5 mg at 22 2157    metoprolol succinate (TOPROL-XL) XL tablet 25 mg  25 mg Oral DAILY Shannen Yost MD   25 mg at 22 3241    sodium chloride (NS) flush 5-40 mL  5-40 mL IntraVENous Q8H Shannen Yost MD   10 mL at 22 0647    sodium chloride (NS) flush 5-40 mL  5-40 mL IntraVENous PRN Shannen Yost MD        acetaminophen (TYLENOL) tablet 650 mg  650 mg Oral Q6H PRN Shannen Yost MD        Or    acetaminophen (TYLENOL) suppository 650 mg  650 mg Rectal Q6H PRN Shannen Yost MD        polyethylene glycol (MIRALAX) packet 17 g  17 g Oral DAILY PRN Shannen Yost MD        ondansetron (ZOFRAN ODT) tablet 4 mg  4 mg Oral Q8H PRN Shannen Yost MD        Or    ondansetron [facility]) injection 4 mg  4 mg IntraVENous Q6H PRN Shannen Yost MD        haloperidoL (HALDOL) tablet 5 mg  5 mg Oral TID PRN Shannen Yost MD   5 mg at 22 2223          Allergies   Allergen Reactions    Monosodium Glutamate Anaphylaxis and Hives    Adhesive Tape-Silicones Other (comments)     BLISTERS           Objective:     Blood pressure 113/76, pulse 61, temperature 98.7 °F (37.1 °C), resp. rate 16, height 5' 8\" (1.727 m), weight 91.5 kg (201 lb 11.5 oz), SpO2 97 %. Temp (24hrs), Av.1 °F (37.3 °C), Min:98.7 °F (37.1 °C), Max:99.7 °F (37.6 °C)      Intake and Output:  Current Shift: No intake/output data recorded. Last 3 Shifts:  1901 -  0700  In: 6374 [P.O.:300; I.V.:1177]  Out: 650 [Urine:650]    Intake/Output Summary (Last 24 hours) at 9/3/2022 1232  Last data filed at 9/3/2022 7669  Gross per 24 hour   Intake --   Output 400 ml   Net -400 ml        Physical Exam:     General: Lying in bed comfortably, no acute distress, pleasantly confused, sleepy  Eye: Reactive, symmetric  Throat and Neck: Supple,   Lung: Reduced air entry bilaterally with prolonged exhalation but no wheezing. Occasional crackles. Heart: S1+S2.   No murmurs  Abdomen: soft, non-tender. Bowel sounds normal. No masses; obese  Extremities: No edema having pill-rolling tremor of both upper extremities  : Not done  Skin: No cyanosis  Neurologic: Sleepy and lethargic, pleasantly confused, grossly nonfocal  Psychiatric: Unable to perform due to patient's condition      Lab/Data Review:    Recent Results (from the past 24 hour(s))   CBC W/O DIFF    Collection Time: 09/03/22  5:42 AM   Result Value Ref Range    WBC 13.6 (H) 4.1 - 11.1 K/uL    RBC 3.50 (L) 4.10 - 5.70 M/uL    HGB 11.2 (L) 12.1 - 17.0 g/dL    HCT 34.0 (L) 36.6 - 50.3 %    MCV 97.1 80.0 - 99.0 FL    MCH 32.0 26.0 - 34.0 PG    MCHC 32.9 30.0 - 36.5 g/dL    RDW 13.5 11.5 - 14.5 %    PLATELET 893 141 - 012 K/uL    MPV 11.5 8.9 - 12.9 FL    NRBC 0.0 0.0  WBC    ABSOLUTE NRBC 0.00 0.00 - 3.97 K/uL   METABOLIC PANEL, BASIC    Collection Time: 09/03/22  5:42 AM   Result Value Ref Range    Sodium 145 136 - 145 mmol/L    Potassium 4.0 3.5 - 5.1 mmol/L    Chloride 112 (H) 97 - 108 mmol/L    CO2 26 21 - 32 mmol/L    Anion gap 7 5 - 15 mmol/L    Glucose 132 (H) 65 - 100 mg/dL    BUN 17 6 - 20 mg/dL    Creatinine 1.13 0.70 - 1.30 mg/dL    BUN/Creatinine ratio 15 12 - 20      GFR est AA >60 >60 ml/min/1.73m2    GFR est non-AA >60 >60 ml/min/1.73m2    Calcium 8.4 (L) 8.5 - 10.1 mg/dL       XR CHEST PORT   Final Result   Mild basilar airspace disease: atelectasis or edema favored over pneumonia. Probable cardiomegaly. CT HEAD WO CONT   Final Result   No acute intracranial process. Imaging findings consistent with mild chronic microvascular ischemic change. There is a moderate to severe degree of cerebral atrophy. XR CHEST PORT   Final Result   Persistent or recurrent bibasilar opacities. Two-view radiography   would be more sensitive and specific in this regard. CT Results  (Last 48 hours)      None              Assessment:     1. Acute respiratory failure with hypoxia  2.   Bilateral pneumonia  3. Sepsis  4. Altered mental status  5. Acute metabolic acidosis  6. Leukocytosis  7. Acute kidney injury    Plan:     Patient admitted to the hospital  Will be watched here closely    On 2 L nasal cannula oxygen  Will use supplemental oxygen as needed to keep saturation above 92%    Admitted with sepsis  Pressure improved without vasopressors however patient is still having elevated lactic acid and now with worsening metabolic acidosis improved.     Gentle IV fluid hydration to continue  Continue broad-spectrum antibiotics Zosyn Levaquin  Culture sent  Further changes in antibiotics based on clinical response and culture results  Will follow chest x-ray for pneumonia    Altered mental status  Likely due to combination of infection along with age  Monitor clinically  CT scan negative   more awake and alert today    Monitor renal function with fluid changes  Check electrolytes and replace as needed    DVT and GI prophylaxis    Patient is full code, if declines may require transfer to ICU  Would recommend discussion of CODE STATUS and goals of care with patient's family    Questions of patient were answered at bedside in detail  Case discussed in detail with RN, RT, and care team  Thank you for involving me in the care of this patient  I will follow with you closely during hospitalization    Time spent more than 30 minutes in direct patient care with no overlap    Paulina Solomon MD  [facility]  9/3/2022

## 2022-09-03 NOTE — PROGRESS NOTES
Problem: Falls - Risk of  Goal: *Absence of Falls  Description: Document [place] Fall Risk and appropriate interventions in the flowsheet.   Outcome: Progressing Towards Goal  Note: Fall Risk Interventions:  Mobility Interventions: Bed/chair exit alarm    Mentation Interventions: Bed/chair exit alarm    Medication Interventions: Bed/chair exit alarm    Elimination Interventions: Call light in reach    History of Falls Interventions: Bed/chair exit alarm

## 2022-09-04 VITALS
HEART RATE: 60 BPM | RESPIRATION RATE: 18 BRPM | DIASTOLIC BLOOD PRESSURE: 83 MMHG | WEIGHT: 201.72 LBS | TEMPERATURE: 98.6 F | SYSTOLIC BLOOD PRESSURE: 147 MMHG | OXYGEN SATURATION: 93 % | BODY MASS INDEX: 30.57 KG/M2 | HEIGHT: 68 IN

## 2022-09-04 LAB
ANION GAP SERPL CALC-SCNC: 4 MMOL/L (ref 5–15)
BUN SERPL-MCNC: 16 MG/DL (ref 6–20)
BUN/CREAT SERPL: 13 (ref 12–20)
CA-I BLD-MCNC: 8.6 MG/DL (ref 8.5–10.1)
CHLORIDE SERPL-SCNC: 110 MMOL/L (ref 97–108)
CO2 SERPL-SCNC: 29 MMOL/L (ref 21–32)
CREAT SERPL-MCNC: 1.27 MG/DL (ref 0.7–1.3)
ERYTHROCYTE [DISTWIDTH] IN BLOOD BY AUTOMATED COUNT: 13.5 % (ref 11.5–14.5)
GLUCOSE SERPL-MCNC: 165 MG/DL (ref 65–100)
HCT VFR BLD AUTO: 34.4 % (ref 36.6–50.3)
HGB BLD-MCNC: 11.5 G/DL (ref 12.1–17)
MCH RBC QN AUTO: 32 PG (ref 26–34)
MCHC RBC AUTO-ENTMCNC: 33.4 G/DL (ref 30–36.5)
MCV RBC AUTO: 95.8 FL (ref 80–99)
NRBC # BLD: 0.02 K/UL (ref 0–0.01)
NRBC BLD-RTO: 0.1 PER 100 WBC
PLATELET # BLD AUTO: 158 K/UL (ref 150–400)
PMV BLD AUTO: 10.3 FL (ref 8.9–12.9)
POTASSIUM SERPL-SCNC: 3.7 MMOL/L (ref 3.5–5.1)
RBC # BLD AUTO: 3.59 M/UL (ref 4.1–5.7)
SODIUM SERPL-SCNC: 143 MMOL/L (ref 136–145)
WBC # BLD AUTO: 17.5 K/UL (ref 4.1–11.1)

## 2022-09-04 PROCEDURE — 36415 COLL VENOUS BLD VENIPUNCTURE: CPT

## 2022-09-04 PROCEDURE — 74011250637 HC RX REV CODE- 250/637: Performed by: STUDENT IN AN ORGANIZED HEALTH CARE EDUCATION/TRAINING PROGRAM

## 2022-09-04 PROCEDURE — 85027 COMPLETE CBC AUTOMATED: CPT

## 2022-09-04 PROCEDURE — 94761 N-INVAS EAR/PLS OXIMETRY MLT: CPT

## 2022-09-04 PROCEDURE — 74011000250 HC RX REV CODE- 250: Performed by: HOSPITALIST

## 2022-09-04 PROCEDURE — 74011250637 HC RX REV CODE- 250/637: Performed by: HOSPITALIST

## 2022-09-04 PROCEDURE — 74011250636 HC RX REV CODE- 250/636: Performed by: EMERGENCY MEDICINE

## 2022-09-04 PROCEDURE — 74011000258 HC RX REV CODE- 258: Performed by: EMERGENCY MEDICINE

## 2022-09-04 PROCEDURE — 80048 BASIC METABOLIC PNL TOTAL CA: CPT

## 2022-09-04 RX ORDER — PEN NEEDLE, DIABETIC 31 GX5/16"
1 NEEDLE, DISPOSABLE MISCELLANEOUS
Qty: 1 EACH | Refills: 0 | Status: SHIPPED | OUTPATIENT
Start: 2022-09-04

## 2022-09-04 RX ORDER — IPRATROPIUM BROMIDE AND ALBUTEROL SULFATE 2.5; .5 MG/3ML; MG/3ML
3 SOLUTION RESPIRATORY (INHALATION)
Qty: 1 EACH | Refills: 0 | Status: SHIPPED | OUTPATIENT
Start: 2022-09-04 | End: 2022-09-04 | Stop reason: SDUPTHER

## 2022-09-04 RX ORDER — AMOXICILLIN AND CLAVULANATE POTASSIUM 875; 125 MG/1; MG/1
1 TABLET, FILM COATED ORAL DAILY
Qty: 5 TABLET | Refills: 0 | Status: SHIPPED | OUTPATIENT
Start: 2022-09-04 | End: 2022-09-04 | Stop reason: SDUPTHER

## 2022-09-04 RX ORDER — IPRATROPIUM BROMIDE AND ALBUTEROL SULFATE 2.5; .5 MG/3ML; MG/3ML
3 SOLUTION RESPIRATORY (INHALATION)
Qty: 1 EACH | Refills: 0 | Status: SHIPPED | OUTPATIENT
Start: 2022-09-04

## 2022-09-04 RX ORDER — AMOXICILLIN AND CLAVULANATE POTASSIUM 875; 125 MG/1; MG/1
1 TABLET, FILM COATED ORAL DAILY
Qty: 5 TABLET | Refills: 0 | Status: SHIPPED | OUTPATIENT
Start: 2022-09-04

## 2022-09-04 RX ADMIN — OFLOXACIN 1 DROP: 3 SOLUTION/ DROPS OPHTHALMIC at 13:10

## 2022-09-04 RX ADMIN — AMIODARONE HYDROCHLORIDE 100 MG: 200 TABLET ORAL at 09:17

## 2022-09-04 RX ADMIN — PREDNISOLONE ACETATE 1 DROP: 10 SUSPENSION/ DROPS OPHTHALMIC at 09:17

## 2022-09-04 RX ADMIN — SODIUM CHLORIDE, PRESERVATIVE FREE 10 ML: 5 INJECTION INTRAVENOUS at 07:08

## 2022-09-04 RX ADMIN — OFLOXACIN 1 DROP: 3 SOLUTION/ DROPS OPHTHALMIC at 09:17

## 2022-09-04 RX ADMIN — PIPERACILLIN SODIUM AND TAZOBACTAM SODIUM 3.38 G: 3; .375 INJECTION, POWDER, LYOPHILIZED, FOR SOLUTION INTRAVENOUS at 07:18

## 2022-09-04 RX ADMIN — GUAIFENESIN 1200 MG: 600 TABLET, EXTENDED RELEASE ORAL at 09:17

## 2022-09-04 RX ADMIN — PREDNISOLONE ACETATE 1 DROP: 10 SUSPENSION/ DROPS OPHTHALMIC at 13:13

## 2022-09-04 RX ADMIN — METOPROLOL SUCCINATE 25 MG: 50 TABLET, EXTENDED RELEASE ORAL at 09:17

## 2022-09-04 NOTE — PROGRESS NOTES
Incomplete O2 order received via fax. Will wait for resp flow rate recommendations to complete order.     Referral faxed to [facility] agencies for service area [place], [place].

## 2022-09-04 NOTE — PROGRESS NOTES
NEURO PROGRESS NOTE        SUBJECTIVE:   Mental status changes  Recurrent falls      EXAM:  Awake, in relative good spirits  Oriented to location  Follows one-step commands more readily  Tremulousness decreased  No new focality      ASSESSMENT/PLAN:  Current treatment continues    ALLERGIES:    Allergies   Allergen Reactions    Monosodium Glutamate Anaphylaxis and Hives    Adhesive Tape-Silicones Other (comments)     BLISTERS       MEDS:      Current Facility-Administered Medications:     guaiFENesin ER (MUCINEX) tablet 1,200 mg, 1,200 mg, Oral, Q12H, Zayra Copeland PA, 1,200 mg at 09/04/22 3439    benzonatate (TESSALON) capsule 100 mg, 100 mg, Oral, TID PRN, Zayra Copeland PA    levoFLOXacin (LEVAQUIN) 750 mg in D5W IVPB, 750 mg, IntraVENous, Q24H, William Rodriguez MD, Last Rate: 100 mL/hr at 09/03/22 1514, 750 mg at 09/03/22 1514    0.9% sodium chloride infusion, 75 mL/hr, IntraVENous, CONTINUOUS, Christopher Laguerre MD, Last Rate: 75 mL/hr at 09/03/22 0416, 75 mL/hr at 09/03/22 0416    albuterol-ipratropium (DUO-NEB) 2.5 MG-0.5 MG/3 ML, 3 mL, Nebulization, Q4H PRN, Christopher Laguerre MD, 3 mL at 09/02/22 0235    prednisoLONE acetate (PRED FORTE) 1 % ophthalmic suspension 1 Drop (Patient Supplied), 1 Drop, Right Eye, QID, Christopher Laguerre MD, 1 Drop at 09/04/22 1313    ofloxacin (FLOXIN) 0.3 % ophthalmic solution 1 Drop (Patient Supplied), 1 Drop, Right Eye, QID, Shawn Lung, Adrian Dunlap MD, 1 Drop at 09/04/22 1310    piperacillin-tazobactam (ZOSYN) 3.375 g in 0.9% sodium chloride (MBP/ADV) 100 mL MBP, 3.375 g, IntraVENous, Q8H, William Rodriguez MD, Last Rate: 25 mL/hr at 09/04/22 0718, 3.375 g at 09/04/22 4428    amiodarone (CORDARONE) tablet 100 mg, 100 mg, Oral, DAILY, Richar Kelly MD, 100 mg at 09/04/22 0917    atorvastatin (LIPITOR) tablet 80 mg, 80 mg, Oral, QHS, Richar Kelly MD, 80 mg at 09/03/22 2147    melatonin tablet 5 mg, 5 mg, Oral, QHS PRN, Richar Kelly MD, 5 mg at 09/03/22 2322    metoprolol succinate (TOPROL-XL) XL tablet 25 mg, 25 mg, Oral, DAILY, María Elena Chou MD, 25 mg at 09/04/22 0917    sodium chloride (NS) flush 5-40 mL, 5-40 mL, IntraVENous, Q8H, Avila Moreau MD, 10 mL at 09/04/22 0708    sodium chloride (NS) flush 5-40 mL, 5-40 mL, IntraVENous, PRN, María Elena Chou MD    acetaminophen (TYLENOL) tablet 650 mg, 650 mg, Oral, Q6H PRN **OR** acetaminophen (TYLENOL) suppository 650 mg, 650 mg, Rectal, Q6H PRN, María Elena Chou MD    polyethylene glycol (MIRALAX) packet 17 g, 17 g, Oral, DAILY PRN, María Elena Chou MD    ondansetron (ZOFRAN ODT) tablet 4 mg, 4 mg, Oral, Q8H PRN **OR** ondansetron (ZOFRAN) injection 4 mg, 4 mg, IntraVENous, Q6H PRN, María Elena Chou MD    haloperidoL (HALDOL) tablet 5 mg, 5 mg, Oral, TID PRN, María Elena Chou MD, 5 mg at 09/03/22 2147    LABS:  Recent Results (from the past 24 hour(s))   CBC W/O DIFF    Collection Time: 09/04/22  8:57 AM   Result Value Ref Range    WBC 17.5 (H) 4.1 - 11.1 K/uL    RBC 3.59 (L) 4.10 - 5.70 M/uL    HGB 11.5 (L) 12.1 - 17.0 g/dL    HCT 34.4 (L) 36.6 - 50.3 %    MCV 95.8 80.0 - 99.0 FL    MCH 32.0 26.0 - 34.0 PG    MCHC 33.4 30.0 - 36.5 g/dL    RDW 13.5 11.5 - 14.5 %    PLATELET 688 433 - 051 K/uL    MPV 10.3 8.9 - 12.9 FL    NRBC 0.1 (H) 0.0  WBC    ABSOLUTE NRBC 0.02 (H) 0.00 - 4.72 K/uL   METABOLIC PANEL, BASIC    Collection Time: 09/04/22  8:57 AM   Result Value Ref Range    Sodium 143 136 - 145 mmol/L    Potassium 3.7 3.5 - 5.1 mmol/L    Chloride 110 (H) 97 - 108 mmol/L    CO2 29 21 - 32 mmol/L    Anion gap 4 (L) 5 - 15 mmol/L    Glucose 165 (H) 65 - 100 mg/dL    BUN 16 6 - 20 mg/dL    Creatinine 1.27 0.70 - 1.30 mg/dL    BUN/Creatinine ratio 13 12 - 20      GFR est AA >60 >60 ml/min/1.73m2    GFR est non-AA 54 (L) >60 ml/min/1.73m2    Calcium 8.6 8.5 - 10.1 mg/dL       Visit Vitals  BP (!) 185/83 (BP 1 Location: Left upper arm, BP Patient Position: At rest)   Pulse 60   Temp 98.6 °F (37 °C)   Resp 18   Ht 5' 8\" (1.727 m)   Wt 91.5 kg (201 lb 11.5 oz)   SpO2 93%   BMI 30.67 kg/m²       Imaging:  XR CHEST PORT   Final Result   Mild basilar airspace disease: atelectasis or edema favored over pneumonia. Probable cardiomegaly. CT HEAD WO CONT   Final Result   No acute intracranial process. Imaging findings consistent with mild chronic microvascular ischemic change. There is a moderate to severe degree of cerebral atrophy. XR CHEST PORT   Final Result   Persistent or recurrent bibasilar opacities. Two-view radiography   would be more sensitive and specific in this regard.

## 2022-09-04 NOTE — PROGRESS NOTES
Respiratory documentation noted. O2 need not verified. Leavy Kussmaul, PA paged. May DC O2 order. [facility] has accepted the pt for PT/OT services at home.   Their [facility] date will be Tues 09/06/2022

## 2022-09-04 NOTE — PROGRESS NOTES
Problem: Falls - Risk of  Goal: *Absence of Falls  Description: Document [place] Fall Risk and appropriate interventions in the flowsheet.   Outcome: Progressing Towards Goal  Note: Fall Risk Interventions:  Mobility Interventions: Bed/chair exit alarm    Mentation Interventions: Bed/chair exit alarm    Medication Interventions: Patient to call before getting OOB    Elimination Interventions: Call light in reach    History of Falls Interventions: Bed/chair exit alarm

## 2022-09-04 NOTE — PROGRESS NOTES
Tiffanie Lemon in CM aware and verbalizes understanding of DME nebulizer for home duo neb administration.

## 2022-09-04 NOTE — PROGRESS NOTES
Arrived to the [facility]. Milena completed. Patient tolerated well. Any issues or concerns during appointment: None. Patient aware of next infusion appointment on September 19th at 1530. Discharged ambulatory. Pulmonary and Critical Care progress note    Subjective:   Consult Note: 9/4/2022 @no control      Chief Complaint:   Chief Complaint   Patient presents with    Altered mental status    Hypotension        This patient has been seen and evaluated at the request of Dr. Haritha Mancuso    Patient seen and examined  Overnight events noted    Much more awake and alert today  Answering simple questions appropriately  No acute distress  Sitting out in the chair today  Has tremor of right hand mostly intentional tremor    Review of Systems:  Review of systems not obtained due to patient factors.        Current Facility-Administered Medications   Medication Dose Route Frequency Provider Last Rate Last Admin    guaiFENesin ER (MUCINEX) tablet 1,200 mg  1,200 mg Oral Q12H Zayra Copeland PA   1,200 mg at 09/04/22 4096    benzonatate (TESSALON) capsule 100 mg  100 mg Oral TID PRN DIANA Crouch        levoFLOXacin (LEVAQUIN) 750 mg in D5W IVPB  750 mg IntraVENous Q24H Elizabeth Baron  mL/hr at 09/03/22 1514 750 mg at 09/03/22 1514    0.9% sodium chloride infusion  75 mL/hr IntraVENous CONTINUOUS Jan Del Rio MD 75 mL/hr at 09/03/22 0416 75 mL/hr at 09/03/22 0416    albuterol-ipratropium (DUO-NEB) 2.5 MG-0.5 MG/3 ML  3 mL Nebulization Q4H PRN Jan Del Rio MD   3 mL at 09/02/22 0235    prednisoLONE acetate (PRED FORTE) 1 % ophthalmic suspension 1 Drop (Patient Supplied)  1 Drop Right Eye QID Jan Del Rio MD   1 Drop at 09/04/22 0917    ofloxacin (FLOXIN) 0.3 % ophthalmic solution 1 Drop (Patient Supplied)  1 Drop Right Eye QID Jan Del Rio MD   1 Drop at 09/04/22 0917    piperacillin-tazobactam (ZOSYN) 3.375 g in 0.9% sodium chloride (MBP/ADV) 100 mL MBP  3.375 g IntraVENous Q8H Elizabeth Baron MD 25 mL/hr at 09/04/22 0718 3.375 g at 09/04/22 0718    amiodarone (CORDARONE) tablet 100 mg  100 mg Oral DAILY Aydee Mays MD   100 mg at 09/04/22 0917    atorvastatin (LIPITOR) tablet 80 mg  80 mg Oral QHS Linda Campos MD   80 mg at 22    melatonin tablet 5 mg  5 mg Oral QHS PRN Linda Campos MD   5 mg at 22 232    metoprolol succinate (TOPROL-XL) XL tablet 25 mg  25 mg Oral DAILY Linda Campos MD   25 mg at 22 0917    sodium chloride (NS) flush 5-40 mL  5-40 mL IntraVENous Q8H Linda Campos MD   10 mL at 22 0708    sodium chloride (NS) flush 5-40 mL  5-40 mL IntraVENous PRN Linda Campos MD        acetaminophen (TYLENOL) tablet 650 mg  650 mg Oral Q6H PRN Linda Campos MD        Or    acetaminophen (TYLENOL) suppository 650 mg  650 mg Rectal Q6H PRN Linda Campos MD        polyethylene glycol (MIRALAX) packet 17 g  17 g Oral DAILY PRN Linda Campos MD        ondansetron (ZOFRAN ODT) tablet 4 mg  4 mg Oral Q8H PRN Linda Campos MD        Or    ondansetron [facility]) injection 4 mg  4 mg IntraVENous Q6H PRN Linda Campos MD        haloperidoL (HALDOL) tablet 5 mg  5 mg Oral TID PRN Linda Campos MD   5 mg at 22          Allergies   Allergen Reactions    Monosodium Glutamate Anaphylaxis and Hives    Adhesive Tape-Silicones Other (comments)     BLISTERS           Objective:     Blood pressure (!) 185/83, pulse 60, temperature 98.6 °F (37 °C), resp. rate 18, height 5' 8\" (1.727 m), weight 91.5 kg (201 lb 11.5 oz), SpO2 93 %. Temp (24hrs), Av.3 °F (36.8 °C), Min:97.4 °F (36.3 °C), Max:98.6 °F (37 °C)      Intake and Output:  Current Shift: No intake/output data recorded. Last 3 Shifts:  1901 -  0700  In: 2618 [P.O.:600;  I.V.:2018]  Out: 1100 [Urine:1100]    Intake/Output Summary (Last 24 hours) at 2022 1148  Last data filed at 9/3/2022 2212  Gross per 24 hour   Intake 550 ml   Output 700 ml   Net -150 ml        Physical Exam:     General: Lying in bed comfortably, no acute distress, pleasantly confused, sleepy  Eye: Reactive, symmetric  Throat and Neck: Supple,   Lung: Reduced air entry bilaterally . Occasional crackles. Heart: S1+S2. No murmurs  Abdomen: soft, non-tender. Bowel sounds normal. No masses; obese  Extremities: No edema having pill-rolling tremor of both upper extremities  : Not done  Skin: No cyanosis  Neurologic: Sleepy and lethargic, pleasantly confused, grossly nonfocal  Psychiatric: Unable to perform due to patient's condition      Lab/Data Review:    Recent Results (from the past 24 hour(s))   CBC W/O DIFF    Collection Time: 09/04/22  8:57 AM   Result Value Ref Range    WBC 17.5 (H) 4.1 - 11.1 K/uL    RBC 3.59 (L) 4.10 - 5.70 M/uL    HGB 11.5 (L) 12.1 - 17.0 g/dL    HCT 34.4 (L) 36.6 - 50.3 %    MCV 95.8 80.0 - 99.0 FL    MCH 32.0 26.0 - 34.0 PG    MCHC 33.4 30.0 - 36.5 g/dL    RDW 13.5 11.5 - 14.5 %    PLATELET 067 464 - 579 K/uL    MPV 10.3 8.9 - 12.9 FL    NRBC 0.1 (H) 0.0  WBC    ABSOLUTE NRBC 0.02 (H) 0.00 - 1.96 K/uL   METABOLIC PANEL, BASIC    Collection Time: 09/04/22  8:57 AM   Result Value Ref Range    Sodium 143 136 - 145 mmol/L    Potassium 3.7 3.5 - 5.1 mmol/L    Chloride 110 (H) 97 - 108 mmol/L    CO2 29 21 - 32 mmol/L    Anion gap 4 (L) 5 - 15 mmol/L    Glucose 165 (H) 65 - 100 mg/dL    BUN 16 6 - 20 mg/dL    Creatinine 1.27 0.70 - 1.30 mg/dL    BUN/Creatinine ratio 13 12 - 20      GFR est AA >60 >60 ml/min/1.73m2    GFR est non-AA 54 (L) >60 ml/min/1.73m2    Calcium 8.6 8.5 - 10.1 mg/dL       XR CHEST PORT   Final Result   Mild basilar airspace disease: atelectasis or edema favored over pneumonia. Probable cardiomegaly. CT HEAD WO CONT   Final Result   No acute intracranial process. Imaging findings consistent with mild chronic microvascular ischemic change. There is a moderate to severe degree of cerebral atrophy. XR CHEST PORT   Final Result   Persistent or recurrent bibasilar opacities. Two-view radiography   would be more sensitive and specific in this regard.           CT Results  (Last 48 hours)      None Assessment:     1. Acute respiratory failure with hypoxia  2. Bilateral pneumonia  3. Sepsis  4. Altered mental status  5. Acute metabolic acidosis  6. Leukocytosis  7. Acute kidney injury    Plan:     Patient admitted to the hospital  Will be watched here closely    On room air now.   Sitting out in the chair     Admitted with sepsis  Pressure improved without vasopressors   Gentle IV fluid hydration to continue  Continue broad-spectrum antibiotics Zosyn Levaquin  Culture sent  Further changes in antibiotics based on clinical response and culture results  Will follow chest x-ray for pneumonia    Altered mental status  Likely due to combination of infection along with age  Monitor clinically  CT scan negative     Monitor renal function with fluid changes  Check electrolytes and replace as needed    DVT and GI prophylaxis    Patient is full code, if declines may require transfer to ICU  Would recommend discussion of CODE STATUS and goals of care with patient's family    Patient getting discharged home today    Time spent more than 20 minutes in direct patient care with no overlap  Will sign off    Ginny Chase MD  [facility]  9/4/2022

## 2022-09-04 NOTE — DISCHARGE SUMMARY
Admit date: 8/29/2022   Admitting Provider: Han Chaparro MD    Discharge date: 9/4/2022  Discharging Provider: DIANA Albarado      * Admission Diagnoses: Sepsis [facility]) [A41.9]    * Discharge Diagnoses:    Hospital Problems as of 9/4/2022 Date Reviewed: 9/3/2022            Codes Class Noted - Resolved POA    ERICKSON (acute kidney injury) ([facility] ICD-10-CM: N17.9  ICD-9-CM: 584.9  9/3/2022 - Present Unknown        Acute metabolic encephalopathy ECI-11-WO: G93.41  ICD-9-CM: 348.31  9/3/2022 - Present Unknown        Acute respiratory failure with hypoxia ([facility] ICD-10-CM: J96.01  ICD-9-CM: 518.81  9/3/2022 - Present Unknown        Community acquired pneumonia ICD-10-CM: J18.9  ICD-9-CM: 823  9/3/2022 - Present Unknown        Dysphagia ICD-10-CM: R13.10  ICD-9-CM: 787.20  9/3/2022 - Present Unknown        Metabolic acidosis O-98-EH: E87.2  ICD-9-CM: 276.2  9/3/2022 - Present Unknown        Sepsis ([facility] ICD-10-CM: A41.9  ICD-9-CM: 038.9, 995.91  8/29/2022 - Present Unknown        Pacemaker ICD-10-CM: Z95.0  ICD-9-CM: V45.01  4/18/2018 - Present Yes           * Hospital Course:     Sterling Moore is a 81year old male with a PMH of hemorrhagic stroke x2, parkinsons disease, SSS and leukemia who presented with confusion and loose stools. In ED hypotensive and tachypneic. Initial labs significant for WBC of 14.3, hemoglobin of 11.2, creatinine of 1.72, lactic of 6, glucose of 310, and metabolic acidosis, UA unremarkable. CXR with persistent or recurrent bibasilar opacities. CT of the head with no acute intracranial process. Patient admitted for further work-up. Patient started on IV fluid, zosyn, levaquin, and given one dose of pressor. Neurology and pulmonology consulted. Echocardiogram showed EF of 55 to 60% with hypokinesis of the mid anterior septal and apical septal segments and normal diastolic function. Neurology consulted for encephalopathy from already diminished baseline. Lipids unremarkable.  TSH normal. EEG pending. PT/OT recommending SNF. Patient family would like to have patient at home with home health. Patient weaned off of oxygen. Patient to complete 5 days of Augmentin outpatient course. Patient to be discharged home with home health. Patient to follow-up with PCP, pulmonology, and neurology within 2 weeks of discharge. All questions answered at time of encounter. * Procedures:   * No surgery found *      Consults:   Neurology and pulmonology    Significant Diagnostic Studies: As discussed in hospital course    Discharge Exam:  Visit Vitals  BP (!) 185/83 (BP 1 Location: Left upper arm, BP Patient Position: At rest)   Pulse 60   Temp 98.6 °F (37 °C)   Resp 18   Ht 5' 8\" (1.727 m)   Wt 91.5 kg (201 lb 11.5 oz)   SpO2 93%   BMI 30.67 kg/m²       PHYSICAL EXAM:  Vitals and nursing note reviewed. Constitutional:       Appearance: He is obese. HENT:      Head: Normocephalic and atraumatic. Cardiovascular:      Rate and Rhythm: Normal rate and regular rhythm. Pulmonary:      Effort: No respiratory distress. Breath sounds: Wheezing present. Comments: Occasional expiratory wheezing  Abdominal:      General: Bowel sounds are normal. There is no distension. Palpations: Abdomen is soft. Tenderness: There is no abdominal tenderness. Genitourinary:     Comments: External spaulding in place  Musculoskeletal:      Right lower leg: No edema. Left lower leg: No edema. Skin:     General: Skin is warm. Capillary Refill: Capillary refill takes less than 2 seconds. Neurological:      Mental Status: He is alert and oriented to person, place, and time. Comments: Fluctuating mentation     * Discharge Condition: improved  * Disposition: Home    Discharge Medications:  Current Discharge Medication List        START taking these medications    Details   guaiFENesin ER (MUCINEX) 1,200 mg Ta12 ER tablet Take 1 Tablet by mouth every twelve (12) hours.   Qty: 14 Tablet, Refills: 0  Start date: 9/4/2022      albuterol-ipratropium (DUO-NEB) 2.5 mg-0.5 mg/3 ml nebu 3 mL by Nebulization route every four (4) hours as needed for Wheezing. Qty: 1 Each, Refills: 0  Start date: 9/4/2022      amoxicillin-clavulanate (Augmentin) 875-125 mg per tablet Take 1 Tablet by mouth daily. Qty: 5 Tablet, Refills: 0  Start date: 9/4/2022           CONTINUE these medications which have NOT CHANGED    Details   lisinopriL (PRINIVIL, ZESTRIL) 2.5 mg tablet Take 2.5 mg by mouth daily. ofloxacin (FLOXIN) 0.3 % ophthalmic solution Administer 1 Drop to right eye four (4) times daily. prednisoLONE acetate (PRED FORTE) 1 % ophthalmic suspension Administer 1 Drop to right eye four (4) times daily. metFORMIN (GLUCOPHAGE) 500 mg tablet Take 1,000 mg by mouth two (2) times daily (with meals). clobetasoL (TEMOVATE) 0.05 % topical cream Apply  to affected area two (2) times a day. As needed for psoriasis      atorvastatin (LIPITOR) 80 mg tablet Take 1 Tablet by mouth nightly. Qty: 30 Tablet, Refills: 0      amiodarone (CORDARONE) 200 mg tablet Take 100 mg by mouth daily. metoprolol succinate (TOPROL-XL) 25 mg XL tablet Take 25 mg by mouth daily. PARoxetine (PAXIL) 10 mg tablet Take 10 mg by mouth daily. STOP taking these medications       hydrOXYzine pamoate (VistariL) 25 mg capsule Comments:   Reason for Stopping:         melatonin 5 mg tablet Comments:   Reason for Stopping:         acetaminophen (TYLENOL) 325 mg tablet Comments:   Reason for Stopping:         busPIRone (BUSPAR) 15 mg tablet Comments:   Reason for Stopping:               * Follow-up Care/Patient Instructions:   Activity: PT/OT per Home Health  Diet: easy to chew/mildly thick liquids  Wound Care: None needed    Follow-up Information       Follow up With Specialties Details Why [address] [facility] DO [facility] Schedule an appointment as soon as possible for a visit in 2 week(s) Post-hospitalization follow-up [facility]  [phone]      Bernice Bryant MD Pulmonary Disease, Internal Medicine Physician Schedule an appointment as soon as possible for a visit in 2 week(s) Post-hospitalization follow-up [address]  [phone]      Whit Capone MD Neurology Schedule an appointment as soon as possible for a visit in 3 week(s) Post-hospitalization follow-up [address] [phone]              Discharge summary greater than 35 minutes spent with the patient performing discharge instructions, medication review and physical exam    Signed:  DIANA Day  9/4/2022  9:09 AM

## 2022-09-04 NOTE — PROGRESS NOTES
Received a call from the pts RN that he would need a nebulizer for DC this date. Explained CMs inability to obtain device quickly.   Referral would not expect to be satisfied by  [facility] until \"24-48 hours after it is reviewed \"  Suggested option of pt obtaining the device locally with a script by medical professional.

## 2022-09-04 NOTE — PROGRESS NOTES
Medicare pt has received, reviewed, and stated understanding of 2nd IM letter informing them of their right to appeal the discharge. Co-Signed copy has been filed in Med Rec Dept. .     Choice completed for [facility] Home Health. Form filed in Med Rec Dept. [facility]. CM to follow for Respiratory consult documenting oxygen needs. Will fax O2 order to [facility] after Resp verifies need and order is received. Attemps to contact [phone] [facility]  unsuccessful as his phone shows \"Do Not Disturb\"  Was unable to leave . Called [email or web address] [phone]  opt 2  Spoke with Hollie Hylton request faxed to [phone] for completion. Unit secretary and PA aware of fax order needs. pts preferred contact information  \"Leana\" at [phone]. His daughter-Leana-will provide transport home. The pt stated that she is aware of his DC this date. Will update chart as information becomes available.

## 2022-09-05 LAB
BACTERIA SPEC CULT: NORMAL
SPECIAL REQUESTS,SREQ: NORMAL

## 2024-09-03 NOTE — PROGRESS NOTES
1930: Bedside and Verbal shift change report given to Rory Byrd (oncoming nurse) by Breanna Nguyễn (offgoing nurse). Report included the following information SBAR, Kardex, ED Summary, Procedure Summary, MAR, Recent Results and Cardiac Rhythm AV Paced. Dual neuro assessment completed with off going Rn.    2000: Shift assessment completed. 0000: Reassessment completed. 0400: Reassessment completed. 0730: Bedside and Verbal shift change report given to 2000 Abdulkadir Demarco (oncoming nurse) by Rory Byrd (offgoing nurse). Report included the following information SBAR, Kardex, ED Summary, Procedure Summary, MAR, Recent Results and Cardiac Rhythm NSR. Consult given to Dr. Hodges and Petrona VARGAS.

## 2024-11-07 NOTE — PROGRESS NOTES
H&P reviewed. The patient was examined and there are no changes to the H&P.   Pacemaker office check  Chargeable visit  See scan for report

## 2024-11-22 NOTE — PROGRESS NOTES
Add cinnamon into half and half.    Aim for 65 grams protein/day.    25-30 grams/meal.     3 PM nuts/seeds.     Eat within 1-3 hours upon waking.     Meals between 7 or 9 AM and 7 PM.     6 days on, 1 day off.     My Net Diary for tracking.    Add psyllium husk daily. [employer].     Build up to 35-40 grams of fiber/day.     Aim for 64 oz of water/day.    Aim for a total of:  2 fruits a day (avocado, tomato, citrus/oranges, apples, berries)  1/2 cup or medium size    4 non-starchy vegetables/day (1/2 cup cooked; 1 cup raw) (greens, peppers, onions, garlic, broccoli, cauliflower, brussels sprouts, asparagus, etc.)    0-2 starches/day (oatmeal, sweet potato, carrots, brown rice, etc).    3 protein per day (fish, seafood, meat, or plants: salmon, nuts, seeds, shrimp, chicken, turkey, beans, lentils, chickpeas, etc).    2 healthy fats (avocado, avocado oil, olives, olive oil, salmon, nuts, seeds)    I recommend a whole food, plant powered diet with low glycemic index:     Aim for 3 meals a day and 1-2 snacks as needed.    Aim for a protein + produce at each meal time.     Breakfast ideas:  1. Fruit and nuts/seeds.  2. Eggs scrambled with vegetables.  3. Oatmeal (stovetop), cinnamon, 2 tbsp flaxseed, berries, nuts.  4. Protein shake + fruit. (1 scoop with water/ice). Garden of Life Fit, Nutiva, Gore, and Orgain are good brands.      Snacks:  Raw vegetables and hummus, apples and peanut butter, nuts, seeds, fruit, pecans drizzled with organic honey.      Use the Healthy Plate method for lunch and dinner:  1/2 right side of plate non-starchy vegetables.  Bottom left 1/4 plate protein.  Top left 1/4 starch as desired.      Aim for 150 minutes moderate level exercise weekly with 2-3 days strength training.    Add Magnesium glycinate 200 to 500 mg/day for sleep.   Life Extension. NOW. Garden of Life. Whole Food Brand. Mary[employer]uth's. Pure Encapsulations.     Or consider Natural Calm.   by Natural Vitality  Follow dosage  Hospitalist Progress Note      Hospital summary: 81 y.o man w/ DM, HTN, CVA, hyperlipidemia, Parkinson's disease, who was admitted for a traumatic subdural hematoma. He is s/p crani on 1/20. Assessment/Plan:  Traumatic SDH: s/p crani 1/20  -NSG following  -continue Keppra  -was on Precedexm then seroquel in the ICU- since discontinued    Dysphagia: likely due to Parkinsons. Aspiration with all liquid consistencies on FEES. I spoke to his daughter regarding options - feeding tube vs allow diet with transition to hospice (can eat while acknowledging risk however from a long term perspective hospice would be appropriate since he is declining Parkinson's treatment and chronic aspiration is not sustainable). HTN:   -keep SBP<140    Type 2 DM:  -SSI/POC checks  -hold PO meds    Parkinson's disease: reportedly declined meds as an outpatient. Per his daughter he was taking Sinement but felt like it wasn't helping except at night time for sleep, so he decided not to see his neurologist again, and she weaned him off the medication. CAD:   -hold aspirin    AMS: delirium due to acute illness/hospitalization- resolving  -stopped quetiapine -much improved today    Has a pacemaker for bradycardia. Unclear why he is on amiodarone. Code status: DNR  DVT prophylaxis: SCDs  Disposition: TBD  ----------------------------------------------    CC: f/u SDH    S: poor historian, not offering any complaints     Review of Systems:  Pertinent items are noted in HPI.     O:  Visit Vitals  BP (!) 147/76   Pulse 60   Temp 97.1 °F (36.2 °C)   Resp 23   Ht 5' 7\" (1.702 m)   Wt 84 kg (185 lb 3 oz)   SpO2 94%   BMI 29.00 kg/m²     Patient Vitals for the past 24 hrs:   Temp Pulse Resp BP SpO2   01/28/22 2136 -- 60 -- (!) 147/76 --   01/28/22 1757 97.1 °F (36.2 °C) 60 23 (!) 144/72 94 %   01/28/22 1400 -- 76 -- -- --   01/28/22 1350 97.9 °F (36.6 °C) 60 21 120/77 95 %   01/28/22 1200 -- 60 -- -- --   01/28/22 1000 -- 60 -- -- --   01/28/22 0949 98.1 °F (36.7 °C) 60 21 120/63 94 %   01/28/22 0822 -- 60 -- (!) 150/72 --   01/28/22 0607 98.4 °F (36.9 °C) 60 18 (!) 143/64 91 %   01/28/22 0605 98.3 °F (36.8 °C) 67 12 (!) 141/61 94 %   01/28/22 0553 -- 60 -- -- --   01/28/22 0407 -- 64 -- -- --   01/28/22 0216 98.5 °F (36.9 °C) 60 18 (!) 127/51 94 %       PHYSICAL EXAM:  Gen: NAD, non-toxic  HEENT: anicteric sclerae, normal conjunctiva, s/p crani  Neck: supple, trachea midline, no adenopathy  Heart: RRR, no MRG, no JVD, no peripheral edema  Lungs: CTA b/l, non-labored respirations  Abd: soft, NT, ND, BS+  Extr: warm  Skin: dry, no rash  Neuro: CN II-XII grossly intact  Psych: not agitation, flat affect    No intake or output data in the 24 hours ending 01/28/22 2228     Recent labs & imaging reviewed:  No results found for this or any previous visit (from the past 24 hour(s)). Recent Labs     01/27/22 0107 01/26/22 0212   WBC 11.3* 13.1*   HGB 12.6 11.7*   HCT 40.1 36.5*    195     Recent Labs     01/27/22 0107 01/26/22 0212    143   K 4.2 4.1   * 111*   CO2 28 28   BUN 22* 25*   CREA 1.05 1.21   * 148*   CA 8.8 8.7   MG  --  2.2   PHOS  --  2.4*     No results for input(s): ALT, AP, TBIL, TBILI, TP, ALB, GLOB, GGT, AML, LPSE in the last 72 hours. No lab exists for component: SGOT, GPT, AMYP, HLPSE  No results for input(s): INR, PTP, APTT, INREXT, INREXT in the last 72 hours. No results for input(s): FE, TIBC, PSAT, FERR in the last 72 hours. No results found for: FOL, RBCF   Recent Labs     01/26/22  1701   PH 7.45   PCO2 43   PO2 65*     No results for input(s): CPK, CKNDX, TROIQ in the last 72 hours.     No lab exists for component: CPKMB  Lab Results   Component Value Date/Time    Cholesterol, total 110 07/29/2021 11:30 AM    HDL Cholesterol 31 07/29/2021 11:30 AM    LDL, calculated 25.2 07/29/2021 11:30 AM    Triglyceride 269 (H) 07/29/2021 11:30 AM    CHOL/HDL Ratio 3.5 07/29/2021 instructions.  Start 1 teaspoon and increase up to 3 teaspoons as needed for sleep.     11:30 AM     Lab Results   Component Value Date/Time    Glucose (POC) 147 (H) 01/27/2022 09:49 PM    Glucose (POC) 112 01/27/2022 04:27 PM    Glucose (POC) 147 (H) 01/27/2022 11:17 AM    Glucose (POC) 138 (H) 01/27/2022 07:11 AM    Glucose (POC) 156 (H) 01/26/2022 09:53 PM     Lab Results   Component Value Date/Time    Color YELLOW/STRAW 01/26/2022 03:36 PM    Appearance CLEAR 01/26/2022 03:36 PM    Specific gravity 1.023 01/26/2022 03:36 PM    Specific gravity 1.016 10/28/2021 11:48 AM    pH (UA) 7.0 01/26/2022 03:36 PM    Protein TRACE (A) 01/26/2022 03:36 PM    Glucose Negative 01/26/2022 03:36 PM    Ketone 15 (A) 01/26/2022 03:36 PM    Bilirubin Negative 01/26/2022 03:36 PM    Urobilinogen 4.0 (H) 01/26/2022 03:36 PM    Nitrites Negative 01/26/2022 03:36 PM    Leukocyte Esterase Negative 01/26/2022 03:36 PM    Epithelial cells FEW 01/26/2022 03:36 PM    Bacteria Negative 01/26/2022 03:36 PM    WBC 0-4 01/26/2022 03:36 PM    RBC 10-20 01/26/2022 03:36 PM       Med list reviewed  Current Facility-Administered Medications   Medication Dose Route Frequency    hydrOXYzine HCL (ATARAX) tablet 10 mg  10 mg Oral TID    [Held by provider] QUEtiapine (SEROquel) tablet 25 mg  25 mg Oral QPM    levETIRAcetam (KEPPRA) tablet 1,000 mg  1,000 mg Oral Q12H    metoprolol tartrate (LOPRESSOR) tablet 12.5 mg  12.5 mg Oral Q12H    miconazole (MICOTIN) 2 % powder   Topical BID    ondansetron (ZOFRAN) injection 4 mg  4 mg IntraVENous Q6H PRN    naloxone (NARCAN) injection 0.4 mg  0.4 mg IntraVENous PRN    acetaminophen (TYLENOL) tablet 650 mg  650 mg Oral Q4H PRN    Or    acetaminophen (TYLENOL) solution 650 mg  650 mg Per NG tube Q4H PRN    Or    acetaminophen (TYLENOL) suppository 650 mg  650 mg Rectal Q4H PRN    lisinopriL (PRINIVIL, ZESTRIL) tablet 2.5 mg  2.5 mg Oral DAILY    busPIRone (BUSPAR) tablet 15 mg  15 mg Oral BID    PARoxetine (PAXIL) tablet 10 mg  10 mg Oral DAILY    amiodarone (CORDARONE) tablet 100 mg 100 mg Oral DAILY    docusate (COLACE) 50 mg/5 mL oral liquid 100 mg  100 mg Oral DAILY PRN    bisacodyL (DULCOLAX) suppository 10 mg  10 mg Rectal DAILY PRN       Care Plan discussed with:  Patient/Family, Nurse and     German Davis MD  Internal Medicine  Date of Service: 1/28/2022

## (undated) DEVICE — SURGICAL PROCEDURE KIT CRANIOTOMY

## (undated) DEVICE — GLOVE SURG SZ 65 L12IN FNGR THK94MIL STD WHT LTX FREE

## (undated) DEVICE — SPONGE GZ W4XL4IN COT 12 PLY TYP VII WVN C FLD DSGN

## (undated) DEVICE — CLOSED WOUND DRAIN, FLAT, SILICONE, WITH CLOTSTOP®: Brand: AXIOM® CWV FACIAL ATRAUM™ WITH CLOTSTOP®

## (undated) DEVICE — SUTURE VCRL SZ 2-0 L18IN ABSRB UD L26MM CP-2 1/2 CIR REV J762D

## (undated) DEVICE — SOLUTION IV 250ML 0.9% SOD CHL CLR INJ FLX BG CONT PRT CLSR

## (undated) DEVICE — TOOL 9AC90 LEGEND 9CM 9MM AC: Brand: MIDAS REX

## (undated) DEVICE — DRAPE FLD WRM W44XL66IN C6L FOR INTRATEMP SYS THERMABASIN

## (undated) DEVICE — SUTURE VCRL RAPIDE SZ 3-0 L18IN ABSRB UD PS-2 L19MM 3/8 CIR VR497

## (undated) DEVICE — TOOL F2/8TA23 LEGEND 8CM 2.3MM TAPER: Brand: MIDAS REX™

## (undated) DEVICE — TOOL 8TA11 LEGEND 8CM 1.1MM TA: Brand: MIDAS REX ™

## (undated) DEVICE — PAD,ABDOMINAL,5"X9",ST,LF,25/BX: Brand: MEDLINE INDUSTRIES, INC.

## (undated) DEVICE — SURGIFOAM SPNG SZ 12-7

## (undated) DEVICE — AGENT HEMSTAT W2XL14IN OXIDIZED REGENERATED CELOS ABSRB FOR

## (undated) DEVICE — SUTURE VCRL SZ 0 L18IN ABSRB VLT L36MM CT-1 1/2 CIR J740D

## (undated) DEVICE — SOLUTION IRRIG 1000ML 0.9% SOD CHL USP POUR PLAS BTL

## (undated) DEVICE — HANDLE LT SNAP ON ULT DURABLE LENS FOR TRUMPF ALC DISPOSABLE

## (undated) DEVICE — SOLUTION IRRIG 1000ML STRL H2O USP PLAS POUR BTL

## (undated) DEVICE — DRAIN SURG FLAT W7MMXL20CM FULL PERF

## (undated) DEVICE — FLOSEAL HEMOSTATIC MATRIX, 10ML: Brand: FLOSEAL HEMOSTATIC MATRIX

## (undated) DEVICE — SUTURE NRLN SZ 4-0 L18IN NONABSORBABLE BLK L13MM TF 1/2 CIR C584D

## (undated) DEVICE — SUT ETHLN 2-0 18IN FS BLK --

## (undated) DEVICE — TOOL 9MH30 LEGEND 9CM 3MM MH: Brand: MIDAS REX

## (undated) DEVICE — RESERVOIR,SUCTION,100CC,SILICONE: Brand: MEDLINE

## (undated) DEVICE — GARMENT,MEDLINE,DVT,INT,CALF,MED, GEN2: Brand: MEDLINE